# Patient Record
Sex: FEMALE | Race: OTHER | NOT HISPANIC OR LATINO | URBAN - METROPOLITAN AREA
[De-identification: names, ages, dates, MRNs, and addresses within clinical notes are randomized per-mention and may not be internally consistent; named-entity substitution may affect disease eponyms.]

---

## 2021-01-26 ENCOUNTER — INPATIENT (INPATIENT)
Facility: HOSPITAL | Age: 61
LOS: 9 days | Discharge: LEFT BEFORE DISCHARGE | End: 2021-02-05
Attending: INTERNAL MEDICINE | Admitting: INTERNAL MEDICINE
Payer: MEDICARE

## 2021-01-26 VITALS
HEART RATE: 91 BPM | RESPIRATION RATE: 16 BRPM | DIASTOLIC BLOOD PRESSURE: 104 MMHG | SYSTOLIC BLOOD PRESSURE: 232 MMHG | OXYGEN SATURATION: 100 % | TEMPERATURE: 97 F

## 2021-01-26 DIAGNOSIS — I10 ESSENTIAL (PRIMARY) HYPERTENSION: ICD-10-CM

## 2021-01-26 DIAGNOSIS — E78.5 HYPERLIPIDEMIA, UNSPECIFIED: ICD-10-CM

## 2021-01-26 DIAGNOSIS — F41.9 ANXIETY DISORDER, UNSPECIFIED: ICD-10-CM

## 2021-01-26 DIAGNOSIS — I63.9 CEREBRAL INFARCTION, UNSPECIFIED: ICD-10-CM

## 2021-01-26 DIAGNOSIS — U07.1 COVID-19: ICD-10-CM

## 2021-01-26 DIAGNOSIS — Z98.51 TUBAL LIGATION STATUS: Chronic | ICD-10-CM

## 2021-01-26 DIAGNOSIS — E11.9 TYPE 2 DIABETES MELLITUS WITHOUT COMPLICATIONS: ICD-10-CM

## 2021-01-26 DIAGNOSIS — Z98.891 HISTORY OF UTERINE SCAR FROM PREVIOUS SURGERY: Chronic | ICD-10-CM

## 2021-01-26 DIAGNOSIS — Z98.890 OTHER SPECIFIED POSTPROCEDURAL STATES: Chronic | ICD-10-CM

## 2021-01-26 DIAGNOSIS — F20.9 SCHIZOPHRENIA, UNSPECIFIED: ICD-10-CM

## 2021-01-26 DIAGNOSIS — E87.1 HYPO-OSMOLALITY AND HYPONATREMIA: ICD-10-CM

## 2021-01-26 DIAGNOSIS — Z29.9 ENCOUNTER FOR PROPHYLACTIC MEASURES, UNSPECIFIED: ICD-10-CM

## 2021-01-26 LAB
ALBUMIN SERPL ELPH-MCNC: 3.6 G/DL — SIGNIFICANT CHANGE UP (ref 3.3–5)
ALP SERPL-CCNC: 92 U/L — SIGNIFICANT CHANGE UP (ref 40–120)
ALT FLD-CCNC: 16 U/L — SIGNIFICANT CHANGE UP (ref 4–33)
ANION GAP SERPL CALC-SCNC: 15 MMOL/L — HIGH (ref 7–14)
APPEARANCE UR: CLEAR — SIGNIFICANT CHANGE UP
APTT BLD: 31.5 SEC — SIGNIFICANT CHANGE UP (ref 27–36.3)
AST SERPL-CCNC: 25 U/L — SIGNIFICANT CHANGE UP (ref 4–32)
BASE EXCESS BLDV CALC-SCNC: -0.9 MMOL/L — SIGNIFICANT CHANGE UP (ref -3–2)
BASOPHILS # BLD AUTO: 0.01 K/UL — SIGNIFICANT CHANGE UP (ref 0–0.2)
BASOPHILS NFR BLD AUTO: 0.3 % — SIGNIFICANT CHANGE UP (ref 0–2)
BILIRUB SERPL-MCNC: 0.4 MG/DL — SIGNIFICANT CHANGE UP (ref 0.2–1.2)
BILIRUB UR-MCNC: NEGATIVE — SIGNIFICANT CHANGE UP
BUN SERPL-MCNC: 18 MG/DL — SIGNIFICANT CHANGE UP (ref 7–23)
CALCIUM SERPL-MCNC: 9.5 MG/DL — SIGNIFICANT CHANGE UP (ref 8.4–10.5)
CHLORIDE SERPL-SCNC: 96 MMOL/L — LOW (ref 98–107)
CO2 SERPL-SCNC: 23 MMOL/L — SIGNIFICANT CHANGE UP (ref 22–31)
COLOR SPEC: SIGNIFICANT CHANGE UP
CREAT SERPL-MCNC: 0.72 MG/DL — SIGNIFICANT CHANGE UP (ref 0.5–1.3)
DIFF PNL FLD: NEGATIVE — SIGNIFICANT CHANGE UP
EOSINOPHIL # BLD AUTO: 0 K/UL — SIGNIFICANT CHANGE UP (ref 0–0.5)
EOSINOPHIL NFR BLD AUTO: 0 % — SIGNIFICANT CHANGE UP (ref 0–6)
GLUCOSE BLDC GLUCOMTR-MCNC: 251 MG/DL — HIGH (ref 70–99)
GLUCOSE BLDC GLUCOMTR-MCNC: 333 MG/DL — HIGH (ref 70–99)
GLUCOSE SERPL-MCNC: 336 MG/DL — HIGH (ref 70–99)
GLUCOSE UR QL: ABNORMAL
HCO3 BLDV-SCNC: 23 MMOL/L — SIGNIFICANT CHANGE UP (ref 20–27)
HCT VFR BLD CALC: 41.6 % — SIGNIFICANT CHANGE UP (ref 34.5–45)
HGB BLD-MCNC: 13.6 G/DL — SIGNIFICANT CHANGE UP (ref 11.5–15.5)
IANC: 2.58 K/UL — SIGNIFICANT CHANGE UP (ref 1.5–8.5)
IMM GRANULOCYTES NFR BLD AUTO: 0.5 % — SIGNIFICANT CHANGE UP (ref 0–1.5)
INR BLD: 0.99 RATIO — SIGNIFICANT CHANGE UP (ref 0.88–1.16)
KETONES UR-MCNC: ABNORMAL
LEUKOCYTE ESTERASE UR-ACNC: ABNORMAL
LYMPHOCYTES # BLD AUTO: 0.95 K/UL — LOW (ref 1–3.3)
LYMPHOCYTES # BLD AUTO: 24.2 % — SIGNIFICANT CHANGE UP (ref 13–44)
MCHC RBC-ENTMCNC: 29.6 PG — SIGNIFICANT CHANGE UP (ref 27–34)
MCHC RBC-ENTMCNC: 32.7 GM/DL — SIGNIFICANT CHANGE UP (ref 32–36)
MCV RBC AUTO: 90.4 FL — SIGNIFICANT CHANGE UP (ref 80–100)
MONOCYTES # BLD AUTO: 0.36 K/UL — SIGNIFICANT CHANGE UP (ref 0–0.9)
MONOCYTES NFR BLD AUTO: 9.2 % — SIGNIFICANT CHANGE UP (ref 2–14)
NEUTROPHILS # BLD AUTO: 2.58 K/UL — SIGNIFICANT CHANGE UP (ref 1.8–7.4)
NEUTROPHILS NFR BLD AUTO: 65.8 % — SIGNIFICANT CHANGE UP (ref 43–77)
NITRITE UR-MCNC: NEGATIVE — SIGNIFICANT CHANGE UP
NRBC # BLD: 0 /100 WBCS — SIGNIFICANT CHANGE UP
NRBC # FLD: 0 K/UL — SIGNIFICANT CHANGE UP
PCO2 BLDV: 41 MMHG — SIGNIFICANT CHANGE UP (ref 41–51)
PH BLDV: 7.38 — SIGNIFICANT CHANGE UP (ref 7.32–7.43)
PH UR: 6.5 — SIGNIFICANT CHANGE UP (ref 5–8)
PLATELET # BLD AUTO: 135 K/UL — LOW (ref 150–400)
PO2 BLDV: 48 MMHG — HIGH (ref 35–40)
POTASSIUM SERPL-MCNC: 4.8 MMOL/L — SIGNIFICANT CHANGE UP (ref 3.5–5.3)
POTASSIUM SERPL-SCNC: 4.8 MMOL/L — SIGNIFICANT CHANGE UP (ref 3.5–5.3)
PROT SERPL-MCNC: 8.6 G/DL — HIGH (ref 6–8.3)
PROT UR-MCNC: ABNORMAL
PROTHROM AB SERPL-ACNC: 11.4 SEC — SIGNIFICANT CHANGE UP (ref 10.6–13.6)
RBC # BLD: 4.6 M/UL — SIGNIFICANT CHANGE UP (ref 3.8–5.2)
RBC # FLD: 14.3 % — SIGNIFICANT CHANGE UP (ref 10.3–14.5)
SAO2 % BLDV: 79.5 % — SIGNIFICANT CHANGE UP (ref 60–85)
SARS-COV-2 RNA SPEC QL NAA+PROBE: DETECTED
SODIUM SERPL-SCNC: 134 MMOL/L — LOW (ref 135–145)
SP GR SPEC: 1.02 — SIGNIFICANT CHANGE UP (ref 1.01–1.02)
TROPONIN T, HIGH SENSITIVITY RESULT: 19 NG/L — SIGNIFICANT CHANGE UP
UROBILINOGEN FLD QL: SIGNIFICANT CHANGE UP
WBC # BLD: 3.92 K/UL — SIGNIFICANT CHANGE UP (ref 3.8–10.5)
WBC # FLD AUTO: 3.92 K/UL — SIGNIFICANT CHANGE UP (ref 3.8–10.5)

## 2021-01-26 PROCEDURE — 0042T: CPT

## 2021-01-26 PROCEDURE — 71045 X-RAY EXAM CHEST 1 VIEW: CPT | Mod: 26

## 2021-01-26 PROCEDURE — 70496 CT ANGIOGRAPHY HEAD: CPT | Mod: 26

## 2021-01-26 PROCEDURE — 99291 CRITICAL CARE FIRST HOUR: CPT | Mod: CS

## 2021-01-26 PROCEDURE — 70498 CT ANGIOGRAPHY NECK: CPT | Mod: 26

## 2021-01-26 PROCEDURE — 70450 CT HEAD/BRAIN W/O DYE: CPT | Mod: 26,59

## 2021-01-26 PROCEDURE — 99223 1ST HOSP IP/OBS HIGH 75: CPT

## 2021-01-26 RX ORDER — ASPIRIN/CALCIUM CARB/MAGNESIUM 324 MG
81 TABLET ORAL DAILY
Refills: 0 | Status: DISCONTINUED | OUTPATIENT
Start: 2021-01-26 | End: 2021-01-29

## 2021-01-26 RX ORDER — DEXTROSE 50 % IN WATER 50 %
12.5 SYRINGE (ML) INTRAVENOUS ONCE
Refills: 0 | Status: DISCONTINUED | OUTPATIENT
Start: 2021-01-26 | End: 2021-02-05

## 2021-01-26 RX ORDER — CLOPIDOGREL BISULFATE 75 MG/1
300 TABLET, FILM COATED ORAL ONCE
Refills: 0 | Status: COMPLETED | OUTPATIENT
Start: 2021-01-26 | End: 2021-01-26

## 2021-01-26 RX ORDER — CLONAZEPAM 1 MG
1 TABLET ORAL
Refills: 0 | Status: DISCONTINUED | OUTPATIENT
Start: 2021-01-26 | End: 2021-02-02

## 2021-01-26 RX ORDER — SODIUM CHLORIDE 9 MG/ML
1000 INJECTION, SOLUTION INTRAVENOUS
Refills: 0 | Status: DISCONTINUED | OUTPATIENT
Start: 2021-01-26 | End: 2021-02-05

## 2021-01-26 RX ORDER — GLUCAGON INJECTION, SOLUTION 0.5 MG/.1ML
1 INJECTION, SOLUTION SUBCUTANEOUS ONCE
Refills: 0 | Status: DISCONTINUED | OUTPATIENT
Start: 2021-01-26 | End: 2021-02-05

## 2021-01-26 RX ORDER — INSULIN HUMAN 100 [IU]/ML
25 INJECTION, SOLUTION SUBCUTANEOUS
Qty: 0 | Refills: 0 | DISCHARGE

## 2021-01-26 RX ORDER — RISPERIDONE 4 MG/1
3 TABLET ORAL AT BEDTIME
Refills: 0 | Status: DISCONTINUED | OUTPATIENT
Start: 2021-01-26 | End: 2021-02-05

## 2021-01-26 RX ORDER — ATORVASTATIN CALCIUM 80 MG/1
80 TABLET, FILM COATED ORAL AT BEDTIME
Refills: 0 | Status: DISCONTINUED | OUTPATIENT
Start: 2021-01-26 | End: 2021-02-05

## 2021-01-26 RX ORDER — SODIUM CHLORIDE 9 MG/ML
1000 INJECTION, SOLUTION INTRAVENOUS
Refills: 0 | Status: DISCONTINUED | OUTPATIENT
Start: 2021-01-26 | End: 2021-01-26

## 2021-01-26 RX ORDER — LABETALOL HCL 100 MG
10 TABLET ORAL ONCE
Refills: 0 | Status: COMPLETED | OUTPATIENT
Start: 2021-01-26 | End: 2021-01-26

## 2021-01-26 RX ORDER — INSULIN LISPRO 100/ML
VIAL (ML) SUBCUTANEOUS
Refills: 0 | Status: DISCONTINUED | OUTPATIENT
Start: 2021-01-26 | End: 2021-01-26

## 2021-01-26 RX ORDER — CLOPIDOGREL BISULFATE 75 MG/1
75 TABLET, FILM COATED ORAL DAILY
Refills: 0 | Status: DISCONTINUED | OUTPATIENT
Start: 2021-01-27 | End: 2021-02-05

## 2021-01-26 RX ORDER — ENOXAPARIN SODIUM 100 MG/ML
40 INJECTION SUBCUTANEOUS DAILY
Refills: 0 | Status: DISCONTINUED | OUTPATIENT
Start: 2021-01-26 | End: 2021-02-05

## 2021-01-26 RX ORDER — TRAZODONE HCL 50 MG
200 TABLET ORAL AT BEDTIME
Refills: 0 | Status: DISCONTINUED | OUTPATIENT
Start: 2021-01-26 | End: 2021-02-05

## 2021-01-26 RX ORDER — DEXTROSE 50 % IN WATER 50 %
15 SYRINGE (ML) INTRAVENOUS ONCE
Refills: 0 | Status: DISCONTINUED | OUTPATIENT
Start: 2021-01-26 | End: 2021-02-05

## 2021-01-26 RX ORDER — DEXTROSE 50 % IN WATER 50 %
25 SYRINGE (ML) INTRAVENOUS ONCE
Refills: 0 | Status: DISCONTINUED | OUTPATIENT
Start: 2021-01-26 | End: 2021-02-05

## 2021-01-26 RX ORDER — BENZTROPINE MESYLATE 1 MG
2 TABLET ORAL AT BEDTIME
Refills: 0 | Status: DISCONTINUED | OUTPATIENT
Start: 2021-01-26 | End: 2021-02-05

## 2021-01-26 RX ORDER — INSULIN LISPRO 100/ML
VIAL (ML) SUBCUTANEOUS EVERY 6 HOURS
Refills: 0 | Status: DISCONTINUED | OUTPATIENT
Start: 2021-01-26 | End: 2021-01-27

## 2021-01-26 RX ORDER — QUETIAPINE FUMARATE 200 MG/1
0 TABLET, FILM COATED ORAL
Qty: 0 | Refills: 0 | DISCHARGE

## 2021-01-26 RX ORDER — CLONAZEPAM 1 MG
0 TABLET ORAL
Qty: 0 | Refills: 0 | DISCHARGE

## 2021-01-26 RX ORDER — ZOLPIDEM TARTRATE 10 MG/1
1 TABLET ORAL
Qty: 0 | Refills: 0 | DISCHARGE

## 2021-01-26 RX ADMIN — Medication 10 MILLIGRAM(S): at 11:36

## 2021-01-26 RX ADMIN — Medication 200 MILLIGRAM(S): at 23:56

## 2021-01-26 RX ADMIN — Medication 81 MILLIGRAM(S): at 12:22

## 2021-01-26 RX ADMIN — SODIUM CHLORIDE 125 MILLILITER(S): 9 INJECTION, SOLUTION INTRAVENOUS at 14:32

## 2021-01-26 RX ADMIN — ENOXAPARIN SODIUM 40 MILLIGRAM(S): 100 INJECTION SUBCUTANEOUS at 19:20

## 2021-01-26 RX ADMIN — RISPERIDONE 3 MILLIGRAM(S): 4 TABLET ORAL at 23:56

## 2021-01-26 RX ADMIN — Medication 4: at 19:20

## 2021-01-26 RX ADMIN — Medication 1 MILLIGRAM(S): at 19:07

## 2021-01-26 RX ADMIN — CLOPIDOGREL BISULFATE 300 MILLIGRAM(S): 75 TABLET, FILM COATED ORAL at 12:22

## 2021-01-26 RX ADMIN — Medication 2 MILLIGRAM(S): at 23:57

## 2021-01-26 RX ADMIN — ATORVASTATIN CALCIUM 80 MILLIGRAM(S): 80 TABLET, FILM COATED ORAL at 23:56

## 2021-01-26 NOTE — H&P ADULT - HISTORY OF PRESENT ILLNESS
59 y/o female with a PmHx of HTN, DM type II, HLD, YONAS presents to the ED accompanied by her son s/p episode of right sided weakness that occurred this morning. Son reports that he found the patient on the floor by her bed this morning. When he tried to help her up and back into the bed, he noticed that she had significant R sided weakness and R facial drooping, which prompted him to call EMS. A similar episode occurred last night but son states that he did not think that episode was emergent because she is usually weak at baseline (2/2 medications) and he did not notice any hemiparesis or other symptoms concerning for a stroke. He was able to assist her back into bed last night, but after the episode this morning, she was unable to bear any weight on the R leg or move her R extremities. She is usually able to ambulate but has not been able to ambulate since the episode this morning due to the R sided weakness. Patient was able to talk to the son after the episode and denied acute confusion or disorientation. Patient denies loss of consciousness or trauma, states she fell onto carpet. Admits to R leg pain that was a 7/10 when she fell this morning, but denies any pain now. Patient admits to weakness, fatigue, constipation (normal for her), palpitations (2/2 anxiety), and chronic blurry vision and tinnitus in the L ear that she attributes to the medications that she is taking. Denies dizziness, lightheadedness, fever, chills, sweats, weight loss, abdominal pain, n/v, diarrhea, dysuria, hematuria, blood in the stools. Further denies SOB, chest pain, orthopnea, LE pain or swelling, numbness or tingling, syncope, depression or SI.  61 y/o female with a PmHx of HTN, DM type II, HLD, YONAS presents to the ED accompanied by her son, Ruben, s/p episode of right sided weakness that occurred this morning. Son reports that he found the patient on the floor by her bed this morning. When he tried to help her up and back into the bed, he noticed that she had significant R sided weakness and R facial drooping, which prompted him to call EMS. A similar episode occurred last night but son states that he did not think that episode was emergent because she is usually weak at baseline (2/2 medications) and he did not notice any hemiparesis or other symptoms concerning for a stroke. He was able to assist her back into bed last night, but after the episode this morning, she was unable to bear any weight on the R leg or move her R extremities. She is usually able to ambulate but has not been able to ambulate since the episode this morning due to the R sided weakness. Patient was able to talk to the son after the episode and denied acute confusion or disorientation. Patient denies loss of consciousness or trauma, states she fell onto carpet. Admits to R leg pain that was a 7/10 when she fell this morning, but denies any pain now. Patient admits to weakness, fatigue, constipation (normal for her), palpitations (2/2 anxiety), and chronic blurry vision and tinnitus in the L ear that she attributes to the medications that she is taking. Also reports one episode of urinary incontinence that occurred when she experienced the hemiparesis this morning which has never happened before. Denies fecal incontinence, dizziness, lightheadedness, fever, chills, sweats, weight loss, abdominal pain, n/v, diarrhea, dysuria, hematuria, blood in the stools. Further denies SOB, chest pain, orthopnea, LE pain or swelling, numbness or tingling, syncope, depression or SI.  61 y/o female with a PmHx of HTN, DM type II, HLD, Generalized Anxiety D/O presents to the ED accompanied by her son, Ruben, s/p episode of right sided weakness that occurred this morning. Son reports that he found his mom on the floor by her bed this morning. When he tried to help her up and back into the bed, he noticed that she had significant R sided weakness and R facial drooping, which prompted him to call EMS. A similar episode occurred last night but son states that he did not think that episode was emergent because she is usually weak at baseline (2/2 medications) and he did not notice any hemiparesis or other symptoms concerning for a stroke. He was able to assist her back into bed last night.  After the episode this morning, she was unable to bear any weight on the R leg or move her R extremities. She is usually able to ambulate but has not been able to since the episode this morning.  Patient was able to talk to the son after the episode and denied acute confusion or disorientation. Patient denies LOC or trauma, states she fell onto carpet.  Pt admits to R leg pain that was a 7/10 when she fell this morning, but denies any pain now. Patient admits to weakness, fatigue, constipation (normal for her), palpitations (2/2 anxiety), and chronic blurry vision and tinnitus in the L ear that she attributes to the medications that she is taking. Also reports one episode of urinary incontinence that occurred when she experienced the hemiparesis this morning which has never happened before. Pt denies fecal incontinence, dizziness, lightheadedness, fever, chills, sweats, weight loss, abdominal pain, n/v, diarrhea, dysuria, hematuria, blood in the stools. Further denies SOB, chest pain, orthopnea, numbness or tingling, syncope, depression or SI.    61 y/o female with a PmHx of HTN, DM type II, HLD, Generalized Anxiety D/O presents to the ED accompanied by her son, Ruben, s/p episode of right sided weakness that occurred this morning. Son reports that he found his mom on the floor by her bed this morning. When he tried to help her up and back into the bed, he noticed that she had significant R sided weakness and R facial drooping with slurred speech, which prompted him to call EMS. A similar episode occurred last night but son states that he did not think that episode was emergent because she is usually weak at baseline (2/2 medications) and he did not notice any hemiparesis or other symptoms concerning for a stroke. He was able to assist her back into bed last night.  After the episode this morning, she was unable to bear any weight on the R leg or move her R extremities. She is usually able to ambulate but has not been able to since the episode this morning.  Patient was able to talk to the son after the episode and denied acute confusion or disorientation. Patient denies LOC or trauma, states she fell onto carpet.  Pt admits to R leg pain that was a 7/10 when she fell this morning, but denies any pain now. Patient admits to weakness, fatigue, constipation (normal for her), palpitations (2/2 anxiety), and chronic blurry vision and tinnitus in the L ear that she attributes to the medications that she is taking. Also reports one episode of urinary incontinence that occurred when she experienced the hemiparesis this morning which has never happened before. Pt denies fecal incontinence, dizziness, lightheadedness, fever, chills, sweats, weight loss, abdominal pain, n/v, diarrhea, dysuria, hematuria, blood in the stools. Further denies SOB, chest pain, orthopnea, numbness or tingling, syncope, depression or SI.    61 y/o female with a PmHx of HTN, DM type II, HLD, Generalized Anxiety D/O, Covid 4 weeks ago presents to the ED accompanied by her son, Ruben, s/p episode of right sided weakness that occurred this morning. Son reports that he found his mom on the floor by her bed this morning. When he tried to help her up and back into the bed, he noticed that she had significant R sided weakness and R facial drooping with slurred speech, which prompted him to call EMS. A similar episode occurred last night but son states that he did not think that episode was emergent because she is usually weak at baseline (2/2 medications) and he did not notice any hemiparesis or other symptoms concerning for a stroke. He was able to assist her back into bed last night.  After the episode this morning, she was unable to bear any weight on the R leg or move her R extremities. She is usually able to ambulate but has not been able to since the episode this morning.  Patient was able to talk to the son after the episode and denied acute confusion or disorientation. Patient denies LOC or trauma, states she fell onto carpet.  Pt admits to R leg pain that was a 7/10 when she fell this morning, but denies any pain now. Patient admits to weakness, fatigue, constipation (normal for her), palpitations (2/2 anxiety), and chronic blurry vision and tinnitus in the L ear that she attributes to the medications that she is taking. Also reports one episode of urinary incontinence that occurred when she experienced the hemiparesis this morning which has never happened before. Pt denies fecal incontinence, dizziness, lightheadedness, fever, chills, sweats, weight loss, abdominal pain, n/v, diarrhea, dysuria, hematuria, blood in the stools. Further denies SOB, chest pain, orthopnea, numbness or tingling, syncope, depression or SI.    61 y/o female with a PmHx of HTN, DM type II, HLD, Generalized Anxiety D/O, Covid 4 weeks ago presents to the ED accompanied by her son, Ruben, s/p episode of right sided weakness that occurred this morning. Son reports that he found his mom on the floor by her bed this morning. When he tried to help her up and back into the bed, he noticed that she had significant R sided weakness and R facial drooping with dysarthria which prompted him to call EMS. A similar episode occurred last night but son states that he did not think that episode was emergent because she is usually weak at baseline (2/2 medications) and he did not notice any hemiparesis or other symptoms concerning for a stroke. He was able to assist her back into bed last night.  After the episode this morning, she was unable to bear any weight on the R leg or move her R extremities. She is usually able to ambulate but has not been able to since the episode this morning.  Patient was able to talk to the son after the episode and denied acute confusion or disorientation. Patient denies LOC or trauma, states she fell onto carpet.  Pt admits to R leg pain that was a 7/10 when she fell this morning, but denies any pain now. Patient admits to weakness, fatigue, constipation (normal for her), palpitations (2/2 anxiety), and chronic blurry vision and tinnitus in the L ear that she attributes to the medications that she is taking. Also reports one episode of urinary incontinence that occurred when she experienced the hemiparesis this morning which has never happened before. Pt denies fecal incontinence, dizziness, lightheadedness, fever, chills, sweats, weight loss, abdominal pain, n/v, diarrhea, dysuria, hematuria, blood in the stools. Further denies SOB, chest pain, orthopnea, numbness or tingling, syncope, depression or SI.    61 y/o female with a PmHx of HTN, DM type II, HLD, Anxiety/Depression, Schizophrenia, Covid 4 weeks ago presents to the ED accompanied by her son, Ruben, s/p episode of right sided weakness that occurred this morning. Son reports that he found his mom on the floor by her bed this morning. When he tried to help her up and back into the bed, he noticed that she had significant R sided weakness and R facial drooping with dysarthria which prompted him to call EMS. A similar episode occurred last night but son states that he did not think that episode was emergent because she is usually weak at baseline (2/2 medications) and he did not notice any hemiparesis or other symptoms concerning for a stroke. He was able to assist her back into bed last night.  After the episode this morning, she was unable to bear any weight on the R leg or move her R extremities. She is usually able to ambulate but has not been able to since the episode this morning.  Patient was able to talk to the son after the episode and denied acute confusion or disorientation. Patient denies LOC or trauma, states she fell onto carpet.  Pt admits to R leg pain that was a 7/10 when she fell this morning, but denies any pain now. Patient admits to weakness, fatigue, constipation (normal for her), palpitations (2/2 anxiety), and chronic blurry vision and tinnitus in the L ear that she attributes to the medications that she is taking. Also reports one episode of urinary incontinence that occurred when she experienced the hemiparesis this morning which has never happened before. Pt denies fecal incontinence, dizziness, lightheadedness, fever, chills, sweats, weight loss, abdominal pain, n/v, diarrhea, dysuria, hematuria, blood in the stools. Further denies SOB, chest pain, orthopnea, numbness or tingling, syncope, depression or SI.

## 2021-01-26 NOTE — H&P ADULT - PROBLEM SELECTOR PLAN 4
continue home lovastatin  check lipid panel Continue Statin.  check lipid panel Continue Statin.  Check lipid panel

## 2021-01-26 NOTE — ED PROVIDER NOTE - CLINICAL SUMMARY MEDICAL DECISION MAKING FREE TEXT BOX
60F, h.o htn, dm, p.w right facial drooping and right hemiparesis, last normal 16 hrs ago. no cva in the past. baseline health prior. no abdominal pain, concerning for cva, stroke code was called. no fever, non tachycardiac, unlikely meningitis or encephalitis. bp 230 systolic, will get labetolol for htn control. out of tpa window, will get ct perfusion.

## 2021-01-26 NOTE — ED ADULT NURSE NOTE - VOIDING
[FreeTextEntry1] : melvi olvera g nsr wnl\par meds renewed\par  blood work done waiting for results\par  cardiac ststus is stable\par  without difficulty

## 2021-01-26 NOTE — CONSULT NOTE ADULT - SUBJECTIVE AND OBJECTIVE BOX
DONTE PETERSON  Female  MRN-[id number]    HPI:    60 RHF w/ DM, HTN, COVID 4 weeks ago, ?psychiatric issues unspecified, presents w/ speech difficulties and right sided weakness.     Patient was in usual state of health yesterday at 1900 1/25. At 2000 1/25, she was found down by her  with right sided weakness. She tried to sleep this off. Woke up this morning with worsening symptoms. Brought into ED.     Code stroke called on arrivial.   NIHSS 15  LKN 1900 1/25  No tpa as outside window  CTH demonstrated hypodensity involving the L. PL of the IC, otherwise no early signs of ischemia (to my eye)   CTA h/n   CTP         NIHSS:  MRS:     ROS: All negative except as mentioned in HPI    PAST MEDICAL & SURGICAL HISTORY:    MEDICATIONS  (STANDING):    MEDICATIONS  (PRN):    Allergies    No Known Allergies    Intolerances    VITAL SIGNS:  T(F): 97  HR: 91  BP: 232/104  RR: 16  SpO2: 100%    Exam:   MS: Oriented x3. Non-fluent speech making subtle paraphasic errors. Comprehension intact. Cannot repeat.   CN: Decreased BTT on the right. Moderate-severe R. facial. EOMI.   Motor: Right side no movement. Left side full.   Sensory: Slightly decreased sensation to LT on the right  Coordination: No dysmetria on FNF or ataxia on HTS bilaterally     LABS:                RADIOLOGY & ADDITIONAL STUDIES:             DONTE PETERSON  Female  MRN-[id number]    HPI:    60 RHF w/ DM, HTN, COVID 4 weeks ago, ?psychiatric issues unspecified, presents w/ speech difficulties and right sided weakness.     Patient was in usual state of health yesterday at 1900 1/25. At 2000 1/25, she was found down by her  with right sided weakness. She tried to sleep this off. Woke up this morning with worsening symptoms. Brought into ED.     Code stroke called on arrivial.   NIHSS 15  LKN 1900 1/25  No tpa as outside window  CTH demonstrated hypodensity involving the L. PL of the IC, otherwise no early signs of ischemia (to my eye)   CTA head: multifocal intracranial stenosis; Severe stenosis involving the supraclinoid L. ICA, bilateral M1 moderate stenosis, severe stenosis in the mid R. M1. R. V4 severe stenosis.   CTA neck: Severe 85-90% stenosis involving the R. ICA orgin.   CTP 0 core 0 tmax     NIHSS: 15  MRS: 0    ROS: All negative except as mentioned in HPI    PAST MEDICAL & SURGICAL HISTORY:    MEDICATIONS  (STANDING):    MEDICATIONS  (PRN):    Allergies    No Known Allergies    Intolerances    VITAL SIGNS:  T(F): 97  HR: 91  BP: 232/104  RR: 16  SpO2: 100%    Exam:   MS: Oriented x3. Non-fluent speech making subtle paraphasic errors. Comprehension intact. Cannot repeat.   CN: Decreased BTT on the right. Moderate-severe R. facial. EOMI.   Motor: Right side no movement. Left side full.   Sensory: Slightly decreased sensation to LT on the right  Coordination: No dysmetria on FNF or ataxia on HTS bilaterally     LABS:      RADIOLOGY & ADDITIONAL STUDIES:

## 2021-01-26 NOTE — H&P ADULT - PROBLEM SELECTOR PLAN 1
admit to Telemetry for monitoring  CT Head shows moderate atherosclerotic narrowing in various vascular beds  CT Neck shows 85-90% narrowing of ICA lumen  Chest Xray shows clear lungs  will order MRI  dysphagia screening  Neurology c/s  O2 via nasal cannula  PT/OT c/s  EP c/s  Fall risk and seizure precautions  Aspiration precautions  ASA daily Admit to Telemetry for monitoring  CT Head shows moderate atherosclerotic narrowing in various vascular beds  CT Neck shows 85-90% narrowing of ICA lumen  Will order MRI Head  dysphagia screening  Neurology c/s  O2 via nasal cannula  PT/OT c/s  Permissive HTN up to 24-48 hours.  Fall risk and seizure precautions  Aspiration precautions  ASA + Plavix daily. Admit to Telemetry for monitoring  CT Head shows moderate atherosclerotic narrowing in various vascular beds  CT Neck shows 85-90% narrowing of ICA lumen  Will order MRI Head without contrast  Carotid dopplers to evaluate carotid stenosis.  dysphagia screening  Neurology c/s  O2 via nasal cannula  PT/OT c/s  Permissive HTN up to 24-48 hours.  Fall risk and seizure precautions  Aspiration precautions  ASA + Plavix daily. Admit to Telemetry for monitoring.  CT Head shows moderate atherosclerotic narrowing in various vascular beds.  CT Neck shows 85-90% narrowing of ICA lumen  Will order MRI Head without contrast.  Carotid dopplers to evaluate carotid stenosis.  Dysphagia screening  Neurology c/s  O2 via nasal cannula  PT/OT c/s  Permissive HTN up to 24-48 hours.  Fall risk and seizure precautions  Aspiration precautions.  ASA + Plavix daily for 3 months. Plavix load given. High-dose statin.   TTE with Bubble study. Admit to Telemetry for monitoring.  CT Head shows moderate atherosclerotic narrowing in various vascular beds.  CTA NECK: There is hemodynamically significant narrowing of the proximal right ICA immediately above the bifurcation with 85-90% narrowing of its lumen.  Will order MRI Head without contrast.  Carotid dopplers to evaluate carotid stenosis.  Dysphagia screening  Neurology c/s  O2 via nasal cannula  PT/OT c/s  Permissive HTN up to 24-48 hours.  Fall risk and seizure precautions  Aspiration precautions.  ASA + Plavix daily for 3 months. Plavix load given. High-dose statin.   TTE with Bubble study.

## 2021-01-26 NOTE — H&P ADULT - PROBLEM SELECTOR PLAN 2
monitor HgA1c  POC glucose monitoring monitor HgA1c  Monitor FS with Insulin Sliding Scale.   Continue........ monitor HgA1c  Monitor FS with Insulin Sliding Scale.   Continue Insulin. monitor HgA1c  Monitor FS with Insulin Sliding Scale.   Pt takes Humulin R 30 units before breakfast and QHS, converts to Lispro 20 units TID here but currently NPO, discussed with pharmacy, will use Sliding Scale q 6 hrs for now till she is able to eat.

## 2021-01-26 NOTE — ED ADULT NURSE NOTE - OBJECTIVE STATEMENT
pt brought by son found on her Bed rm floor, as per son pt slept in floor since yest, her  was not able to lift pt up, pt awake, alert, slur speech, right side arm/leg weakness, as per pt does not remember episode, no obvious bruising, no ecchymosis noted to body, pt denies HA, no blur vision, no diff. breathing, lungs clear, resp. equal. resp. 16-18b/min, Oxygen Sat. 07-99% RA, EKG NSR, IV to left Lat. Ac 18g, angio cath.   CT done as soon the pt arrive, Neurology at bed side, labs sent, medicated as ordered.    report to primary RN.      Celeste Hannah RN   (facilitator)

## 2021-01-26 NOTE — H&P ADULT - RS GEN PE MLT RESP DETAILS PC
normal/airway patent/breath sounds equal/good air movement/respirations non-labored/clear to auscultation bilaterally/no chest wall tenderness/no rales/no rhonchi/no wheezes

## 2021-01-26 NOTE — H&P ADULT - PROBLEM SELECTOR PLAN 3
currently 201/98, labetalol injectable given   normal EKG (NSR @ 93 bpm, normal EKG)  Troponin ordered  bedside cardiac monitoring currently 201/98, Labetalol given.   Permissive HTN currently 201/98, Labetalol given.   Permissive HTN up to 24-48 hours. Currently 201/98, Labetalol given.   Permissive HTN up to 24-48 hours.

## 2021-01-26 NOTE — ED PROVIDER NOTE - PHYSICAL EXAMINATION
General: NAD, good hygiene, well developed  HENT: Atraumatic, EOMI, no conjunctivae injection, moist mucosa.  Neck: normal ROM and trachea midline   Cardiovascular: RRR, S1&2, no M or R, radial pulses equal and b/l  Respiratory: CTABL, no wheezes or crackles, no decreased breath sounds  Abdominal: soft and non-tender non distended, neg for guarding, frias's sign, rovsing's sign, mcburney's sign, no CVA tenderness   Extremities: no edema of the legs/feet, DP/PT equal b/l  Skin: warm, well perfused  Neuro: right facial drooping, AOX3, EOMI. PERRLA, 2/5 strength in UE and LE on the right, unable to perform finger to nose on the right, Sensation intact in UE/LE b/l. + pronator drift on the right, gait deferred.   Psych: normal mood and affect General: NAD, good hygiene, well developed ill appearing  HENT: Atraumatic, EOMI, no conjunctivae injection, moist mucosa.  Neck: normal ROM and trachea midline   Cardiovascular: RRR, S1&2, no M or R, radial pulses equal and b/l  Respiratory: CTABL, no wheezes or crackles, no decreased breath sounds  Abdominal: soft and non-tender non distended, neg for guarding, frias's sign, rovsing's sign, mcburney's sign, no CVA tenderness   Extremities: no edema of the legs/feet, DP/PT equal b/l  Skin: warm, well perfused  Neuro: right facial drooping, AOX3, EOMI. PERRLA, 2/5 strength in UE and LE on the right, unable to perform finger to nose on the right, Sensation intact in UE/LE b/l. + pronator drift on the right, gait deferred.   Psych: normal mood and affect

## 2021-01-26 NOTE — H&P ADULT - PROBLEM SELECTOR PLAN 9
Pt had Covid 4 weeks ago and swab is positive.   Will keep pt in Isolation. Pt had Covid 4 weeks ago and swab is positive here. As per family, no paperwork regarding her positive covid test from 4 weeks ago.   Will keep pt in Isolation.

## 2021-01-26 NOTE — CONSULT NOTE ADULT - I HAVE PERSONALLY SEEN, EXAMINED, AND PARTICIPATED IN THE CARE OF THIS PATIENT.  I HAVE REVIEWED ALL PERTINENT CLINICAL INFORMATION, INCLUDING HISTORY, PHYSICAL EXAM, PLAN AND THE MEDICAL/PA/NP STUDENT’S NOTE AND AGREE EXCEPT AS NOTED.
Physician Discharge Summary     Patient ID:  Alicia Atwood  [id number]  30 y.o.  1981    Allergies: Other food    Admit Date: 8/31/2018    Discharge Date: 9/1/2018    * Admission Diagnoses: APPENDICITIS;Appendicitis    * Discharge Diagnoses:    Hospital Problems as of 9/1/2018  Date Reviewed: 8/31/2018          Codes Class Noted - Resolved POA    Appendicitis ICD-10-CM: K37  ICD-9-CM: 973  9/1/2018 - Present Unknown               Admission Condition: Fair    * Discharge Condition: improved    * Procedures: Procedure(s):  [address] [facility] [place] Hospital Course:   Normal hospital course for this procedure. Consults: None    Significant Diagnostic Studies: radiology: CT scan: appendicitis     * Disposition: Home    Discharge Medications:   Current Discharge Medication List      START taking these medications    Details   oxyCODONE-acetaminophen (PERCOCET) 5-325 mg per tablet Take 1-2 Tabs by mouth every six (6) hours as needed for Pain. Max Daily Amount: 8 Tabs. Qty: 20 Tab, Refills: 0    Associated Diagnoses: Acute appendicitis, unspecified acute appendicitis type             * Follow-up Care/Patient Instructions:   Activity: See surgical instructions  Diet: Resume previous diet  Wound Care: As directed    Follow-up Information     Follow up With Details Comments Ul. Mari Russell MD In 10 days For wound re-check [facility]  [address] [place] [address] 1306 [facility], [address] [place] [address]  [phone]              Signed:  Mariana Grimes MD  9/1/2018  1:08 PM Statement Selected no cyanosis

## 2021-01-26 NOTE — ED PROVIDER NOTE - NS ED ROS FT
GENERAL: No fever or chills, weight changes, nightsweats  EYES: no change in vision  HEENT: no dysplasia, odynophagia, ear pain, rhinorrhea, epistasis   CARDIAC: no chest pain, palpitation   PULMONARY: no productive cough or SOB  GI: no abdominal pain, no nausea or no vomiting, no diarrhea or constipation  : No changes in urination for pain/freq.   SKIN: no rashes, abnormal bruising or bleeding  NEURO: HPI   MSK: No joint pain

## 2021-01-26 NOTE — PATIENT PROFILE ADULT - HEALTH LITERACY
Melyssa Sharma MD - Primary         Assistant Staff:       Surgical Staff:  Circ-1: Charles Long RN  Scrub Tech-1: Florina Notice Duane  Event Time In   Incision Start 1231   Incision Close      Anesthesia: MAC   Estimated Blood Loss: MINIMAL  Specimens: * No specimens in log *   Findings: SEE DICTATION   Complications: NONE  Implants: * No implants in log *
no

## 2021-01-26 NOTE — ED PROVIDER NOTE - ATTENDING CONTRIBUTION TO CARE
I performed a face to face evaluation of this patient and performed a full history and physical examination on the patient.  I agree with the resident's history, physical examination, and plan of the patient.  60F, h.o htn, dm, p.w right facial drooping and right hemiparesis, last normal 16 hrs ago. no cva in the past. baseline health prior. no abdominal pain, concerning for cva, stroke code was called. no fever, non tachycardiac, unlikely meningitis or encephalitis. bp 230 systolic, will get labetolol for htn control. out of tpa window, will get ct perfusion.  Heart s1,s2 rrr lung cta abd soft nontender, right side hemiparesis, 2/5 strength throughtout, code stroke called and CT, CTA, perfusion studies done, ekg, labs and will need admission

## 2021-01-26 NOTE — CONSULT NOTE ADULT - ASSESSMENT
Impression: Expressive aphasia + RHH + R. hemiparesis + R. hemisensory loss consistent w/ L. MCA.    60 RHF w/ DM, HTN, COVID 4 weeks ago, ?psychiatric issues unspecified, presents w/ dysarthria and R. hemiapresis. No tpa as outside window. No MT as no LVO.     Code stroke called on arrivial.   NIHSS 15  LKN 1900   No tpa as outside window  CTH demonstrated hypodensity involving the L. Putamen and PL of the IC, otherwise no early signs of ischemia (to my eye). R. posteiror portion of the PL of the internal capsule.   CTA head: multifocal intracranial stenosis; Severe stenosis involving the supraclinoid L. ICA, bilateral M1 moderate stenosis, severe stenosis in the mid R. M1. R. V4 severe stenosis.   CTA neck: Severe 85-90% stenosis involving the R. ICA orgin.   CTP 0 core 0 tmax     Impression:  R. hemiparesis w/ dysarthria consistent w/ L. brain dysfunction, likely localizing to the subcortical or pontine structures. Mechanism likely secondary to Intracranial Atherosclerotic Disease (ICAD), probably related to branched  occlusion, vs. Small Vessel Disease (). The patient also has evidence of severe R. proximal ICA disease, with possible chronic infarct in the R. anterior choroidal distribution, which will make the carotid symptomatic, although these infarcts can also be related to ICAD, or even . Patient may benefit from early revascularization (within 2 weeks), with a possible R. carotid stent, especially since a stroke in the R. hemisphere can lead her to be quadriparetic.     Plan:   [] Keep BP permissive up to 220/110 for 48 hours followed by gradual normotension over 2-3 days   [] MRI brain without contrast   [] Carotid Dopplers to verify degree of carotid stenosis   [] TTE w/ bubble   [] Unlikely to need ILR as patient has convincing stroke mechanism  [] Load Plavix 300mg now; Start ASA 81mg + Plavix 75mg for 3 months, followed by ASA indefinitely per SHIV   [] Atorvastatin 80mg (titrate to LDL < 70)   [] A1c, LDL   [] Lovenox SQ for DVT prophylaxis   [] PT/OT; S/S evaluation   [] Will discuss case with Stroke attending and Interventionalist Dr. Guerrier. If she needs a stent, he will be performing it.

## 2021-01-26 NOTE — H&P ADULT - NEGATIVE GASTROINTESTINAL SYMPTOMS
no nausea/no vomiting/no diarrhea/no constipation/no change in bowel habits/no abdominal pain/no hematochezia/no jaundice

## 2021-01-26 NOTE — H&P ADULT - ASSESSMENT
59 y/o female with a PmHx of HTN, DM type II, HLD, YONAS presents to the ED accompanied by her son s/p episode of right sided weakness that occurred this morning. 59 y/o female with a PmHx of HTN, DM type II, HLD, Anxiety d/o presents to the ED accompanied by her son s/p episode of right sided weakness, R facial droop that occurred this morning. 61 y/o female with a PmHx of HTN, DM type II, HLD, Anxiety d/o presents to the ED accompanied by her son s/p episode of right sided weakness, R facial droop, dysarthria that occurred this morning.

## 2021-01-26 NOTE — STROKE CODE NOTE - NIH STROKE SCALE: 6B. MOTOR LEG, RIGHT, QM
Astigmatism / Hyperopia / Presbyopia OU - Discussed diagnosis in detail with patient- New Glasses RX given today- Continue to monitor- RTC 6 months ARMD with OCT MAC NIDDM - Discussed diagnosis in detail with patient- Stressed importance of good blood sugar control- Recommend no soda’s- No diabetic retinopathy seen on todays exam- Patient reports last A1C was 7 and last blood sugar was 195- Letter to Dr. Gavin Clifton 	- Continue to [address] [place] [address]- Discussed diagnosis in detail with patient- Discussed signs and symptoms of progression- Discussed UV protection- No treatment needed at this time - Continue to monitorARMD OU (Very Early) - Discussed diagnosis in detail with patient- Recommend  patient continue eye vitamins daily such as Preservision. Patient states that he has not been taking. Recommend starting. Sample given 1/31/20- Recommend that patient continue following the [address] patient to call or come into the office ASAP if any changes noted from today. Grid given with instructions on how to use 1/28/18- Continue to monitorPVD OU:-  Discussed findings of exam in detail with the patient. -  The risk of retinal detachment in patients with PVDs was discussed with the patient and the warning signs of retinal detachment were carefully reviewed with the patient. -  The patient was warned to return to the office or contact the ophthalmologist on call immediately if they experience signs of retinal detachment or changes in vision noted from today. -  Continue to monitor.  USMAN OU / Curlene Simper OU- Discussed diagnosis in detail with patient- Discussed signs and symptoms of progression- Recommend patient drinking plenty of water and starting Omega 3’s - Recommend Refresh or Systane Complete  throughout the day- Consider Restasis or plugs in the future if no improvement- Continue to monitorTrichalasis OD - Discussed diagnosis in detail with patient - Patient stable at this time - Continue to monitor; Dr's Notes: PCP - Gavin Clifton [facility]  1/28/19DFE  1/28/19[facility] 7/29/19OCT  07/23/2018[facility]  7/11/16 (4) No movement

## 2021-01-26 NOTE — H&P ADULT - ATTENDING COMMENTS
59 y/o female with PMH of HTN, DM type II, HLD, Anxiety, schizophrenia who presented to the ED for R sided weakness, facial droop, dysarthria that started this AM. Code stroke was called in the ED but patient was not a candidate for TPA. Found to have significant R ICA disease on imaging. CTH reviewed by neurology – apart from some hypodensity no early signs of ischemia. SBP ranged between 200-230, diastolic BP between  with vitals otherwise stable. On exam, patient appears mildly anxious, 4/5 strength on the L side, able to only move fingers and toes on the R. + R facial droop. Labs notable for hyperglycemia causing pseudohyponatremia, COVID +. Chest xray with no consolidation or effusion. EKG NSR with no acute ST changes, .    Plan:  - Neurology cs appreciated, MRI brain, carotid dopplers, echo w/ bubble study ordered  - Continue aspirin, Plavix, statin holding home clonidine and lisinopril for 48 hours for permissive HTN up to 220/110  - NPO pending dysphagia screen, monitor BG q6h  - Can get Seroquel PRN if needed, QTC < 500  - Istop reviewed, reference # [id number]  As per family they were all COVID + 1 month ago but no documented testing. Given uncertainty of timeline maintain isolation precaution for now, obtain outpatient records if available.     Rx Written	Rx Dispensed	Drug	Quantity	Days Supply	Prescriber Name	Payment Method	Dispenser  12/30/2020	01/05/2021	zolpidem tartrate 5 mg tablet	60	30	Denny Samaniego MD	[id number]  12/30/2020	01/05/2021	clonazepam 1 mg tablet	60	30	Denny Samaniego MD	[id number] 61 y/o female with PMH of HTN, DM type II, HLD, Anxiety, schizophrenia who presented to the ED for R sided weakness, facial droop, dysarthria that started this AM. Code stroke was called in the ED but patient was not a candidate for TPA. Found to have significant R ICA disease on imaging. CTH reviewed by neurology – apart from some hypodensity no early signs of ischemia. SBP ranged between 200-230, diastolic BP between  with vitals otherwise stable. On exam, patient appears mildly anxious, 4/5 strength on the L side, able to only move fingers and toes on the R. + R facial droop. Labs notable for hyperglycemia causing pseudohyponatremia, COVID +. Chest xray with no consolidation or effusion. EKG NSR with no acute ST changes, .    Plan:  - Neurology cs appreciated, MRI brain, carotid dopplers, echo w/ bubble study ordered. To consider possible R ICA intervention   - Continue aspirin, Plavix, statin holding home clonidine and lisinopril for 48 hours for permissive HTN up to 220/110  - NPO pending dysphagia screen, monitor BG q6h  - Can get Seroquel PRN if needed, QTC < 500  - Istop reviewed, reference # [id number]  As per family they were all COVID + 1 month ago but no documented testing. Given uncertainty of timeline maintain isolation precaution for now, obtain outpatient records if available.     Rx Written	Rx Dispensed	Drug	Quantity	Days Supply	Prescriber Name	Payment Method	Dispenser  12/30/2020	01/05/2021	zolpidem tartrate 5 mg tablet	60	30	Denny Samaniego MD	[id number]  12/30/2020	01/05/2021	clonazepam 1 mg tablet	60	30	Denny Samaniego MD	[id number]

## 2021-01-26 NOTE — ED ADULT TRIAGE NOTE - CHIEF COMPLAINT QUOTE
Covid+ 4 weeks ago.  Pt found by spouse this am with aphasia and right hemiparesis.  Last normal 10pm last night

## 2021-01-26 NOTE — H&P ADULT - NSICDXPASTMEDICALHX_GEN_ALL_CORE_FT
PAST MEDICAL HISTORY:  Anxiety     Hypercholesteremia     Hypertension     Type 2 diabetes mellitus     Uterine fibroid

## 2021-01-26 NOTE — ED PROVIDER NOTE - OBJECTIVE STATEMENT
60F, h.o anxiety disorder, HTN, DM, tubal ligation p.w right hemiparesis and right facial drooping with aphasia. last normal 16hrs ago. patient was found on the ground this am and son notice right sided weakness while helping her get on her bed. patient spend most of the time bedbound but able to ambulate. no change in meds.   clonazepam 1mg, clonidine .2, enalapril 20mg, lovastain 20, seroquel 200mg, risperdal 3mg

## 2021-01-26 NOTE — H&P ADULT - GASTROINTESTINAL DETAILS
normal/soft/nontender/no distention/no masses palpable/bowel sounds normal/no rebound tenderness/no guarding/no rigidity

## 2021-01-26 NOTE — H&P ADULT - MOTOR
Finger to nose, heel to shin normal on left without dysmetria. Unable to assess on the R 2/2 ongoing R sided weakness  LUE/LLE strengths 5/5 B/L, unable to assess RUE/RLE strengths 2/2 weakness Finger to nose, heel to shin normal on left without dysmetria. Unable to move Right side.   LUE/LLE strengths 5/5 B/L, unable to move RUE/RLE.

## 2021-01-27 LAB
ALBUMIN SERPL ELPH-MCNC: 3.1 G/DL — LOW (ref 3.3–5)
ALP SERPL-CCNC: 80 U/L — SIGNIFICANT CHANGE UP (ref 40–120)
ALT FLD-CCNC: 13 U/L — SIGNIFICANT CHANGE UP (ref 4–33)
ANION GAP SERPL CALC-SCNC: 12 MMOL/L — SIGNIFICANT CHANGE UP (ref 7–14)
AST SERPL-CCNC: 14 U/L — SIGNIFICANT CHANGE UP (ref 4–32)
BASOPHILS # BLD AUTO: 0 K/UL — SIGNIFICANT CHANGE UP (ref 0–0.2)
BASOPHILS NFR BLD AUTO: 0 % — SIGNIFICANT CHANGE UP (ref 0–2)
BILIRUB SERPL-MCNC: 0.6 MG/DL — SIGNIFICANT CHANGE UP (ref 0.2–1.2)
BUN SERPL-MCNC: 14 MG/DL — SIGNIFICANT CHANGE UP (ref 7–23)
CALCIUM SERPL-MCNC: 9 MG/DL — SIGNIFICANT CHANGE UP (ref 8.4–10.5)
CHLORIDE SERPL-SCNC: 94 MMOL/L — LOW (ref 98–107)
CHOLEST SERPL-MCNC: 280 MG/DL — HIGH
CO2 SERPL-SCNC: 26 MMOL/L — SIGNIFICANT CHANGE UP (ref 22–31)
CREAT SERPL-MCNC: 1.01 MG/DL — SIGNIFICANT CHANGE UP (ref 0.5–1.3)
EOSINOPHIL # BLD AUTO: 0.02 K/UL — SIGNIFICANT CHANGE UP (ref 0–0.5)
EOSINOPHIL NFR BLD AUTO: 0.6 % — SIGNIFICANT CHANGE UP (ref 0–6)
GLUCOSE BLDC GLUCOMTR-MCNC: 229 MG/DL — HIGH (ref 70–99)
GLUCOSE BLDC GLUCOMTR-MCNC: 230 MG/DL — HIGH (ref 70–99)
GLUCOSE BLDC GLUCOMTR-MCNC: 344 MG/DL — HIGH (ref 70–99)
GLUCOSE BLDC GLUCOMTR-MCNC: 398 MG/DL — HIGH (ref 70–99)
GLUCOSE BLDC GLUCOMTR-MCNC: 430 MG/DL — HIGH (ref 70–99)
GLUCOSE SERPL-MCNC: 223 MG/DL — HIGH (ref 70–99)
HCT VFR BLD CALC: 36.5 % — SIGNIFICANT CHANGE UP (ref 34.5–45)
HCV AB S/CO SERPL IA: 0.23 S/CO — SIGNIFICANT CHANGE UP (ref 0–0.99)
HCV AB SERPL-IMP: SIGNIFICANT CHANGE UP
HDLC SERPL-MCNC: 45 MG/DL — LOW
HGB BLD-MCNC: 11.8 G/DL — SIGNIFICANT CHANGE UP (ref 11.5–15.5)
IANC: 2.4 K/UL — SIGNIFICANT CHANGE UP (ref 1.5–8.5)
IMM GRANULOCYTES NFR BLD AUTO: 0.3 % — SIGNIFICANT CHANGE UP (ref 0–1.5)
LIPID PNL WITH DIRECT LDL SERPL: 186 MG/DL — HIGH
LYMPHOCYTES # BLD AUTO: 0.88 K/UL — LOW (ref 1–3.3)
LYMPHOCYTES # BLD AUTO: 24.7 % — SIGNIFICANT CHANGE UP (ref 13–44)
MCHC RBC-ENTMCNC: 29.3 PG — SIGNIFICANT CHANGE UP (ref 27–34)
MCHC RBC-ENTMCNC: 32.3 GM/DL — SIGNIFICANT CHANGE UP (ref 32–36)
MCV RBC AUTO: 90.6 FL — SIGNIFICANT CHANGE UP (ref 80–100)
MONOCYTES # BLD AUTO: 0.25 K/UL — SIGNIFICANT CHANGE UP (ref 0–0.9)
MONOCYTES NFR BLD AUTO: 7 % — SIGNIFICANT CHANGE UP (ref 2–14)
NEUTROPHILS # BLD AUTO: 2.4 K/UL — SIGNIFICANT CHANGE UP (ref 1.8–7.4)
NEUTROPHILS NFR BLD AUTO: 67.4 % — SIGNIFICANT CHANGE UP (ref 43–77)
NON HDL CHOLESTEROL: 235 MG/DL — HIGH
NRBC # BLD: 0 /100 WBCS — SIGNIFICANT CHANGE UP
NRBC # FLD: 0 K/UL — SIGNIFICANT CHANGE UP
PLATELET # BLD AUTO: 157 K/UL — SIGNIFICANT CHANGE UP (ref 150–400)
POTASSIUM SERPL-MCNC: 4 MMOL/L — SIGNIFICANT CHANGE UP (ref 3.5–5.3)
POTASSIUM SERPL-SCNC: 4 MMOL/L — SIGNIFICANT CHANGE UP (ref 3.5–5.3)
PROT SERPL-MCNC: 7.4 G/DL — SIGNIFICANT CHANGE UP (ref 6–8.3)
RBC # BLD: 4.03 M/UL — SIGNIFICANT CHANGE UP (ref 3.8–5.2)
RBC # FLD: 14.6 % — HIGH (ref 10.3–14.5)
SODIUM SERPL-SCNC: 132 MMOL/L — LOW (ref 135–145)
TRIGL SERPL-MCNC: 244 MG/DL — HIGH
WBC # BLD: 3.56 K/UL — LOW (ref 3.8–10.5)
WBC # FLD AUTO: 3.56 K/UL — LOW (ref 3.8–10.5)

## 2021-01-27 PROCEDURE — 99232 SBSQ HOSP IP/OBS MODERATE 35: CPT

## 2021-01-27 PROCEDURE — 93880 EXTRACRANIAL BILAT STUDY: CPT | Mod: 26

## 2021-01-27 PROCEDURE — 93970 EXTREMITY STUDY: CPT | Mod: 26

## 2021-01-27 RX ORDER — INSULIN LISPRO 100/ML
20 VIAL (ML) SUBCUTANEOUS
Refills: 0 | Status: DISCONTINUED | OUTPATIENT
Start: 2021-01-27 | End: 2021-01-29

## 2021-01-27 RX ORDER — INSULIN LISPRO 100/ML
VIAL (ML) SUBCUTANEOUS
Refills: 0 | Status: DISCONTINUED | OUTPATIENT
Start: 2021-01-27 | End: 2021-01-29

## 2021-01-27 RX ORDER — ATORVASTATIN CALCIUM 80 MG/1
80 TABLET, FILM COATED ORAL AT BEDTIME
Refills: 0 | Status: DISCONTINUED | OUTPATIENT
Start: 2021-01-27 | End: 2021-01-27

## 2021-01-27 RX ORDER — INSULIN LISPRO 100/ML
VIAL (ML) SUBCUTANEOUS AT BEDTIME
Refills: 0 | Status: DISCONTINUED | OUTPATIENT
Start: 2021-01-27 | End: 2021-01-29

## 2021-01-27 RX ADMIN — Medication 200 MILLIGRAM(S): at 22:36

## 2021-01-27 RX ADMIN — Medication 3: at 22:36

## 2021-01-27 RX ADMIN — Medication 2: at 00:08

## 2021-01-27 RX ADMIN — ENOXAPARIN SODIUM 40 MILLIGRAM(S): 100 INJECTION SUBCUTANEOUS at 12:29

## 2021-01-27 RX ADMIN — Medication 1 MILLIGRAM(S): at 17:09

## 2021-01-27 RX ADMIN — Medication 2 MILLIGRAM(S): at 22:36

## 2021-01-27 RX ADMIN — RISPERIDONE 3 MILLIGRAM(S): 4 TABLET ORAL at 22:36

## 2021-01-27 RX ADMIN — Medication 4: at 17:09

## 2021-01-27 RX ADMIN — Medication 6: at 12:28

## 2021-01-27 RX ADMIN — CLOPIDOGREL BISULFATE 75 MILLIGRAM(S): 75 TABLET, FILM COATED ORAL at 12:29

## 2021-01-27 RX ADMIN — Medication 81 MILLIGRAM(S): at 12:29

## 2021-01-27 RX ADMIN — Medication 2: at 07:06

## 2021-01-27 RX ADMIN — Medication 1 MILLIGRAM(S): at 07:07

## 2021-01-27 RX ADMIN — ATORVASTATIN CALCIUM 80 MILLIGRAM(S): 80 TABLET, FILM COATED ORAL at 22:35

## 2021-01-27 NOTE — SWALLOW BEDSIDE ASSESSMENT ADULT - SWALLOW EVAL: DIAGNOSIS
Patient presents with OroPharyngeal Stage Dysphagia characterized by adequate oral containment, slow chewing for solid with ability to break down solid, adequate bolus manipulation and transfer for solids/puree; suspect premature loss/spillage for thin liquids. There is laryngeal elevation upon palpation, suspect delayed initiation of the pharyngeal swallow. There were overt signs of impaired airway protection for Thin Liquids evident by throat clearing response post intake for Thin Liquids. Patient tolerated Nectar Thick Liquids without overt signs of impaired airway protection.

## 2021-01-27 NOTE — PROGRESS NOTE ADULT - ASSESSMENT
61 y/o female with a PmHx of HTN, DM type II, HLD, Anxiety d/o presents to the ED accompanied by her son s/p episode of right sided weakness, R facial droop, dysarthria that occurred this morning.    acute CVA with right side hemiplegia   ICA stenosis does not correlate to acute presentation       plan:  continue telemetry monitoring   neurochecks q4hrs   MRi brain   echo with bubble study   neurology evaluation

## 2021-01-27 NOTE — SWALLOW BEDSIDE ASSESSMENT ADULT - ADDITIONAL RECOMMENDATIONS
Monitor PO intake/tolerance. Monitor for any evolving mental/neurological changes given stroke code.

## 2021-01-27 NOTE — PHYSICAL THERAPY INITIAL EVALUATION ADULT - PERTINENT HX OF CURRENT PROBLEM, REHAB EVAL
patient presents with CVA. PMH includes HTN, DM type II, HLD, Anxiety/Depression, Schizophrenia, Covid 4 weeks ago

## 2021-01-27 NOTE — SWALLOW BEDSIDE ASSESSMENT ADULT - COMMENTS
H&P - 59 y/o female with a PmHx of HTN, DM type II, HLD, Anxiety d/o presents to the ED accompanied by her son s/p episode of right sided weakness, R facial droop, dysarthria that occurred this morning.    Neuro Note - 60 RHF w/ DM, HTN, COVID 4 weeks ago, ?psychiatric issues unspecified, presents w/ dysarthria and R. hemiapresis. No tpa as outside window. No MT as no LVO. Impression:  R. hemiparesis w/ dysarthria consistent w/ L. brain dysfunction, likely localizing to the subcortical or pontine structures. Mechanism likely secondary to Intracranial Atherosclerotic Disease (ICAD), probably related to branched  occlusion, vs. Small Vessel Disease (). The patient also has evidence of severe R. proximal ICA disease, with possible chronic infarct in the R. anterior choroidal distribution, which will make the carotid symptomatic, although these infarcts can also be related to ICAD, or even . Patient may benefit from early revascularization (within 2 weeks), with a possible R. carotid stent, especially since a stroke in the R. hemisphere can lead her to be quadriparetic.    Patient seen OOB in chair. Patient is alert and oriented x3. Patient is able to follow commands and express simple needs. Patient's with slow/reduced  speech output but able to make simple wants/needs known.

## 2021-01-27 NOTE — PHYSICAL THERAPY INITIAL EVALUATION ADULT - IMPAIRMENTS CONTRIBUTING IMPAIRED BED MOBILITY, REHAB EVAL
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   Would like for [facility] to continue to intermittently check her blood pressure and continue to monitor trend- send BPs in for review in 1 week    Repeat BMP to reassess potassium   Continue on current dose of 12 5 mg for now (refills provided today) and if home BPs trend up or if BMP is abnormal, will adjust medications     Will transition to adult nephrology and plan for follow up in 4 months  HPI: Ysabel Story is a 23 y  o female who presents for follow up of   Chief Complaint   Patient presents with    Follow-up     Laurence has been doing well overall with no complaints  States that she feels overall well with her medication regimen  No missed doses of her hydrochlorothiazide  Intermittently checking her blood pressures at this time  Blood pressures on average have been 120s to 130s/ 70 to 80s  No complaints of dizziness  Had one migraine several days ago  No recent fevers or illnesses  Obtain a new job and is planning on starting school again this upcoming month with 18 credits this semester  Trying to stay well hydrated with HCTZ and denies any issues at this time  Recently had IUD placed for contraception control per [facility]  Review of Systems  Constitutional:   Negative for fevers, fatigue   HEENT: negative for rhinorrhea, congestion or sore throat  Respiratory: negative for cough or shortness of breath? ?  Cardiovascular: negative for chest pain, facial or lower extremity edema  Gastrointestinal: negative for abdominal pain, nausea, vomiting, diarrhea or constipation  Genitourinary: negative for dysuria, hematuria, urgency, frequency or foamy urine  Endocrine: negative for changes in weight  Musculoskeletal: negative for joint pain or swelling, back pain  Neurologic: negative for dizziness  See HPI  Hematologic: negative for bruising or bleeding  Integumentary: negative for rashes  Psychiatric/Behavioral: no behavioral changes    The remainder of review of systems as noted per HPI  ?           Past Medical History:   Diagnosis Date    Asthma     prn - not used recently    Contraception     Elevated glycated hemoglobin     History of asthma     History of UTI     3 times/yr - last 4/2018    Hypertension     chronic    Injury of foot, right     Injury of knee, right     Injury, ankle     Kidney stone     Late menses     Migraine     1-2 times/month    Obesity     Pain in finger of right hand     Pain in right wrist     Pseudohypoaldosteronism, type 2     Severe headache     Sprain and strain of deltoid (ligament) of ankle      Past Surgical History:   Procedure Laterality Date    TOOTH EXTRACTION      WISDOM TOOTH EXTRACTION        Family History   Problem Relation Age of Onset    Hypertension Mother     Nephrolithiasis Mother     No Known Problems Father     Hypertension Maternal Grandfather     Heart disease Maternal Grandfather     Hypertension Paternal Grandmother     Hypertension Paternal Grandfather     Heart disease Paternal Grandfather     Diabetes Maternal Uncle     Breast cancer Neg Hx         paternal cousin      Social History     Socioeconomic History    Marital status: Single     Spouse name: Not on file    Number of children: Not on file    Years of education: Not on file    Highest education level: Not on file   Occupational History    Not on file   Social Needs    Financial resource strain: Not on file   Langston-Delaney insecurity:     Worry: Not on file     Inability: Not on file    Transportation needs:     Medical: Not on file     Non-medical: Not on file   Tobacco Use    Smoking status: Never Smoker    Smokeless tobacco: Never Used   Substance and Sexual Activity    Alcohol use: No    Drug use: No    Sexual activity: Not Currently     Partners: Male   Lifestyle    Physical activity:     Days per week: Not on file     Minutes per session: Not on file    Stress: Not on file   Relationships    Social connections:     Talks on phone: Not on file     Gets together: Not on file     Attends Yarsani service: Not on file     Active member of club or organization: Not on file     Attends meetings of clubs or organizations: Not on file     Relationship status: Not on file    Intimate partner violence:     Fear of current or ex partner: Not on file     Emotionally abused: Not on file     Physically abused: Not on file     Forced sexual activity: Not on file   Other Topics Concern    Not on file   Social History Narrative    Not on file       Allergies   Allergen Reactions    Nitrofurantoin Hives     Reaction Date: 02Oct2017;     Pollen Extract Allergic Rhinitis        Current Outpatient Medications:     albuterol (PROVENTIL HFA,VENTOLIN HFA) 90 mcg/act inhaler, INHALE 1-2 PUFFS BY MOUTH 4 TIMES A DAY, Disp: , Rfl: 12    Blood Pressure Monitoring (BLOOD PRESSURE KIT) POLLY, 1 kit by Does not apply route as needed (for blood pressure checks), Disp: 1 Device, Rfl: 0    hydrochlorothiazide (HYDRODIURIL) 12 5 mg tablet, Take 1 tablet (12 5 mg total) by mouth daily, Disp: 90 tablet, Rfl: 3    levonorgestrel (KYLEENA) 19 5 MG intrauterine device, 1 Intra Uterine Device by Intrauterine route once, Disp: , Rfl:     ondansetron (ZOFRAN-ODT) 8 mg disintegrating tablet, as needed, Disp: , Rfl: 1    aspirin (ECOTRIN LOW STRENGTH) 81 mg EC tablet, Take 81 mg by mouth 2 (two) times a day  , Disp: , Rfl:     docusate sodium (COLACE) 100 mg capsule, Take 1 capsule (100 mg total) by mouth 2 (two) times a day (Patient not taking: Reported on 2/27/2019), Disp: 60 capsule, Rfl: 2    Ferrous Fumarate-Vitamin C (CRISTIAN-SEQUELS PO), Take 1 caplet by mouth daily, Disp: , Rfl:     hydrocortisone 1 % cream, Apply 1 application topically as needed for rash (after each bowel movement for skin irritation ) (Patient not taking: Reported on 2/27/2019), Disp: 30 g, Rfl: 0    ibuprofen (MOTRIN) 600 mg tablet, Take 1 tablet (600 mg total) by mouth every 6 (six) hours as needed for mild pain (Patient not taking: Reported on 7/15/2019), Disp: 30 tablet, Rfl: 1    Prenatal MV-Min-Fe Fum-FA-DHA (PRENATAL+DHA PO), Take 1 tablet by mouth daily, Disp: , Rfl:      Objective   Vitals:    07/30/19 0923   BP: 114/68   Pulse: 72     Height:5' 3" (1 6 m)  Weight:109 kg (241 lb)  BMI: Body mass index is 42 69 kg/m²      Physical Exam:  General: Obese  Awake, alert and in no acute distress  HEENT:  +Glasses  Normocephalic, atraumatic, pupils equally round and reactive to light, extraocular movement intact, conjunctiva clear with no discharge  Ears normally set with tympanic membranes visualized  Tympanic membranes without erythema or effusion and canals clear  Nares patent with no discharge  Mucous membranes moist and oropharynx is clear with no erythema or exudate present  Normal dentition  Chest: Normal without deformity  Neck: supple, symmetric with no masses, no cervical lymphadenopathy  Lungs: clear to auscultation bilaterally with no wheezes, rales or rhonchi  Cardiovascular:   Normal S1 and S2  No murmurs, rubs or gallops  Regular rate and rhythm  Abdomen:  Soft, nontender, and nondistended  Normoactive bowel sounds  No hepatosplenomegaly present  Genitourinary:  Deferred  Back:  Straight without deformity  Skin: warm and well perfused  No rashes present  Extremities:  No cyanosis, clubbing or edema    Pulses 2+ bilaterally  Musculoskeletal:   Full range of motion all four extremities  No joint swelling or tenderness noted  Neurologic: grossly normal neurologic exam with no deficits noted    Psychiatric: normal mood and affect     Lab Results:     Lab Results   Component Value Date    GLUCOSE 87 11/01/2015    CALCIUM 8 7 05/28/2019     11/01/2015    K 5 4 (H) 05/28/2019    CO2 24 05/28/2019     (H) 05/28/2019    BUN 11 05/28/2019    CREATININE 0 67 05/28/2019     Lab Results   Component Value Date    CALCIUM 8 7 05/28/2019    PHOS 3 9 10/21/2014         Imaging: none  Other Studies: none    All laboratory results and imaging was reviewed by me and summarized above    decreased strength

## 2021-01-27 NOTE — PROGRESS NOTE ADULT - SUBJECTIVE AND OBJECTIVE BOX
PROGRESS NOTE:     Patient is a 60y old  Female who presents with a chief complaint of R sided hemiparesis and facial droop (2021 15:16)    SUBJECTIVE / OVERNIGHT EVENTS:  reports right side weakness     ADDITIONAL REVIEW OF SYSTEMS:  denies cough fever or chills     MEDICATIONS  (STANDING):  aspirin enteric coated 81 milliGRAM(s) Oral daily  atorvastatin 80 milliGRAM(s) Oral at bedtime  benztropine 2 milliGRAM(s) Oral at bedtime  clonazePAM  Tablet 1 milliGRAM(s) Oral two times a day  clopidogrel Tablet 75 milliGRAM(s) Oral daily  dextrose 40% Gel 15 Gram(s) Oral once  dextrose 5%. 1000 milliLiter(s) (50 mL/Hr) IV Continuous <Continuous>  dextrose 5%. 1000 milliLiter(s) (100 mL/Hr) IV Continuous <Continuous>  dextrose 5%. 1000 milliLiter(s) (100 mL/Hr) IV Continuous <Continuous>  dextrose 50% Injectable 25 Gram(s) IV Push once  dextrose 50% Injectable 12.5 Gram(s) IV Push once  dextrose 50% Injectable 25 Gram(s) IV Push once  enoxaparin Injectable 40 milliGRAM(s) SubCutaneous daily  glucagon  Injectable 1 milliGRAM(s) IntraMuscular once  glucagon  Injectable 1 milliGRAM(s) IntraMuscular once  insulin lispro (ADMELOG) corrective regimen sliding scale   SubCutaneous every 6 hours  risperiDONE   Tablet 3 milliGRAM(s) Oral at bedtime  traZODone 200 milliGRAM(s) Oral at bedtime    MEDICATIONS  (PRN):      CAPILLARY BLOOD GLUCOSE      POCT Blood Glucose.: 344 mg/dL (2021 17:01)  POCT Blood Glucose.: 430 mg/dL (2021 11:51)  POCT Blood Glucose.: 230 mg/dL (2021 06:29)  POCT Blood Glucose.: 229 mg/dL (2021 23:48)  POCT Blood Glucose.: 251 mg/dL (2021 21:28)  POCT Blood Glucose.: 333 mg/dL (2021 19:16)    I&O's Summary    2021 07:01  -  2021 07:00  --------------------------------------------------------  IN: 0 mL / OUT: 1500 mL / NET: -1500 mL    PHYSICAL EXAM:  Vital Signs Last 24 Hrs  T(C): 37.1 (2021 17:18), Max: 37.1 (2021 07:04)  T(F): 98.7 (2021 17:18), Max: 98.7 (2021 07:04)  HR: 100 (2021 17:18) (91 - 100)  BP: 204/80 (2021 17:18) (152/66 - 207/91)  BP(mean): 110 (2021 17:18) (89 - 110)  RR: 18 (2021 17:18) (18 - 18)  SpO2: 96% (2021 17:18) (95% - 97%)    CONSTITUTIONAL: NAD, well-developed  mild dysarthria, right side facial droop, right hemiplegia 1/5 strength in upper and lower extremity   weak right hand grasp  left side intact strength 5/5    LABS:                        11.8   3.56  )-----------( 157      ( 2021 07:24 )             36.5     01-27    132<L>  |  94<L>  |  14  ----------------------------<  223<H>  4.0   |  26  |  1.01    Ca    9.0      2021 07:24    TPro  7.4  /  Alb  3.1<L>  /  TBili  0.6  /  DBili  x   /  AST  14  /  ALT  13  /  AlkPhos  80      PT/INR - ( 2021 13:29 )   PT: 11.4 sec;   INR: 0.99 ratio         PTT - ( 2021 13:29 )  PTT:31.5 sec      Urinalysis Basic - ( 2021 13:27 )    Color: Light Yellow / Appearance: Clear / S.025 / pH: x  Gluc: x / Ketone: Trace  / Bili: Negative / Urobili: <2 mg/dL   Blood: x / Protein: Trace / Nitrite: Negative   Leuk Esterase: Moderate / RBC: 0 /HPF / WBC 23 /HPF   Sq Epi: x / Non Sq Epi: 2 /HPF / Bacteria: Few      RADIOLOGY & ADDITIONAL TESTS:  Results Reviewed: y  Imaging Personally Reviewed:y  Electrocardiogram Personally Reviewed:y    COORDINATION OF CARE:  Care Discussed with Consultants/Other Providers [Y/N]:y  Prior or Outpatient Records Reviewed [Y/N]:y

## 2021-01-28 LAB
A1C WITH ESTIMATED AVERAGE GLUCOSE RESULT: 11.6 % — HIGH (ref 4–5.6)
ALBUMIN SERPL ELPH-MCNC: 3.1 G/DL — LOW (ref 3.3–5)
ALP SERPL-CCNC: 86 U/L — SIGNIFICANT CHANGE UP (ref 40–120)
ALT FLD-CCNC: 7 U/L — SIGNIFICANT CHANGE UP (ref 4–33)
ANION GAP SERPL CALC-SCNC: 11 MMOL/L — SIGNIFICANT CHANGE UP (ref 7–14)
AST SERPL-CCNC: 11 U/L — SIGNIFICANT CHANGE UP (ref 4–32)
BILIRUB SERPL-MCNC: 0.6 MG/DL — SIGNIFICANT CHANGE UP (ref 0.2–1.2)
BUN SERPL-MCNC: 17 MG/DL — SIGNIFICANT CHANGE UP (ref 7–23)
CALCIUM SERPL-MCNC: 8.9 MG/DL — SIGNIFICANT CHANGE UP (ref 8.4–10.5)
CHLORIDE SERPL-SCNC: 94 MMOL/L — LOW (ref 98–107)
CO2 SERPL-SCNC: 26 MMOL/L — SIGNIFICANT CHANGE UP (ref 22–31)
CREAT SERPL-MCNC: 1.04 MG/DL — SIGNIFICANT CHANGE UP (ref 0.5–1.3)
CRP SERPL-MCNC: 28.4 MG/L — HIGH
D DIMER BLD IA.RAPID-MCNC: 1180 NG/ML DDU — HIGH
ESTIMATED AVERAGE GLUCOSE: 286 MG/DL — HIGH (ref 68–114)
FERRITIN SERPL-MCNC: 445 NG/ML — HIGH (ref 15–150)
GLUCOSE BLDC GLUCOMTR-MCNC: 189 MG/DL — HIGH (ref 70–99)
GLUCOSE BLDC GLUCOMTR-MCNC: 223 MG/DL — HIGH (ref 70–99)
GLUCOSE SERPL-MCNC: 310 MG/DL — HIGH (ref 70–99)
HCT VFR BLD CALC: 35.3 % — SIGNIFICANT CHANGE UP (ref 34.5–45)
HGB BLD-MCNC: 11.3 G/DL — LOW (ref 11.5–15.5)
LDH SERPL L TO P-CCNC: 245 U/L — HIGH (ref 135–225)
MAGNESIUM SERPL-MCNC: 1.8 MG/DL — SIGNIFICANT CHANGE UP (ref 1.6–2.6)
MCHC RBC-ENTMCNC: 28.8 PG — SIGNIFICANT CHANGE UP (ref 27–34)
MCHC RBC-ENTMCNC: 32 GM/DL — SIGNIFICANT CHANGE UP (ref 32–36)
MCV RBC AUTO: 90.1 FL — SIGNIFICANT CHANGE UP (ref 80–100)
NRBC # BLD: 0 /100 WBCS — SIGNIFICANT CHANGE UP
NRBC # FLD: 0 K/UL — SIGNIFICANT CHANGE UP
PHOSPHATE SERPL-MCNC: 3.4 MG/DL — SIGNIFICANT CHANGE UP (ref 2.5–4.5)
PLATELET # BLD AUTO: 141 K/UL — LOW (ref 150–400)
POTASSIUM SERPL-MCNC: 3.8 MMOL/L — SIGNIFICANT CHANGE UP (ref 3.5–5.3)
POTASSIUM SERPL-SCNC: 3.8 MMOL/L — SIGNIFICANT CHANGE UP (ref 3.5–5.3)
PROCALCITONIN SERPL-MCNC: 0.06 NG/ML — SIGNIFICANT CHANGE UP (ref 0.02–0.1)
PROT SERPL-MCNC: 7.3 G/DL — SIGNIFICANT CHANGE UP (ref 6–8.3)
RBC # BLD: 3.92 M/UL — SIGNIFICANT CHANGE UP (ref 3.8–5.2)
RBC # FLD: 14.5 % — SIGNIFICANT CHANGE UP (ref 10.3–14.5)
SODIUM SERPL-SCNC: 131 MMOL/L — LOW (ref 135–145)
WBC # BLD: 3.68 K/UL — LOW (ref 3.8–10.5)
WBC # FLD AUTO: 3.68 K/UL — LOW (ref 3.8–10.5)

## 2021-01-28 PROCEDURE — 70551 MRI BRAIN STEM W/O DYE: CPT | Mod: 26

## 2021-01-28 PROCEDURE — 99232 SBSQ HOSP IP/OBS MODERATE 35: CPT

## 2021-01-28 RX ADMIN — Medication 20 UNIT(S): at 08:00

## 2021-01-28 RX ADMIN — Medication 3: at 08:01

## 2021-01-28 RX ADMIN — Medication 81 MILLIGRAM(S): at 12:49

## 2021-01-28 RX ADMIN — Medication 20 UNIT(S): at 17:33

## 2021-01-28 RX ADMIN — ENOXAPARIN SODIUM 40 MILLIGRAM(S): 100 INJECTION SUBCUTANEOUS at 12:49

## 2021-01-28 RX ADMIN — Medication 200 MILLIGRAM(S): at 22:09

## 2021-01-28 RX ADMIN — Medication 1 MILLIGRAM(S): at 06:41

## 2021-01-28 RX ADMIN — Medication 1: at 17:33

## 2021-01-28 RX ADMIN — Medication 20 UNIT(S): at 12:47

## 2021-01-28 RX ADMIN — ATORVASTATIN CALCIUM 80 MILLIGRAM(S): 80 TABLET, FILM COATED ORAL at 22:09

## 2021-01-28 RX ADMIN — Medication 2: at 12:48

## 2021-01-28 RX ADMIN — Medication 1 MILLIGRAM(S): at 17:37

## 2021-01-28 RX ADMIN — RISPERIDONE 3 MILLIGRAM(S): 4 TABLET ORAL at 22:09

## 2021-01-28 RX ADMIN — CLOPIDOGREL BISULFATE 75 MILLIGRAM(S): 75 TABLET, FILM COATED ORAL at 12:49

## 2021-01-28 RX ADMIN — Medication 2 MILLIGRAM(S): at 22:09

## 2021-01-28 NOTE — PROGRESS NOTE ADULT - SUBJECTIVE AND OBJECTIVE BOX
PROGRESS NOTE:     Patient is a 60y old  Female who presents with a chief complaint of R sided hemiparesis and facial droop (27 Jan 2021 18:04)    SUBJECTIVE / OVERNIGHT EVENTS:  reports odynophagia and discomfort with food, tolerating po intake  , right side weakness     ADDITIONAL REVIEW OF SYSTEMS:  MEDICATIONS  (STANDING):  aspirin enteric coated 81 milliGRAM(s) Oral daily  atorvastatin 80 milliGRAM(s) Oral at bedtime  benztropine 2 milliGRAM(s) Oral at bedtime  clonazePAM  Tablet 1 milliGRAM(s) Oral two times a day  clopidogrel Tablet 75 milliGRAM(s) Oral daily  dextrose 40% Gel 15 Gram(s) Oral once  dextrose 5%. 1000 milliLiter(s) (50 mL/Hr) IV Continuous <Continuous>  dextrose 5%. 1000 milliLiter(s) (100 mL/Hr) IV Continuous <Continuous>  dextrose 5%. 1000 milliLiter(s) (100 mL/Hr) IV Continuous <Continuous>  dextrose 50% Injectable 25 Gram(s) IV Push once  dextrose 50% Injectable 12.5 Gram(s) IV Push once  dextrose 50% Injectable 25 Gram(s) IV Push once  enoxaparin Injectable 40 milliGRAM(s) SubCutaneous daily  glucagon  Injectable 1 milliGRAM(s) IntraMuscular once  glucagon  Injectable 1 milliGRAM(s) IntraMuscular once  insulin lispro (ADMELOG) corrective regimen sliding scale   SubCutaneous three times a day before meals  insulin lispro (ADMELOG) corrective regimen sliding scale   SubCutaneous at bedtime  insulin lispro Injectable (ADMELOG) 20 Unit(s) SubCutaneous three times a day before meals  risperiDONE   Tablet 3 milliGRAM(s) Oral at bedtime  traZODone 200 milliGRAM(s) Oral at bedtime    MEDICATIONS  (PRN):    CAPILLARY BLOOD GLUCOSE      POCT Blood Glucose.: 223 mg/dL (28 Jan 2021 11:52)  POCT Blood Glucose.: 398 mg/dL (27 Jan 2021 21:56)  POCT Blood Glucose.: 344 mg/dL (27 Jan 2021 17:01)    I&O's Summary    27 Jan 2021 07:01  -  28 Jan 2021 07:00  --------------------------------------------------------  IN: 0 mL / OUT: 450 mL / NET: -450 mL        PHYSICAL EXAM:  Vital Signs Last 24 Hrs  T(C): 36.6 (28 Jan 2021 10:21), Max: 37.2 (27 Jan 2021 22:34)  T(F): 97.8 (28 Jan 2021 10:21), Max: 98.9 (27 Jan 2021 22:34)  HR: 102 (28 Jan 2021 10:21) (96 - 102)  BP: 147/66 (28 Jan 2021 10:21) (147/66 - 204/80)  BP(mean): 110 (27 Jan 2021 17:18) (110 - 110)  RR: 17 (28 Jan 2021 10:21) (16 - 18)  SpO2: 97% (28 Jan 2021 10:21) (96% - 97%)    awake, alert oriented , mild dysarthria , facial droop right side  right side hemiplegia     LABS:                        11.3   3.68  )-----------( 141      ( 28 Jan 2021 08:38 )             35.3     01-28    131<L>  |  94<L>  |  17  ----------------------------<  310<H>  3.8   |  26  |  1.04    Ca    8.9      28 Jan 2021 08:38  Phos  3.4     01-28  Mg     1.8     01-28    TPro  7.3  /  Alb  3.1<L>  /  TBili  0.6  /  DBili  x   /  AST  11  /  ALT  7   /  AlkPhos  86  01-28      RADIOLOGY & ADDITIONAL TESTS:  Results Reviewed: y  Imaging Personally Reviewed:y  Electrocardiogram Personally Reviewed:y    COORDINATION OF CARE:  Care Discussed with Consultants/Other Providers [Y/N]:y  Prior or Outpatient Records Reviewed [Y/N]:elisabeth

## 2021-01-28 NOTE — PROGRESS NOTE ADULT - ASSESSMENT
59 y/o female with a PmHx of HTN, DM type II, HLD, Anxiety d/o presents to the ED accompanied by her son s/p episode of right sided weakness, R facial droop, dysarthria that occurred this morning.    acute CVA with right side hemiplegia   ICA stenosis does not correlate to acute presentation       plan:  continue telemetry monitoring   neurochecks q4hrs   MRi brain   echo with bubble study    61 y/o female with a PmHx of HTN, DM type II, HLD, Anxiety d/o presents to the ED accompanied by her son s/p episode of right sided weakness, R facial droop, dysarthria that occurred this morning.    acute CVA with right side hemiplegia   ICA stenosis does not correlate to acute presentation   acute urinary retention , monitor Urine output, straight  cath as needed depending on bladder scan , insert hanna if fails management       plan:  continue telemetry monitoring   neurochecks q4hrs   MRi brain   echo with bubble study

## 2021-01-29 DIAGNOSIS — E78.5 HYPERLIPIDEMIA, UNSPECIFIED: ICD-10-CM

## 2021-01-29 DIAGNOSIS — E11.65 TYPE 2 DIABETES MELLITUS WITH HYPERGLYCEMIA: ICD-10-CM

## 2021-01-29 DIAGNOSIS — I10 ESSENTIAL (PRIMARY) HYPERTENSION: ICD-10-CM

## 2021-01-29 LAB
ANION GAP SERPL CALC-SCNC: 14 MMOL/L — SIGNIFICANT CHANGE UP (ref 7–14)
BUN SERPL-MCNC: 22 MG/DL — SIGNIFICANT CHANGE UP (ref 7–23)
CALCIUM SERPL-MCNC: 8.9 MG/DL — SIGNIFICANT CHANGE UP (ref 8.4–10.5)
CHLORIDE SERPL-SCNC: 95 MMOL/L — LOW (ref 98–107)
CO2 SERPL-SCNC: 23 MMOL/L — SIGNIFICANT CHANGE UP (ref 22–31)
CREAT SERPL-MCNC: 1.03 MG/DL — SIGNIFICANT CHANGE UP (ref 0.5–1.3)
GLUCOSE BLDC GLUCOMTR-MCNC: 143 MG/DL — HIGH (ref 70–99)
GLUCOSE BLDC GLUCOMTR-MCNC: 205 MG/DL — HIGH (ref 70–99)
GLUCOSE BLDC GLUCOMTR-MCNC: 350 MG/DL — HIGH (ref 70–99)
GLUCOSE BLDC GLUCOMTR-MCNC: 383 MG/DL — HIGH (ref 70–99)
GLUCOSE BLDC GLUCOMTR-MCNC: 397 MG/DL — HIGH (ref 70–99)
GLUCOSE BLDC GLUCOMTR-MCNC: 405 MG/DL — HIGH (ref 70–99)
GLUCOSE BLDC GLUCOMTR-MCNC: 409 MG/DL — HIGH (ref 70–99)
GLUCOSE BLDC GLUCOMTR-MCNC: 416 MG/DL — HIGH (ref 70–99)
GLUCOSE BLDC GLUCOMTR-MCNC: 464 MG/DL — CRITICAL HIGH (ref 70–99)
GLUCOSE SERPL-MCNC: 332 MG/DL — HIGH (ref 70–99)
HCT VFR BLD CALC: 37 % — SIGNIFICANT CHANGE UP (ref 34.5–45)
HGB BLD-MCNC: 11.9 G/DL — SIGNIFICANT CHANGE UP (ref 11.5–15.5)
MAGNESIUM SERPL-MCNC: 1.9 MG/DL — SIGNIFICANT CHANGE UP (ref 1.6–2.6)
MCHC RBC-ENTMCNC: 29.7 PG — SIGNIFICANT CHANGE UP (ref 27–34)
MCHC RBC-ENTMCNC: 32.2 GM/DL — SIGNIFICANT CHANGE UP (ref 32–36)
MCV RBC AUTO: 92.3 FL — SIGNIFICANT CHANGE UP (ref 80–100)
NRBC # BLD: 0 /100 WBCS — SIGNIFICANT CHANGE UP
NRBC # FLD: 0 K/UL — SIGNIFICANT CHANGE UP
PHOSPHATE SERPL-MCNC: 4 MG/DL — SIGNIFICANT CHANGE UP (ref 2.5–4.5)
PLATELET # BLD AUTO: 164 K/UL — SIGNIFICANT CHANGE UP (ref 150–400)
POTASSIUM SERPL-MCNC: 4 MMOL/L — SIGNIFICANT CHANGE UP (ref 3.5–5.3)
POTASSIUM SERPL-SCNC: 4 MMOL/L — SIGNIFICANT CHANGE UP (ref 3.5–5.3)
RBC # BLD: 4.01 M/UL — SIGNIFICANT CHANGE UP (ref 3.8–5.2)
RBC # FLD: 14.4 % — SIGNIFICANT CHANGE UP (ref 10.3–14.5)
SODIUM SERPL-SCNC: 132 MMOL/L — LOW (ref 135–145)
WBC # BLD: 3.92 K/UL — SIGNIFICANT CHANGE UP (ref 3.8–10.5)
WBC # FLD AUTO: 3.92 K/UL — SIGNIFICANT CHANGE UP (ref 3.8–10.5)

## 2021-01-29 PROCEDURE — 99232 SBSQ HOSP IP/OBS MODERATE 35: CPT

## 2021-01-29 PROCEDURE — 99223 1ST HOSP IP/OBS HIGH 75: CPT | Mod: GC

## 2021-01-29 RX ORDER — INSULIN GLARGINE 100 [IU]/ML
15 INJECTION, SOLUTION SUBCUTANEOUS EVERY MORNING
Refills: 0 | Status: DISCONTINUED | OUTPATIENT
Start: 2021-01-29 | End: 2021-01-29

## 2021-01-29 RX ORDER — INSULIN LISPRO 100/ML
12 VIAL (ML) SUBCUTANEOUS
Refills: 0 | Status: DISCONTINUED | OUTPATIENT
Start: 2021-01-29 | End: 2021-01-31

## 2021-01-29 RX ORDER — ASPIRIN/CALCIUM CARB/MAGNESIUM 324 MG
81 TABLET ORAL DAILY
Refills: 0 | Status: DISCONTINUED | OUTPATIENT
Start: 2021-01-30 | End: 2021-02-05

## 2021-01-29 RX ORDER — INSULIN GLARGINE 100 [IU]/ML
35 INJECTION, SOLUTION SUBCUTANEOUS ONCE
Refills: 0 | Status: COMPLETED | OUTPATIENT
Start: 2021-01-29 | End: 2021-01-29

## 2021-01-29 RX ORDER — INSULIN GLARGINE 100 [IU]/ML
35 INJECTION, SOLUTION SUBCUTANEOUS
Refills: 0 | Status: DISCONTINUED | OUTPATIENT
Start: 2021-01-30 | End: 2021-01-31

## 2021-01-29 RX ORDER — METFORMIN HYDROCHLORIDE 850 MG/1
500 TABLET ORAL
Refills: 0 | Status: DISCONTINUED | OUTPATIENT
Start: 2021-01-29 | End: 2021-01-31

## 2021-01-29 RX ORDER — INSULIN LISPRO 100/ML
VIAL (ML) SUBCUTANEOUS AT BEDTIME
Refills: 0 | Status: DISCONTINUED | OUTPATIENT
Start: 2021-01-29 | End: 2021-02-02

## 2021-01-29 RX ORDER — INSULIN LISPRO 100/ML
VIAL (ML) SUBCUTANEOUS
Refills: 0 | Status: DISCONTINUED | OUTPATIENT
Start: 2021-01-29 | End: 2021-02-02

## 2021-01-29 RX ORDER — PANTOPRAZOLE SODIUM 20 MG/1
40 TABLET, DELAYED RELEASE ORAL
Refills: 0 | Status: DISCONTINUED | OUTPATIENT
Start: 2021-01-29 | End: 2021-02-05

## 2021-01-29 RX ADMIN — Medication 2 MILLIGRAM(S): at 20:20

## 2021-01-29 RX ADMIN — RISPERIDONE 3 MILLIGRAM(S): 4 TABLET ORAL at 20:20

## 2021-01-29 RX ADMIN — Medication 1 MILLIGRAM(S): at 17:25

## 2021-01-29 RX ADMIN — Medication 12 UNIT(S): at 16:47

## 2021-01-29 RX ADMIN — CLOPIDOGREL BISULFATE 75 MILLIGRAM(S): 75 TABLET, FILM COATED ORAL at 10:09

## 2021-01-29 RX ADMIN — Medication 81 MILLIGRAM(S): at 10:00

## 2021-01-29 RX ADMIN — Medication 20 UNIT(S): at 12:20

## 2021-01-29 RX ADMIN — Medication 1 MILLIGRAM(S): at 06:30

## 2021-01-29 RX ADMIN — ENOXAPARIN SODIUM 40 MILLIGRAM(S): 100 INJECTION SUBCUTANEOUS at 10:00

## 2021-01-29 RX ADMIN — ATORVASTATIN CALCIUM 80 MILLIGRAM(S): 80 TABLET, FILM COATED ORAL at 20:20

## 2021-01-29 RX ADMIN — Medication 20 UNIT(S): at 08:12

## 2021-01-29 RX ADMIN — Medication 8: at 16:47

## 2021-01-29 RX ADMIN — INSULIN GLARGINE 35 UNIT(S): 100 INJECTION, SOLUTION SUBCUTANEOUS at 15:19

## 2021-01-29 RX ADMIN — METFORMIN HYDROCHLORIDE 500 MILLIGRAM(S): 850 TABLET ORAL at 18:10

## 2021-01-29 RX ADMIN — Medication 5: at 08:11

## 2021-01-29 RX ADMIN — Medication 20 MILLIGRAM(S): at 18:11

## 2021-01-29 RX ADMIN — Medication 200 MILLIGRAM(S): at 20:20

## 2021-01-29 RX ADMIN — Medication 6: at 12:20

## 2021-01-29 NOTE — PROGRESS NOTE ADULT - SUBJECTIVE AND OBJECTIVE BOX
PROGRESS NOTE:     Patient is a 60y old  Female who presents with a chief complaint of R sided hemiparesis and facial droop (29 Jan 2021 13:44)      SUBJECTIVE / OVERNIGHT EVENTS:    ADDITIONAL REVIEW OF SYSTEMS:    MEDICATIONS  (STANDING):  aspirin enteric coated 81 milliGRAM(s) Oral daily  atorvastatin 80 milliGRAM(s) Oral at bedtime  benztropine 2 milliGRAM(s) Oral at bedtime  clonazePAM  Tablet 1 milliGRAM(s) Oral two times a day  clopidogrel Tablet 75 milliGRAM(s) Oral daily  dextrose 40% Gel 15 Gram(s) Oral once  dextrose 5%. 1000 milliLiter(s) (50 mL/Hr) IV Continuous <Continuous>  dextrose 5%. 1000 milliLiter(s) (100 mL/Hr) IV Continuous <Continuous>  dextrose 5%. 1000 milliLiter(s) (100 mL/Hr) IV Continuous <Continuous>  dextrose 50% Injectable 25 Gram(s) IV Push once  dextrose 50% Injectable 12.5 Gram(s) IV Push once  dextrose 50% Injectable 25 Gram(s) IV Push once  enoxaparin Injectable 40 milliGRAM(s) SubCutaneous daily  glucagon  Injectable 1 milliGRAM(s) IntraMuscular once  glucagon  Injectable 1 milliGRAM(s) IntraMuscular once  insulin lispro (ADMELOG) corrective regimen sliding scale   SubCutaneous three times a day before meals  insulin lispro (ADMELOG) corrective regimen sliding scale   SubCutaneous at bedtime  insulin lispro Injectable (ADMELOG) 12 Unit(s) SubCutaneous three times a day before meals  pantoprazole    Tablet 40 milliGRAM(s) Oral before breakfast  risperiDONE   Tablet 3 milliGRAM(s) Oral at bedtime  traZODone 200 milliGRAM(s) Oral at bedtime    MEDICATIONS  (PRN):      CAPILLARY BLOOD GLUCOSE      POCT Blood Glucose.: 383 mg/dL (29 Jan 2021 15:11)  POCT Blood Glucose.: 409 mg/dL (29 Jan 2021 15:08)  POCT Blood Glucose.: 464 mg/dL (29 Jan 2021 12:17)  POCT Blood Glucose.: 416 mg/dL (29 Jan 2021 12:00)  POCT Blood Glucose.: 397 mg/dL (29 Jan 2021 07:08)  POCT Blood Glucose.: 405 mg/dL (29 Jan 2021 07:07)  POCT Blood Glucose.: 205 mg/dL (29 Jan 2021 00:25)  POCT Blood Glucose.: 189 mg/dL (28 Jan 2021 17:11)    I&O's Summary      PHYSICAL EXAM:  Vital Signs Last 24 Hrs  T(C): 36.3 (29 Jan 2021 15:26), Max: 36.9 (28 Jan 2021 22:05)  T(F): 97.4 (29 Jan 2021 15:26), Max: 98.5 (29 Jan 2021 06:18)  HR: 98 (29 Jan 2021 15:26) (96 - 100)  BP: 166/78 (29 Jan 2021 15:26) (166/78 - 192/84)  BP(mean): --  RR: 18 (29 Jan 2021 15:26) (15 - 18)  SpO2: 96% (29 Jan 2021 15:26) (95% - 97%)    up in chair, no acute distress  awake alert orientedx3  right hemiplegia , mild right facial droop and dysarthria       LABS:                        11.9   3.92  )-----------( 164      ( 29 Jan 2021 07:35 )             37.0     01-29    132<L>  |  95<L>  |  22  ----------------------------<  332<H>  4.0   |  23  |  1.03    Ca    8.9      29 Jan 2021 06:58  Phos  4.0     01-29  Mg     1.9     01-29    TPro  7.3  /  Alb  3.1<L>  /  TBili  0.6  /  DBili  x   /  AST  11  /  ALT  7   /  AlkPhos  86  01-28        RADIOLOGY & ADDITIONAL TESTS:  Results Reviewed: y  Imaging Personally Reviewed:y  Electrocardiogram Personally Reviewed:y    COORDINATION OF CARE:  Care Discussed with Consultants/Other Providers [Y/N]:y  Prior or Outpatient Records Reviewed [Y/N]:elisabeth

## 2021-01-29 NOTE — PROGRESS NOTE ADULT - ASSESSMENT
61 y/o female with a PmHx of HTN, DM type II, HLD, Anxiety d/o and unspecified somatic complaint disorder presents to the ED accompanied by her son s/p episode of right sided weakness, R facial droop, dysarthria that occurred this morning.    acute CVA with right side hemiplegia   ICA stenosis does not correlate to acute presentation   acute urinary retention , monitor Urine output, straight  cath as needed depending on bladder scan , insert hanna if fails management   odynophagia and dysphagia post stroke   Dm2 uncontrolled     plan:  start protonix PPI,   barium swallow  to evaluate odynophagia, discussed with her outpatient GI doctor, no hx of web or strictures. she has numerous somatic complaints in the past  endocrine eval, start lantus, metformin.monitor blood sugar   restart enalapril 20mg bid   continue telemetry monitoring   neurochecks q4hrs   MRI  brain  shows acute left basal ganglia and corona radiata infarcts consistent with the patine presentation, there is  old right lacunar infarct   echo with bubble study pending   OT eval, PT recommended for STR   outpatient vascular surgery evaluation and follow up for carotid disease

## 2021-01-29 NOTE — PROGRESS NOTE ADULT - SUBJECTIVE AND OBJECTIVE BOX
DONTE PETERSON  Female  MRN-[id number]    Interval:    -No acute events overnight.   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     VITAL SIGNS:  T(F): 98.5  HR: 100  BP: 171/86  RR: 15  SpO2: 95%    Exam:   MS: Oriented x3. Non-fluent speech making subtle paraphasic errors. Comprehension intact. Cannot repeat.   CN: Decreased BTT on the right. Moderate-severe R. facial. EOMI.   Motor: Right side no movement. Left side full.   Sensory: Slightly decreased sensation to LT on the right  Coordination: No dysmetria on FNF or ataxia on HTS bilaterally     LABS:                          11.9   3.92  )-----------( 164      ( 29 Jan 2021 07:35 )             37.0     01-29    132<L>  |  95<L>  |  22  ----------------------------<  332<H>  4.0   |  23  |  1.03    Ca    8.9      29 Jan 2021 06:58  Phos  4.0     01-29  Mg     1.9     01-29    TPro  7.3  /  Alb  3.1<L>  /  TBili  0.6  /  DBili  x   /  AST  11  /  ALT  7   /  AlkPhos  86  01-28        MEDICATIONS:   aspirin enteric coated 81 milliGRAM(s) Oral daily  atorvastatin 80 milliGRAM(s) Oral at bedtime  benztropine 2 milliGRAM(s) Oral at bedtime  clonazePAM  Tablet 1 milliGRAM(s) Oral two times a day  clopidogrel Tablet 75 milliGRAM(s) Oral daily  dextrose 40% Gel 15 Gram(s) Oral once  dextrose 5%. 1000 milliLiter(s) IV Continuous <Continuous>  dextrose 5%. 1000 milliLiter(s) IV Continuous <Continuous>  dextrose 5%. 1000 milliLiter(s) IV Continuous <Continuous>  dextrose 50% Injectable 25 Gram(s) IV Push once  dextrose 50% Injectable 12.5 Gram(s) IV Push once  dextrose 50% Injectable 25 Gram(s) IV Push once  enoxaparin Injectable 40 milliGRAM(s) SubCutaneous daily  glucagon  Injectable 1 milliGRAM(s) IntraMuscular once  glucagon  Injectable 1 milliGRAM(s) IntraMuscular once  insulin glargine Injectable (LANTUS) 15 Unit(s) SubCutaneous every morning  insulin lispro (ADMELOG) corrective regimen sliding scale   SubCutaneous three times a day before meals  insulin lispro (ADMELOG) corrective regimen sliding scale   SubCutaneous at bedtime  insulin lispro Injectable (ADMELOG) 20 Unit(s) SubCutaneous three times a day before meals  risperiDONE   Tablet 3 milliGRAM(s) Oral at bedtime  traZODone 200 milliGRAM(s) Oral at bedtime          RADIOLOGY & ADDITIONAL STUDIES:             DONTE PETERSON  Female  [id number]    Interval: patient seen and examined in COVID unit on airborne and contact isolation    -No acute events overnight.   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     VITAL SIGNS:  T(F): 98.5  HR: 100  BP: 171/86  RR: 15  SpO2: 95%    Exam:   MS: Oriented x3. Non-fluent speech making subtle paraphasic errors. Comprehension intact. Cannot repeat.   CN: Decreased BTT on the right. Moderate-severe R. facial. EOMI.   Motor: Right side no movement. Left side full.   Sensory: Slightly decreased sensation to LT on the right  Coordination: No dysmetria on FNF or ataxia on HTS bilaterally     LABS:                          11.9   3.92  )-----------( 164      ( 29 Jan 2021 07:35 )             37.0     01-29    132<L>  |  95<L>  |  22  ----------------------------<  332<H>  4.0   |  23  |  1.03    Ca    8.9      29 Jan 2021 06:58  Phos  4.0     01-29  Mg     1.9     01-29    TPro  7.3  /  Alb  3.1<L>  /  TBili  0.6  /  DBili  x   /  AST  11  /  ALT  7   /  AlkPhos  86  01-28        MEDICATIONS:   aspirin enteric coated 81 milliGRAM(s) Oral daily  atorvastatin 80 milliGRAM(s) Oral at bedtime  benztropine 2 milliGRAM(s) Oral at bedtime  clonazePAM  Tablet 1 milliGRAM(s) Oral two times a day  clopidogrel Tablet 75 milliGRAM(s) Oral daily  dextrose 40% Gel 15 Gram(s) Oral once  dextrose 5%. 1000 milliLiter(s) IV Continuous <Continuous>  dextrose 5%. 1000 milliLiter(s) IV Continuous <Continuous>  dextrose 5%. 1000 milliLiter(s) IV Continuous <Continuous>  dextrose 50% Injectable 25 Gram(s) IV Push once  dextrose 50% Injectable 12.5 Gram(s) IV Push once  dextrose 50% Injectable 25 Gram(s) IV Push once  enoxaparin Injectable 40 milliGRAM(s) SubCutaneous daily  glucagon  Injectable 1 milliGRAM(s) IntraMuscular once  glucagon  Injectable 1 milliGRAM(s) IntraMuscular once  insulin glargine Injectable (LANTUS) 15 Unit(s) SubCutaneous every morning  insulin lispro (ADMELOG) corrective regimen sliding scale   SubCutaneous three times a day before meals  insulin lispro (ADMELOG) corrective regimen sliding scale   SubCutaneous at bedtime  insulin lispro Injectable (ADMELOG) 20 Unit(s) SubCutaneous three times a day before meals  risperiDONE   Tablet 3 milliGRAM(s) Oral at bedtime  traZODone 200 milliGRAM(s) Oral at bedtime          RADIOLOGY & ADDITIONAL STUDIES:

## 2021-01-29 NOTE — CONSULT NOTE ADULT - ATTENDING COMMENTS
seen and examined in COVID unit.  code stroke called in ED> 61 yo RH  F comes with dysarthria and R HP, outside of tpa window. recent covid still +. CTH with old infarcts and significant atrophy for age. R ICA 90% stenosis. CTP neg,  CTH with L BG. o/e no gaze but right flaccid 0/5.  Tchol 280, .  Etiology unclear possible large artery athero needs further workup.  MRI brain w/o, CD, DAPT and lipitor 80mg daily. will need to consider diagnostic angio in future. possible covid coagulopathy. TTE
Uncontrolled DM2 with COVID, not on dexamethasone but with hyperglycemia to 400s.  At home on Humulin R, administered by .  Inpatient optimize to basal and bolus insulin plan.  Adjust home regimen as noted above.   Will follow.  Endocrine team consulted for uncontrolled diabetes. Patient is high risk with high level decision making due to uncontrolled diabetes which places patient at high risk for cardiovascular and cerebrovascular events. Patient with lability of glucose requiring close monitoring and insulin adjustments.    Katharina Padron MD  Division of Endocrinology  Pager: [phone]    If after 6PM or before 9AM, or on weekends/holidays, please call endocrine answering service for assistance ([phone]).  For nonurgent matters email [email or web address] for assistance.

## 2021-01-29 NOTE — CONSULT NOTE ADULT - ASSESSMENT
Uncontrolled DM2  A1c 11.4%  FS premeal and qhs   Fs goal 100-180  Carb consistent diet  Recommend give Lantus 35units now. Then dose future doses of Lantus 35units for 3pm (please order using user schedule)  Recommend start admelog 12units TID ac, hold if NPO   Recommend mod correctional scale premeal and qhs   Nutrition consult    On discharge patient will require basal/bolus insulin, dose to be determined   [facility] in [facility]   [address] [facility] [address]  [place] [id number] [phone]    HTN  goal 130/80   Currently above goal   Primary team to restart home medication, when patient stable     HDL   goal LDL <70    Patient started on Atorvastatin 80mg daily during admission given recent CVA        Camilo Rojas MD  Endocrine Fellow   Pager  59 y/o female with history of HTN, DM type II, HLD, Anxiety/Depression, Schizophrenia, Covid 4 weeks ago presented with right sided weakness and R facial drooping with dysarthria.   Patient admitted for management of L. anterior choroidal territory infarct.  Endocrine consult requested for management of uncontrolled DM2. During admission BG trended up t0 400s. Currently on Admelog 20units with mod correctional scale. No basal insulin.         Uncontrolled DM2  A1c 11.6%  FS premeal and qhs   Fs goal 100-180  Carb consistent diet  Recommend give Lantus 35units now. Then dose future doses of Lantus 35units for 3pm (please order using user schedule)  Recommend start admelog 12units TID ac, hold if NPO   Recommend mod correctional scale premeal and qhs     On discharge patient will require basal/bolus insulin, dose to be determined   [facility] in [facility]   [address] [place] [address] [address]  [place] [address]  [phone]    HTN  goal 130/80   Currently above goal   Primary team to restart home medication, when patient stable     HDL   goal LDL <70    Patient started on Atorvastatin 80mg daily during admission given recent CVA        Camilo Rojas MD  Endocrine Fellow   Pager  61 y/o female with history of HTN, DM type II, HLD, Anxiety/Depression, Schizophrenia, Covid 4 weeks ago presented with right sided weakness and R facial drooping with dysarthria.   Patient admitted for management of L. anterior choroidal territory infarct.  Endocrine consult requested for management of uncontrolled DM2. During admission BG trended up t0 400s. Currently on Admelog 20units with mod correctional scale. No basal insulin.       Uncontrolled DM2  A1c 11.6%  FS premeal and qhs   Fs goal 100-180  Carb consistent diet  Recommend give Lantus 35units now. Then dose future doses of Lantus 35units for 3pm (please order using user schedule)  Recommend start admelog 12units TID ac, hold if NPO   Recommend mod correctional scale premeal and qhs   Nutrition consult     On discharge, ideally patient should be on basal/bolus insulin. However given patient history of mental illness will try to simply home medication regimen.   Recommend basal insulin+ GLP1 agonist+ metformin. Please stop Humulin R  Recommend send basal insulin (ie lantus, basaglar or tuojeo...).  script to pharmacy and see which on is covered.  Patient will need BD pen needles  Patient will NEED BEDSIDE RN INSULIN  PEN TEACHING, HOWEVER  HAS TO BE TAUGHT AS WELL AS HE ADMINISTERS INSULIN AT HOME. Patient was previously on vial and syringe.   Recommend sending script for once weekly GLP1 agonist ( trulicty 0.75mg SQ weekly or ozempic 0.25mg SQ weekly) to pharmacy to determine which is covered.   Would also restart metformin 500mg BIDac and increase to 1000mg BID ac after one week if patient not having GI side effects  Patient can [facility] up in [facility]   [address] [facility] [address]  [facility] [id number] [phone]    HTN  goal 130/80   Currently above goal   Primary team to restart home medication, when patient stable     HDL   goal LDL <70    Patient started on Atorvastatin 80mg daily during admission given recent CVA        Camilo Rojas MD  Endocrine Fellow   Pager  59 y/o female with history of HTN, DM type 2, HLD, Anxiety/Depression, Schizophrenia, Covid 4 weeks ago presented with right sided weakness and R facial drooping with dysarthria.   Patient admitted for management of L. anterior choroidal territory infarct.  Endocrine consult requested for management of uncontrolled DM2. During admission BG trended up t0 400s. Currently on Admelog 20units with mod correctional scale. No basal insulin.       Uncontrolled DM2  A1c 11.6%  FS premeal and qhs   Fs goal 100-180  Carb consistent diet  Recommend give Lantus 35units now. Then dose future doses of Lantus 35units for 3pm (please order using user schedule)  Recommend start admelog 12units TID ac, hold if NPO   Recommend mod correctional scale premeal and qhs   Nutrition consult     On discharge, ideally patient should be on basal/bolus insulin. However given patient history of mental illness will try to simply home medication regimen.   Recommend basal insulin+ GLP1 agonist (once weekly)+ metformin. Please stop Humulin R  Recommend send basal insulin (ie lantus, basaglar or tuojeo...).  script to pharmacy and see which on is covered.  Patient will need BD pen needles  Patient will NEED BEDSIDE RN INSULIN  PEN TEACHING, HOWEVER  HAS TO BE TAUGHT AS WELL AS HE ADMINISTERS INSULIN AT HOME. Patient was previously on vial and syringe.   Recommend sending script for once weekly GLP1 agonist ( trulicty 0.75mg SQ weekly or ozempic 0.25mg SQ weekly) to pharmacy to determine which is covered.   Would also restart metformin 500mg BIDac and increase to 1000mg BID ac after one week if patient not having GI side effects  Patient can follow up in [facility]   [address] [facility] [address]  [facility] [id number] [phone]    HTN  goal 130/80   Currently above goal   Primary team to restart home medication, when patient stable     HDL   goal LDL <70    Patient started on Atorvastatin 80mg daily during admission given recent CVA        Camilo Rojas MD  Endocrine Fellow   Pager

## 2021-01-29 NOTE — PROGRESS NOTE ADULT - ASSESSMENT
60 RHF w/ DM, HTN, COVID 4 weeks ago, ?psychiatric issues unspecified, presents w/ dysarthria and R. hemiapresis. No tpa as outside window. No MT as no LVO.     CTH demonstrated hypodensity involving the L. [place] and PL of the IC, otherwise no early signs of ischemia (to my eye). R. posteiror portion of the PL of the internal capsule.   CTA head: multifocal intracranial stenosis; Severe stenosis involving the supraclinoid L. ICA, bilateral M1 moderate stenosis, severe stenosis in the mid R. M1. R. V4 severe stenosis.   CTA neck: Severe 85-90% stenosis involving the R. ICA orgin.   MR brain demonstrates L. anterior choroidal infarct.   Carotid Dopplers R. ICA moderate stenosis 50-79%; L. 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 R/o competing cardioembolic cause. The moderate carotid stenosis is symptomatic given previous infarcts.     Plan:   [] TTE w/ bubble   [] Unlikely to need ILR as patient has convincing stroke mechanism  [x] Load Plavix 300mg now; Start ASA 81mg + Plavix 75mg for 3 months, followed by ASA indefinitely per [facility]   [x] Atorvastatin 80mg (titrate to LDL < 70)   [x] A1c 11.6,    [] Lovenox SQ for DVT prophylaxis   [] F/u with Dr. Parrish Ragsdale at [phone]  [] F/u with Dr. Wilmer Guerrier at [phone] within 2 week for possible right carotid stent

## 2021-01-29 NOTE — CONSULT NOTE ADULT - SUBJECTIVE AND OBJECTIVE BOX
HPI: 59 y/o female with history of HTN, DM type II, HLD, Anxiety/Depression, Schizophrenia, Covid 4 weeks ago presented with right sided weakness and R facial drooping with dysarthria.   Patient admitted for management of L. anterior choroidal territory infarct.   Endocrine consult requested for management of uncontrolled DM2. During admission BG trended up t0 400s. Currently on Admelog 20units with mod correctional scale. No basal insulin.     Endocrine History:     PAST MEDICAL & SURGICAL HISTORY:  Uterine fibroid  Hypercholesteremia  Type 2 diabetes mellitus  Hypertension  Anxiety  H/O  section  H/O hemorrhoidectomy  H/O tubal ligation    FAMILY HISTORY:  FH: type 2 diabetes    Social History:    Outpatient Medications:  benztropine 2 mg oral tablet: 1 tab(s) orally once a day (at bedtime) (2021 18:18)  cloNIDine 0.2 mg oral tablet: 1 tab(s) orally 2 times a day (2021 17:00)  enalapril 20 mg oral tablet: 1 tab(s) orally 2 times a day (2021 17:38)  HumuLIN R 100 units/mL injectable solution: 30-40 unit(s) injectable 2 times a day before breakfast and QHS. (2021 18:19)  KlonoPIN 1 mg oral tablet: 1 tab(s) orally 2 times a day (2021 17:38)  lovastatin 20 mg oral tablet: 1 tab(s) orally once a day (2021 15:32)  RisperDAL 3 mg oral tablet: orally once a day (2021 15:32)  risperiDONE 3 mg oral tablet: 1 tab(s) orally once a day (at bedtime) (2021 18:17)  SEROquel 200 mg oral tablet: 1 tab(s) orally once a day (at bedtime) as needed.  (2021 18:17)  traZODone 100 mg oral tablet: 2 tab(s) orally once a day (at bedtime) (2021 18:16)  zolpidem 5 mg oral tablet: 2 tab(s) orally once a day (at bedtime) (2021 18:16)      MEDICATIONS  (STANDING):  aspirin enteric coated 81 milliGRAM(s) Oral daily  atorvastatin 80 milliGRAM(s) Oral at bedtime  benztropine 2 milliGRAM(s) Oral at bedtime  clonazePAM  Tablet 1 milliGRAM(s) Oral two times a day  clopidogrel Tablet 75 milliGRAM(s) Oral daily  dextrose 40% Gel 15 Gram(s) Oral once  dextrose 5%. 1000 milliLiter(s) (50 mL/Hr) IV Continuous <Continuous>  dextrose 5%. 1000 milliLiter(s) (100 mL/Hr) IV Continuous <Continuous>  dextrose 5%. 1000 milliLiter(s) (100 mL/Hr) IV Continuous <Continuous>  dextrose 50% Injectable 25 Gram(s) IV Push once  dextrose 50% Injectable 12.5 Gram(s) IV Push once  dextrose 50% Injectable 25 Gram(s) IV Push once  enoxaparin Injectable 40 milliGRAM(s) SubCutaneous daily  glucagon  Injectable 1 milliGRAM(s) IntraMuscular once  glucagon  Injectable 1 milliGRAM(s) IntraMuscular once  insulin glargine Injectable (LANTUS) 15 Unit(s) SubCutaneous every morning  insulin lispro (ADMELOG) corrective regimen sliding scale   SubCutaneous three times a day before meals  insulin lispro (ADMELOG) corrective regimen sliding scale   SubCutaneous at bedtime  insulin lispro Injectable (ADMELOG) 20 Unit(s) SubCutaneous three times a day before meals  pantoprazole    Tablet 40 milliGRAM(s) Oral before breakfast  risperiDONE   Tablet 3 milliGRAM(s) Oral at bedtime  traZODone 200 milliGRAM(s) Oral at bedtime      Allergies  erythromycin (Unknown)    Review of Systems:  Constitutional: No fever  Eyes: No blurry vision  Neuro: No tremors  HEENT: No pain  Cardiovascular: No chest pain, palpitations  Respiratory: No SOB, no cough  GI: No nausea, vomiting, abdominal pain  : No dysuria  Skin: no rash  Psych: no depression  Endocrine: no polyuria, polydipsia  Hem/lymph: no swelling  Osteoporosis: no fractures    ALL OTHER SYSTEMS REVIEWED AND NEGATIVE      PHYSICAL EXAM:  VITALS: T(C): 36.9 (21 @ 06:18)  T(F): 98.5 (21 @ 06:18), Max: 98.5 (21 @ 06:18)  HR: 100 (21 @ 06:18) (96 - 100)  BP: 171/86 (21 @ 06:18) (171/86 - 192/84)  RR:  (15 - 16)  SpO2:  (95% - 97%)  Wt(kg): 77kg   GENERAL: NAD, well-groomed, well-developed  EYES: No proptosis, no lid lag, anicteric  HEENT:  Atraumatic, Normocephalic, moist mucous membranes  THYROID: Normal size, no palpable nodules  RESPIRATORY: Clear to auscultation bilaterally; No rales, rhonchi, wheezing  CARDIOVASCULAR: Regular rate and rhythm; No murmurs; no peripheral edema  GI: Soft, nontender, non distended, normal bowel sounds  SKIN: Dry, intact, No rashes or lesions  MUSCULOSKELETAL: Full range of motion, normal strength  NEURO: sensation intact, extraocular movements intact, no tremor  PSYCH: Alert and oriented x 3, reactive affect     POCT Blood Glucose.: 464 mg/dL (21 @ 12:17)  POCT Blood Glucose.: 416 mg/dL (21 @ 12:00)  POCT Blood Glucose.: 397 mg/dL (21 @ 07:08)  POCT Blood Glucose.: 405 mg/dL (21 @ 07:07)  POCT Blood Glucose.: 205 mg/dL (21 @ 00:25)  POCT Blood Glucose.: 189 mg/dL (21 @ 17:11)  POCT Blood Glucose.: 223 mg/dL (21 @ 11:52)  POCT Blood Glucose.: 398 mg/dL (21 @ 21:56)  POCT Blood Glucose.: 344 mg/dL (21 @ 17:01)  POCT Blood Glucose.: 430 mg/dL (21 @ 11:51)  POCT Blood Glucose.: 230 mg/dL (21 @ 06:29)  POCT Blood Glucose.: 229 mg/dL (21 @ 23:48)  POCT Blood Glucose.: 251 mg/dL (21 @ 21:28)  POCT Blood Glucose.: 333 mg/dL (21 @ 19:16)                            11.9   3.92  )-----------( 164      ( 2021 07:35 )             37.0           132<L>  |  95<L>  |  22  ----------------------------<  332<H>  4.0   |  23  |  1.03    EGFR if : 68  EGFR if non : 59<L>    Ca    8.9        Mg     1.9       Phos  4.0         TPro  7.3  /  Alb  3.1<L>  /  TBili  0.6  /  DBili  x   /  AST  11  /  ALT  7   /  AlkPhos  86   Chol 280<H> LDL -- HDL 45<L> Trig 244<H>                   HPI: 61 y/o female with history of HTN, DM type II, HLD, Anxiety/Depression, Schizophrenia, Covid 4 weeks ago presented with right sided weakness and R facial drooping with dysarthria.   Patient admitted for management of L. anterior choroidal territory infarct.   Endocrine consult requested for management of uncontrolled DM2. During admission BG trended up t0 400s. Currently on Admelog 20units with mod correctional scale. No basal insulin.     Endocrine History:   Patient is a poor history   Discussed with son Ruben, Diagnosed with 10-15years, PCP Berto Varun  Was on Metformin in the past however did not control her BG levels.   Reports  administers insulin. Reports patient takes Humulin- R 25units when BG ~200, 35units when BG ~300,and 40units when BG ~400. Patient only takes insulin before breakfast and dinner.    Son reports patient checks her own FS. however believes premeal FS , before dinner 400.  No retinopathy, No neuropathy, no CAD.  Son reports patient eats fast food for each meal.     PAST MEDICAL & SURGICAL HISTORY:  Uterine fibroid  Hypercholesteremia  Type 2 diabetes mellitus  Hypertension  Anxiety  H/O  section  H/O hemorrhoidectomy  H/O tubal ligation    FAMILY HISTORY:  FH: type 2 diabetes in mother and father ans siblings     Social History:  No history of smoking or alcohol use     Outpatient Medications:  benztropine 2 mg oral tablet: 1 tab(s) orally once a day (at bedtime) (2021 18:18)  cloNIDine 0.2 mg oral tablet: 1 tab(s) orally 2 times a day (2021 17:00)  enalapril 20 mg oral tablet: 1 tab(s) orally 2 times a day (2021 17:38)  HumuLIN R 100 units/mL injectable solution: 30-40 unit(s) injectable 2 times a day before breakfast and QHS. (2021 18:19)  KlonoPIN 1 mg oral tablet: 1 tab(s) orally 2 times a day (2021 17:38)  lovastatin 20 mg oral tablet: 1 tab(s) orally once a day (2021 15:32)  RisperDAL 3 mg oral tablet: orally once a day (2021 15:32)  risperiDONE 3 mg oral tablet: 1 tab(s) orally once a day (at bedtime) (2021 18:17)  SEROquel 200 mg oral tablet: 1 tab(s) orally once a day (at bedtime) as needed.  (2021 18:17)  traZODone 100 mg oral tablet: 2 tab(s) orally once a day (at bedtime) (2021 18:16)  zolpidem 5 mg oral tablet: 2 tab(s) orally once a day (at bedtime) (2021 18:16)      MEDICATIONS  (STANDING):  aspirin enteric coated 81 milliGRAM(s) Oral daily  atorvastatin 80 milliGRAM(s) Oral at bedtime  benztropine 2 milliGRAM(s) Oral at bedtime  clonazePAM  Tablet 1 milliGRAM(s) Oral two times a day  clopidogrel Tablet 75 milliGRAM(s) Oral daily  dextrose 40% Gel 15 Gram(s) Oral once  dextrose 5%. 1000 milliLiter(s) (50 mL/Hr) IV Continuous <Continuous>  dextrose 5%. 1000 milliLiter(s) (100 mL/Hr) IV Continuous <Continuous>  dextrose 5%. 1000 milliLiter(s) (100 mL/Hr) IV Continuous <Continuous>  dextrose 50% Injectable 25 Gram(s) IV Push once  dextrose 50% Injectable 12.5 Gram(s) IV Push once  dextrose 50% Injectable 25 Gram(s) IV Push once  enoxaparin Injectable 40 milliGRAM(s) SubCutaneous daily  glucagon  Injectable 1 milliGRAM(s) IntraMuscular once  glucagon  Injectable 1 milliGRAM(s) IntraMuscular once  insulin glargine Injectable (LANTUS) 15 Unit(s) SubCutaneous every morning  insulin lispro (ADMELOG) corrective regimen sliding scale   SubCutaneous three times a day before meals  insulin lispro (ADMELOG) corrective regimen sliding scale   SubCutaneous at bedtime  insulin lispro Injectable (ADMELOG) 20 Unit(s) SubCutaneous three times a day before meals  pantoprazole    Tablet 40 milliGRAM(s) Oral before breakfast  risperiDONE   Tablet 3 milliGRAM(s) Oral at bedtime  traZODone 200 milliGRAM(s) Oral at bedtime      Allergies  erythromycin (Unknown)    Review of Systems:  Constitutional: No fever  Eyes: No blurry vision  Neuro: No tremors  HEENT: No pain  Cardiovascular: No chest pain, palpitations  Respiratory: No SOB, no cough  GI: No nausea, vomiting, abdominal pain  : No dysuria  Skin: no rash  Psych: no depression  Endocrine: no polyuria, polydipsia  Hem/lymph: no swelling  Osteoporosis: no fractures  Neuro: slurred speech, right UE and LE weakness     ALL OTHER SYSTEMS REVIEWED AND NEGATIVE      PHYSICAL EXAM:  VITALS: T(C): 36.9 (21 @ 06:18)  T(F): 98.5 (21 @ 06:18), Max: 98.5 (21 @ 06:18)  HR: 100 (21 @ 06:18) (96 - 100)  BP: 171/86 (21 @ 06:18) (171/86 - 192/84)  RR:  (15 - 16)  SpO2:  (95% - 97%)  Wt(kg): 77kg   GENERAL: NAD, well-groomed, well-developed  EYES: No proptosis, anicteric  HEENT:  Atraumatic, Normocephalic, moist mucous membranes  THYROID: Normal size, no palpable nodules, nontender   RESPIRATORY: Clear to auscultation bilaterally; No rales, rhonchi, wheezing  CARDIOVASCULAR: Regular rate and rhythm; No murmurs; no peripheral edema  GI: Soft, nontender, non distended, normal bowel sounds  SKIN: Dry, intact, No rashes or lesions  MUSCULOSKELETAL: decrease ROM and strength in RUE and RLE   NEURO: right facial droop and slurred speech   PSYCH: Alert and oriented x 3, reactive affect     POCT Blood Glucose.: 464 mg/dL (21 @ 12:17)  POCT Blood Glucose.: 416 mg/dL (21 @ 12:00)  POCT Blood Glucose.: 397 mg/dL (21 @ 07:08)  POCT Blood Glucose.: 405 mg/dL (21 @ 07:07)  POCT Blood Glucose.: 205 mg/dL (21 @ 00:25)  POCT Blood Glucose.: 189 mg/dL (21 @ 17:11)  POCT Blood Glucose.: 223 mg/dL (21 @ 11:52)  POCT Blood Glucose.: 398 mg/dL (21 @ 21:56)  POCT Blood Glucose.: 344 mg/dL (21 @ 17:01)  POCT Blood Glucose.: 430 mg/dL (21 @ 11:51)  POCT Blood Glucose.: 230 mg/dL (21 @ 06:29)  POCT Blood Glucose.: 229 mg/dL (21 @ 23:48)  POCT Blood Glucose.: 251 mg/dL (21 @ 21:28)  POCT Blood Glucose.: 333 mg/dL (21 @ 19:16)                            11.9   3.92  )-----------( 164      ( 2021 07:35 )             37.0           132<L>  |  95<L>  |  22  ----------------------------<  332<H>  4.0   |  23  |  1.03    EGFR if : 68  EGFR if non : 59<L>    Ca    8.9        Mg     1.9       Phos  4.0         TPro  7.3  /  Alb  3.1<L>  /  TBili  0.6  /  DBili  x   /  AST  11  /  ALT  7   /  AlkPhos  86   Chol 280<H> LDL -- HDL 45<L> Trig 244<H>                   HPI: 59 y/o female with history of HTN, DM type II, HLD, Anxiety/Depression, Schizophrenia, Covid 4 weeks ago presented with right sided weakness and R facial drooping with dysarthria.   Patient admitted for management of L. anterior choroidal territory infarct.   Endocrine consult requested for management of uncontrolled DM2. During admission BG trended up t0 400s. Currently on Admelog 20units with mod correctional scale. No basal insulin.     Endocrine History:   Patient is a poor historian.   Discussed with son Ruben, diagnosed with 10-15years, PCP Berto Varun  Was on Metformin in the past however did not control her BG levels.   Patient reports  administers insulin, patient does not know  how much insulin  administers.  Son reports patient takes Humulin- R 25units when BG ~200, 35units when BG ~300,and 40units when BG ~400. Patient only takes insulin before breakfast and dinner.  Confirmed with [facility] in [place], patient prescribed Humulin R 25units TID.     Son reports patient checks her own FS, however believes premeal FS , before dinner 400. Son does not report any hypoglycemic events  No retinopathy, No neuropathy, no CAD.  Son reports patient eats fast food for each meal.     PAST MEDICAL & SURGICAL HISTORY:  Uterine fibroid  Hypercholesteremia  Type 2 diabetes mellitus  Hypertension  Anxiety  H/O  section  H/O hemorrhoidectomy  H/O tubal ligation    FAMILY HISTORY:  FH: type 2 diabetes in mother and father ans siblings     Social History:  No history of smoking or alcohol use     Outpatient Medications:  benztropine 2 mg oral tablet: 1 tab(s) orally once a day (at bedtime) (2021 18:18)  cloNIDine 0.2 mg oral tablet: 1 tab(s) orally 2 times a day (2021 17:00)  enalapril 20 mg oral tablet: 1 tab(s) orally 2 times a day (2021 17:38)  HumuLIN R 100 units/mL injectable solution: 30-40 unit(s) injectable 2 times a day before breakfast and QHS. (2021 18:19)  KlonoPIN 1 mg oral tablet: 1 tab(s) orally 2 times a day (2021 17:38)  lovastatin 20 mg oral tablet: 1 tab(s) orally once a day (2021 15:32)  RisperDAL 3 mg oral tablet: orally once a day (2021 15:32)  risperiDONE 3 mg oral tablet: 1 tab(s) orally once a day (at bedtime) (2021 18:17)  SEROquel 200 mg oral tablet: 1 tab(s) orally once a day (at bedtime) as needed.  (2021 18:17)  traZODone 100 mg oral tablet: 2 tab(s) orally once a day (at bedtime) (2021 18:16)  zolpidem 5 mg oral tablet: 2 tab(s) orally once a day (at bedtime) (2021 18:16)      MEDICATIONS  (STANDING):  aspirin enteric coated 81 milliGRAM(s) Oral daily  atorvastatin 80 milliGRAM(s) Oral at bedtime  benztropine 2 milliGRAM(s) Oral at bedtime  clonazePAM  Tablet 1 milliGRAM(s) Oral two times a day  clopidogrel Tablet 75 milliGRAM(s) Oral daily  dextrose 40% Gel 15 Gram(s) Oral once  dextrose 5%. 1000 milliLiter(s) (50 mL/Hr) IV Continuous <Continuous>  dextrose 5%. 1000 milliLiter(s) (100 mL/Hr) IV Continuous <Continuous>  dextrose 5%. 1000 milliLiter(s) (100 mL/Hr) IV Continuous <Continuous>  dextrose 50% Injectable 25 Gram(s) IV Push once  dextrose 50% Injectable 12.5 Gram(s) IV Push once  dextrose 50% Injectable 25 Gram(s) IV Push once  enoxaparin Injectable 40 milliGRAM(s) SubCutaneous daily  glucagon  Injectable 1 milliGRAM(s) IntraMuscular once  glucagon  Injectable 1 milliGRAM(s) IntraMuscular once  insulin glargine Injectable (LANTUS) 15 Unit(s) SubCutaneous every morning  insulin lispro (ADMELOG) corrective regimen sliding scale   SubCutaneous three times a day before meals  insulin lispro (ADMELOG) corrective regimen sliding scale   SubCutaneous at bedtime  insulin lispro Injectable (ADMELOG) 20 Unit(s) SubCutaneous three times a day before meals  pantoprazole    Tablet 40 milliGRAM(s) Oral before breakfast  risperiDONE   Tablet 3 milliGRAM(s) Oral at bedtime  traZODone 200 milliGRAM(s) Oral at bedtime      Allergies  erythromycin (Unknown)    Review of Systems:  Constitutional: No fever  Eyes: No blurry vision  Neuro: No tremors  HEENT: No pain  Cardiovascular: No chest pain, palpitations  Respiratory: No SOB, no cough  GI: No nausea, vomiting, abdominal pain  : No dysuria  Skin: no rash  Psych: no depression  Endocrine: no polyuria, polydipsia  Hem/lymph: no swelling  Osteoporosis: no fractures  Neuro: slurred speech, right UE and LE weakness     ALL OTHER SYSTEMS REVIEWED AND NEGATIVE      PHYSICAL EXAM:  VITALS: T(C): 36.9 (21 @ 06:18)  T(F): 98.5 (21 @ 06:18), Max: 98.5 (21 @ 06:18)  HR: 100 (21 @ 06:18) (96 - 100)  BP: 171/86 (21 @ 06:18) (171/86 - 192/84)  RR:  (15 - 16)  SpO2:  (95% - 97%)  Wt(kg): 77kg   GENERAL: NAD, well-groomed, well-developed  EYES: No proptosis, anicteric  HEENT:  Atraumatic, Normocephalic, moist mucous membranes  THYROID: Normal size, no palpable nodules, nontender   RESPIRATORY: Clear to auscultation bilaterally; No rales, rhonchi, wheezing  CARDIOVASCULAR: Regular rate and rhythm; No murmurs; no peripheral edema  GI: Soft, nontender, non distended, normal bowel sounds  SKIN: Dry, intact, No rashes or lesions  MUSCULOSKELETAL: decrease ROM and strength in RUE and RLE   NEURO: right facial droop and slurred speech   PSYCH: Alert and oriented x 3, reactive affect     POCT Blood Glucose.: 464 mg/dL (21 @ 12:17)  POCT Blood Glucose.: 416 mg/dL (21 @ 12:00)  POCT Blood Glucose.: 397 mg/dL (21 @ 07:08)  POCT Blood Glucose.: 405 mg/dL (21 @ 07:07)  POCT Blood Glucose.: 205 mg/dL (21 @ 00:25)  POCT Blood Glucose.: 189 mg/dL (21 @ 17:11)  POCT Blood Glucose.: 223 mg/dL (21 @ 11:52)  POCT Blood Glucose.: 398 mg/dL (21 @ 21:56)  POCT Blood Glucose.: 344 mg/dL (21 @ 17:01)  POCT Blood Glucose.: 430 mg/dL (21 @ 11:51)  POCT Blood Glucose.: 230 mg/dL (21 @ 06:29)  POCT Blood Glucose.: 229 mg/dL (21 @ 23:48)  POCT Blood Glucose.: 251 mg/dL (21 @ 21:28)  POCT Blood Glucose.: 333 mg/dL (21 @ 19:16)                            11.9   3.92  )-----------( 164      ( 2021 07:35 )             37.0           132<L>  |  95<L>  |  22  ----------------------------<  332<H>  4.0   |  23  |  1.03    EGFR if : 68  EGFR if non : 59<L>    Ca    8.9        Mg     1.9       Phos  4.0         TPro  7.3  /  Alb  3.1<L>  /  TBili  0.6  /  DBili  x   /  AST  11  /  ALT  7   /  AlkPhos  86   Chol 280<H> LDL -- HDL 45<L> Trig 244<H>

## 2021-01-30 LAB
ANION GAP SERPL CALC-SCNC: 12 MMOL/L — SIGNIFICANT CHANGE UP (ref 7–14)
BUN SERPL-MCNC: 20 MG/DL — SIGNIFICANT CHANGE UP (ref 7–23)
CALCIUM SERPL-MCNC: 8.9 MG/DL — SIGNIFICANT CHANGE UP (ref 8.4–10.5)
CHLORIDE SERPL-SCNC: 98 MMOL/L — SIGNIFICANT CHANGE UP (ref 98–107)
CO2 SERPL-SCNC: 24 MMOL/L — SIGNIFICANT CHANGE UP (ref 22–31)
CREAT SERPL-MCNC: 0.92 MG/DL — SIGNIFICANT CHANGE UP (ref 0.5–1.3)
GLUCOSE BLDC GLUCOMTR-MCNC: 100 MG/DL — HIGH (ref 70–99)
GLUCOSE BLDC GLUCOMTR-MCNC: 147 MG/DL — HIGH (ref 70–99)
GLUCOSE BLDC GLUCOMTR-MCNC: 79 MG/DL — SIGNIFICANT CHANGE UP (ref 70–99)
GLUCOSE BLDC GLUCOMTR-MCNC: 95 MG/DL — SIGNIFICANT CHANGE UP (ref 70–99)
GLUCOSE SERPL-MCNC: 139 MG/DL — HIGH (ref 70–99)
HCT VFR BLD CALC: 34.1 % — LOW (ref 34.5–45)
HGB BLD-MCNC: 11.4 G/DL — LOW (ref 11.5–15.5)
MAGNESIUM SERPL-MCNC: 1.7 MG/DL — SIGNIFICANT CHANGE UP (ref 1.6–2.6)
MCHC RBC-ENTMCNC: 29.7 PG — SIGNIFICANT CHANGE UP (ref 27–34)
MCHC RBC-ENTMCNC: 33.4 GM/DL — SIGNIFICANT CHANGE UP (ref 32–36)
MCV RBC AUTO: 88.8 FL — SIGNIFICANT CHANGE UP (ref 80–100)
NRBC # BLD: 0 /100 WBCS — SIGNIFICANT CHANGE UP
NRBC # FLD: 0 K/UL — SIGNIFICANT CHANGE UP
PHOSPHATE SERPL-MCNC: 3.8 MG/DL — SIGNIFICANT CHANGE UP (ref 2.5–4.5)
PLATELET # BLD AUTO: 153 K/UL — SIGNIFICANT CHANGE UP (ref 150–400)
POTASSIUM SERPL-MCNC: 3.6 MMOL/L — SIGNIFICANT CHANGE UP (ref 3.5–5.3)
POTASSIUM SERPL-SCNC: 3.6 MMOL/L — SIGNIFICANT CHANGE UP (ref 3.5–5.3)
RBC # BLD: 3.84 M/UL — SIGNIFICANT CHANGE UP (ref 3.8–5.2)
RBC # FLD: 14.6 % — HIGH (ref 10.3–14.5)
SODIUM SERPL-SCNC: 134 MMOL/L — LOW (ref 135–145)
WBC # BLD: 4.03 K/UL — SIGNIFICANT CHANGE UP (ref 3.8–10.5)
WBC # FLD AUTO: 4.03 K/UL — SIGNIFICANT CHANGE UP (ref 3.8–10.5)

## 2021-01-30 PROCEDURE — 99232 SBSQ HOSP IP/OBS MODERATE 35: CPT

## 2021-01-30 RX ORDER — AMLODIPINE BESYLATE 2.5 MG/1
5 TABLET ORAL DAILY
Refills: 0 | Status: DISCONTINUED | OUTPATIENT
Start: 2021-01-30 | End: 2021-02-01

## 2021-01-30 RX ADMIN — Medication 12 UNIT(S): at 08:20

## 2021-01-30 RX ADMIN — Medication 200 MILLIGRAM(S): at 21:20

## 2021-01-30 RX ADMIN — Medication 1 MILLIGRAM(S): at 17:24

## 2021-01-30 RX ADMIN — AMLODIPINE BESYLATE 5 MILLIGRAM(S): 2.5 TABLET ORAL at 17:24

## 2021-01-30 RX ADMIN — Medication 81 MILLIGRAM(S): at 11:30

## 2021-01-30 RX ADMIN — Medication 1 MILLIGRAM(S): at 04:52

## 2021-01-30 RX ADMIN — CLOPIDOGREL BISULFATE 75 MILLIGRAM(S): 75 TABLET, FILM COATED ORAL at 11:30

## 2021-01-30 RX ADMIN — Medication 12 UNIT(S): at 12:01

## 2021-01-30 RX ADMIN — INSULIN GLARGINE 35 UNIT(S): 100 INJECTION, SOLUTION SUBCUTANEOUS at 14:25

## 2021-01-30 RX ADMIN — Medication 20 MILLIGRAM(S): at 17:24

## 2021-01-30 RX ADMIN — Medication 2 MILLIGRAM(S): at 21:20

## 2021-01-30 RX ADMIN — PANTOPRAZOLE SODIUM 40 MILLIGRAM(S): 20 TABLET, DELAYED RELEASE ORAL at 04:52

## 2021-01-30 RX ADMIN — ENOXAPARIN SODIUM 40 MILLIGRAM(S): 100 INJECTION SUBCUTANEOUS at 11:29

## 2021-01-30 RX ADMIN — METFORMIN HYDROCHLORIDE 500 MILLIGRAM(S): 850 TABLET ORAL at 04:52

## 2021-01-30 RX ADMIN — Medication 20 MILLIGRAM(S): at 04:52

## 2021-01-30 RX ADMIN — ATORVASTATIN CALCIUM 80 MILLIGRAM(S): 80 TABLET, FILM COATED ORAL at 21:20

## 2021-01-30 RX ADMIN — METFORMIN HYDROCHLORIDE 500 MILLIGRAM(S): 850 TABLET ORAL at 17:24

## 2021-01-30 RX ADMIN — RISPERIDONE 3 MILLIGRAM(S): 4 TABLET ORAL at 21:20

## 2021-01-30 NOTE — DIETITIAN INITIAL EVALUATION ADULT. - ADD RECOMMEND
4) Nutrition education deferred at this time, RDN remains available PRN. 5) Monitor PO intake, tolerance to diet/supplement, weight trends, nutrition related lab values, BMs/GI distress, hydration status, skin integrity.

## 2021-01-30 NOTE — OCCUPATIONAL THERAPY INITIAL EVALUATION ADULT - VISUAL ASSESSMENT: VISUAL NEGLECT
No pertinent past medical history not observed <<----- Click to add NO pertinent Past Medical History

## 2021-01-30 NOTE — DIETITIAN INITIAL EVALUATION ADULT. - ORAL NUTRITION SUPPLEMENTS
2) Recommend add Ensure Max 1 PO daily (provides 150 kcal, 30 gm protein per 11oz serving), will provide thickeners to prepare to appropriate consistency.

## 2021-01-30 NOTE — DIETITIAN INITIAL EVALUATION ADULT. - PERTINENT MEDS FT
atorvastatin, LANTUS 35 Unit(s), ADMELOG corrective regimen sliding scale, ADMELOG 12 Unit(s) three times a day before meals, metFORMIN, pantoprazole Tablet

## 2021-01-30 NOTE — DIETITIAN INITIAL EVALUATION ADULT. - ETIOLOGY
motor/mechanical causes endocrine related organ dysfunction, limited prior exposure to nutrition educationh

## 2021-01-30 NOTE — PROGRESS NOTE ADULT - ASSESSMENT
59 y/o female with a PmHx of HTN, DM type II, HLD, Anxiety d/o and unspecified somatic complaint disorder presents to the ED accompanied by her son s/p episode of right sided weakness, R facial droop, dysarthria that occurred this morning.    acute CVA with right side hemiplegia   ICA stenosis does not correlate to acute presentation   acute urinary retention , monitor Urine output, straight  cath as needed depending on bladder scan , insert hanna if fails management   odynophagia and dysphagia post stroke   Dm2 uncontrolled     plan:  start protonix PPI,   barium swallow  to evaluate odynophagia, discussed with her outpatient GI doctor, no hx of web or strictures. she has numerous somatic complaints in the past  endocrine eval, start lantus, metformin.monitor blood sugar   restart enalapril 20mg bid   continue telemetry monitoring   neurochecks q4hrs   MRI  brain  shows acute left basal ganglia and corona radiata infarcts consistent with the patine presentation, there is  old right lacunar infarct   echo with bubble study pending   OT eval, PT recommended for STR   outpatient vascular surgery evaluation and follow up for carotid disease

## 2021-01-30 NOTE — DIETITIAN INITIAL EVALUATION ADULT. - ORAL INTAKE PTA/DIET HISTORY
Unable to conduct a face-to-face interview due to limited contact restrictions related to pt's medical condition/isolation precautions. Attempted to contact pt/pt family, unable to reach at this time. Comprehensive chart review completed.    Pt with history of T2DM, on basal insulin and oral medications PTA, HbA1c 11.6% indicating poor control. Per chart, son reports pt eats fast food at every meal. Of note, pt with history of mental illness, lives with family at home who assist with care.

## 2021-01-30 NOTE — DIETITIAN INITIAL EVALUATION ADULT. - OTHER INFO
Endocrinology following and adjusting insulin regimen as appropriate. Noted pt with hyperglycemia, BGs managed with basal/bolus/corrective sliding scale insulin, metformin BID and therapeutic diet. Pt currently on room air. Noted with with odynophagia and dysphagia following stroke; s/p bedside swallow assessment (1/27) recommending soft solids with nectar consistency liquids, pending barium swallow. Dosing weight 170 lbs (1/26), no history available via chart. Pt with no current GI distress (nausea/vomiting/constipation/diarrhea) per flowsheets, last BM 1/29. Pt with NKFA, no noted micronutrient supplementation PTA per H&P.

## 2021-01-30 NOTE — DIETITIAN INITIAL EVALUATION ADULT. - PERTINENT LABORATORY DATA
(1/30) Na 134<L>, BUN 20, Cr 0.92, <H>, K+ 3.6, Phos 3.8, Mg 1.7. (1/28) HbA1c 11.6%<>H. POCT: (1/30) 100-147, (1/29) 143-464.

## 2021-01-30 NOTE — OCCUPATIONAL THERAPY INITIAL EVALUATION ADULT - PERTINENT HX OF CURRENT PROBLEM, REHAB EVAL
60 year old female with history of HTN, DM type II, HLD, Anxiety d/o, Schizophrenia, and Covid 4 weeks ago presents to the ED s/p episode of right sided weakness, right facial droop, and dysarthria. MRI brain demonstrating acute infarct of left basal ganglia/corona radiata region.

## 2021-01-30 NOTE — OCCUPATIONAL THERAPY INITIAL EVALUATION ADULT - PRECAUTIONS/LIMITATIONS, REHAB EVAL
[place]/CONTACT- COVID-19+/aspiration precautions/fall precautions/isolation precautions/seizure precautions

## 2021-01-30 NOTE — OCCUPATIONAL THERAPY INITIAL EVALUATION ADULT - DIAGNOSIS, OT EVAL
s/p right sided weakness, s/p dysarthria, s/p acute CVA of left basal ganglia/corona radiata; decreased functional mobility, decreased ADL performance, decreased functional use of right UE, decreased sitting balance

## 2021-01-30 NOTE — DIETITIAN INITIAL EVALUATION ADULT. - SIGNS/SYMPTOMS
HbA1c 11.6%, limited adherence to therapeutic diet s/p bedside swallow assessment recommending texture/consistency modified diet

## 2021-01-30 NOTE — DIETITIAN INITIAL EVALUATION ADULT. - CONTINUE CURRENT NUTRITION CARE PLAN
1) Recommend continue current diet to optimize glycemic control; texture/consistency per speech recommendations/medical discretion.

## 2021-01-30 NOTE — DIETITIAN INITIAL EVALUATION ADULT. - RD TO REMAIN AVAILABLE
yes [Consultation] : a consultation visit [Spouse] : spouse [FreeTextEntry1] : LLE venous stasis ulcer since mid December

## 2021-01-30 NOTE — DIETITIAN INITIAL EVALUATION ADULT. - REASON FOR ADMISSION
Per chart, pt is 60 year old female PMHx of HTN, DM2, HLD, Anxiety/Depression, Schizophrenia, COVID swab still positive, presenting with right sided weakness and R facial drooping with dysarthria, admitted for management of L anterior choroidal territory infarct.

## 2021-01-30 NOTE — OCCUPATIONAL THERAPY INITIAL EVALUATION ADULT - GENERAL OBSERVATIONS, REHAB EVAL
Patient received semisupine in bed in NAD. +Prima fit. +tele. Per RN Cj, patient okay to participate in OT evaluation.

## 2021-01-30 NOTE — PROGRESS NOTE ADULT - SUBJECTIVE AND OBJECTIVE BOX
Medicine Progress Note    Patient is a 60y old  Female who presents with a chief complaint of R sided hemiparesis and facial droop (29 Jan 2021 16:13)      SUBJECTIVE / OVERNIGHT EVENTS: Patient with elevated BP, will start amlodipine. Satting well on RA.     ADDITIONAL REVIEW OF SYSTEMS:    MEDICATIONS  (STANDING):  amLODIPine   Tablet 5 milliGRAM(s) Oral daily  aspirin  chewable 81 milliGRAM(s) Oral daily  atorvastatin 80 milliGRAM(s) Oral at bedtime  benztropine 2 milliGRAM(s) Oral at bedtime  clonazePAM  Tablet 1 milliGRAM(s) Oral two times a day  clopidogrel Tablet 75 milliGRAM(s) Oral daily  dextrose 40% Gel 15 Gram(s) Oral once  dextrose 5%. 1000 milliLiter(s) (50 mL/Hr) IV Continuous <Continuous>  dextrose 5%. 1000 milliLiter(s) (100 mL/Hr) IV Continuous <Continuous>  dextrose 5%. 1000 milliLiter(s) (100 mL/Hr) IV Continuous <Continuous>  dextrose 50% Injectable 25 Gram(s) IV Push once  dextrose 50% Injectable 12.5 Gram(s) IV Push once  dextrose 50% Injectable 25 Gram(s) IV Push once  enalapril 20 milliGRAM(s) Oral two times a day  enoxaparin Injectable 40 milliGRAM(s) SubCutaneous daily  glucagon  Injectable 1 milliGRAM(s) IntraMuscular once  glucagon  Injectable 1 milliGRAM(s) IntraMuscular once  insulin glargine Injectable (LANTUS) 35 Unit(s) SubCutaneous <User Schedule>  insulin lispro (ADMELOG) corrective regimen sliding scale   SubCutaneous three times a day before meals  insulin lispro (ADMELOG) corrective regimen sliding scale   SubCutaneous at bedtime  insulin lispro Injectable (ADMELOG) 12 Unit(s) SubCutaneous three times a day before meals  metFORMIN 500 milliGRAM(s) Oral two times a day  pantoprazole    Tablet 40 milliGRAM(s) Oral before breakfast  risperiDONE   Tablet 3 milliGRAM(s) Oral at bedtime  traZODone 200 milliGRAM(s) Oral at bedtime    MEDICATIONS  (PRN):    CAPILLARY BLOOD GLUCOSE      POCT Blood Glucose.: 147 mg/dL (30 Jan 2021 07:41)  POCT Blood Glucose.: 143 mg/dL (29 Jan 2021 21:27)  POCT Blood Glucose.: 350 mg/dL (29 Jan 2021 16:45)  POCT Blood Glucose.: 383 mg/dL (29 Jan 2021 15:11)  POCT Blood Glucose.: 409 mg/dL (29 Jan 2021 15:08)  POCT Blood Glucose.: 464 mg/dL (29 Jan 2021 12:17)  POCT Blood Glucose.: 416 mg/dL (29 Jan 2021 12:00)    I&O's Summary      PHYSICAL EXAM:  Vital Signs Last 24 Hrs  T(C): 36.6 (30 Jan 2021 04:51), Max: 36.6 (29 Jan 2021 20:15)  T(F): 97.8 (30 Jan 2021 04:51), Max: 97.9 (29 Jan 2021 20:15)  HR: 91 (30 Jan 2021 04:51) (91 - 98)  BP: 152/73 (30 Jan 2021 04:51) (152/73 - 166/78)  BP(mean): --  RR: 18 (30 Jan 2021 04:51) (18 - 18)  SpO2: 94% (30 Jan 2021 04:51) (94% - 97%)  CONSTITUTIONAL: NAD, well-developed, well-groomed  ENMT: Moist oral mucosa, no pharyngeal injection or exudates; normal dentition  RESPIRATORY: Normal respiratory effort; lungs are clear to auscultation bilaterally  CARDIOVASCULAR: Regular rate and rhythm, normal S1 and S2, no murmur/rub/gallop; No lower extremity edema; Peripheral pulses are 2+ bilaterally  ABDOMEN: Nontender to palpation, normoactive bowel sounds, no rebound/guarding; No hepatosplenomegaly  PSYCH: A+O to person, place, and time; affect appropriate  NEUROLOGY: CN 2-12 are intact and symmetric; no gross sensory deficits   SKIN: No rashes; no palpable lesions    LABS:                        11.4   4.03  )-----------( 153      ( 30 Jan 2021 08:13 )             34.1     01-30    134<L>  |  98  |  20  ----------------------------<  139<H>  3.6   |  24  |  0.92    Ca    8.9      30 Jan 2021 08:13  Phos  3.8     01-30  Mg     1.7     01-30                COVID-19 PCR: Detected (26 Jan 2021 12:59)      RADIOLOGY & ADDITIONAL TESTS:  Imaging from Last 24 Hours:    Electrocardiogram/QTc Interval:    COORDINATION OF CARE:  Care Discussed with Consultants/Other Providers:   Medicine Progress Note    Patient is a 60y old  Female who presents with a chief complaint of R sided hemiparesis and facial droop (29 Jan 2021 16:13)      SUBJECTIVE / OVERNIGHT EVENTS: Patient with elevated BP, will start amlodipine. Satting well on RA.     ADDITIONAL REVIEW OF SYSTEMS:    MEDICATIONS  (STANDING):  amLODIPine   Tablet 5 milliGRAM(s) Oral daily  aspirin  chewable 81 milliGRAM(s) Oral daily  atorvastatin 80 milliGRAM(s) Oral at bedtime  benztropine 2 milliGRAM(s) Oral at bedtime  clonazePAM  Tablet 1 milliGRAM(s) Oral two times a day  clopidogrel Tablet 75 milliGRAM(s) Oral daily  dextrose 40% Gel 15 Gram(s) Oral once  dextrose 5%. 1000 milliLiter(s) (50 mL/Hr) IV Continuous <Continuous>  dextrose 5%. 1000 milliLiter(s) (100 mL/Hr) IV Continuous <Continuous>  dextrose 5%. 1000 milliLiter(s) (100 mL/Hr) IV Continuous <Continuous>  dextrose 50% Injectable 25 Gram(s) IV Push once  dextrose 50% Injectable 12.5 Gram(s) IV Push once  dextrose 50% Injectable 25 Gram(s) IV Push once  enalapril 20 milliGRAM(s) Oral two times a day  enoxaparin Injectable 40 milliGRAM(s) SubCutaneous daily  glucagon  Injectable 1 milliGRAM(s) IntraMuscular once  glucagon  Injectable 1 milliGRAM(s) IntraMuscular once  insulin glargine Injectable (LANTUS) 35 Unit(s) SubCutaneous <User Schedule>  insulin lispro (ADMELOG) corrective regimen sliding scale   SubCutaneous three times a day before meals  insulin lispro (ADMELOG) corrective regimen sliding scale   SubCutaneous at bedtime  insulin lispro Injectable (ADMELOG) 12 Unit(s) SubCutaneous three times a day before meals  metFORMIN 500 milliGRAM(s) Oral two times a day  pantoprazole    Tablet 40 milliGRAM(s) Oral before breakfast  risperiDONE   Tablet 3 milliGRAM(s) Oral at bedtime  traZODone 200 milliGRAM(s) Oral at bedtime    MEDICATIONS  (PRN):    CAPILLARY BLOOD GLUCOSE      POCT Blood Glucose.: 147 mg/dL (30 Jan 2021 07:41)  POCT Blood Glucose.: 143 mg/dL (29 Jan 2021 21:27)  POCT Blood Glucose.: 350 mg/dL (29 Jan 2021 16:45)  POCT Blood Glucose.: 383 mg/dL (29 Jan 2021 15:11)  POCT Blood Glucose.: 409 mg/dL (29 Jan 2021 15:08)  POCT Blood Glucose.: 464 mg/dL (29 Jan 2021 12:17)  POCT Blood Glucose.: 416 mg/dL (29 Jan 2021 12:00)    I&O's Summary      PHYSICAL EXAM:  Vital Signs Last 24 Hrs  T(C): 36.6 (30 Jan 2021 04:51), Max: 36.6 (29 Jan 2021 20:15)  T(F): 97.8 (30 Jan 2021 04:51), Max: 97.9 (29 Jan 2021 20:15)  HR: 91 (30 Jan 2021 04:51) (91 - 98)  BP: 152/73 (30 Jan 2021 04:51) (152/73 - 166/78)  BP(mean): --  RR: 18 (30 Jan 2021 04:51) (18 - 18)  SpO2: 94% (30 Jan 2021 04:51) (94% - 97%)  CONSTITUTIONAL: Middle age woman NAD, well-developed, well-groomed  ENMT: Moist oral mucosa, no pharyngeal injection or exudates; normal dentition  RESPIRATORY: Breathing comfortably on RA, Normal respiratory effort; lungs are clear to auscultation bilaterally  CARDIOVASCULAR: Regular rate and rhythm, normal S1 and S2, no murmur/rub/gallop; No lower extremity edema; Peripheral pulses are 2+ bilaterally  ABDOMEN: Nontender to palpation, normoactive bowel sounds, no rebound/guarding; No hepatosplenomegaly  PSYCH: A+O to person, place, and time; affect appropriate  NEUROLOGY: right hemiplegia , mild right facial droop and dysarthria   SKIN: No rashes; no palpable lesions    LABS:                        11.4   4.03  )-----------( 153      ( 30 Jan 2021 08:13 )             34.1     01-30    134<L>  |  98  |  20  ----------------------------<  139<H>  3.6   |  24  |  0.92    Ca    8.9      30 Jan 2021 08:13  Phos  3.8     01-30  Mg     1.7     01-30                COVID-19 PCR: Detected (26 Jan 2021 12:59)      RADIOLOGY & ADDITIONAL TESTS:  Imaging from Last 24 Hours:    Electrocardiogram/QTc Interval:    COORDINATION OF CARE:  Care Discussed with Consultants/Other Providers:

## 2021-01-31 LAB
ANION GAP SERPL CALC-SCNC: 12 MMOL/L — SIGNIFICANT CHANGE UP (ref 7–14)
BUN SERPL-MCNC: 19 MG/DL — SIGNIFICANT CHANGE UP (ref 7–23)
CALCIUM SERPL-MCNC: 8.8 MG/DL — SIGNIFICANT CHANGE UP (ref 8.4–10.5)
CHLORIDE SERPL-SCNC: 100 MMOL/L — SIGNIFICANT CHANGE UP (ref 98–107)
CO2 SERPL-SCNC: 24 MMOL/L — SIGNIFICANT CHANGE UP (ref 22–31)
CREAT SERPL-MCNC: 1.01 MG/DL — SIGNIFICANT CHANGE UP (ref 0.5–1.3)
CRP SERPL-MCNC: 21 MG/L — HIGH
D DIMER BLD IA.RAPID-MCNC: 912 NG/ML DDU — HIGH
FERRITIN SERPL-MCNC: 337 NG/ML — HIGH (ref 15–150)
GLUCOSE BLDC GLUCOMTR-MCNC: 116 MG/DL — HIGH (ref 70–99)
GLUCOSE BLDC GLUCOMTR-MCNC: 129 MG/DL — HIGH (ref 70–99)
GLUCOSE BLDC GLUCOMTR-MCNC: 168 MG/DL — HIGH (ref 70–99)
GLUCOSE BLDC GLUCOMTR-MCNC: 290 MG/DL — HIGH (ref 70–99)
GLUCOSE SERPL-MCNC: 111 MG/DL — HIGH (ref 70–99)
HCT VFR BLD CALC: 34.9 % — SIGNIFICANT CHANGE UP (ref 34.5–45)
HGB BLD-MCNC: 11.2 G/DL — LOW (ref 11.5–15.5)
LDH SERPL L TO P-CCNC: 243 U/L — HIGH (ref 135–225)
MAGNESIUM SERPL-MCNC: 1.8 MG/DL — SIGNIFICANT CHANGE UP (ref 1.6–2.6)
MCHC RBC-ENTMCNC: 29.4 PG — SIGNIFICANT CHANGE UP (ref 27–34)
MCHC RBC-ENTMCNC: 32.1 GM/DL — SIGNIFICANT CHANGE UP (ref 32–36)
MCV RBC AUTO: 91.6 FL — SIGNIFICANT CHANGE UP (ref 80–100)
NRBC # BLD: 0 /100 WBCS — SIGNIFICANT CHANGE UP
NRBC # FLD: 0 K/UL — SIGNIFICANT CHANGE UP
PHOSPHATE SERPL-MCNC: 3.8 MG/DL — SIGNIFICANT CHANGE UP (ref 2.5–4.5)
PLATELET # BLD AUTO: 173 K/UL — SIGNIFICANT CHANGE UP (ref 150–400)
POTASSIUM SERPL-MCNC: 3.8 MMOL/L — SIGNIFICANT CHANGE UP (ref 3.5–5.3)
POTASSIUM SERPL-SCNC: 3.8 MMOL/L — SIGNIFICANT CHANGE UP (ref 3.5–5.3)
PROCALCITONIN SERPL-MCNC: 0.05 NG/ML — SIGNIFICANT CHANGE UP (ref 0.02–0.1)
RBC # BLD: 3.81 M/UL — SIGNIFICANT CHANGE UP (ref 3.8–5.2)
RBC # FLD: 14.4 % — SIGNIFICANT CHANGE UP (ref 10.3–14.5)
SODIUM SERPL-SCNC: 136 MMOL/L — SIGNIFICANT CHANGE UP (ref 135–145)
WBC # BLD: 4.26 K/UL — SIGNIFICANT CHANGE UP (ref 3.8–10.5)
WBC # FLD AUTO: 4.26 K/UL — SIGNIFICANT CHANGE UP (ref 3.8–10.5)

## 2021-01-31 PROCEDURE — 99232 SBSQ HOSP IP/OBS MODERATE 35: CPT

## 2021-01-31 PROCEDURE — 99222 1ST HOSP IP/OBS MODERATE 55: CPT

## 2021-01-31 RX ORDER — INSULIN GLARGINE 100 [IU]/ML
33 INJECTION, SOLUTION SUBCUTANEOUS
Refills: 0 | Status: DISCONTINUED | OUTPATIENT
Start: 2021-01-31 | End: 2021-02-01

## 2021-01-31 RX ORDER — INSULIN LISPRO 100/ML
11 VIAL (ML) SUBCUTANEOUS
Refills: 0 | Status: DISCONTINUED | OUTPATIENT
Start: 2021-01-31 | End: 2021-02-01

## 2021-01-31 RX ADMIN — METFORMIN HYDROCHLORIDE 500 MILLIGRAM(S): 850 TABLET ORAL at 06:14

## 2021-01-31 RX ADMIN — ATORVASTATIN CALCIUM 80 MILLIGRAM(S): 80 TABLET, FILM COATED ORAL at 20:52

## 2021-01-31 RX ADMIN — INSULIN GLARGINE 33 UNIT(S): 100 INJECTION, SOLUTION SUBCUTANEOUS at 16:59

## 2021-01-31 RX ADMIN — Medication 20 MILLIGRAM(S): at 17:20

## 2021-01-31 RX ADMIN — Medication 11 UNIT(S): at 12:14

## 2021-01-31 RX ADMIN — Medication 200 MILLIGRAM(S): at 20:52

## 2021-01-31 RX ADMIN — Medication 2 MILLIGRAM(S): at 20:52

## 2021-01-31 RX ADMIN — Medication 20 MILLIGRAM(S): at 06:14

## 2021-01-31 RX ADMIN — Medication 2: at 17:18

## 2021-01-31 RX ADMIN — ENOXAPARIN SODIUM 40 MILLIGRAM(S): 100 INJECTION SUBCUTANEOUS at 12:07

## 2021-01-31 RX ADMIN — RISPERIDONE 3 MILLIGRAM(S): 4 TABLET ORAL at 20:52

## 2021-01-31 RX ADMIN — PANTOPRAZOLE SODIUM 40 MILLIGRAM(S): 20 TABLET, DELAYED RELEASE ORAL at 06:14

## 2021-01-31 RX ADMIN — AMLODIPINE BESYLATE 5 MILLIGRAM(S): 2.5 TABLET ORAL at 06:14

## 2021-01-31 RX ADMIN — Medication 6: at 12:13

## 2021-01-31 RX ADMIN — Medication 1 MILLIGRAM(S): at 06:14

## 2021-01-31 RX ADMIN — Medication 81 MILLIGRAM(S): at 12:06

## 2021-01-31 RX ADMIN — Medication 11 UNIT(S): at 17:18

## 2021-01-31 RX ADMIN — Medication 1 MILLIGRAM(S): at 17:17

## 2021-01-31 RX ADMIN — CLOPIDOGREL BISULFATE 75 MILLIGRAM(S): 75 TABLET, FILM COATED ORAL at 12:06

## 2021-01-31 NOTE — CONSULT NOTE ADULT - SUBJECTIVE AND OBJECTIVE BOX
Patient is a 60y old  Female who presents with a chief complaint of R sided hemiparesis and facial droop (2021 10:13)      HPI:  61 y/o female with a PmHx of HTN, DM type II, HLD, Anxiety/Depression, Schizophrenia, Covid 4 weeks ago presents to the ED accompanied by her son, Ruben, s/p episode of right sided weakness that occurred this morning. Son reports that he found his mom on the floor by her bed this morning. When he tried to help her up and back into the bed, he noticed that she had significant R sided weakness and R facial drooping with dysarthria which prompted him to call EMS. A similar episode occurred last night but son states that he did not think that episode was emergent because she is usually weak at baseline (2/2 medications) and he did not notice any hemiparesis or other symptoms concerning for a stroke. He was able to assist her back into bed last night.  After the episode this morning, she was unable to bear any weight on the R leg or move her R extremities. She is usually able to ambulate but has not been able to since the episode this morning.  Patient was able to talk to the son after the episode and denied acute confusion or disorientation. Patient denies LOC or trauma, states she fell onto carpet.  Pt admits to R leg pain that was a 7/10 when she fell this morning, but denies any pain now. Patient admits to weakness, fatigue, constipation (normal for her), palpitations (2/2 anxiety), and chronic blurry vision and tinnitus in the L ear that she attributes to the medications that she is taking. Also reports one episode of urinary incontinence that occurred when she experienced the hemiparesis this morning which has never happened before. Pt denies fecal incontinence, dizziness, lightheadedness, fever, chills, sweats, weight loss, abdominal pain, n/v, diarrhea, dysuria, hematuria, blood in the stools. Further denies SOB, chest pain, orthopnea, numbness or tingling, syncope, depression or SI.    (2021 15:16)  Patient was found to have acute L basal ganglia/corona radiata cva on MRI.  Patient states she lives with her  at home in house with 2 stairs to enter, 10 inside.  She worked with PT today. States she is feeling better overall, denies pain or shortness of breath.  States cough improving.    REVIEW OF SYSTEMS  Constitutional - No fever, No weight loss, No fatigue  HEENT - No eye pain, No visual disturbances, No difficulty hearing, No tinnitus, No vertigo, No neck pain  Respiratory - + cough, No wheezing, No shortness of breath  Cardiovascular - No chest pain, No palpitations  Gastrointestinal - No abdominal pain, No nausea, No vomiting, No diarrhea, No constipation  Genitourinary - No dysuria, No frequency, No hematuria, No incontinence  Neurological - No headaches, No memory loss, + loss of strength, No numbness, No tremors  Skin - No itching, No rashes, No lesions   Endocrine - No temperature intolerance  Musculoskeletal - No joint pain, No joint swelling, No muscle pain  Psychiatric - No depression, No anxiety    PAST MEDICAL & SURGICAL HISTORY  Uterine fibroid    Hypercholesteremia    Type 2 diabetes mellitus    Hypertension    Diabetes    Anxiety    H/O  section    H/O hemorrhoidectomy    H/O tubal ligation        SOCIAL HISTORY  Smoking - Denied  EtOH - Denied   Drugs - Denied    FUNCTIONAL HISTORY  Lives with  in private house  Independent for ambulation, states she required assistance for some adls at baseline    CURRENT FUNCTIONAL STATUS  Pt received semi-nicole in bed, + via tele monitor, room air, NAD. Pt performs bed mobility with moderate assistance x1, sit to stand transfers x2 with minimum assistance x1 and stand pivot transfer with maximum assistance x1 secondary to right LE knee buckling. Continue to follow with PT services to address functional deficits.Pt left seated in bedside chair, all lines and tubes intact, NAD, +call bell. RN aware of session.       FAMILY HISTORY   FH: type 2 diabetes    No pertinent family history in first degree relatives        RECENT LABS/IMAGING  < from: MR Head No Cont (21 @ 22:03) >  EXAM:  MR BRAIN        PROCEDURE DATE:  2021         INTERPRETATION:  Clinical indications: Code stroke.    MRI of the brain was performed using sagittal T1, axial T1 T2 T2 FLAIR diffusion and susceptibility weighted sequence.    This exam is compared prior noncontrast head CT performed on 2001    Extensive parenchymal volume loss and chronic microvascular ischemic changes are identified.    Old lacunar infarct involving the posterior right lenticular nucleus is seen.    Abnormal T2 prolongation restricted diffusion is seen involving the left basal ganglia/corona radiata region. This is compatible with an acute infarct. No hemorrhagic transformation is seen. No significant shift or herniation is again    The large vessels demonstrate normal flow voids    The visualized paranasal sinuses mastoid and middle ear regions appear clear.    IMPRESSION: Acute infarcts as described above.          < end of copied text >    CBC Full  -  ( 2021 06:34 )  WBC Count : 4.26 K/uL  RBC Count : 3.81 M/uL  Hemoglobin : 11.2 g/dL  Hematocrit : 34.9 %  Platelet Count - Automated : 173 K/uL  Mean Cell Volume : 91.6 fL  Mean Cell Hemoglobin : 29.4 pg  Mean Cell Hemoglobin Concentration : 32.1 gm/dL  Auto Neutrophil # : x  Auto Lymphocyte # : x  Auto Monocyte # : x  Auto Eosinophil # : x  Auto Basophil # : x  Auto Neutrophil % : x  Auto Lymphocyte % : x  Auto Monocyte % : x  Auto Eosinophil % : x  Auto Basophil % : x        136  |  100  |  19  ----------------------------<  111<H>  3.8   |  24  |  1.01    Ca    8.8      2021 06:34  Phos  3.8       Mg     1.8               VITALS  T(C): 36.5 (21 @ 12:04), Max: 37.3 (21 @ 01:27)  HR: 95 (21 @ 17:21) (85 - 95)  BP: 189/89 (21 @ 17:21) (134/68 - 189/89)  RR: 18 (21 @ 17:21) (17 - 18)  SpO2: 97% (21 @ 17:21) (96% - 97%)  Wt(kg): --    ALLERGIES  erythromycin (Unknown)      MEDICATIONS   amLODIPine   Tablet 5 milliGRAM(s) Oral daily  aspirin  chewable 81 milliGRAM(s) Oral daily  atorvastatin 80 milliGRAM(s) Oral at bedtime  benztropine 2 milliGRAM(s) Oral at bedtime  clonazePAM  Tablet 1 milliGRAM(s) Oral two times a day  clopidogrel Tablet 75 milliGRAM(s) Oral daily  dextrose 40% Gel 15 Gram(s) Oral once  dextrose 5%. 1000 milliLiter(s) IV Continuous <Continuous>  dextrose 5%. 1000 milliLiter(s) IV Continuous <Continuous>  dextrose 5%. 1000 milliLiter(s) IV Continuous <Continuous>  dextrose 50% Injectable 25 Gram(s) IV Push once  dextrose 50% Injectable 12.5 Gram(s) IV Push once  dextrose 50% Injectable 25 Gram(s) IV Push once  enalapril 20 milliGRAM(s) Oral two times a day  enoxaparin Injectable 40 milliGRAM(s) SubCutaneous daily  glucagon  Injectable 1 milliGRAM(s) IntraMuscular once  glucagon  Injectable 1 milliGRAM(s) IntraMuscular once  insulin glargine Injectable (LANTUS) 33 Unit(s) SubCutaneous <User Schedule>  insulin lispro (ADMELOG) corrective regimen sliding scale   SubCutaneous three times a day before meals  insulin lispro (ADMELOG) corrective regimen sliding scale   SubCutaneous at bedtime  insulin lispro Injectable (ADMELOG) 11 Unit(s) SubCutaneous three times a day before meals  pantoprazole    Tablet 40 milliGRAM(s) Oral before breakfast  risperiDONE   Tablet 3 milliGRAM(s) Oral at bedtime  traZODone 200 milliGRAM(s) Oral at bedtime      ----------------------------------------------------------------------------------------  PHYSICAL EXAM  Constitutional - NAD, Comfortable  HEENT - NCAT, EOMI  Neck - Supple, No limited ROM  Chest - CTA bilaterally, No wheeze, No rhonchi, No crackles  Cardiovascular - RRR, S1S2, No murmurs  Abdomen - BS+, Soft, NTND  Extremities - No C/C/E, No calf tenderness   Neurologic Exam -                    Cognitive - Awake, Alert, AAO to self, place, date, year, situation     Communication - mild dysarthria     Cranial Nerves - trace R facial weakness, tongue appears midline     Motor -                      LEFT    UE - ShAB 5/5, EF 5/5, EE 5/5, WE 5/5,  5/5                    RIGHT UE - 2/5                    LEFT    LE - HF 5/5, KE 5/5, DF 5/5, PF 5/5                    RIGHT LE - HF 1/5     Sensory - Intact to LT      Coordination - FTN intact on left     OculoVestibular - No saccades, No nystagmus, VOR         Balance - WNL Static  Psychiatric - Mood stable, Affect WNL  ----------------------------------------------------------------------------------------  ASSESSMENT/PLAN 61 y/o female with a PmHx of HTN, DM type II, HLD, Anxiety d/o and unspecified somatic complaint disorder presents to the ED accompanied by her son s/p episode of right sided weakness and dysarthria  on isolation for covid per family (4 weeks ago).  on isolation precautions  echo pending  schizophrenia: risperdal  anxiety: klonopin  htn: norvasc, enalapril  cva: asa, statin, plavix  DVT PPX - lovenox  Diet: soft, consistent carb, nectar thick liquid  continue bedside PT/OT/SLP  turn and position q 2 to prevent skin breakdown  Rehab - likely acute rehab when medically cleared.    Recommend ACUTE inpatient rehabilitation for the functional deficits consisting of 3 hours of therapy/day & 24 hour RN/daily PMR physician for comorbid medical management. Will continue to follow for ongoing rehab needs and recommendations.

## 2021-01-31 NOTE — PROGRESS NOTE ADULT - SUBJECTIVE AND OBJECTIVE BOX
Medicine Progress Note    Patient is a 60y old  Female who presents with a chief complaint of Per chart, pt is 60 year old female PMHx of HTN, DM2, HLD, Anxiety/Depression, Schizophrenia, COVID swab still positive, presenting with right sided weakness and R facial drooping with dysarthria, admitted for management of L anterior choroidal territory infarct. (30 Jan 2021 12:54)      SUBJECTIVE / OVERNIGHT EVENTS: Patient is 96-97 on RA.     ADDITIONAL REVIEW OF SYSTEMS:    MEDICATIONS  (STANDING):  amLODIPine   Tablet 5 milliGRAM(s) Oral daily  aspirin  chewable 81 milliGRAM(s) Oral daily  atorvastatin 80 milliGRAM(s) Oral at bedtime  benztropine 2 milliGRAM(s) Oral at bedtime  clonazePAM  Tablet 1 milliGRAM(s) Oral two times a day  clopidogrel Tablet 75 milliGRAM(s) Oral daily  dextrose 40% Gel 15 Gram(s) Oral once  dextrose 5%. 1000 milliLiter(s) (50 mL/Hr) IV Continuous <Continuous>  dextrose 5%. 1000 milliLiter(s) (100 mL/Hr) IV Continuous <Continuous>  dextrose 5%. 1000 milliLiter(s) (100 mL/Hr) IV Continuous <Continuous>  dextrose 50% Injectable 25 Gram(s) IV Push once  dextrose 50% Injectable 12.5 Gram(s) IV Push once  dextrose 50% Injectable 25 Gram(s) IV Push once  enalapril 20 milliGRAM(s) Oral two times a day  enoxaparin Injectable 40 milliGRAM(s) SubCutaneous daily  glucagon  Injectable 1 milliGRAM(s) IntraMuscular once  glucagon  Injectable 1 milliGRAM(s) IntraMuscular once  insulin glargine Injectable (LANTUS) 35 Unit(s) SubCutaneous <User Schedule>  insulin lispro (ADMELOG) corrective regimen sliding scale   SubCutaneous three times a day before meals  insulin lispro (ADMELOG) corrective regimen sliding scale   SubCutaneous at bedtime  insulin lispro Injectable (ADMELOG) 12 Unit(s) SubCutaneous three times a day before meals  metFORMIN 500 milliGRAM(s) Oral two times a day  pantoprazole    Tablet 40 milliGRAM(s) Oral before breakfast  risperiDONE   Tablet 3 milliGRAM(s) Oral at bedtime  traZODone 200 milliGRAM(s) Oral at bedtime    MEDICATIONS  (PRN):    CAPILLARY BLOOD GLUCOSE  108 (30 Jan 2021 14:22)      POCT Blood Glucose.: 129 mg/dL (31 Jan 2021 07:21)  POCT Blood Glucose.: 95 mg/dL (30 Jan 2021 21:15)  POCT Blood Glucose.: 79 mg/dL (30 Jan 2021 16:36)  POCT Blood Glucose.: 100 mg/dL (30 Jan 2021 11:47)    I&O's Summary    30 Jan 2021 07:01  -  31 Jan 2021 07:00  --------------------------------------------------------  IN: 400 mL / OUT: 900 mL / NET: -500 mL        PHYSICAL EXAM:  Vital Signs Last 24 Hrs  T(C): 36.5 (31 Jan 2021 06:10), Max: 37.3 (31 Jan 2021 01:27)  T(F): 97.7 (31 Jan 2021 06:10), Max: 99.1 (31 Jan 2021 01:27)  HR: 85 (31 Jan 2021 06:10) (85 - 95)  BP: 134/68 (31 Jan 2021 06:10) (134/68 - 178/74)  BP(mean): --  RR: 17 (31 Jan 2021 06:10) (16 - 18)  SpO2: 97% (31 Jan 2021 06:10) (95% - 97%)  CONSTITUTIONAL: NAD, well-developed, well-groomed  ENMT: Moist oral mucosa, no pharyngeal injection or exudates; normal dentition  RESPIRATORY: Normal respiratory effort; lungs are clear to auscultation bilaterally  CARDIOVASCULAR: Regular rate and rhythm, normal S1 and S2, no murmur/rub/gallop; No lower extremity edema; Peripheral pulses are 2+ bilaterally  ABDOMEN: Nontender to palpation, normoactive bowel sounds, no rebound/guarding; No hepatosplenomegaly  PSYCH: A+O to person, place, and time; affect appropriate  NEUROLOGY: CN 2-12 are intact and symmetric; no gross sensory deficits   SKIN: No rashes; no palpable lesions    LABS:                        11.2   4.26  )-----------( 173      ( 31 Jan 2021 06:34 )             34.9     01-31    136  |  100  |  19  ----------------------------<  111<H>  3.8   |  24  |  1.01    Ca    8.8      31 Jan 2021 06:34  Phos  3.8     01-31  Mg     1.8     01-31                COVID-19 PCR: Detected (26 Jan 2021 12:59)      RADIOLOGY & ADDITIONAL TESTS:  Imaging from Last 24 Hours:    Electrocardiogram/QTc Interval:    COORDINATION OF CARE:  Care Discussed with Consultants/Other Providers:   Medicine Progress Note    Patient is a 60y old  Female who presents with a chief complaint of Per chart, pt is 60 year old female PMHx of HTN, DM2, HLD, Anxiety/Depression, Schizophrenia, COVID swab still positive, presenting with right sided weakness and R facial drooping with dysarthria, admitted for management of L anterior choroidal territory infarct. (30 Jan 2021 12:54)      SUBJECTIVE / OVERNIGHT EVENTS: Patient is 96-97 on RA. Patient reports neck pain. No other issues. No fever, chills, or cough.     ADDITIONAL REVIEW OF SYSTEMS:    MEDICATIONS  (STANDING):  amLODIPine   Tablet 5 milliGRAM(s) Oral daily  aspirin  chewable 81 milliGRAM(s) Oral daily  atorvastatin 80 milliGRAM(s) Oral at bedtime  benztropine 2 milliGRAM(s) Oral at bedtime  clonazePAM  Tablet 1 milliGRAM(s) Oral two times a day  clopidogrel Tablet 75 milliGRAM(s) Oral daily  dextrose 40% Gel 15 Gram(s) Oral once  dextrose 5%. 1000 milliLiter(s) (50 mL/Hr) IV Continuous <Continuous>  dextrose 5%. 1000 milliLiter(s) (100 mL/Hr) IV Continuous <Continuous>  dextrose 5%. 1000 milliLiter(s) (100 mL/Hr) IV Continuous <Continuous>  dextrose 50% Injectable 25 Gram(s) IV Push once  dextrose 50% Injectable 12.5 Gram(s) IV Push once  dextrose 50% Injectable 25 Gram(s) IV Push once  enalapril 20 milliGRAM(s) Oral two times a day  enoxaparin Injectable 40 milliGRAM(s) SubCutaneous daily  glucagon  Injectable 1 milliGRAM(s) IntraMuscular once  glucagon  Injectable 1 milliGRAM(s) IntraMuscular once  insulin glargine Injectable (LANTUS) 35 Unit(s) SubCutaneous <User Schedule>  insulin lispro (ADMELOG) corrective regimen sliding scale   SubCutaneous three times a day before meals  insulin lispro (ADMELOG) corrective regimen sliding scale   SubCutaneous at bedtime  insulin lispro Injectable (ADMELOG) 12 Unit(s) SubCutaneous three times a day before meals  metFORMIN 500 milliGRAM(s) Oral two times a day  pantoprazole    Tablet 40 milliGRAM(s) Oral before breakfast  risperiDONE   Tablet 3 milliGRAM(s) Oral at bedtime  traZODone 200 milliGRAM(s) Oral at bedtime    MEDICATIONS  (PRN):    CAPILLARY BLOOD GLUCOSE  108 (30 Jan 2021 14:22)      POCT Blood Glucose.: 129 mg/dL (31 Jan 2021 07:21)  POCT Blood Glucose.: 95 mg/dL (30 Jan 2021 21:15)  POCT Blood Glucose.: 79 mg/dL (30 Jan 2021 16:36)  POCT Blood Glucose.: 100 mg/dL (30 Jan 2021 11:47)    I&O's Summary    30 Jan 2021 07:01  -  31 Jan 2021 07:00  --------------------------------------------------------  IN: 400 mL / OUT: 900 mL / NET: -500 mL        PHYSICAL EXAM:  Vital Signs Last 24 Hrs  T(C): 36.5 (31 Jan 2021 06:10), Max: 37.3 (31 Jan 2021 01:27)  T(F): 97.7 (31 Jan 2021 06:10), Max: 99.1 (31 Jan 2021 01:27)  HR: 85 (31 Jan 2021 06:10) (85 - 95)  BP: 134/68 (31 Jan 2021 06:10) (134/68 - 178/74)  BP(mean): --  RR: 17 (31 Jan 2021 06:10) (16 - 18)  SpO2: 97% (31 Jan 2021 06:10) (95% - 97%)  CONSTITUTIONAL: Middle age woman NAD, well-developed, well-groomed  ENMT: Moist oral mucosa, no pharyngeal injection or exudates; normal dentition  RESPIRATORY: Breathing comfortably on RA, Normal respiratory effort; lungs are clear to auscultation bilaterally  CARDIOVASCULAR: Regular rate and rhythm, normal S1 and S2, no murmur/rub/gallop; No lower extremity edema; Peripheral pulses are 2+ bilaterally  ABDOMEN: Nontender to palpation, normoactive bowel sounds, no rebound/guarding; No hepatosplenomegaly  PSYCH: A+O to person, place, and time; affect appropriate  NEUROLOGY: right hemiplegia , mild right facial droop and dysarthria   SKIN: No rashes; no palpable lesions  LABS:                        11.2   4.26  )-----------( 173      ( 31 Jan 2021 06:34 )             34.9     01-31    136  |  100  |  19  ----------------------------<  111<H>  3.8   |  24  |  1.01    Ca    8.8      31 Jan 2021 06:34  Phos  3.8     01-31  Mg     1.8     01-31                COVID-19 PCR: Detected (26 Jan 2021 12:59)      RADIOLOGY & ADDITIONAL TESTS:  Imaging from Last 24 Hours:    Electrocardiogram/QTc Interval:    COORDINATION OF CARE:  Care Discussed with Consultants/Other Providers:

## 2021-01-31 NOTE — CHART NOTE - NSCHARTNOTEFT_GEN_A_CORE
Chart reviewed and noted that patient is receiving basal bolus insulin and metformin inpatient.  Discontinued metformin for inpatient (plan is to start at time of discharge).  BG tightly controlled.   Will mildly reduce Admelog to 11/11/11 and Lantus to 33 units daily.  Continue moderate correction scales.  For full discharge plan see endocrine consult from 1/29/21.  Will follow.  Discussed with Dr. Bernal.    Katharina Padron MD  Division of Endocrinology  Pager: [phone]    If after 6PM or before 9AM, or on weekends/holidays, please call endocrine answering service for assistance ([phone]).  For nonurgent matters email [email or web address] for assistance.

## 2021-02-01 LAB
ANION GAP SERPL CALC-SCNC: 13 MMOL/L — SIGNIFICANT CHANGE UP (ref 7–14)
BUN SERPL-MCNC: 18 MG/DL — SIGNIFICANT CHANGE UP (ref 7–23)
CALCIUM SERPL-MCNC: 9.1 MG/DL — SIGNIFICANT CHANGE UP (ref 8.4–10.5)
CHLORIDE SERPL-SCNC: 99 MMOL/L — SIGNIFICANT CHANGE UP (ref 98–107)
CO2 SERPL-SCNC: 24 MMOL/L — SIGNIFICANT CHANGE UP (ref 22–31)
CREAT SERPL-MCNC: 0.72 MG/DL — SIGNIFICANT CHANGE UP (ref 0.5–1.3)
GLUCOSE BLDC GLUCOMTR-MCNC: 101 MG/DL — HIGH (ref 70–99)
GLUCOSE BLDC GLUCOMTR-MCNC: 123 MG/DL — HIGH (ref 70–99)
GLUCOSE BLDC GLUCOMTR-MCNC: 161 MG/DL — HIGH (ref 70–99)
GLUCOSE BLDC GLUCOMTR-MCNC: 186 MG/DL — HIGH (ref 70–99)
GLUCOSE BLDC GLUCOMTR-MCNC: 210 MG/DL — HIGH (ref 70–99)
GLUCOSE BLDC GLUCOMTR-MCNC: 216 MG/DL — HIGH (ref 70–99)
GLUCOSE BLDC GLUCOMTR-MCNC: 68 MG/DL — LOW (ref 70–99)
GLUCOSE BLDC GLUCOMTR-MCNC: 69 MG/DL — LOW (ref 70–99)
GLUCOSE SERPL-MCNC: 98 MG/DL — SIGNIFICANT CHANGE UP (ref 70–99)
HCT VFR BLD CALC: 34.9 % — SIGNIFICANT CHANGE UP (ref 34.5–45)
HGB BLD-MCNC: 11.4 G/DL — LOW (ref 11.5–15.5)
MAGNESIUM SERPL-MCNC: 1.9 MG/DL — SIGNIFICANT CHANGE UP (ref 1.6–2.6)
MCHC RBC-ENTMCNC: 29.5 PG — SIGNIFICANT CHANGE UP (ref 27–34)
MCHC RBC-ENTMCNC: 32.7 GM/DL — SIGNIFICANT CHANGE UP (ref 32–36)
MCV RBC AUTO: 90.4 FL — SIGNIFICANT CHANGE UP (ref 80–100)
NRBC # BLD: 0 /100 WBCS — SIGNIFICANT CHANGE UP
NRBC # FLD: 0 K/UL — SIGNIFICANT CHANGE UP
PHOSPHATE SERPL-MCNC: 3.1 MG/DL — SIGNIFICANT CHANGE UP (ref 2.5–4.5)
PLATELET # BLD AUTO: 184 K/UL — SIGNIFICANT CHANGE UP (ref 150–400)
POTASSIUM SERPL-MCNC: 3.4 MMOL/L — LOW (ref 3.5–5.3)
POTASSIUM SERPL-SCNC: 3.4 MMOL/L — LOW (ref 3.5–5.3)
RBC # BLD: 3.86 M/UL — SIGNIFICANT CHANGE UP (ref 3.8–5.2)
RBC # FLD: 14.2 % — SIGNIFICANT CHANGE UP (ref 10.3–14.5)
SODIUM SERPL-SCNC: 136 MMOL/L — SIGNIFICANT CHANGE UP (ref 135–145)
WBC # BLD: 4.07 K/UL — SIGNIFICANT CHANGE UP (ref 3.8–10.5)
WBC # FLD AUTO: 4.07 K/UL — SIGNIFICANT CHANGE UP (ref 3.8–10.5)

## 2021-02-01 PROCEDURE — 99232 SBSQ HOSP IP/OBS MODERATE 35: CPT

## 2021-02-01 RX ORDER — INSULIN GLARGINE 100 [IU]/ML
30 INJECTION, SOLUTION SUBCUTANEOUS
Refills: 0 | Status: DISCONTINUED | OUTPATIENT
Start: 2021-02-01 | End: 2021-02-04

## 2021-02-01 RX ORDER — INSULIN LISPRO 100/ML
10 VIAL (ML) SUBCUTANEOUS
Refills: 0 | Status: DISCONTINUED | OUTPATIENT
Start: 2021-02-01 | End: 2021-02-04

## 2021-02-01 RX ORDER — POTASSIUM CHLORIDE 20 MEQ
20 PACKET (EA) ORAL ONCE
Refills: 0 | Status: COMPLETED | OUTPATIENT
Start: 2021-02-01 | End: 2021-02-01

## 2021-02-01 RX ORDER — DEXTROSE 50 % IN WATER 50 %
15 SYRINGE (ML) INTRAVENOUS ONCE
Refills: 0 | Status: COMPLETED | OUTPATIENT
Start: 2021-02-01 | End: 2021-02-01

## 2021-02-01 RX ORDER — AMLODIPINE BESYLATE 2.5 MG/1
5 TABLET ORAL ONCE
Refills: 0 | Status: COMPLETED | OUTPATIENT
Start: 2021-02-01 | End: 2021-02-05

## 2021-02-01 RX ORDER — INSULIN LISPRO 100/ML
12 VIAL (ML) SUBCUTANEOUS
Refills: 0 | Status: DISCONTINUED | OUTPATIENT
Start: 2021-02-01 | End: 2021-02-05

## 2021-02-01 RX ORDER — INSULIN LISPRO 100/ML
11 VIAL (ML) SUBCUTANEOUS
Refills: 0 | Status: DISCONTINUED | OUTPATIENT
Start: 2021-02-01 | End: 2021-02-02

## 2021-02-01 RX ORDER — AMLODIPINE BESYLATE 2.5 MG/1
10 TABLET ORAL DAILY
Refills: 0 | Status: DISCONTINUED | OUTPATIENT
Start: 2021-02-02 | End: 2021-02-05

## 2021-02-01 RX ORDER — INSULIN LISPRO 100/ML
11 VIAL (ML) SUBCUTANEOUS
Refills: 0 | Status: DISCONTINUED | OUTPATIENT
Start: 2021-02-01 | End: 2021-02-01

## 2021-02-01 RX ADMIN — Medication 4: at 13:24

## 2021-02-01 RX ADMIN — Medication 15 GRAM(S): at 18:15

## 2021-02-01 RX ADMIN — Medication 20 MILLIGRAM(S): at 04:15

## 2021-02-01 RX ADMIN — Medication 2 MILLIGRAM(S): at 22:43

## 2021-02-01 RX ADMIN — Medication 2: at 09:34

## 2021-02-01 RX ADMIN — Medication 11 UNIT(S): at 09:34

## 2021-02-01 RX ADMIN — Medication 1 MILLIGRAM(S): at 18:57

## 2021-02-01 RX ADMIN — Medication 20 MILLIGRAM(S): at 18:57

## 2021-02-01 RX ADMIN — ATORVASTATIN CALCIUM 80 MILLIGRAM(S): 80 TABLET, FILM COATED ORAL at 22:43

## 2021-02-01 RX ADMIN — Medication 81 MILLIGRAM(S): at 11:45

## 2021-02-01 RX ADMIN — CLOPIDOGREL BISULFATE 75 MILLIGRAM(S): 75 TABLET, FILM COATED ORAL at 11:45

## 2021-02-01 RX ADMIN — Medication 200 MILLIGRAM(S): at 22:43

## 2021-02-01 RX ADMIN — ENOXAPARIN SODIUM 40 MILLIGRAM(S): 100 INJECTION SUBCUTANEOUS at 11:45

## 2021-02-01 RX ADMIN — AMLODIPINE BESYLATE 5 MILLIGRAM(S): 2.5 TABLET ORAL at 04:15

## 2021-02-01 RX ADMIN — Medication 11 UNIT(S): at 13:24

## 2021-02-01 RX ADMIN — Medication 20 MILLIEQUIVALENT(S): at 11:45

## 2021-02-01 RX ADMIN — PANTOPRAZOLE SODIUM 40 MILLIGRAM(S): 20 TABLET, DELAYED RELEASE ORAL at 04:15

## 2021-02-01 RX ADMIN — Medication 1 MILLIGRAM(S): at 04:15

## 2021-02-01 RX ADMIN — RISPERIDONE 3 MILLIGRAM(S): 4 TABLET ORAL at 22:43

## 2021-02-01 NOTE — PROGRESS NOTE ADULT - ASSESSMENT
59 y/o female with a PmHx of HTN, DM type II, HLD, Anxiety d/o and unspecified somatic complaint disorder presents to the ED accompanied by her son s/p episode of right sided weakness, R facial droop, dysarthria that occurred this morning.    acute CVA with right side hemiplegia   ICA stenosis does not correlate to acute presentation   acute urinary retention , monitor Urine output, straight  cath as needed depending on bladder scan , insert hanna if fails management   odynophagia and dysphagia post stroke   Dm2 uncontrolled   uncontrolled htn , on  enalapril 20mg bid, norvasc 5mg     MRI  brain  shows acute left basal ganglia and corona radiata infarcts consistent with the patine presentation, there is  old right lacunar infarct   discussed her care with her son and updated him.   outpatient Gi follow up, outpatient vascular surgery eval in 2-3 weeks, outpatient neurology in 4 weeks    plan:  started protonix PPI,  outpatient barium swallow  to evaluate odynophagia, discussed with her outpatient GI doctor, no hx of web or strictures. she has numerous somatic complaints in the past  insuline per endocrine team  on  lantus, metformin.monitor blood sugar   continue telemetry monitoring   neurochecks q4hrs   echo with bubble study pending  discharge planning after   OT eval, PT recommended for acute rehab  outpatient vascular surgery evaluation and follow up for carotid disease

## 2021-02-01 NOTE — PROGRESS NOTE ADULT - ASSESSMENT
60 RHF w/ DM, HTN, COVID 4 weeks ago, ?psychiatric issues unspecified, presents w/ dysarthria and R. hemiapresis. No tpa as outside window. No MT as no LVO.     CTH demonstrated hypodensity involving the L. Putamen and PL of the IC, otherwise no early signs of ischemia (to my eye). R. posteiror portion of the PL of the internal capsule.   CTA head: multifocal intracranial stenosis; Severe stenosis involving the supraclinoid L. ICA, bilateral M1 moderate stenosis, severe stenosis in the mid R. M1. R. V4 severe stenosis.   CTA neck: Severe 85-90% stenosis involving the R. ICA orgin.   MR brain demonstrates L. anterior choroidal infarct.   Carotid Dopplers R. ICA moderate stenosis 50-79%; L. ICA mild 15-49% stenosis  A1c 11.6%, LDL 186mg/dL    Impression:  R. hemiparesis secondary to L. anterior choroidal territory infarct. Mechanism ICAD. R/o competing cardioembolic cause. The moderate carotid stenosis is symptomatic given previous infarcts.     Plan:   -TTE w/ bubble   - c/w ASA 81mg + Plavix 75mg for 3 months, followed by ASA indefinitely per HSIV   - Atorvastatin 80mg (titrate to LDL < 70)   - telemetry  - PT/OT/SS/SLP, OOBC  - avoid hypotension  - check FS, glucose control <180  - GI/DVT ppx  - Counseling on diet, exercise, and medication adherence was done  - Counseling on smoking cessation and alcohol consumption offered when appropriate.  - Pain assessed and judicious use of narcotics when appropriate was discussed.    - Stroke education given when appropriate.  - Importance of fall prevention discussed.   - Differential diagnosis and plan of care discussed with patient and/or family and primary team  - Thank you for allowing me to participate in the care of this patient. Call with questions.   -F/u with Dr. Parrish Ragsdale at [phone]  -F/u with Dr. Wilmer Guerrier at [phone] within 2 week for possible right carotid stent

## 2021-02-01 NOTE — PROGRESS NOTE ADULT - SUBJECTIVE AND OBJECTIVE BOX
PROGRESS NOTE:     Patient is a 60y old  Female who presents with a chief complaint of R sided hemiparesis and facial droop (01 Feb 2021 12:12)      SUBJECTIVE / OVERNIGHT EVENTS:  complains of odynophagia still   right arm weakness     ADDITIONAL REVIEW OF SYSTEMS:  no fever or cough  MEDICATIONS  (STANDING):  amLODIPine   Tablet 5 milliGRAM(s) Oral daily  amLODIPine   Tablet 5 milliGRAM(s) Oral once  aspirin  chewable 81 milliGRAM(s) Oral daily  atorvastatin 80 milliGRAM(s) Oral at bedtime  benztropine 2 milliGRAM(s) Oral at bedtime  clonazePAM  Tablet 1 milliGRAM(s) Oral two times a day  clopidogrel Tablet 75 milliGRAM(s) Oral daily  dextrose 40% Gel 15 Gram(s) Oral once  dextrose 5%. 1000 milliLiter(s) (50 mL/Hr) IV Continuous <Continuous>  dextrose 5%. 1000 milliLiter(s) (100 mL/Hr) IV Continuous <Continuous>  dextrose 5%. 1000 milliLiter(s) (100 mL/Hr) IV Continuous <Continuous>  dextrose 50% Injectable 25 Gram(s) IV Push once  dextrose 50% Injectable 12.5 Gram(s) IV Push once  dextrose 50% Injectable 25 Gram(s) IV Push once  enalapril 20 milliGRAM(s) Oral two times a day  enoxaparin Injectable 40 milliGRAM(s) SubCutaneous daily  glucagon  Injectable 1 milliGRAM(s) IntraMuscular once  glucagon  Injectable 1 milliGRAM(s) IntraMuscular once  insulin glargine Injectable (LANTUS) 30 Unit(s) SubCutaneous <User Schedule>  insulin lispro (ADMELOG) corrective regimen sliding scale   SubCutaneous three times a day before meals  insulin lispro (ADMELOG) corrective regimen sliding scale   SubCutaneous at bedtime  insulin lispro Injectable (ADMELOG) 12 Unit(s) SubCutaneous before breakfast  insulin lispro Injectable (ADMELOG) 11 Unit(s) SubCutaneous before dinner  insulin lispro Injectable (ADMELOG) 10 Unit(s) SubCutaneous before lunch  pantoprazole    Tablet 40 milliGRAM(s) Oral before breakfast  risperiDONE   Tablet 3 milliGRAM(s) Oral at bedtime  traZODone 200 milliGRAM(s) Oral at bedtime    MEDICATIONS  (PRN):      CAPILLARY BLOOD GLUCOSE      POCT Blood Glucose.: 123 mg/dL (01 Feb 2021 19:10)  POCT Blood Glucose.: 101 mg/dL (01 Feb 2021 18:34)  POCT Blood Glucose.: 69 mg/dL (01 Feb 2021 17:52)  POCT Blood Glucose.: 68 mg/dL (01 Feb 2021 17:37)  POCT Blood Glucose.: 216 mg/dL (01 Feb 2021 12:50)  POCT Blood Glucose.: 161 mg/dL (01 Feb 2021 09:18)  POCT Blood Glucose.: 186 mg/dL (01 Feb 2021 09:06)  POCT Blood Glucose.: 116 mg/dL (31 Jan 2021 21:03)    I&O's Summary      PHYSICAL EXAM:  Vital Signs Last 24 Hrs  T(C): 36.6 (01 Feb 2021 18:16), Max: 36.6 (01 Feb 2021 10:10)  T(F): 97.9 (01 Feb 2021 18:16), Max: 97.9 (01 Feb 2021 10:10)  HR: 82 (01 Feb 2021 18:16) (82 - 85)  BP: 181/84 (01 Feb 2021 18:16) (156/80 - 181/84)  BP(mean): --  RR: 18 (01 Feb 2021 18:16) (16 - 18)  SpO2: 98% (01 Feb 2021 18:16) (98% - 100%)    awake, alert   mild to moderate dysarthria   right hemiplegia     LABS:                        11.4   4.07  )-----------( 184      ( 01 Feb 2021 07:10 )             34.9     02-01    136  |  99  |  18  ----------------------------<  98  3.4<L>   |  24  |  0.72    Ca    9.1      01 Feb 2021 07:10  Phos  3.1     02-01  Mg     1.9     02-01    RADIOLOGY & ADDITIONAL TESTS:  Results Reviewed: y  Imaging Personally Reviewed:y  Electrocardiogram Personally Reviewed:y    COORDINATION OF CARE:  Care Discussed with Consultants/Other Providers [Y/N]:y  Prior or Outpatient Records Reviewed [Y/N]:elisabeth

## 2021-02-01 NOTE — CHART NOTE - NSCHARTNOTEFT_GEN_A_CORE
59 y/o female with history of HTN, DM type 2, HLD, Anxiety/Depression, Schizophrenia, Covid 4 weeks ago presented with right sided weakness and R facial drooping with dysarthria.   Patient admitted for management of L. anterior choroidal territory infarct.  Endocrine consult requested for management of uncontrolled DM2.    Recommend decrease Lantus to 30units qhs as AM serum glucose 98  Recommend increase Admelog to 12units before breakfast. Continue Admelog 11units before lunch and dinner   Recommend continue mod correctional scale premal and qhs  FS goal 100-180    Discussed with nursing (Ingrid) and ACP (Kimberlee)    Camilo Rojas MD  Endocrine Fellow   Pls call  if there are any further questions

## 2021-02-01 NOTE — PROGRESS NOTE ADULT - SUBJECTIVE AND OBJECTIVE BOX
Neurology Progress Note    S: Patient seen in covid unit on airborne and contact isolation. No new events overnight.      Medication:  amLODIPine   Tablet 5 milliGRAM(s) Oral daily  aspirin  chewable 81 milliGRAM(s) Oral daily  atorvastatin 80 milliGRAM(s) Oral at bedtime  benztropine 2 milliGRAM(s) Oral at bedtime  clonazePAM  Tablet 1 milliGRAM(s) Oral two times a day  clopidogrel Tablet 75 milliGRAM(s) Oral daily  dextrose 40% Gel 15 Gram(s) Oral once  dextrose 5%. 1000 milliLiter(s) IV Continuous <Continuous>  dextrose 5%. 1000 milliLiter(s) IV Continuous <Continuous>  dextrose 5%. 1000 milliLiter(s) IV Continuous <Continuous>  dextrose 50% Injectable 25 Gram(s) IV Push once  dextrose 50% Injectable 12.5 Gram(s) IV Push once  dextrose 50% Injectable 25 Gram(s) IV Push once  enalapril 20 milliGRAM(s) Oral two times a day  enoxaparin Injectable 40 milliGRAM(s) SubCutaneous daily  glucagon  Injectable 1 milliGRAM(s) IntraMuscular once  glucagon  Injectable 1 milliGRAM(s) IntraMuscular once  insulin glargine Injectable (LANTUS) 33 Unit(s) SubCutaneous <User Schedule>  insulin lispro (ADMELOG) corrective regimen sliding scale   SubCutaneous three times a day before meals  insulin lispro (ADMELOG) corrective regimen sliding scale   SubCutaneous at bedtime  insulin lispro Injectable (ADMELOG) 11 Unit(s) SubCutaneous three times a day before meals  pantoprazole    Tablet 40 milliGRAM(s) Oral before breakfast  risperiDONE   Tablet 3 milliGRAM(s) Oral at bedtime  traZODone 200 milliGRAM(s) Oral at bedtime      Vitals:  Vital Signs Last 24 Hrs  T(C): 36.2 (01 Feb 2021 04:12), Max: 36.2 (01 Feb 2021 04:12)  T(F): 97.1 (01 Feb 2021 04:12), Max: 97.1 (01 Feb 2021 04:12)  HR: 83 (01 Feb 2021 04:12) (83 - 95)  BP: 164/80 (01 Feb 2021 04:12) (164/80 - 189/89)  BP(mean): --  RR: 18 (01 Feb 2021 04:12) (18 - 18)  SpO2: 100% (01 Feb 2021 04:12) (97% - 100%)    General Exam:   General Appearance: Appropriately dressed and in no acute distress       Head: Normocephalic, atraumatic and no dysmorphic features  Ear, Nose, and Throat: Moist mucous membranes  CVS: S1S2+  Resp: No SOB, no wheeze or rhonchi  Abd: soft NTND  Extremities: No edema, no cyanosis  Skin: No bruises, no rashes     Neurological Exam:  MS: Oriented x3. Non-fluent speech making subtle paraphasic errors. Comprehension intact. Cannot repeat.   CN: Decreased BTT on the right. Moderate-severe R. facial. EOMI.   Motor: Right side no movement. Left side full.   Sensory: Slightly decreased sensation to LT on the right  Coordination: No dysmetria on FNF or ataxia on HTS bilaterally   Gait: deferred     I personally reviewed the below data/images/labs:      CBC Full  -  ( 01 Feb 2021 07:10 )  WBC Count : 4.07 K/uL  RBC Count : 3.86 M/uL  Hemoglobin : 11.4 g/dL  Hematocrit : 34.9 %  Platelet Count - Automated : 184 K/uL  Mean Cell Volume : 90.4 fL  Mean Cell Hemoglobin : 29.5 pg  Mean Cell Hemoglobin Concentration : 32.7 gm/dL  Auto Neutrophil # : x  Auto Lymphocyte # : x  Auto Monocyte # : x  Auto Eosinophil # : x  Auto Basophil # : x  Auto Neutrophil % : x  Auto Lymphocyte % : x  Auto Monocyte % : x  Auto Eosinophil % : x  Auto Basophil % : x    02-01    136  |  99  |  18  ----------------------------<  98  3.4<L>   |  24  |  0.72    Ca    9.1      01 Feb 2021 07:10  Phos  3.1     02-01  Mg     1.9     02-01          < from: CT Angio Neck w/ IV Cont (01.26.21 @ 11:08) >    EXAM:  CT PERFUSION W MAPS IC      EXAM:  CT ANGIO NECK (W)AW IC      EXAM:  CT ANGIO BRAIN (W)AW IC        PROCEDURE DATE:  Jan 26 2021         INTERPRETATION:  CT ANGIOGRAPHY OF HEAD AND NECK AND CT PERFUSION WITH INTRAVENOUS CONTRAST.    CLINICAL INDICATION: Code stroke.    TECHNIQUE: Volumetric acquisition was performed from the thoracic inlet to the vertex.    A total of 70 cc of Omnipaque 350 was injected intravenously without complications.  Dose optimization techniques were utilized including kVp/mA modulation along with iterative reconstructions.  MIP image analysis was performed on a separate workstation.  CT brain perfusion was performed after intravenous injection 50 mL of intravenous contrast Omnipaque 350. Dose optimization techniques were utilized including kVp/mA modulation along with iterative reconstructions. 3D image analysis was performed on a dedicated workstation by a neuroradiologist.    DOSE REPORT: Radiation Dose Estimate (DLP) Dose 2370 mGy-cm.    COMPARISON: No prior studies have been submitted for comparison.    FINDINGS:    CT angiography:    NECK:  The aortic arch is conventional in anatomy. Origin of supraaortic arteries are patent. The common carotid arteries are normal in course and caliber. There are atherosclerotic plaques at common carotid bifurcations and carotid bulbs with significant narrowing in the right common carotid bifurcation.    There is severe atherosclerotic narrowing of the proximal portion of the right internal carotid artery immediately above the bifurcation. There is 85-90 % stenosis using NASCET criteria (normal right ICA caliber is 1.2 mm).    There is mild atherosclerotic narrowing of the proximal left internal carotid artery. There is 15-20 % stenosis using NASCET criteria (normal left ICA caliber is 3.8 mm).    Bilateral vertebral arteries are normal in course, caliber and contour, without evidence of dissection or occlusion.    Degenerative changes of the bony cervical spine are noted.    BRAIN:    There is moderate atherosclerotic narrowing of the petrocavernous ICA is bilaterally. There is severe atherosclerotic narrowing of the supraclinoid left ICA. There is multifocal moderate atherosclerotic narrowing of the M1 segments bilaterally with severe narrowingof the midportion of the M1 segment of the right MCA. The ACOM is present. The right posterior communicating artery is present and patent. The left posterior communicating artery is not well seen, probably atretic or hypoplastic. There is multifocal moderate narrowing of the V4 segment of the right vertebral artery. The left intradural vertebral artery is normal in caliber.  The visualized cerebellar arteries, basilar and bilateral posterior cerebral arteries are patent without evidence of stenoses or aneurysm.    Other findings: None.    CT Perfusion:    The regional cerebral blood flow (CBF), cerebral blood volume (CBV), and time to maximum (Tmax) for the VAHE, MCA, and PCA territories are within normal limits. There is no evidence of asymmetric or delayed territorial perfusion.      IMPRESSION:    CTA brain: There is moderate atherosclerotic narrowing in various vascular beds as described above. There is severe atherosclerotic narrowing in the supraclinoid left ICA and midportion of theM1 segment of the right MCA.    CTA NECK: There is hemodynamically significant narrowing of the proximal right ICA immediately above the bifurcation with 85-90% narrowing of its lumen.    CT perfusion: There is no evidence of asymmetric or delayed territorial perfusion.    NASCET CAROTID STENOSIS CRITERIA (distal normal appearing ICA as denominator for measurement): 0%-none, 1-49%-mild, 50-70%-moderate, 70-89%-severe, 90-99%-critical.      The threshold values for estimation of hypoperfused and infarction volume are based on previously utilized methodology in large clinical trial including [facility] [employer] ClinicalTrials.gov number, WAU36436806, www.nejm.org/doi/full/10.1056/MEXAvl5696811    Notification to clinician of alert:    Provider   Dr. Boni Nye  was notified about  the impression 1118  on  1/26/2021   via telephone by Neuroradiologist DOMINGUEZ Russo.  Readback confirmation was obtained.              KEHINDE RUSSO M.D., ATTENDING RADIOLOGIST  This document has been electronicallysigned. Jan 26 2021 11:29AM    < end of copied text >  < from: MR Head No Cont (01.28.21 @ 22:03) >    EXAM:  MR BRAIN        PROCEDURE DATE:  Jan 28 2021         INTERPRETATION:  Clinical indications: Code stroke.    MRI of the brain was performed using sagittal T1, axial T1 T2 T2 FLAIR diffusion and susceptibility weighted sequence.    This exam is compared prior noncontrast head CT performed on June 26, 2001    Extensive parenchymal volume loss and chronic microvascular ischemic changes are identified.    Old lacunar infarct involving the posterior right lenticular nucleus is seen.    Abnormal T2 prolongation restricted diffusion is seen involving the left basal ganglia/corona radiata region. This is compatible with an acute infarct. No hemorrhagic transformation is seen. No significant shift or herniation is again    The large vessels demonstrate normal flow voids    The visualized paranasal sinuses mastoid and middle ear regions appear clear.    IMPRESSION: Acute infarcts as described above.                LEO PICKARD M.D., ATTENDING RADIOLOGIST  This document has been electronically signed. Jan 29 2021  8:31AM    < end of copied text >

## 2021-02-02 LAB
ANION GAP SERPL CALC-SCNC: 10 MMOL/L — SIGNIFICANT CHANGE UP (ref 7–14)
BUN SERPL-MCNC: 15 MG/DL — SIGNIFICANT CHANGE UP (ref 7–23)
CALCIUM SERPL-MCNC: 8.8 MG/DL — SIGNIFICANT CHANGE UP (ref 8.4–10.5)
CHLORIDE SERPL-SCNC: 98 MMOL/L — SIGNIFICANT CHANGE UP (ref 98–107)
CO2 SERPL-SCNC: 26 MMOL/L — SIGNIFICANT CHANGE UP (ref 22–31)
CREAT SERPL-MCNC: 0.9 MG/DL — SIGNIFICANT CHANGE UP (ref 0.5–1.3)
GLUCOSE BLDC GLUCOMTR-MCNC: 197 MG/DL — HIGH (ref 70–99)
GLUCOSE BLDC GLUCOMTR-MCNC: 228 MG/DL — HIGH (ref 70–99)
GLUCOSE BLDC GLUCOMTR-MCNC: 231 MG/DL — HIGH (ref 70–99)
GLUCOSE BLDC GLUCOMTR-MCNC: 292 MG/DL — HIGH (ref 70–99)
GLUCOSE SERPL-MCNC: 237 MG/DL — HIGH (ref 70–99)
HCT VFR BLD CALC: 34.7 % — SIGNIFICANT CHANGE UP (ref 34.5–45)
HGB BLD-MCNC: 11.5 G/DL — SIGNIFICANT CHANGE UP (ref 11.5–15.5)
MAGNESIUM SERPL-MCNC: 1.8 MG/DL — SIGNIFICANT CHANGE UP (ref 1.6–2.6)
MCHC RBC-ENTMCNC: 29.7 PG — SIGNIFICANT CHANGE UP (ref 27–34)
MCHC RBC-ENTMCNC: 33.1 GM/DL — SIGNIFICANT CHANGE UP (ref 32–36)
MCV RBC AUTO: 89.7 FL — SIGNIFICANT CHANGE UP (ref 80–100)
NRBC # BLD: 0 /100 WBCS — SIGNIFICANT CHANGE UP
NRBC # FLD: 0 K/UL — SIGNIFICANT CHANGE UP
PHOSPHATE SERPL-MCNC: 2.6 MG/DL — SIGNIFICANT CHANGE UP (ref 2.5–4.5)
PLATELET # BLD AUTO: 207 K/UL — SIGNIFICANT CHANGE UP (ref 150–400)
POTASSIUM SERPL-MCNC: 3.9 MMOL/L — SIGNIFICANT CHANGE UP (ref 3.5–5.3)
POTASSIUM SERPL-SCNC: 3.9 MMOL/L — SIGNIFICANT CHANGE UP (ref 3.5–5.3)
RBC # BLD: 3.87 M/UL — SIGNIFICANT CHANGE UP (ref 3.8–5.2)
RBC # FLD: 14 % — SIGNIFICANT CHANGE UP (ref 10.3–14.5)
SODIUM SERPL-SCNC: 134 MMOL/L — LOW (ref 135–145)
WBC # BLD: 4.44 K/UL — SIGNIFICANT CHANGE UP (ref 3.8–10.5)
WBC # FLD AUTO: 4.44 K/UL — SIGNIFICANT CHANGE UP (ref 3.8–10.5)

## 2021-02-02 PROCEDURE — 99232 SBSQ HOSP IP/OBS MODERATE 35: CPT

## 2021-02-02 PROCEDURE — 99232 SBSQ HOSP IP/OBS MODERATE 35: CPT | Mod: GC

## 2021-02-02 RX ORDER — HYDRALAZINE HCL 50 MG
10 TABLET ORAL ONCE
Refills: 0 | Status: DISCONTINUED | OUTPATIENT
Start: 2021-02-02 | End: 2021-02-02

## 2021-02-02 RX ORDER — INSULIN LISPRO 100/ML
VIAL (ML) SUBCUTANEOUS AT BEDTIME
Refills: 0 | Status: DISCONTINUED | OUTPATIENT
Start: 2021-02-02 | End: 2021-02-05

## 2021-02-02 RX ORDER — INSULIN LISPRO 100/ML
10 VIAL (ML) SUBCUTANEOUS
Refills: 0 | Status: DISCONTINUED | OUTPATIENT
Start: 2021-02-02 | End: 2021-02-05

## 2021-02-02 RX ORDER — INSULIN LISPRO 100/ML
VIAL (ML) SUBCUTANEOUS
Refills: 0 | Status: DISCONTINUED | OUTPATIENT
Start: 2021-02-02 | End: 2021-02-05

## 2021-02-02 RX ADMIN — Medication 10 UNIT(S): at 13:03

## 2021-02-02 RX ADMIN — Medication 10 UNIT(S): at 19:12

## 2021-02-02 RX ADMIN — Medication 20 MILLIGRAM(S): at 05:01

## 2021-02-02 RX ADMIN — Medication 12 UNIT(S): at 09:33

## 2021-02-02 RX ADMIN — CLOPIDOGREL BISULFATE 75 MILLIGRAM(S): 75 TABLET, FILM COATED ORAL at 13:45

## 2021-02-02 RX ADMIN — ATORVASTATIN CALCIUM 80 MILLIGRAM(S): 80 TABLET, FILM COATED ORAL at 21:48

## 2021-02-02 RX ADMIN — PANTOPRAZOLE SODIUM 40 MILLIGRAM(S): 20 TABLET, DELAYED RELEASE ORAL at 05:00

## 2021-02-02 RX ADMIN — Medication 0.1 MILLIGRAM(S): at 05:01

## 2021-02-02 RX ADMIN — Medication 0.2 MILLIGRAM(S): at 19:11

## 2021-02-02 RX ADMIN — ENOXAPARIN SODIUM 40 MILLIGRAM(S): 100 INJECTION SUBCUTANEOUS at 13:45

## 2021-02-02 RX ADMIN — Medication 2: at 13:13

## 2021-02-02 RX ADMIN — Medication 1 MILLIGRAM(S): at 20:18

## 2021-02-02 RX ADMIN — Medication 1 MILLIGRAM(S): at 05:01

## 2021-02-02 RX ADMIN — Medication 81 MILLIGRAM(S): at 13:45

## 2021-02-02 RX ADMIN — INSULIN GLARGINE 30 UNIT(S): 100 INJECTION, SOLUTION SUBCUTANEOUS at 19:11

## 2021-02-02 RX ADMIN — Medication 2 MILLIGRAM(S): at 21:48

## 2021-02-02 RX ADMIN — Medication 200 MILLIGRAM(S): at 21:48

## 2021-02-02 RX ADMIN — Medication 20 MILLIGRAM(S): at 20:18

## 2021-02-02 RX ADMIN — RISPERIDONE 3 MILLIGRAM(S): 4 TABLET ORAL at 21:48

## 2021-02-02 RX ADMIN — Medication 4: at 09:33

## 2021-02-02 RX ADMIN — AMLODIPINE BESYLATE 10 MILLIGRAM(S): 2.5 TABLET ORAL at 05:01

## 2021-02-02 RX ADMIN — Medication 3: at 19:11

## 2021-02-02 NOTE — PROGRESS NOTE ADULT - ASSESSMENT
61 y/o female with history of HTN, DM type 2, HLD, Anxiety/Depression, Schizophrenia, Covid 4 weeks ago presented with right sided weakness and R facial drooping with dysarthria.   Patient admitted for management of L. anterior choroidal territory infarct.  Endocrine consult requested for management of uncontrolled DM2.    Uncontrolled DM2  A1c 11.6%  FS premeal and qhs   Fs goal 100-180  Carb consistent diet  Recommend continue Lantus 30 q3pm   Recommend continue Admelog 12units before breakfast and decrease to Admelog 10units before lunch and dinner, hold if NPO   Recommend switch to low correctional scale premeal and qhs     On discharge, ideally patient should be on basal/bolus insulin. However given patient history of mental illness will try to simplify home medication regimen.   Recommend basal insulin+ GLP1 agonist (once weekly)+ metformin. Please stop Humulin R  Recommend send basal insulin (ie lantus, basaglar or tuojeo...).  script to pharmacy and see which on is covered.  Patient will need BD pen needles  Patient will NEED BEDSIDE RN INSULIN  PEN TEACHING, HOWEVER  HAS TO BE TAUGHT AS WELL AS HE ADMINISTERS INSULIN AT HOME. Patient was previously on vial and syringe.   Recommend sending script for once weekly GLP1 agonist ( trulicty 0.75mg SQ weekly or ozempic 0.25mg SQ weekly) to pharmacy to determine which is covered.   Would also restart metformin 500mg BIDac and increase to 1000mg BID ac after one week if patient not having GI side effects  [facility] up in [facility]   [address] [facility] [address]  [place] [address]  [phone]    HTN  goal 130/80   Remains above goal   Patient restarted on home regimen     HDL   goal LDL <70    Continue Atorvastatin 80mg daily         Camilo Rojas MD  Endocrine Fellow   Pager  61 y/o female with history of HTN, DM type 2, HLD, Anxiety/Depression, Schizophrenia, Covid 4 weeks ago presented with right sided weakness and R facial drooping with dysarthria.   Patient admitted for management of L. anterior choroidal territory infarct.  Endocrine consult requested for management of uncontrolled DM2.    Uncontrolled DM2  A1c 11.6%  FS premeal and qhs   Fs goal 100-180  Carb consistent diet  Recommend continue Lantus 30 q3pm   Recommend continue Admelog 12units before breakfast and decrease to Admelog 10units before lunch and dinner, hold if NPO   Recommend switch to low correctional scale premeal and qhs     Patient to be discharged to rehab, recommend continue basal/bolus insulin. dose to be determined   On discharge home, ideally patient should be on basal/bolus insulin. However given patient history of mental illness will try to simplify home medication regimen.   Recommend basal insulin+ GLP1 agonist (once weekly)+ metformin. Please stop Humulin R  Recommend send basal insulin (ie lantus, basaglar or tuojeo...).  script to pharmacy and see which on is covered.  Patient will need BD pen needles  Patient will NEED BEDSIDE RN INSULIN  PEN TEACHING, HOWEVER  HAS TO BE TAUGHT AS WELL AS HE ADMINISTERS INSULIN AT HOME. Patient was previously on vial and syringe.   Recommend sending script for once weekly GLP1 agonist ( trulicty 0.75mg SQ weekly or ozempic 0.25mg SQ weekly) to pharmacy to determine which is covered.   Would also restart metformin 500mg BIDac and increase to 1000mg BID ac after one week if patient not having GI side effects  Patient can follow up in [facility]   [address] [place] [address] [address]  [facility] [id number] [phone]    HTN  goal 130/80   Remains above goal   Patient restarted on home regimen     HDL   goal LDL <70    Continue Atorvastatin 80mg daily         Camilo Rojas MD  Endocrine Fellow   Pager  59 y/o female with history of HTN, DM type 2, HLD, Anxiety/Depression, Schizophrenia, Covid 4 weeks ago presented with right sided weakness and R facial drooping with dysarthria.   Patient admitted for management of L. anterior choroidal territory infarct.  Endocrine consult requested for management of uncontrolled DM2.    Uncontrolled DM2  A1c 11.6%  FS premeal and qhs   Fs goal 100-180  Carb consistent diet  Recommend continue Lantus 30 units q3pm   Recommend continue Admelog 12units before breakfast and decrease to Admelog 10units before lunch and dinner, hold if NPO   Recommend switch to low correctional scale premeal and qhs     Patient to be discharged to rehab, recommend continue basal/bolus insulin. dose to be determined   On discharge home, ideally patient should be on basal/bolus insulin. However given patient history of mental illness will try to simplify home medication regimen.   Recommend basal insulin+ GLP1 agonist (once weekly)+ metformin. Please stop Humulin R  Recommend send basal insulin (ie lantus, basaglar or tuojeo...).  script to pharmacy and see which on is covered.  Patient will need BD pen needles  Patient will NEED BEDSIDE RN INSULIN  PEN TEACHING, HOWEVER  HAS TO BE TAUGHT AS WELL AS HE ADMINISTERS INSULIN AT HOME. Patient was previously on vial and syringe.   Recommend sending script for once weekly GLP1 agonist ( trulicty 0.75mg SQ weekly or ozempic 0.25mg SQ weekly) to pharmacy to determine which is covered.   Would also restart metformin 500mg BIDac and increase to 1000mg BID ac after one week if patient not having GI side effects  Patient can follow up in [facility]   [address] [place] [address] [address]  [facility] [id number] [phone]    HTN  goal 130/80   Remains above goal   Patient restarted on home regimen     HDL   goal LDL <70    Continue Atorvastatin 80mg daily         Camilo Rojas MD  Endocrine Fellow   Pager

## 2021-02-02 NOTE — PROGRESS NOTE ADULT - SUBJECTIVE AND OBJECTIVE BOX
PROGRESS NOTE:     Patient is a 60y old  Female who presents with a chief complaint of R sided hemiparesis and facial droop (02 Feb 2021 18:17)      SUBJECTIVE / OVERNIGHT EVENTS:    ADDITIONAL REVIEW OF SYSTEMS:    MEDICATIONS  (STANDING):  amLODIPine   Tablet 5 milliGRAM(s) Oral once  amLODIPine   Tablet 10 milliGRAM(s) Oral daily  aspirin  chewable 81 milliGRAM(s) Oral daily  atorvastatin 80 milliGRAM(s) Oral at bedtime  benztropine 2 milliGRAM(s) Oral at bedtime  clonazePAM  Tablet 1 milliGRAM(s) Oral two times a day  cloNIDine 0.2 milliGRAM(s) Oral two times a day  clopidogrel Tablet 75 milliGRAM(s) Oral daily  dextrose 40% Gel 15 Gram(s) Oral once  dextrose 5%. 1000 milliLiter(s) (50 mL/Hr) IV Continuous <Continuous>  dextrose 5%. 1000 milliLiter(s) (100 mL/Hr) IV Continuous <Continuous>  dextrose 5%. 1000 milliLiter(s) (100 mL/Hr) IV Continuous <Continuous>  dextrose 50% Injectable 25 Gram(s) IV Push once  dextrose 50% Injectable 12.5 Gram(s) IV Push once  dextrose 50% Injectable 25 Gram(s) IV Push once  enalapril 20 milliGRAM(s) Oral two times a day  enoxaparin Injectable 40 milliGRAM(s) SubCutaneous daily  glucagon  Injectable 1 milliGRAM(s) IntraMuscular once  glucagon  Injectable 1 milliGRAM(s) IntraMuscular once  insulin glargine Injectable (LANTUS) 30 Unit(s) SubCutaneous <User Schedule>  insulin lispro (ADMELOG) corrective regimen sliding scale   SubCutaneous three times a day before meals  insulin lispro (ADMELOG) corrective regimen sliding scale   SubCutaneous at bedtime  insulin lispro Injectable (ADMELOG) 10 Unit(s) SubCutaneous before dinner  insulin lispro Injectable (ADMELOG) 10 Unit(s) SubCutaneous before lunch  insulin lispro Injectable (ADMELOG) 12 Unit(s) SubCutaneous before breakfast  pantoprazole    Tablet 40 milliGRAM(s) Oral before breakfast  risperiDONE   Tablet 3 milliGRAM(s) Oral at bedtime  traZODone 200 milliGRAM(s) Oral at bedtime    MEDICATIONS  (PRN):      CAPILLARY BLOOD GLUCOSE      POCT Blood Glucose.: 292 mg/dL (02 Feb 2021 18:49)  POCT Blood Glucose.: 197 mg/dL (02 Feb 2021 12:36)  POCT Blood Glucose.: 231 mg/dL (02 Feb 2021 09:20)  POCT Blood Glucose.: 210 mg/dL (01 Feb 2021 21:28)    I&O's Summary      PHYSICAL EXAM:  Vital Signs Last 24 Hrs  T(C): 36.6 (02 Feb 2021 14:45), Max: 37.1 (02 Feb 2021 04:52)  T(F): 97.8 (02 Feb 2021 14:45), Max: 98.8 (02 Feb 2021 04:52)  HR: 83 (02 Feb 2021 14:45) (81 - 93)  BP: 158/82 (02 Feb 2021 14:45) (158/82 - 203/81)  BP(mean): --  RR: 18 (02 Feb 2021 14:45) (18 - 18)  SpO2: 97% (02 Feb 2021 14:45) (96% - 98%)    CONSTITUTIONAL: NAD, well-developed  RESPIRATORY: Normal respiratory effort; lungs are clear to auscultation bilaterally  CARDIOVASCULAR: Regular rate and rhythm, normal S1 and S2, no murmur/rub/gallop; No lower extremity edema; Peripheral pulses are 2+ bilaterally  ABDOMEN: Nontender to palpation, normoactive bowel sounds, no rebound/guarding; No hepatosplenomegaly  MUSCLOSKELETAL: no clubbing or cyanosis of digits; no joint swelling or tenderness to palpation  PSYCH: A+O to person, place, and time; affect appropriate    LABS:                        11.5   4.44  )-----------( 207      ( 02 Feb 2021 07:07 )             34.7     02-02    134<L>  |  98  |  15  ----------------------------<  237<H>  3.9   |  26  |  0.90    Ca    8.8      02 Feb 2021 07:07  Phos  2.6     02-02  Mg     1.8     02-02        RADIOLOGY & ADDITIONAL TESTS:  Results Reviewed: y  Imaging Personally Reviewed:y  Electrocardiogram Personally Reviewed:y    COORDINATION OF CARE:  Care Discussed with Consultants/Other Providers [Y/N]:y  Prior or Outpatient Records Reviewed [Y/N]:y

## 2021-02-02 NOTE — PROGRESS NOTE ADULT - SUBJECTIVE AND OBJECTIVE BOX
Neurology Progress Note    S: Patient seen in covid unit on airborne and contact isolation. No new events overnight. in chair on RA      Medication:  MEDICATIONS  (STANDING):  amLODIPine   Tablet 5 milliGRAM(s) Oral once  amLODIPine   Tablet 10 milliGRAM(s) Oral daily  aspirin  chewable 81 milliGRAM(s) Oral daily  atorvastatin 80 milliGRAM(s) Oral at bedtime  benztropine 2 milliGRAM(s) Oral at bedtime  clonazePAM  Tablet 1 milliGRAM(s) Oral two times a day  cloNIDine 0.2 milliGRAM(s) Oral two times a day  clopidogrel Tablet 75 milliGRAM(s) Oral daily  dextrose 40% Gel 15 Gram(s) Oral once  dextrose 5%. 1000 milliLiter(s) (50 mL/Hr) IV Continuous <Continuous>  dextrose 5%. 1000 milliLiter(s) (100 mL/Hr) IV Continuous <Continuous>  dextrose 5%. 1000 milliLiter(s) (100 mL/Hr) IV Continuous <Continuous>  dextrose 50% Injectable 25 Gram(s) IV Push once  dextrose 50% Injectable 12.5 Gram(s) IV Push once  dextrose 50% Injectable 25 Gram(s) IV Push once  enalapril 20 milliGRAM(s) Oral two times a day  enoxaparin Injectable 40 milliGRAM(s) SubCutaneous daily  glucagon  Injectable 1 milliGRAM(s) IntraMuscular once  glucagon  Injectable 1 milliGRAM(s) IntraMuscular once  insulin glargine Injectable (LANTUS) 30 Unit(s) SubCutaneous <User Schedule>  insulin lispro (ADMELOG) corrective regimen sliding scale   SubCutaneous three times a day before meals  insulin lispro (ADMELOG) corrective regimen sliding scale   SubCutaneous at bedtime  insulin lispro Injectable (ADMELOG) 10 Unit(s) SubCutaneous before dinner  insulin lispro Injectable (ADMELOG) 10 Unit(s) SubCutaneous before lunch  insulin lispro Injectable (ADMELOG) 12 Unit(s) SubCutaneous before breakfast  pantoprazole    Tablet 40 milliGRAM(s) Oral before breakfast  risperiDONE   Tablet 3 milliGRAM(s) Oral at bedtime  traZODone 200 milliGRAM(s) Oral at bedtime    MEDICATIONS  (PRN):      Vitals:  Vital Signs Last 24 Hrs  T(C): 36.6 (02 Feb 2021 14:45), Max: 37.1 (02 Feb 2021 04:52)  T(F): 97.8 (02 Feb 2021 14:45), Max: 98.8 (02 Feb 2021 04:52)  HR: 83 (02 Feb 2021 14:45) (81 - 93)  BP: 158/82 (02 Feb 2021 14:45) (158/82 - 203/81)  BP(mean): --  RR: 18 (02 Feb 2021 14:45) (18 - 18)  SpO2: 97% (02 Feb 2021 14:45) (96% - 98%)    General Exam:   General Appearance: Appropriately dressed and in no acute distress       Head: Normocephalic, atraumatic and no dysmorphic features  Ear, Nose, and Throat: Moist mucous membranes  CVS: S1S2+  Resp: No SOB, no wheeze or rhonchi  Abd: soft NTND  Extremities: No edema, no cyanosis  Skin: No bruises, no rashes     Neurological Exam:  MS: Oriented x3. Non-fluent speech making subtle paraphasic errors. Comprehension intact. Cannot repeat.   CN: Decreased BTT on the right. Moderate-severe R. facial. EOMI.   Motor: Right side no movement. Left side full.   Sensory: Slightly decreased sensation to LT on the right  Coordination: No dysmetria on FNF or ataxia on HTS bilaterally   Gait: deferred     I personally reviewed the below data/images/labs:      CBC Full  -  ( 02 Feb 2021 07:07 )  WBC Count : 4.44 K/uL  RBC Count : 3.87 M/uL  Hemoglobin : 11.5 g/dL  Hematocrit : 34.7 %  Platelet Count - Automated : 207 K/uL  Mean Cell Volume : 89.7 fL  Mean Cell Hemoglobin : 29.7 pg  Mean Cell Hemoglobin Concentration : 33.1 gm/dL  Auto Neutrophil # : x  Auto Lymphocyte # : x  Auto Monocyte # : x  Auto Eosinophil # : x  Auto Basophil # : x  Auto Neutrophil % : x  Auto Lymphocyte % : x  Auto Monocyte % : x  Auto Eosinophil % : x  Auto Basophil % : x    02-02    134<L>  |  98  |  15  ----------------------------<  237<H>  3.9   |  26  |  0.90    Ca    8.8      02 Feb 2021 07:07  Phos  2.6     02-02  Mg     1.8     02-02            < from: CT Angio Neck w/ IV Cont (01.26.21 @ 11:08) >    EXAM:  CT PERFUSION W MAPS IC      EXAM:  CT ANGIO NECK (W)AW IC      EXAM:  CT ANGIO BRAIN (W)AW IC        PROCEDURE DATE:  Jan 26 2021         INTERPRETATION:  CT ANGIOGRAPHY OF HEAD AND NECK AND CT PERFUSION WITH INTRAVENOUS CONTRAST.    CLINICAL INDICATION: Code stroke.    TECHNIQUE: Volumetric acquisition was performed from the thoracic inlet to the vertex.    A total of 70 cc of Omnipaque 350 was injected intravenously without complications.  Dose optimization techniques were utilized including kVp/mA modulation along with iterative reconstructions.  MIP image analysis was performed on a separate workstation.  CT brain perfusion was performed after intravenous injection 50 mL of intravenous contrast Omnipaque 350. Dose optimization techniques were utilized including kVp/mA modulation along with iterative reconstructions. 3D image analysis was performed on a dedicated workstation by a neuroradiologist.    DOSE REPORT: Radiation Dose Estimate (DLP) Dose 2370 mGy-cm.    COMPARISON: No prior studies have been submitted for comparison.    FINDINGS:    CT angiography:    NECK:  The aortic arch is conventional in anatomy. Origin of supraaortic arteries are patent. The common carotid arteries are normal in course and caliber. There are atherosclerotic plaques at common carotid bifurcations and carotid bulbs with significant narrowing in the right common carotid bifurcation.    There is severe atherosclerotic narrowing of the proximal portion of the right internal carotid artery immediately above the bifurcation. There is 85-90 % stenosis using NASCET criteria (normal right ICA caliber is 1.2 mm).    There is mild atherosclerotic narrowing of the proximal left internal carotid artery. There is 15-20 % stenosis using NASCET criteria (normal left ICA caliber is 3.8 mm).    Bilateral vertebral arteries are normal in course, caliber and contour, without evidence of dissection or occlusion.    Degenerative changes of the bony cervical spine are noted.    BRAIN:    There is moderate atherosclerotic narrowing of the petrocavernous ICA is bilaterally. There is severe atherosclerotic narrowing of the supraclinoid left ICA. There is multifocal moderate atherosclerotic narrowing of the M1 segments bilaterally with severe narrowingof the midportion of the M1 segment of the right MCA. The ACOM is present. The right posterior communicating artery is present and patent. The left posterior communicating artery is not well seen, probably atretic or hypoplastic. There is multifocal moderate narrowing of the V4 segment of the right vertebral artery. The left intradural vertebral artery is normal in caliber.  The visualized cerebellar arteries, basilar and bilateral posterior cerebral arteries are patent without evidence of stenoses or aneurysm.    Other findings: None.    CT Perfusion:    The regional cerebral blood flow (CBF), cerebral blood volume (CBV), and time to maximum (Tmax) for the VAHE, MCA, and PCA territories are within normal limits. There is no evidence of asymmetric or delayed territorial perfusion.      IMPRESSION:    CTA brain: There is moderate atherosclerotic narrowing in various vascular beds as described above. There is severe atherosclerotic narrowing in the supraclinoid left ICA and midportion of theM1 segment of the right MCA.    CTA NECK: There is hemodynamically significant narrowing of the proximal right ICA immediately above the bifurcation with 85-90% narrowing of its lumen.    CT perfusion: There is no evidence of asymmetric or delayed territorial perfusion.    NASCET CAROTID STENOSIS CRITERIA (distal normal appearing ICA as denominator for measurement): 0%-none, 1-49%-mild, 50-70%-moderate, 70-89%-severe, 90-99%-critical.      The threshold values for estimation of hypoperfused and infarction volume are based on previously utilized methodology in large clinical trial including [facility] [employer] ClinicalTrials.gov number, [id number], www.nejm.org/doi/full/10.1056/HEUNas5224109    Notification to clinician of alert:    Provider   Dr. Boni Nye  was notified about  the impression 1118  on  1/26/2021   via telephone by Neuroradiologist DOMINGUEZ Russo.  Readback confirmation was obtained.              KEHINDE RUSSO M.D., ATTENDING RADIOLOGIST  This document has been electronicallysigned. Jan 26 2021 11:29AM    < end of copied text >  < from: MR Head No Cont (01.28.21 @ 22:03) >    EXAM:  MR BRAIN        PROCEDURE DATE:  Jan 28 2021         INTERPRETATION:  Clinical indications: Code stroke.    MRI of the brain was performed using sagittal T1, axial T1 T2 T2 FLAIR diffusion and susceptibility weighted sequence.    This exam is compared prior noncontrast head CT performed on June 26, 2001    Extensive parenchymal volume loss and chronic microvascular ischemic changes are identified.    Old lacunar infarct involving the posterior right lenticular nucleus is seen.    Abnormal T2 prolongation restricted diffusion is seen involving the left basal ganglia/corona radiata region. This is compatible with an acute infarct. No hemorrhagic transformation is seen. No significant shift or herniation is again    The large vessels demonstrate normal flow voids    The visualized paranasal sinuses mastoid and middle ear regions appear clear.    IMPRESSION: Acute infarcts as described above.                LEO PICKARD M.D., ATTENDING RADIOLOGIST  This document has been electronically signed. Jan 29 2021  8:31AM    < end of copied text >

## 2021-02-02 NOTE — PROGRESS NOTE ADULT - ASSESSMENT
59 y/o female with a PmHx of HTN, DM type II, HLD, Anxiety d/o and unspecified somatic complaint disorder presents to the ED accompanied by her son s/p episode of right sided weakness, R facial droop, dysarthria that occurred this morning.    acute CVA with right side hemiplegia   right ICA stenosis does not correlate to acute presentation   odynophagia and dysphagia post stroke   Dm2 uncontrolled   uncontrolled htn , on  enalapril 20mg bid, norvasc 10mg , clonidine .2mg bid     MRI  brain  shows acute left basal ganglia and corona radiata infarcts consistent with the patine presentation, there is  old right lacunar infarct   discussed her care with her son and updated him.   outpatient Gi follow up, outpatient vascular surgery eval in 2-3 weeks, outpatient neurology in 4 weeks  follow up with Dr. Parrish Ragsdale at [phone]  follow up with  Dr. Wilmer Guerrier at [phone] within 2 week for evaluation  possible right carotid stent   outpatient Gi follow up for dysphagia and odynophagia if no improvement with PPI in 2-4 weeks    plan:  monitor BP, discharge planning to acute rehab after echocardiogram   insulin per endocrine team  on  lantus,monitor blood sugar   continue telemetry monitoring   neurochecks q4hrs   echo with bubble study pending  discharge planning after   OT eval, PT recommended for acute rehab

## 2021-02-02 NOTE — PROGRESS NOTE ADULT - ASSESSMENT
60 RHF w/ DM, HTN, COVID 4 weeks ago, ?psychiatric issues unspecified, presents w/ dysarthria and R. hemiapresis. No tpa as outside window. No MT as no LVO.     CTH demonstrated hypodensity involving the L. [place] and PL of the IC, otherwise no early signs of ischemia (to my eye). R. posteiror portion of the PL of the internal capsule.   CTA head: multifocal intracranial stenosis; Severe stenosis involving the supraclinoid L. ICA, bilateral M1 moderate stenosis, severe stenosis in the mid R. M1. R. V4 severe stenosis.   CTA neck: Severe 85-90% stenosis involving the R. ICA orgin.   MR brain demonstrates L. anterior choroidal infarct.   Carotid Dopplers R. ICA moderate stenosis 50-79%; L. ICA mild 15-49% stenosis  A1c 11.6%, LDL 186mg/dL    Impression:  R. hemiparesis secondary to L. anterior choroidal territory infarct. Mechanism ICAD. R/o competing cardioembolic cause. The moderate carotid stenosis is symptomatic given previous infarcts.     Plan:   - TTE w/ bubble   - c/w ASA 81mg + Plavix 75mg for 3 months, followed by ASA indefinitely per [facility]   - Atorvastatin 80mg (titrate to LDL < 70)   - telemetry  - PT/OT/SS/SLP, OOBC  - avoid hypotension  - check FS, glucose control <180  - GI/DVT ppx  - Counseling on diet, exercise, and medication adherence was done  - Counseling on smoking cessation and alcohol consumption offered when appropriate.  - Pain assessed and judicious use of narcotics when appropriate was discussed.    - Stroke education given when appropriate.  - Importance of fall prevention discussed.   - Differential diagnosis and plan of care discussed with patient and/or family and primary team  - Thank you for allowing me to participate in the care of this patient. Call with questions.   -F/u with Dr. Parrish Ragsdale at [phone]  -F/u with Dr. Wilmer Guerrier at [phone] within 2 week for possible right carotid stent     Parrish Ragsdale MD  Vascular Neurology  Office: [phone]

## 2021-02-02 NOTE — PROGRESS NOTE ADULT - SUBJECTIVE AND OBJECTIVE BOX
Patient is a 60y old  Female who presents with a chief complaint of R sided hemiparesis and facial droop (2021 20:19)      HPI:  61 y/o female with a PmHx of HTN, DM type II, HLD, Anxiety/Depression, Schizophrenia, Covid 4 weeks ago presents to the ED accompanied by her son, Ruben, s/p episode of right sided weakness that occurred this morning. Son reports that he found his mom on the floor by her bed this morning. When he tried to help her up and back into the bed, he noticed that she had significant R sided weakness and R facial drooping with dysarthria which prompted him to call EMS. A similar episode occurred last night but son states that he did not think that episode was emergent because she is usually weak at baseline (2/2 medications) and he did not notice any hemiparesis or other symptoms concerning for a stroke. He was able to assist her back into bed last night.  After the episode this morning, she was unable to bear any weight on the R leg or move her R extremities. She is usually able to ambulate but has not been able to since the episode this morning.  Patient was able to talk to the son after the episode and denied acute confusion or disorientation. Patient denies LOC or trauma, states she fell onto carpet.  Pt admits to R leg pain that was a 7/10 when she fell this morning, but denies any pain now. Patient admits to weakness, fatigue, constipation (normal for her), palpitations (2/2 anxiety), and chronic blurry vision and tinnitus in the L ear that she attributes to the medications that she is taking. Also reports one episode of urinary incontinence that occurred when she experienced the hemiparesis this morning which has never happened before. Pt denies fecal incontinence, dizziness, lightheadedness, fever, chills, sweats, weight loss, abdominal pain, n/v, diarrhea, dysuria, hematuria, blood in the stools. Further denies SOB, chest pain, orthopnea, numbness or tingling, syncope, depression or SI.    (2021 15:16)      REVIEW OF SYSTEMS  Constitutional - No fever, No weight loss, No fatigue  HEENT - No eye pain, No visual disturbances, No difficulty hearing, No tinnitus, No vertigo, No neck pain  Respiratory - + cough, No wheezing, No shortness of breath  Cardiovascular - No chest pain, No palpitations  Gastrointestinal - No abdominal pain, No nausea, No vomiting, No diarrhea, No constipation  Genitourinary - No dysuria, No frequency, No hematuria, No incontinence  Neurological - No headaches, No memory loss, + loss of strength, No numbness, No tremors  Skin - No itching, No rashes, No lesions   Endocrine - No temperature intolerance  Musculoskeletal - No joint pain, No joint swelling, No muscle pain  Psychiatric - No depression, No anxiety    PAST MEDICAL & SURGICAL HISTORY  Uterine fibroid    Hypercholesteremia    Type 2 diabetes mellitus    Hypertension    Diabetes    Anxiety    H/O  section    H/O hemorrhoidectomy    H/O tubal ligation           CURRENT FUNCTIONAL STATUS  Patient performed supine to sit and rested; sit to stand and ambulated 3' (upgraded to right Lower Extremity FWB, as per last physical therapy note) sidestepping with rolling walker & Mod A x 1. Patient performed right upper extremity and lower extremity active-assistive ROM 1x10 while sitting up at the edge of the bed.    FAMILY HISTORY   FH: type 2 diabetes      RECENT LABS/IMAGING  CBC Full  -  ( 2021 07:07 )  WBC Count : 4.44 K/uL  RBC Count : 3.87 M/uL  Hemoglobin : 11.5 g/dL  Hematocrit : 34.7 %  Platelet Count - Automated : 207 K/uL  Mean Cell Volume : 89.7 fL  Mean Cell Hemoglobin : 29.7 pg  Mean Cell Hemoglobin Concentration : 33.1 gm/dL  Auto Neutrophil # : x  Auto Lymphocyte # : x  Auto Monocyte # : x  Auto Eosinophil # : x  Auto Basophil # : x  Auto Neutrophil % : x  Auto Lymphocyte % : x  Auto Monocyte % : x  Auto Eosinophil % : x  Auto Basophil % : x        134<L>  |  98  |  15  ----------------------------<  237<H>  3.9   |  26  |  0.90    Ca    8.8      2021 07:07  Phos  2.6       Mg     1.8               VITALS  T(C): 36.8 (21 @ 10:20), Max: 37.1 (21 @ 04:52)  HR: 90 (21 @ 10:20) (81 - 93)  BP: 162/69 (21 @ 10:20) (162/69 - 203/81)  RR: 18 (21 @ 10:20) (18 - 18)  SpO2: 97% (21 @ 10:20) (96% - 98%)  Wt(kg): --    ALLERGIES  erythromycin (Unknown)      MEDICATIONS   amLODIPine   Tablet 5 milliGRAM(s) Oral once  amLODIPine   Tablet 10 milliGRAM(s) Oral daily  aspirin  chewable 81 milliGRAM(s) Oral daily  atorvastatin 80 milliGRAM(s) Oral at bedtime  benztropine 2 milliGRAM(s) Oral at bedtime  clonazePAM  Tablet 1 milliGRAM(s) Oral two times a day  cloNIDine 0.2 milliGRAM(s) Oral two times a day  clopidogrel Tablet 75 milliGRAM(s) Oral daily  dextrose 40% Gel 15 Gram(s) Oral once  dextrose 5%. 1000 milliLiter(s) IV Continuous <Continuous>  dextrose 5%. 1000 milliLiter(s) IV Continuous <Continuous>  dextrose 5%. 1000 milliLiter(s) IV Continuous <Continuous>  dextrose 50% Injectable 25 Gram(s) IV Push once  dextrose 50% Injectable 12.5 Gram(s) IV Push once  dextrose 50% Injectable 25 Gram(s) IV Push once  enalapril 20 milliGRAM(s) Oral two times a day  enoxaparin Injectable 40 milliGRAM(s) SubCutaneous daily  glucagon  Injectable 1 milliGRAM(s) IntraMuscular once  glucagon  Injectable 1 milliGRAM(s) IntraMuscular once  insulin glargine Injectable (LANTUS) 30 Unit(s) SubCutaneous <User Schedule>  insulin lispro (ADMELOG) corrective regimen sliding scale   SubCutaneous three times a day before meals  insulin lispro (ADMELOG) corrective regimen sliding scale   SubCutaneous at bedtime  insulin lispro Injectable (ADMELOG) 10 Unit(s) SubCutaneous before dinner  insulin lispro Injectable (ADMELOG) 10 Unit(s) SubCutaneous before lunch  insulin lispro Injectable (ADMELOG) 12 Unit(s) SubCutaneous before breakfast  pantoprazole    Tablet 40 milliGRAM(s) Oral before breakfast  risperiDONE   Tablet 3 milliGRAM(s) Oral at bedtime  traZODone 200 milliGRAM(s) Oral at bedtime      ----------------------------------------------------------------------------------------   PHYSICAL EXAM  Constitutional - NAD, Comfortable  HEENT - NCAT, EOMI  Neck - Supple, No limited ROM  Chest - no respiratory distress  Cardiovascular - no edema  Abdomen - BS+, Soft, NTND  Extremities - No C/C/E, No calf tenderness   Neurologic Exam -                    Cognitive - Awake, Alert, AAO to self, place, date, year, situation     Communication - mild dysarthria     Cranial Nerves - trace R facial weakness, tongue appears midline     Motor -                      LEFT    UE - ShAB 5/5, EF 5/5, EE 5/5, WE 5/5,  5/5                    RIGHT UE - 2/5                    LEFT    LE - HF 5/5, KE 5/5, DF 5/5, PF 5/5                    RIGHT LE - HF 1/5     Sensory - Intact to LT      Coordination - FTN intact on left     OculoVestibular - No saccades, No nystagmus, VOR         Balance - WNL Static  Psychiatric - Mood stable, Affect WNL  ----------------------------------------------------------------------------------------  ASSESSMENT/PLAN 61 y/o female with a PmHx of HTN, DM type II, HLD, Anxiety d/o and unspecified somatic complaint disorder presents to the ED accompanied by her son s/p episode of right sided weakness and dysarthria  on isolation for covid per family (4 weeks ago).  on isolation precautions  echo pending  schizophrenia: risperdal  anxiety: klonopin  htn: norvasc, enalapril  cva: asa, statin, plavix  DVT PPX - lovenox  Diet: soft, consistent carb, nectar thick liquid  continue bedside PT/OT/SLP  turn and position q 2 to prevent skin breakdown  Rehab - likely acute rehab when medically cleared.    Recommend ACUTE inpatient rehabilitation for the functional deficits consisting of 3 hours of therapy/day & 24 hour RN/daily PMR physician for comorbid medical management. Will continue to follow for ongoing rehab needs and recommendations.      Patient is a 60y old  Female who presents with a chief complaint of R sided hemiparesis and facial droop (2021 20:19)      HPI:  59 y/o female with a PmHx of HTN, DM type II, HLD, Anxiety/Depression, Schizophrenia, Covid 4 weeks ago presents to the ED accompanied by her son, Ruben, s/p episode of right sided weakness that occurred this morning. Son reports that he found his mom on the floor by her bed this morning. When he tried to help her up and back into the bed, he noticed that she had significant R sided weakness and R facial drooping with dysarthria which prompted him to call EMS. A similar episode occurred last night but son states that he did not think that episode was emergent because she is usually weak at baseline (2/2 medications) and he did not notice any hemiparesis or other symptoms concerning for a stroke. He was able to assist her back into bed last night.  After the episode this morning, she was unable to bear any weight on the R leg or move her R extremities. She is usually able to ambulate but has not been able to since the episode this morning.  Patient was able to talk to the son after the episode and denied acute confusion or disorientation. Patient denies LOC or trauma, states she fell onto carpet.  Pt admits to R leg pain that was a 7/10 when she fell this morning, but denies any pain now. Patient admits to weakness, fatigue, constipation (normal for her), palpitations (2/2 anxiety), and chronic blurry vision and tinnitus in the L ear that she attributes to the medications that she is taking. Also reports one episode of urinary incontinence that occurred when she experienced the hemiparesis this morning which has never happened before. Pt denies fecal incontinence, dizziness, lightheadedness, fever, chills, sweats, weight loss, abdominal pain, n/v, diarrhea, dysuria, hematuria, blood in the stools. Further denies SOB, chest pain, orthopnea, numbness or tingling, syncope, depression or SI.    (2021 15:16)      REVIEW OF SYSTEMS  Constitutional - No fever, No weight loss, No fatigue  HEENT - No eye pain, No visual disturbances, No difficulty hearing, No tinnitus, No vertigo, No neck pain  Respiratory - + cough, No wheezing, No shortness of breath  Cardiovascular - No chest pain, No palpitations  Gastrointestinal - No abdominal pain, No nausea, No vomiting, No diarrhea, No constipation  Genitourinary - No dysuria, No frequency, No hematuria, No incontinence  Neurological - No headaches, No memory loss, + loss of strength, No numbness, No tremors  Skin - No itching, No rashes, No lesions   Endocrine - No temperature intolerance  Musculoskeletal - No joint pain, No joint swelling, No muscle pain  Psychiatric - No depression, No anxiety    PAST MEDICAL & SURGICAL HISTORY  Uterine fibroid    Hypercholesteremia    Type 2 diabetes mellitus    Hypertension    Diabetes    Anxiety    H/O  section    H/O hemorrhoidectomy    H/O tubal ligation           CURRENT FUNCTIONAL STATUS  Patient performed supine to sit and rested; sit to stand and ambulated 3' (upgraded to right Lower Extremity FWB, as per last physical therapy note) sidestepping with rolling walker & Mod A x 1. Patient performed right upper extremity and lower extremity active-assistive ROM 1x10 while sitting up at the edge of the bed.    FAMILY HISTORY   FH: type 2 diabetes      RECENT LABS/IMAGING  CBC Full  -  ( 2021 07:07 )  WBC Count : 4.44 K/uL  RBC Count : 3.87 M/uL  Hemoglobin : 11.5 g/dL  Hematocrit : 34.7 %  Platelet Count - Automated : 207 K/uL  Mean Cell Volume : 89.7 fL  Mean Cell Hemoglobin : 29.7 pg  Mean Cell Hemoglobin Concentration : 33.1 gm/dL  Auto Neutrophil # : x  Auto Lymphocyte # : x  Auto Monocyte # : x  Auto Eosinophil # : x  Auto Basophil # : x  Auto Neutrophil % : x  Auto Lymphocyte % : x  Auto Monocyte % : x  Auto Eosinophil % : x  Auto Basophil % : x        134<L>  |  98  |  15  ----------------------------<  237<H>  3.9   |  26  |  0.90    Ca    8.8      2021 07:07  Phos  2.6       Mg     1.8               VITALS  T(C): 36.8 (21 @ 10:20), Max: 37.1 (21 @ 04:52)  HR: 90 (21 @ 10:20) (81 - 93)  BP: 162/69 (21 @ 10:20) (162/69 - 203/81)  RR: 18 (21 @ 10:20) (18 - 18)  SpO2: 97% (21 @ 10:20) (96% - 98%)  Wt(kg): --    ALLERGIES  erythromycin (Unknown)      MEDICATIONS   amLODIPine   Tablet 5 milliGRAM(s) Oral once  amLODIPine   Tablet 10 milliGRAM(s) Oral daily  aspirin  chewable 81 milliGRAM(s) Oral daily  atorvastatin 80 milliGRAM(s) Oral at bedtime  benztropine 2 milliGRAM(s) Oral at bedtime  clonazePAM  Tablet 1 milliGRAM(s) Oral two times a day  cloNIDine 0.2 milliGRAM(s) Oral two times a day  clopidogrel Tablet 75 milliGRAM(s) Oral daily  dextrose 40% Gel 15 Gram(s) Oral once  dextrose 5%. 1000 milliLiter(s) IV Continuous <Continuous>  dextrose 5%. 1000 milliLiter(s) IV Continuous <Continuous>  dextrose 5%. 1000 milliLiter(s) IV Continuous <Continuous>  dextrose 50% Injectable 25 Gram(s) IV Push once  dextrose 50% Injectable 12.5 Gram(s) IV Push once  dextrose 50% Injectable 25 Gram(s) IV Push once  enalapril 20 milliGRAM(s) Oral two times a day  enoxaparin Injectable 40 milliGRAM(s) SubCutaneous daily  glucagon  Injectable 1 milliGRAM(s) IntraMuscular once  glucagon  Injectable 1 milliGRAM(s) IntraMuscular once  insulin glargine Injectable (LANTUS) 30 Unit(s) SubCutaneous <User Schedule>  insulin lispro (ADMELOG) corrective regimen sliding scale   SubCutaneous three times a day before meals  insulin lispro (ADMELOG) corrective regimen sliding scale   SubCutaneous at bedtime  insulin lispro Injectable (ADMELOG) 10 Unit(s) SubCutaneous before dinner  insulin lispro Injectable (ADMELOG) 10 Unit(s) SubCutaneous before lunch  insulin lispro Injectable (ADMELOG) 12 Unit(s) SubCutaneous before breakfast  pantoprazole    Tablet 40 milliGRAM(s) Oral before breakfast  risperiDONE   Tablet 3 milliGRAM(s) Oral at bedtime  traZODone 200 milliGRAM(s) Oral at bedtime      ----------------------------------------------------------------------------------------   PHYSICAL EXAM  Constitutional - NAD, Comfortable  HEENT - NCAT, EOMI  Neck - Supple, No limited ROM  Chest - no respiratory distress  Cardiovascular - no edema  Abdomen - BS+, Soft, NTND  Extremities - No C/C/E, No calf tenderness   Neurologic Exam -                    Cognitive - Awake, Alert, AAO to self, place, date, year, situation     Communication - mild dysarthria     Cranial Nerves - trace R facial weakness, tongue appears midline     Motor -                      LEFT    UE - ShAB 5/5, EF 5/5, EE 5/5, WE 5/5,  5/5                    RIGHT UE - 2/5                    LEFT    LE - HF 5/5, KE 5/5, DF 5/5, PF 5/5                    RIGHT LE -  1/5     Sensory - Intact to LT       Balance - WNL Static  Psychiatric - Mood stable, Affect WNL  ----------------------------------------------------------------------------------------  ASSESSMENT/PLAN 59 y/o female with a PmHx of HTN, DM type II, HLD, Anxiety d/o and unspecified somatic complaint disorder presents to the ED accompanied by her son s/p episode of right sided weakness and dysarthria  on isolation for covid per family (4 weeks ago).  on isolation precautions  schizophrenia: risperdal  anxiety: klonopin  htn: norvasc, enalapril  cva: asa, statin, plavix  DVT PPX - lovenox  Diet: soft, consistent carb, nectar thick liquid  continue bedside PT/OT/SLP  turn and position q 2 to prevent skin breakdown  Rehab - when medically cleared.    Recommend ACUTE inpatient rehabilitation for the functional deficits consisting of 3 hours of therapy/day & 24 hour RN/daily PMR physician for comorbid medical management. Will continue to follow for ongoing rehab needs and recommendations.

## 2021-02-02 NOTE — PROGRESS NOTE ADULT - SUBJECTIVE AND OBJECTIVE BOX
Chief Complaint: uncontrolled DM2    History: Patient seen and examined at bedside. Reports feeling sad as she would like to go home.   Patient reports appetite in not great. She would prefer to have lasagna. About 40% of lunch eaten at  bedside.  Per nursing, patient appetite is good, ate all of breakfast.  Of note yesterday 2/1/21 dinner FS, 68.     MEDICATIONS  (STANDING):  amLODIPine   Tablet 5 milliGRAM(s) Oral once  amLODIPine   Tablet 10 milliGRAM(s) Oral daily  aspirin  chewable 81 milliGRAM(s) Oral daily  atorvastatin 80 milliGRAM(s) Oral at bedtime  benztropine 2 milliGRAM(s) Oral at bedtime  clonazePAM  Tablet 1 milliGRAM(s) Oral two times a day  cloNIDine 0.2 milliGRAM(s) Oral two times a day  clopidogrel Tablet 75 milliGRAM(s) Oral daily  dextrose 40% Gel 15 Gram(s) Oral once  dextrose 5%. 1000 milliLiter(s) (50 mL/Hr) IV Continuous <Continuous>  dextrose 5%. 1000 milliLiter(s) (100 mL/Hr) IV Continuous <Continuous>  dextrose 5%. 1000 milliLiter(s) (100 mL/Hr) IV Continuous <Continuous>  dextrose 50% Injectable 25 Gram(s) IV Push once  dextrose 50% Injectable 12.5 Gram(s) IV Push once  dextrose 50% Injectable 25 Gram(s) IV Push once  enalapril 20 milliGRAM(s) Oral two times a day  enoxaparin Injectable 40 milliGRAM(s) SubCutaneous daily  glucagon  Injectable 1 milliGRAM(s) IntraMuscular once  glucagon  Injectable 1 milliGRAM(s) IntraMuscular once  insulin glargine Injectable (LANTUS) 30 Unit(s) SubCutaneous <User Schedule>  insulin lispro (ADMELOG) corrective regimen sliding scale   SubCutaneous three times a day before meals  insulin lispro (ADMELOG) corrective regimen sliding scale   SubCutaneous at bedtime  insulin lispro Injectable (ADMELOG) 10 Unit(s) SubCutaneous before dinner  insulin lispro Injectable (ADMELOG) 10 Unit(s) SubCutaneous before lunch  insulin lispro Injectable (ADMELOG) 12 Unit(s) SubCutaneous before breakfast  pantoprazole    Tablet 40 milliGRAM(s) Oral before breakfast  risperiDONE   Tablet 3 milliGRAM(s) Oral at bedtime  traZODone 200 milliGRAM(s) Oral at bedtime    PHYSICAL EXAM:  VITALS: T(C): 36.8 (02-02-21 @ 10:20)  T(F): 98.2 (02-02-21 @ 10:20), Max: 98.8 (02-02-21 @ 04:52)  HR: 90 (02-02-21 @ 10:20) (81 - 93)  BP: 162/69 (02-02-21 @ 10:20) (162/69 - 203/81)  RR:  (18 - 18)  SpO2:  (96% - 98%)  Wt(kg): 77kg   GENERAL: NAD, well-groomed, well-developed  RESPIRATORY: Clear to auscultation bilaterally; No rales, rhonchi, wheezing, or rubs  CARDIOVASCULAR: Regular rate and rhythm; No murmurs; no peripheral edema  GI: Soft, nontender, non distended, normal bowel sounds  SKIN: Dry, intact, No rashes or lesions  MUSCULOSKELETAL: Decreased strength in RUE and RLE   NEURO:  slow speech and right facial droop   PSYCH: Alert and oriented x 3,reactive affect     POCT Blood Glucose.: 197 mg/dL (02-02-21 @ 12:36)  POCT Blood Glucose.: 231 mg/dL (02-02-21 @ 09:20)  POCT Blood Glucose.: 210 mg/dL (02-01-21 @ 21:28)  POCT Blood Glucose.: 123 mg/dL (02-01-21 @ 19:10)  POCT Blood Glucose.: 101 mg/dL (02-01-21 @ 18:34)  POCT Blood Glucose.: 69 mg/dL (02-01-21 @ 17:52)  POCT Blood Glucose.: 68 mg/dL (02-01-21 @ 17:37)  POCT Blood Glucose.: 216 mg/dL (02-01-21 @ 12:50)  POCT Blood Glucose.: 161 mg/dL (02-01-21 @ 09:18)  POCT Blood Glucose.: 186 mg/dL (02-01-21 @ 09:06)  POCT Blood Glucose.: 116 mg/dL (01-31-21 @ 21:03)  POCT Blood Glucose.: 168 mg/dL (01-31-21 @ 16:44)  POCT Blood Glucose.: 290 mg/dL (01-31-21 @ 12:07)  POCT Blood Glucose.: 129 mg/dL (01-31-21 @ 07:21)  POCT Blood Glucose.: 95 mg/dL (01-30-21 @ 21:15)  POCT Blood Glucose.: 79 mg/dL (01-30-21 @ 16:36)      02-02    134<L>  |  98  |  15  ----------------------------<  237<H>  3.9   |  26  |  0.90    EGFR if : 80  EGFR if non : 69    Ca    8.8      02-02  Mg     1.8     02-02  Phos  2.6     02-02

## 2021-02-03 LAB
ANION GAP SERPL CALC-SCNC: 10 MMOL/L — SIGNIFICANT CHANGE UP (ref 7–14)
BUN SERPL-MCNC: 22 MG/DL — SIGNIFICANT CHANGE UP (ref 7–23)
CALCIUM SERPL-MCNC: 9.1 MG/DL — SIGNIFICANT CHANGE UP (ref 8.4–10.5)
CHLORIDE SERPL-SCNC: 99 MMOL/L — SIGNIFICANT CHANGE UP (ref 98–107)
CO2 SERPL-SCNC: 25 MMOL/L — SIGNIFICANT CHANGE UP (ref 22–31)
CREAT SERPL-MCNC: 1.17 MG/DL — SIGNIFICANT CHANGE UP (ref 0.5–1.3)
CRP SERPL-MCNC: 5.6 MG/L — HIGH
D DIMER BLD IA.RAPID-MCNC: 502 NG/ML DDU — HIGH
FERRITIN SERPL-MCNC: 418 NG/ML — HIGH (ref 15–150)
GLUCOSE BLDC GLUCOMTR-MCNC: 112 MG/DL — HIGH (ref 70–99)
GLUCOSE BLDC GLUCOMTR-MCNC: 139 MG/DL — HIGH (ref 70–99)
GLUCOSE BLDC GLUCOMTR-MCNC: 151 MG/DL — HIGH (ref 70–99)
GLUCOSE BLDC GLUCOMTR-MCNC: 220 MG/DL — HIGH (ref 70–99)
GLUCOSE BLDC GLUCOMTR-MCNC: 222 MG/DL — HIGH (ref 70–99)
GLUCOSE BLDC GLUCOMTR-MCNC: 65 MG/DL — LOW (ref 70–99)
GLUCOSE BLDC GLUCOMTR-MCNC: 67 MG/DL — LOW (ref 70–99)
GLUCOSE BLDC GLUCOMTR-MCNC: 69 MG/DL — LOW (ref 70–99)
GLUCOSE BLDC GLUCOMTR-MCNC: 89 MG/DL — SIGNIFICANT CHANGE UP (ref 70–99)
GLUCOSE SERPL-MCNC: 136 MG/DL — HIGH (ref 70–99)
HCT VFR BLD CALC: 33.5 % — LOW (ref 34.5–45)
HGB BLD-MCNC: 10.9 G/DL — LOW (ref 11.5–15.5)
LDH SERPL L TO P-CCNC: 232 U/L — HIGH (ref 135–225)
MCHC RBC-ENTMCNC: 29.4 PG — SIGNIFICANT CHANGE UP (ref 27–34)
MCHC RBC-ENTMCNC: 32.5 GM/DL — SIGNIFICANT CHANGE UP (ref 32–36)
MCV RBC AUTO: 90.3 FL — SIGNIFICANT CHANGE UP (ref 80–100)
NRBC # BLD: 0 /100 WBCS — SIGNIFICANT CHANGE UP
NRBC # FLD: 0 K/UL — SIGNIFICANT CHANGE UP
PHOSPHATE SERPL-MCNC: 3.7 MG/DL — SIGNIFICANT CHANGE UP (ref 2.5–4.5)
PLATELET # BLD AUTO: 202 K/UL — SIGNIFICANT CHANGE UP (ref 150–400)
POTASSIUM SERPL-MCNC: 3.8 MMOL/L — SIGNIFICANT CHANGE UP (ref 3.5–5.3)
POTASSIUM SERPL-SCNC: 3.8 MMOL/L — SIGNIFICANT CHANGE UP (ref 3.5–5.3)
PROCALCITONIN SERPL-MCNC: 0.05 NG/ML — SIGNIFICANT CHANGE UP (ref 0.02–0.1)
RBC # BLD: 3.71 M/UL — LOW (ref 3.8–5.2)
RBC # FLD: 14.3 % — SIGNIFICANT CHANGE UP (ref 10.3–14.5)
SODIUM SERPL-SCNC: 134 MMOL/L — LOW (ref 135–145)
WBC # BLD: 4.4 K/UL — SIGNIFICANT CHANGE UP (ref 3.8–10.5)
WBC # FLD AUTO: 4.4 K/UL — SIGNIFICANT CHANGE UP (ref 3.8–10.5)

## 2021-02-03 PROCEDURE — 99232 SBSQ HOSP IP/OBS MODERATE 35: CPT

## 2021-02-03 RX ORDER — CLONAZEPAM 1 MG
1 TABLET ORAL
Refills: 0 | Status: DISCONTINUED | OUTPATIENT
Start: 2021-02-03 | End: 2021-02-05

## 2021-02-03 RX ORDER — DEXTROSE 50 % IN WATER 50 %
25 SYRINGE (ML) INTRAVENOUS ONCE
Refills: 0 | Status: COMPLETED | OUTPATIENT
Start: 2021-02-03 | End: 2021-02-03

## 2021-02-03 RX ORDER — DEXTROSE 50 % IN WATER 50 %
15 SYRINGE (ML) INTRAVENOUS ONCE
Refills: 0 | Status: COMPLETED | OUTPATIENT
Start: 2021-02-03 | End: 2021-02-03

## 2021-02-03 RX ADMIN — Medication 1: at 13:12

## 2021-02-03 RX ADMIN — Medication 0.2 MILLIGRAM(S): at 04:34

## 2021-02-03 RX ADMIN — Medication 25 GRAM(S): at 19:00

## 2021-02-03 RX ADMIN — Medication 10 UNIT(S): at 13:13

## 2021-02-03 RX ADMIN — Medication 0.2 MILLIGRAM(S): at 21:30

## 2021-02-03 RX ADMIN — CLOPIDOGREL BISULFATE 75 MILLIGRAM(S): 75 TABLET, FILM COATED ORAL at 14:59

## 2021-02-03 RX ADMIN — Medication 20 MILLIGRAM(S): at 04:34

## 2021-02-03 RX ADMIN — Medication 20 MILLIGRAM(S): at 19:24

## 2021-02-03 RX ADMIN — Medication 0.2 MILLIGRAM(S): at 14:59

## 2021-02-03 RX ADMIN — Medication 200 MILLIGRAM(S): at 21:30

## 2021-02-03 RX ADMIN — AMLODIPINE BESYLATE 10 MILLIGRAM(S): 2.5 TABLET ORAL at 04:34

## 2021-02-03 RX ADMIN — Medication 2 MILLIGRAM(S): at 21:30

## 2021-02-03 RX ADMIN — Medication 1 MILLIGRAM(S): at 19:24

## 2021-02-03 RX ADMIN — RISPERIDONE 3 MILLIGRAM(S): 4 TABLET ORAL at 21:30

## 2021-02-03 RX ADMIN — Medication 15 GRAM(S): at 19:00

## 2021-02-03 RX ADMIN — ENOXAPARIN SODIUM 40 MILLIGRAM(S): 100 INJECTION SUBCUTANEOUS at 15:00

## 2021-02-03 RX ADMIN — Medication 81 MILLIGRAM(S): at 14:59

## 2021-02-03 RX ADMIN — INSULIN GLARGINE 30 UNIT(S): 100 INJECTION, SOLUTION SUBCUTANEOUS at 15:43

## 2021-02-03 RX ADMIN — ATORVASTATIN CALCIUM 80 MILLIGRAM(S): 80 TABLET, FILM COATED ORAL at 21:30

## 2021-02-03 RX ADMIN — PANTOPRAZOLE SODIUM 40 MILLIGRAM(S): 20 TABLET, DELAYED RELEASE ORAL at 09:43

## 2021-02-03 RX ADMIN — Medication 12 UNIT(S): at 09:43

## 2021-02-03 NOTE — PROGRESS NOTE ADULT - SUBJECTIVE AND OBJECTIVE BOX
PROGRESS NOTE:     Patient is a 60y old  Female who presents with a chief complaint of R sided hemiparesis and facial droop (03 Feb 2021 11:55)      SUBJECTIVE / OVERNIGHT EVENTS:  reports no dysphagia or odynophagia   right side weakness still     ADDITIONAL REVIEW OF SYSTEMS: no fever    MEDICATIONS  (STANDING):  amLODIPine   Tablet 5 milliGRAM(s) Oral once  amLODIPine   Tablet 10 milliGRAM(s) Oral daily  aspirin  chewable 81 milliGRAM(s) Oral daily  atorvastatin 80 milliGRAM(s) Oral at bedtime  benztropine 2 milliGRAM(s) Oral at bedtime  clonazePAM  Tablet 1 milliGRAM(s) Oral two times a day  cloNIDine 0.2 milliGRAM(s) Oral three times a day  clopidogrel Tablet 75 milliGRAM(s) Oral daily  dextrose 40% Gel 15 Gram(s) Oral once  dextrose 5%. 1000 milliLiter(s) (100 mL/Hr) IV Continuous <Continuous>  dextrose 5%. 1000 milliLiter(s) (100 mL/Hr) IV Continuous <Continuous>  dextrose 5%. 1000 milliLiter(s) (50 mL/Hr) IV Continuous <Continuous>  dextrose 50% Injectable 25 Gram(s) IV Push once  dextrose 50% Injectable 12.5 Gram(s) IV Push once  dextrose 50% Injectable 25 Gram(s) IV Push once  enalapril 20 milliGRAM(s) Oral two times a day  enoxaparin Injectable 40 milliGRAM(s) SubCutaneous daily  glucagon  Injectable 1 milliGRAM(s) IntraMuscular once  glucagon  Injectable 1 milliGRAM(s) IntraMuscular once  insulin glargine Injectable (LANTUS) 30 Unit(s) SubCutaneous <User Schedule>  insulin lispro (ADMELOG) corrective regimen sliding scale   SubCutaneous three times a day before meals  insulin lispro (ADMELOG) corrective regimen sliding scale   SubCutaneous at bedtime  insulin lispro Injectable (ADMELOG) 12 Unit(s) SubCutaneous before breakfast  insulin lispro Injectable (ADMELOG) 10 Unit(s) SubCutaneous before lunch  insulin lispro Injectable (ADMELOG) 10 Unit(s) SubCutaneous before dinner  pantoprazole    Tablet 40 milliGRAM(s) Oral before breakfast  risperiDONE   Tablet 3 milliGRAM(s) Oral at bedtime  traZODone 200 milliGRAM(s) Oral at bedtime    MEDICATIONS  (PRN):      CAPILLARY BLOOD GLUCOSE      POCT Blood Glucose.: 112 mg/dL (03 Feb 2021 15:12)  POCT Blood Glucose.: 151 mg/dL (03 Feb 2021 12:38)  POCT Blood Glucose.: 139 mg/dL (03 Feb 2021 09:09)  POCT Blood Glucose.: 228 mg/dL (02 Feb 2021 21:28)  POCT Blood Glucose.: 292 mg/dL (02 Feb 2021 18:49)    I&O's Summary    02 Feb 2021 07:01  -  03 Feb 2021 07:00  --------------------------------------------------------  IN: 0 mL / OUT: 600 mL / NET: -600 mL        PHYSICAL EXAM:  Vital Signs Last 24 Hrs  T(C): 36.8 (03 Feb 2021 14:21), Max: 36.8 (03 Feb 2021 14:21)  T(F): 98.2 (03 Feb 2021 14:21), Max: 98.2 (03 Feb 2021 14:21)  HR: 87 (03 Feb 2021 14:21) (79 - 87)  BP: 161/79 (03 Feb 2021 14:21) (138/70 - 168/72)  BP(mean): --  RR: 15 (03 Feb 2021 14:21) (15 - 19)  SpO2: 99% (03 Feb 2021 14:21) (95% - 99%)    awake ,alert  right side mild facial , mild dysarthria,  right hemiplegia upper and lower extremity     LABS:                        10.9   4.40  )-----------( 202      ( 03 Feb 2021 07:24 )             33.5     02-03    134<L>  |  99  |  22  ----------------------------<  136<H>  3.8   |  25  |  1.17    Ca    9.1      03 Feb 2021 07:24  Phos  3.7     02-03  Mg     1.8     02-02      RADIOLOGY & ADDITIONAL TESTS:  Results Reviewed: y  Imaging Personally Reviewed:y  Electrocardiogram Personally Reviewed:y    COORDINATION OF CARE:  Care Discussed with Consultants/Other Providers [Y/N]:y  Prior or Outpatient Records Reviewed [Y/N]:y

## 2021-02-03 NOTE — PROGRESS NOTE ADULT - SUBJECTIVE AND OBJECTIVE BOX
Neurology Progress Note    S: Patient seen in covid unit on airborne and contact isolation. No new events overnight. in chair on RA sitting in chair      Medication:  MEDICATIONS  (STANDING):  amLODIPine   Tablet 5 milliGRAM(s) Oral once  amLODIPine   Tablet 10 milliGRAM(s) Oral daily  aspirin  chewable 81 milliGRAM(s) Oral daily  atorvastatin 80 milliGRAM(s) Oral at bedtime  benztropine 2 milliGRAM(s) Oral at bedtime  cloNIDine 0.2 milliGRAM(s) Oral three times a day  clopidogrel Tablet 75 milliGRAM(s) Oral daily  dextrose 40% Gel 15 Gram(s) Oral once  dextrose 5%. 1000 milliLiter(s) (50 mL/Hr) IV Continuous <Continuous>  dextrose 5%. 1000 milliLiter(s) (100 mL/Hr) IV Continuous <Continuous>  dextrose 5%. 1000 milliLiter(s) (100 mL/Hr) IV Continuous <Continuous>  dextrose 50% Injectable 25 Gram(s) IV Push once  dextrose 50% Injectable 12.5 Gram(s) IV Push once  dextrose 50% Injectable 25 Gram(s) IV Push once  enalapril 20 milliGRAM(s) Oral two times a day  enoxaparin Injectable 40 milliGRAM(s) SubCutaneous daily  glucagon  Injectable 1 milliGRAM(s) IntraMuscular once  glucagon  Injectable 1 milliGRAM(s) IntraMuscular once  insulin glargine Injectable (LANTUS) 30 Unit(s) SubCutaneous <User Schedule>  insulin lispro (ADMELOG) corrective regimen sliding scale   SubCutaneous three times a day before meals  insulin lispro (ADMELOG) corrective regimen sliding scale   SubCutaneous at bedtime  insulin lispro Injectable (ADMELOG) 10 Unit(s) SubCutaneous before dinner  insulin lispro Injectable (ADMELOG) 10 Unit(s) SubCutaneous before lunch  insulin lispro Injectable (ADMELOG) 12 Unit(s) SubCutaneous before breakfast  pantoprazole    Tablet 40 milliGRAM(s) Oral before breakfast  risperiDONE   Tablet 3 milliGRAM(s) Oral at bedtime  traZODone 200 milliGRAM(s) Oral at bedtime    MEDICATIONS  (PRN):        Vitals:  ICU Vital Signs Last 24 Hrs  T(C): 36.6 (03 Feb 2021 04:24), Max: 36.6 (02 Feb 2021 14:45)  T(F): 97.9 (03 Feb 2021 04:24), Max: 97.9 (03 Feb 2021 04:24)  HR: 79 (03 Feb 2021 04:24) (79 - 85)  BP: 139/70 (03 Feb 2021 04:24) (138/70 - 168/72)  BP(mean): --  ABP: --  ABP(mean): --  RR: 18 (03 Feb 2021 04:24) (18 - 19)  SpO2: 98% (03 Feb 2021 04:24) (95% - 98%)      General Exam:   General Appearance: Appropriately dressed and in no acute distress       Head: Normocephalic, atraumatic and no dysmorphic features  Ear, Nose, and Throat: Moist mucous membranes  CVS: S1S2+  Resp: No SOB, no wheeze or rhonchi  Abd: soft NTND  Extremities: No edema, no cyanosis  Skin: No bruises, no rashes     Neurological Exam:  MS: Oriented x3. Non-fluent speech making subtle paraphasic errors. Comprehension intact. Cannot repeat.   CN: Decreased BTT on the right. Moderate-severe R. facial. EOMI.   Motor: Right side no movement. Left side full.   Sensory: Slightly decreased sensation to LT on the right  Coordination: No dysmetria on FNF or ataxia on HTS bilaterally   Gait: deferred     I personally reviewed the below data/images/labs:      CBC Full  -  ( 03 Feb 2021 07:24 )  WBC Count : 4.40 K/uL  RBC Count : 3.71 M/uL  Hemoglobin : 10.9 g/dL  Hematocrit : 33.5 %  Platelet Count - Automated : 202 K/uL  Mean Cell Volume : 90.3 fL  Mean Cell Hemoglobin : 29.4 pg  Mean Cell Hemoglobin Concentration : 32.5 gm/dL  Auto Neutrophil # : x  Auto Lymphocyte # : x  Auto Monocyte # : x  Auto Eosinophil # : x  Auto Basophil # : x  Auto Neutrophil % : x  Auto Lymphocyte % : x  Auto Monocyte % : x  Auto Eosinophil % : x  Auto Basophil % : x    02-03    134<L>  |  99  |  22  ----------------------------<  136<H>  3.8   |  25  |  1.17    Ca    9.1      03 Feb 2021 07:24  Phos  3.7     02-03  Mg     1.8     02-02              < from: CT Angio Neck w/ IV Cont (01.26.21 @ 11:08) >    EXAM:  CT PERFUSION W MAPS IC      EXAM:  CT ANGIO NECK (W)AW IC      EXAM:  CT ANGIO BRAIN (W)AW IC        PROCEDURE DATE:  Jan 26 2021         INTERPRETATION:  CT ANGIOGRAPHY OF HEAD AND NECK AND CT PERFUSION WITH INTRAVENOUS CONTRAST.    CLINICAL INDICATION: Code stroke.    TECHNIQUE: Volumetric acquisition was performed from the thoracic inlet to the vertex.    A total of 70 cc of Omnipaque 350 was injected intravenously without complications.  Dose optimization techniques were utilized including kVp/mA modulation along with iterative reconstructions.  MIP image analysis was performed on a separate workstation.  CT brain perfusion was performed after intravenous injection 50 mL of intravenous contrast Omnipaque 350. Dose optimization techniques were utilized including kVp/mA modulation along with iterative reconstructions. 3D image analysis was performed on a dedicated workstation by a neuroradiologist.    DOSE REPORT: Radiation Dose Estimate (DLP) Dose 2370 mGy-cm.    COMPARISON: No prior studies have been submitted for comparison.    FINDINGS:    CT angiography:    NECK:  The aortic arch is conventional in anatomy. Origin of supraaortic arteries are patent. The common carotid arteries are normal in course and caliber. There are atherosclerotic plaques at common carotid bifurcations and carotid bulbs with significant narrowing in the right common carotid bifurcation.    There is severe atherosclerotic narrowing of the proximal portion of the right internal carotid artery immediately above the bifurcation. There is 85-90 % stenosis using NASCET criteria (normal right ICA caliber is 1.2 mm).    There is mild atherosclerotic narrowing of the proximal left internal carotid artery. There is 15-20 % stenosis using NASCET criteria (normal left ICA caliber is 3.8 mm).    Bilateral vertebral arteries are normal in course, caliber and contour, without evidence of dissection or occlusion.    Degenerative changes of the bony cervical spine are noted.    BRAIN:    There is moderate atherosclerotic narrowing of the petrocavernous ICA is bilaterally. There is severe atherosclerotic narrowing of the supraclinoid left ICA. There is multifocal moderate atherosclerotic narrowing of the M1 segments bilaterally with severe narrowingof the midportion of the M1 segment of the right MCA. The ACOM is present. The right posterior communicating artery is present and patent. The left posterior communicating artery is not well seen, probably atretic or hypoplastic. There is multifocal moderate narrowing of the V4 segment of the right vertebral artery. The left intradural vertebral artery is normal in caliber.  The visualized cerebellar arteries, basilar and bilateral posterior cerebral arteries are patent without evidence of stenoses or aneurysm.    Other findings: None.    CT Perfusion:    The regional cerebral blood flow (CBF), cerebral blood volume (CBV), and time to maximum (Tmax) for the VAHE, MCA, and PCA territories are within normal limits. There is no evidence of asymmetric or delayed territorial perfusion.      IMPRESSION:    CTA brain: There is moderate atherosclerotic narrowing in various vascular beds as described above. There is severe atherosclerotic narrowing in the supraclinoid left ICA and midportion of theM1 segment of the right MCA.    CTA NECK: There is hemodynamically significant narrowing of the proximal right ICA immediately above the bifurcation with 85-90% narrowing of its lumen.    CT perfusion: There is no evidence of asymmetric or delayed territorial perfusion.    NASCET CAROTID STENOSIS CRITERIA (distal normal appearing ICA as denominator for measurement): 0%-none, 1-49%-mild, 50-70%-moderate, 70-89%-severe, 90-99%-critical.      The threshold values for estimation of hypoperfused and infarction volume are based on previously utilized methodology in large clinical trial including [facility] [employer] ClinicalTrials.gov number, NIO54583971, www.nejm.org/doi/full/10.1056/OLCVnz7722404    Notification to clinician of alert:    Provider   Dr. Boni Nye  was notified about  the impression 1118  on  1/26/2021   via telephone by Neuroradiologist DOMINGUEZ Russo.  Readback confirmation was obtained.              KEHINDE RUSSO M.D., ATTENDING RADIOLOGIST  This document has been electronicallysigned. Jan 26 2021 11:29AM    < end of copied text >  < from: MR Head No Cont (01.28.21 @ 22:03) >    EXAM:  MR BRAIN        PROCEDURE DATE:  Jan 28 2021         INTERPRETATION:  Clinical indications: Code stroke.    MRI of the brain was performed using sagittal T1, axial T1 T2 T2 FLAIR diffusion and susceptibility weighted sequence.    This exam is compared prior noncontrast head CT performed on June 26, 2001    Extensive parenchymal volume loss and chronic microvascular ischemic changes are identified.    Old lacunar infarct involving the posterior right lenticular nucleus is seen.    Abnormal T2 prolongation restricted diffusion is seen involving the left basal ganglia/corona radiata region. This is compatible with an acute infarct. No hemorrhagic transformation is seen. No significant shift or herniation is again    The large vessels demonstrate normal flow voids    The visualized paranasal sinuses mastoid and middle ear regions appear clear.    IMPRESSION: Acute infarcts as described above.                LEO PICKARD M.D., ATTENDING RADIOLOGIST  This document has been electronically signed. Jan 29 2021  8:31AM    < end of copied text >

## 2021-02-03 NOTE — PROGRESS NOTE ADULT - ASSESSMENT
60 RHF w/ DM, HTN, COVID 4 weeks ago, ?psychiatric issues unspecified, presents w/ dysarthria and R. hemiapresis. No tpa as outside window. No MT as no LVO.     CTH demonstrated hypodensity involving the L. [place] and PL of the IC, otherwise no early signs of ischemia (to my eye). R. posteiror portion of the PL of the internal capsule.   CTA head: multifocal intracranial stenosis; Severe stenosis involving the supraclinoid L. ICA, bilateral M1 moderate stenosis, severe stenosis in the mid R. M1. R. V4 severe stenosis.   CTA neck: Severe 85-90% stenosis involving the R. ICA orgin.   MR brain demonstrates L. anterior choroidal infarct.   Carotid Dopplers R. ICA moderate stenosis 50-79%; L. ICA mild 15-49% stenosis  A1c 11.6%, LDL 186mg/dL    Impression:  R. hemiparesis secondary to L. anterior choroidal territory infarct. Mechanism ICAD. R/o competing cardioembolic cause. The moderate carotid stenosis is symptomatic given previous infarcts.     Plan:   - TTE w/ bubble   - would send APLS labs including beta 2 glycoprotein, lupus anticoagulant and cardiolipin Ab  - c/w ASA 81mg + Plavix 75mg for 3 months, followed by ASA indefinitely per [facility]   - Atorvastatin 80mg (titrate to LDL < 70)   - telemetry  - PT/OT/SS/SLP, OOBC  - avoid hypotension  - check FS, glucose control <180  - GI/DVT ppx  - Counseling on diet, exercise, and medication adherence was done  - Counseling on smoking cessation and alcohol consumption offered when appropriate.  - Pain assessed and judicious use of narcotics when appropriate was discussed.    - Stroke education given when appropriate.  - Importance of fall prevention discussed.   - Differential diagnosis and plan of care discussed with patient and/or family and primary team  - Thank you for allowing me to participate in the care of this patient. Call with questions.   -F/u with Dr. Parrish Ragsdale at [phone]  -F/u with Dr. Wilmer Guerrier at [phone] within 2 week for possible right carotid stent     Parrish Ragsdale MD  Vascular Neurology  Office: [phone]

## 2021-02-03 NOTE — PROGRESS NOTE ADULT - ASSESSMENT
61 y/o female with a PmHx of HTN, DM type II, HLD, Anxiety d/o and unspecified somatic complaint disorder presents to the ED accompanied by her son s/p episode of right sided weakness, R facial droop, dysarthria that occurred this morning.    acute CVA with right side hemiplegia   right ICA stenosis does not correlate to acute presentation   odynophagia  - resolved  dysphagia post stroke - on dysphagia diet    Dm2 uncontrolled   uncontrolled htn , on  enalapril 20mg bid, norvasc 10mg , clonidine 0.2mg TID     MRI  brain  shows acute left basal ganglia and corona radiata infarcts consistent with the patine presentation, there is  old right lacunar infarct   discussed her care with her son and updated him.   outpatient Gi follow up, outpatient vascular surgery eval in 2-3 weeks, outpatient neurology in 4 weeks  follow up with Dr. Parrish Ragsdale at [phone]  follow up with  Dr. Wilmer Guerrier at [phone] within 2 week for evaluation  possible right carotid stent   outpatient Gi follow up for dysphagia and odynophagia if no improvement with PPI in 2-4 weeks    plan:  monitor BP, increased clonidine to TID dose for optimal bp control, discharge planning to acute rehab after echocardiogram   insulin per endocrine team  on  lantus,monitor blood sugar   continue telemetry monitoring   neurochecks q4hrs   echo with bubble study pending  discharge planning after  . i contacted echo lab asked them to perform study   OT eval, PT recommended for acute rehab

## 2021-02-04 ENCOUNTER — TRANSCRIPTION ENCOUNTER (OUTPATIENT)
Age: 61
End: 2021-02-04

## 2021-02-04 LAB
ANION GAP SERPL CALC-SCNC: 11 MMOL/L — SIGNIFICANT CHANGE UP (ref 7–14)
BUN SERPL-MCNC: 24 MG/DL — HIGH (ref 7–23)
CALCIUM SERPL-MCNC: 8.8 MG/DL — SIGNIFICANT CHANGE UP (ref 8.4–10.5)
CHLORIDE SERPL-SCNC: 97 MMOL/L — LOW (ref 98–107)
CO2 SERPL-SCNC: 25 MMOL/L — SIGNIFICANT CHANGE UP (ref 22–31)
CREAT SERPL-MCNC: 1.01 MG/DL — SIGNIFICANT CHANGE UP (ref 0.5–1.3)
GLUCOSE BLDC GLUCOMTR-MCNC: 110 MG/DL — HIGH (ref 70–99)
GLUCOSE BLDC GLUCOMTR-MCNC: 204 MG/DL — HIGH (ref 70–99)
GLUCOSE BLDC GLUCOMTR-MCNC: 239 MG/DL — HIGH (ref 70–99)
GLUCOSE BLDC GLUCOMTR-MCNC: 374 MG/DL — HIGH (ref 70–99)
GLUCOSE BLDC GLUCOMTR-MCNC: 58 MG/DL — LOW (ref 70–99)
GLUCOSE BLDC GLUCOMTR-MCNC: 74 MG/DL — SIGNIFICANT CHANGE UP (ref 70–99)
GLUCOSE BLDC GLUCOMTR-MCNC: 89 MG/DL — SIGNIFICANT CHANGE UP (ref 70–99)
GLUCOSE SERPL-MCNC: 245 MG/DL — HIGH (ref 70–99)
HCT VFR BLD CALC: 34.5 % — SIGNIFICANT CHANGE UP (ref 34.5–45)
HGB BLD-MCNC: 11 G/DL — LOW (ref 11.5–15.5)
LUPUS ANTICOAGULANT PROFILE RESULT: SIGNIFICANT CHANGE UP
MCHC RBC-ENTMCNC: 29 PG — SIGNIFICANT CHANGE UP (ref 27–34)
MCHC RBC-ENTMCNC: 31.9 GM/DL — LOW (ref 32–36)
MCV RBC AUTO: 91 FL — SIGNIFICANT CHANGE UP (ref 80–100)
NRBC # BLD: 0 /100 WBCS — SIGNIFICANT CHANGE UP
NRBC # FLD: 0 K/UL — SIGNIFICANT CHANGE UP
PLATELET # BLD AUTO: 226 K/UL — SIGNIFICANT CHANGE UP (ref 150–400)
POTASSIUM SERPL-MCNC: 4.3 MMOL/L — SIGNIFICANT CHANGE UP (ref 3.5–5.3)
POTASSIUM SERPL-SCNC: 4.3 MMOL/L — SIGNIFICANT CHANGE UP (ref 3.5–5.3)
RBC # BLD: 3.79 M/UL — LOW (ref 3.8–5.2)
RBC # FLD: 14.1 % — SIGNIFICANT CHANGE UP (ref 10.3–14.5)
SARS-COV-2 RNA SPEC QL NAA+PROBE: SIGNIFICANT CHANGE UP
SODIUM SERPL-SCNC: 133 MMOL/L — LOW (ref 135–145)
WBC # BLD: 4.52 K/UL — SIGNIFICANT CHANGE UP (ref 3.8–10.5)
WBC # FLD AUTO: 4.52 K/UL — SIGNIFICANT CHANGE UP (ref 3.8–10.5)

## 2021-02-04 PROCEDURE — 99232 SBSQ HOSP IP/OBS MODERATE 35: CPT

## 2021-02-04 PROCEDURE — 93306 TTE W/DOPPLER COMPLETE: CPT | Mod: 26

## 2021-02-04 RX ORDER — INSULIN GLARGINE 100 [IU]/ML
30 INJECTION, SOLUTION SUBCUTANEOUS
Refills: 0 | Status: DISCONTINUED | OUTPATIENT
Start: 2021-02-04 | End: 2021-02-05

## 2021-02-04 RX ORDER — INSULIN LISPRO 100/ML
8 VIAL (ML) SUBCUTANEOUS
Refills: 0 | Status: DISCONTINUED | OUTPATIENT
Start: 2021-02-04 | End: 2021-02-05

## 2021-02-04 RX ORDER — INSULIN LISPRO 100/ML
6 VIAL (ML) SUBCUTANEOUS ONCE
Refills: 0 | Status: COMPLETED | OUTPATIENT
Start: 2021-02-04 | End: 2021-02-04

## 2021-02-04 RX ADMIN — Medication 5: at 09:40

## 2021-02-04 RX ADMIN — Medication 0.2 MILLIGRAM(S): at 13:36

## 2021-02-04 RX ADMIN — ENOXAPARIN SODIUM 40 MILLIGRAM(S): 100 INJECTION SUBCUTANEOUS at 13:11

## 2021-02-04 RX ADMIN — Medication 1 MILLIGRAM(S): at 18:47

## 2021-02-04 RX ADMIN — Medication 200 MILLIGRAM(S): at 21:43

## 2021-02-04 RX ADMIN — Medication 12 UNIT(S): at 09:40

## 2021-02-04 RX ADMIN — CLOPIDOGREL BISULFATE 75 MILLIGRAM(S): 75 TABLET, FILM COATED ORAL at 13:11

## 2021-02-04 RX ADMIN — INSULIN GLARGINE 30 UNIT(S): 100 INJECTION, SOLUTION SUBCUTANEOUS at 18:46

## 2021-02-04 RX ADMIN — RISPERIDONE 3 MILLIGRAM(S): 4 TABLET ORAL at 21:43

## 2021-02-04 RX ADMIN — PANTOPRAZOLE SODIUM 40 MILLIGRAM(S): 20 TABLET, DELAYED RELEASE ORAL at 05:42

## 2021-02-04 RX ADMIN — Medication 0.2 MILLIGRAM(S): at 21:43

## 2021-02-04 RX ADMIN — AMLODIPINE BESYLATE 10 MILLIGRAM(S): 2.5 TABLET ORAL at 09:39

## 2021-02-04 RX ADMIN — Medication 81 MILLIGRAM(S): at 13:11

## 2021-02-04 RX ADMIN — Medication 0.2 MILLIGRAM(S): at 05:42

## 2021-02-04 RX ADMIN — Medication 2: at 13:10

## 2021-02-04 RX ADMIN — Medication 1 MILLIGRAM(S): at 05:42

## 2021-02-04 RX ADMIN — Medication 20 MILLIGRAM(S): at 05:42

## 2021-02-04 RX ADMIN — Medication 2 MILLIGRAM(S): at 21:43

## 2021-02-04 RX ADMIN — Medication 20 MILLIGRAM(S): at 18:47

## 2021-02-04 RX ADMIN — ATORVASTATIN CALCIUM 80 MILLIGRAM(S): 80 TABLET, FILM COATED ORAL at 21:43

## 2021-02-04 RX ADMIN — Medication 8 UNIT(S): at 13:10

## 2021-02-04 NOTE — CHART NOTE - NSCHARTNOTEFT_GEN_A_CORE
patient with hypoglycemic episode in the evening . Fingerstick noted to be 58. Per RN patient is asymptomatic. Hypoglycemic protocol initiated. Repeat fingerstick is 89, 110. Will continue to monitor. patient with hypoglycemic episode in the evening . Fingerstick noted to be 58. Per RN patient is asymptomatic. Hypoglycemic protocol initiated. Repeat fingerstick is 110. Will continue to monitor.

## 2021-02-04 NOTE — PROGRESS NOTE ADULT - PROBLEM SELECTOR PLAN 3
- Continue with enalapril 20mg bid  may restart clonidine if needed
- Continue with enalapril 20mg bid  may restart clonidine if needed
- Continue with enalapril 20mg bid  - Poorly controlled, added amlodipine 5mg
- Continue with enalapril 20mg bid  may restart clonidine if needed
Permissive HTN up to 24-48 hours.  restarted enalapril 20mg bid
- Continue with enalapril 20mg bid  - Poorly controlled, added amlodipine 5mg
- Continue with enalapril 20mg bid  may restart clonidine if needed
Permissive HTN up to 24-48 hours.
Permissive HTN up to 24-48 hours.  may restart enalapril lower dose if needed for blood pressure control

## 2021-02-04 NOTE — PROGRESS NOTE ADULT - PROBLEM SELECTOR PLAN 1
CT Head shows moderate atherosclerotic narrowing in various vascular beds.  CTA NECK: There is hemodynamically significant narrowing of the proximal right ICA immediately above the bifurcation with 85-90% narrowing of its lumen.  MRI Head without contrast  the ICA stenosis does not correlate with her presenting symptoms.  Dysphagia screen passed  Neurology evaluated patient   Fall risk and seizure precautions  Aspiration precautions.  ASA + Plavix daily for 3 months. Plavix load given. High-dose statin.   TTE with Bubble study.
CT Head shows moderate atherosclerotic narrowing in various vascular beds.  CTA NECK: There is hemodynamically significant narrowing of the proximal right ICA immediately above the bifurcation with 85-90% narrowing of its lumen.  MRI Head without contrast  the ICA stenosis does not correlate with her presenting symptoms.  Carotid dopplers to evaluate carotid stenosis.  Dysphagia screening  Neurology evaluation   PT/OT c/s  Permissive HTN up to 24-48 hours.  Fall risk and seizure precautions  Aspiration precautions.  ASA + Plavix daily for 3 months. Plavix load given. High-dose statin.   TTE with Bubble study.
CT Head shows moderate atherosclerotic narrowing in various vascular beds.  CTA NECK: There is hemodynamically significant narrowing of the proximal right ICA immediately above the bifurcation with 85-90% narrowing of its lumen.  MRI Head without contrast  the ICA stenosis does not correlate with her presenting symptoms.  Carotid dopplers to evaluate carotid stenosis.  Dysphagia screening  Neurology evaluation   PT/OT c/s  Permissive HTN up to 24-48 hours.  Fall risk and seizure precautions  Aspiration precautions.  ASA + Plavix daily for 3 months. Plavix load given. High-dose statin.   TTE with Bubble study.
CT Head shows moderate atherosclerotic narrowing in various vascular beds.  CTA NECK: There is hemodynamically significant narrowing of the proximal right ICA immediately above the bifurcation with 85-90% narrowing of its lumen.  MRI Head without contrast  the ICA stenosis does not correlate with her presenting symptoms.  Dysphagia screen passed  Neurology evaluated patient   Fall risk and seizure precautions  Aspiration precautions.  ASA + Plavix daily for 3 months. Plavix load given. High-dose statin.

## 2021-02-04 NOTE — DISCHARGE NOTE PROVIDER - NSDCCPCAREPLAN_GEN_ALL_CORE_FT
PRINCIPAL DISCHARGE DIAGNOSIS  Diagnosis: Stroke  Assessment and Plan of Treatment: You had a stroke and it was confirmed by MRI.  MRI of the brain was performed using sagittal T1, axial T1 T2 T2 FLAIR diffusion and susceptibility weighted sequence.  This exam is compared prior noncontrast head CT performed on June 26, 2001  Extensive parenchymal volume loss and chronic microvascular ischemic changes are identified.  Old lacunar infarct involving the posterior right lenticular nucleus is seen.  Abnormal T2 prolongation restricted diffusion is seen involving the left basal ganglia/corona radiata region. This is compatible with an acute infarct. No hemorrhagic transformation is seen. No significant shift or herniation is again  The large vessels demonstrate normal flow voids  The visualized paranasal sinuses mastoid and middle ear regions appear clear.  Follow up with Neurology within 2 weeks of discharge for further medical management.   It is recommended to go to acute rehab setting.  Family chooses to pick patient up to leave against medical advise.  Risks discussed with son Damian including death.  Attending Dr Chicas made aware.      SECONDARY DISCHARGE DIAGNOSES  Diagnosis: COVID-19  Assessment and Plan of Treatment: You have been diagnosed with the COVID-19 virus during your hospital stay. You must self quarantine to complete a 14 day time period.  Monitor for fevers, shortness of breath and cough primarily.  Monitor your temperature daily to not any changes and increases.    It has been determined that you no longer need hospitalization and can recover while remaining in self-quarantine at home. You should follow the prevention steps below until a healthcare provider or local or state health department says you can return to your normal activities.  1. You should restrict activities outside your home, except for getting medical care.  2. Do not go to work, school, or public areas.  3. Avoid using public transportation, ride-sharing, or taxis.  4. Separate yourself from other people and animals in your home.  5. Call ahead before visiting your doctor.  6. Wear a facemask.  7. Cover your coughs and sneezes.  8. Clean your hands often.  9. Avoid sharing personal household items.  10. Clean all “high-touch” surfaces everyday.  11. Monitor your symptoms.  If you have a medical emergency and need to call 911, notify the dispatch personnel that you have COVID-19 If possible, put on a facemask before emergency medical services arrive.  12. Stopping home isolation.  Patients with confirmed COVID-19 should remain under home isolation precautions for 14 days since the positive COVID-19 test and until the risk of secondary transmission to others is thought to be low. The decision to discontinue home isolation precautions should be made on a case-by-case basis, in consultation with healthcare providers and state and local health departments. Your [facility] Department of Health can be reached at [phone] for further information about COVID-19.      Diagnosis: Hypertension, unspecified type  Assessment and Plan of Treatment: Continue current blood pressure medication regimen as directed. Monitor for any visual changes, headaches or dizziness.  Monitor blood pressure regularly.  Follow up with your PCP for further management for high blood pressure, please call to make appointment within 1 week of discharge    Diagnosis: Hyperlipidemia, unspecified hyperlipidemia type  Assessment and Plan of Treatment: Continue Lipitor as prescribed.    Diagnosis: Type 2 diabetes mellitus with hyperglycemia, with long-term current use of insulin  Assessment and Plan of Treatment: Continue consistent carbohydrate diet.  Monitor blood glucose levels throughout the day before meals and at bedtime.  Record blood sugars and bring to outpatient providers appointment in order to be reviewed by your doctor for management modifications.  Be aware of diabetes management symptoms including feeling cool and clammy may be related to low glucose levels.  Feeling hot and dry may indicate high glucose levels.  If  you feel these symptoms, check your blood sugar.  Make regular podiatry appointments in order to have feet checked for wounds and toe nails cut by a doctor to prevent infections.     PRINCIPAL DISCHARGE DIAGNOSIS  Diagnosis: Stroke  Assessment and Plan of Treatment: You had a stroke and it was confirmed by MRI.  MRI of the brain was performed using sagittal T1, axial T1 T2 T2 FLAIR diffusion and susceptibility weighted sequence.  This exam is compared prior noncontrast head CT performed on June 26, 2001  Extensive parenchymal volume loss and chronic microvascular ischemic changes are identified.  Old lacunar infarct involving the posterior right lenticular nucleus is seen.  Abnormal T2 prolongation restricted diffusion is seen involving the left basal ganglia/corona radiata region. This is compatible with an acute infarct. No hemorrhagic transformation is seen. No significant shift or herniation is again  The large vessels demonstrate normal flow voids  The visualized paranasal sinuses mastoid and middle ear regions appear clear.  Follow up with Neurology within 2 weeks of discharge for further medical management.   It is recommended to go to acute rehab setting.  Family chooses to pick patient up to leave against medical advise.  Risks discussed with son Damian including death.  Attending Dr Chicas made aware.      SECONDARY DISCHARGE DIAGNOSES  Diagnosis: Dysphagia due to old stroke  Assessment and Plan of Treatment:     Diagnosis: Type 2 diabetes mellitus with hyperglycemia, with long-term current use of insulin  Assessment and Plan of Treatment: Continue consistent carbohydrate diet.  Monitor blood glucose levels throughout the day before meals and at bedtime.  Record blood sugars and bring to outpatient providers appointment in order to be reviewed by your doctor for management modifications.  Be aware of diabetes management symptoms including feeling cool and clammy may be related to low glucose levels.  Feeling hot and dry may indicate high glucose levels.  If  you feel these symptoms, check your blood sugar.  Make regular podiatry appointments in order to have feet checked for wounds and toe nails cut by a doctor to prevent infections.    Diagnosis: Hyperlipidemia, unspecified hyperlipidemia type  Assessment and Plan of Treatment: Continue Lipitor as prescribed.    Diagnosis: Hypertension, unspecified type  Assessment and Plan of Treatment: Continue current blood pressure medication regimen as directed. Monitor for any visual changes, headaches or dizziness.  Monitor blood pressure regularly.  Follow up with your PCP for further management for high blood pressure, please call to make appointment within 1 week of discharge    Diagnosis: COVID-19  Assessment and Plan of Treatment: You have been diagnosed with the COVID-19 virus during your hospital stay. You must self quarantine to complete a 14 day time period.  Monitor for fevers, shortness of breath and cough primarily.  Monitor your temperature daily to not any changes and increases.    It has been determined that you no longer need hospitalization and can recover while remaining in self-quarantine at home. You should follow the prevention steps below until a healthcare provider or local or state health department says you can return to your normal activities.  1. You should restrict activities outside your home, except for getting medical care.  2. Do not go to work, school, or public areas.  3. Avoid using public transportation, ride-sharing, or taxis.  4. Separate yourself from other people and animals in your home.  5. Call ahead before visiting your doctor.  6. Wear a facemask.  7. Cover your coughs and sneezes.  8. Clean your hands often.  9. Avoid sharing personal household items.  10. Clean all “high-touch” surfaces everyday.  11. Monitor your symptoms.  If you have a medical emergency and need to call 911, notify the dispatch personnel that you have COVID-19 If possible, put on a facemask before emergency medical services arrive.  12. Stopping home isolation.  Patients with confirmed COVID-19 should remain under home isolation precautions for 14 days since the positive COVID-19 test and until the risk of secondary transmission to others is thought to be low. The decision to discontinue home isolation precautions should be made on a case-by-case basis, in consultation with healthcare providers and state and local health departments. Your [facility] Department of Health can be reached at [phone] for further information about COVID-19.

## 2021-02-04 NOTE — DISCHARGE NOTE PROVIDER - NSDCMRMEDTOKEN_GEN_ALL_CORE_FT
benztropine 2 mg oral tablet: 1 tab(s) orally once a day (at bedtime)  cloNIDine 0.2 mg oral tablet: 1 tab(s) orally 2 times a day  enalapril 20 mg oral tablet: 1 tab(s) orally 2 times a day  HumuLIN R 100 units/mL injectable solution: 30-40 unit(s) injectable 2 times a day before breakfast and QHS.  KlonoPIN 1 mg oral tablet: 1 tab(s) orally 2 times a day  lovastatin 20 mg oral tablet: 1 tab(s) orally once a day  RisperDAL 3 mg oral tablet: orally once a day  risperiDONE 3 mg oral tablet: 1 tab(s) orally once a day (at bedtime)  SEROquel 200 mg oral tablet: 1 tab(s) orally once a day (at bedtime) as needed.   traZODone 100 mg oral tablet: 2 tab(s) orally once a day (at bedtime)  zolpidem 5 mg oral tablet: 2 tab(s) orally once a day (at bedtime)   amLODIPine 10 mg oral tablet: 1 tab(s) orally once a day  aspirin 81 mg oral tablet, chewable: 1 tab(s) orally once a day  benztropine 2 mg oral tablet: 1 tab(s) orally once a day (at bedtime)  cloNIDine 0.2 mg oral tablet: 1 tab(s) orally 2 times a day  clopidogrel 75 mg oral tablet: 1 tab(s) orally once a day  enalapril 20 mg oral tablet: 1 tab(s) orally 2 times a day  HumuLIN R 100 units/mL injectable solution: 30-40 unit(s) injectable 2 times a day before breakfast and QHS.  KlonoPIN 1 mg oral tablet: 1 tab(s) orally 2 times a day  lovastatin 20 mg oral tablet: 1 tab(s) orally once a day  pantoprazole 40 mg oral delayed release tablet: 1 tab(s) orally once a day (before a meal)  RisperDAL 3 mg oral tablet: orally once a day  risperiDONE 3 mg oral tablet: 1 tab(s) orally once a day (at bedtime)  traZODone 100 mg oral tablet: 2 tab(s) orally once a day (at bedtime)   amLODIPine 10 mg oral tablet: 1 tab(s) orally once a day  aspirin 81 mg oral tablet, chewable: 1 tab(s) orally once a day  benztropine 2 mg oral tablet: 1 tab(s) orally once a day (at bedtime)  cloNIDine 0.2 mg oral tablet: 1 tab(s) orally 2 times a day  clopidogrel 75 mg oral tablet: 1 tab(s) orally once a day  enalapril 20 mg oral tablet: 1 tab(s) orally 2 times a day  HumuLIN R 100 units/mL injectable solution: 30-40 unit(s) injectable 2 times a day before breakfast and QHS.  KlonoPIN 1 mg oral tablet: 1 tab(s) orally 2 times a day  lovastatin 20 mg oral tablet: 1 tab(s) orally once a day  pantoprazole 40 mg oral delayed release tablet: 1 tab(s) orally once a day (before a meal)  RisperDAL 3 mg oral tablet: orally once a day  risperiDONE 3 mg oral tablet: 1 tab(s) orally once a day (at bedtime)  traZODone 100 mg oral tablet: 2 tab(s) orally once a day (at bedtime)  Xarelto 20 mg oral tablet: 1 tab(s) orally once a day

## 2021-02-04 NOTE — PROGRESS NOTE ADULT - PROBLEM SELECTOR PLAN 6
Continue Klonopin.

## 2021-02-04 NOTE — PROGRESS NOTE ADULT - PROBLEM SELECTOR PLAN 7
VTE score 4  Lovenox 40mg daily.

## 2021-02-04 NOTE — PROGRESS NOTE ADULT - PROBLEM SELECTOR PLAN 4
Continue Statin lipitor 80mg
Continue Statin.  Check lipid panel
Continue Statin lipitor 80mg

## 2021-02-04 NOTE — PROVIDER CONTACT NOTE (OTHER) - RECOMMENDATIONS
straight cath?
ACP said goal of SBP is to be below 220. Continue to monitor pt BP.
Stray Cath Pt and document results.
Notify MD ,

## 2021-02-04 NOTE — PROGRESS NOTE ADULT - PROBLEM SELECTOR PLAN 2
monitor HgA1c  Monitor FS with Insulin Sliding Scale.   Pt takes Humulin R 30 units before breakfast and QHS, converts to Lispro 20 units TID here but currently NPO, discussed with pharmacy, will use Sliding Scale q 6 hrs for now till she is able to eat.  resume insulin, she is on dysphagia diet passed swallow eval
she is on dysphagia diet passed swallow eval , resumed insulin  Monitor FS with Insulin Sliding Scale.   plan to add trulicity upon discharge.   endocrine evaluation for uncontrolled hyperglycemia and Dm2  started on lantus 35 units  , sliding scale insulin   decreased per endocrine to 30 units lantus , and meal time lunch and dinner decreased    - dc'ed metformin while in house
she is on dysphagia diet passed swallow eval , resumed insulin  Monitor FS with Insulin Sliding Scale.   plan to add trulicity upon discharge.   endocrine monitoring  for uncontrolled hyperglycemia and Dm2
she is on dysphagia diet passed swallow eval , resumed insulin  Monitor FS with Insulin Sliding Scale.   plan to add trulicity upon discharge.   endocrine evaluation for uncontrolled hyperglycemia and Dm2  started on lantus 35 units , lispro 12 units TID , sliding scale insulin   - dc'ed metformin while in house
she is on dysphagia diet passed swallow eval , resumed insulin  Monitor FS with Insulin Sliding Scale.   plan to add trulicity upon discharge.   endocrine monitoring  for uncontrolled hyperglycemia and Dm2
she is on dysphagia diet passed swallow eval , resumed insulin  Monitor FS with Insulin Sliding Scale.   plan to add trulicity upon discharge.   endocrine evaluation for uncontrolled hyperglycemia and Dm2  started on lantus 35 units , lispro 12 units TID , sliding scale insulin   added metformin 500mg bid
she is on dysphagia diet passed swallow eval , resumed insulin  Monitor FS with Insulin Sliding Scale.   plan to add trulicity upon discharge.   endocrine monitoring  for uncontrolled hyperglycemia and Dm2
monitor HgA1c  Monitor FS with Insulin Sliding Scale.   Pt takes Humulin R 30 units before breakfast and QHS, converts to Lispro 20 units TID here but currently NPO, discussed with pharmacy, will use Sliding Scale q 6 hrs for now till she is able to eat.  resume insulin, she is on dysphagia diet passed swallow eval
she is on dysphagia diet passed swallow eval , resumed insulin  Monitor FS with Insulin Sliding Scale.   plan to add trulicity upon discharge.   endocrine evaluation for uncontrolled hyperglycemia and Dm2  started on lantus 35 units , lispro 12 units TID , sliding scale insulin   added metformin 500mg bid

## 2021-02-04 NOTE — PROVIDER CONTACT NOTE (OTHER) - ASSESSMENT
450 mL retained at this time.
Patients has been straining to void and tried multiple times throughout the night. Patient abdomen has 2 lumps/nodules on palpation. Patient was bladder scanned and an >999ml amount was found.
patient stable  Notified MD , administered juice and patient ate entire dinner, dr needed to adjust insulins , repeat done after patient finished eating, repeat was 89, [facility] [id number] was notified of repeat value , will administer more juice and repeat in 15minutes
Patients blood pressure is 207/91. Patient shows no s/s of acute distress. Pt is asymptomatic
all other vital signs stable. no adverse signs/symptoms noted.
Patient is A&Ox4. comfortable. no new deficits.

## 2021-02-04 NOTE — PROGRESS NOTE ADULT - PROBLEM SELECTOR PROBLEM 8
wheezing ,cough,sob
Schizophrenia

## 2021-02-04 NOTE — PROVIDER CONTACT NOTE (OTHER) - SITUATION
patient blood pressure of 190/92 during 2200 med pass.
At 0127, Patients blood pressure was 178/74 HR 93 RR 18 spO2 97 RA temp 99.1. Vitals rechecked at 0139 and BP was 158/77, HR 88, RR 17 spO2 96 RA temp 98.9
Patient is a 60 year old female with an admitting diagnosis of cerebral infarction.
patient has BG 58
patient retaining urine. bladder scanned twice this shift. provider made aware of bladder scanning/straight cath frequency yesterday (1/27).
Patient is a 60 year old female with an admitting diagnosis of cerebral infarction.

## 2021-02-04 NOTE — PROGRESS NOTE ADULT - ASSESSMENT
61 y/o female with history of HTN, DM type 2, HLD, Anxiety/Depression, Schizophrenia, Covid 4 weeks ago presented with right sided weakness and R facial drooping with dysarthria.   Patient admitted for management of L. anterior choroidal territory infarct.  Endocrine consult requested for management of uncontrolled DM2.    1) Uncontrolled DM2  A1c 11.6%  FS premeal and qhs   Fs goal 100-180, both hypo and hyperglycemia noted past 24 hours  Carb consistent diet  Recommend adjust timing of Lantus 30 units to q 5pm  Recommend adjust Admelog to lower dose prelunch to prevent hypoglycemia predinner: 12/8/10  continue with low correctional scale premeal and qhs     Patient to be discharged to rehab, recommend continue basal/bolus insulin, dose to be finalized before dc.  On discharge home, ideally patient should be on basal/bolus insulin. However given patient history of mental illness will try to simplify home medication regimen.   Recommend basal insulin+ GLP1 agonist (once weekly)+ metformin. Please stop Humulin R  Recommend send basal insulin (ie lantus, basaglar or tuojeo...).  script to pharmacy and see which on is covered.  Patient will need BD pen needles  Patient will NEED BEDSIDE RN INSULIN  PEN TEACHING, HOWEVER  HAS TO BE TAUGHT AS WELL AS HE ADMINISTERS INSULIN AT HOME. Patient was previously on vial and syringe.   Recommend sending script for once weekly GLP1 agonist ( trulicty 0.75mg SQ weekly or ozempic 0.25mg SQ weekly) to pharmacy to determine which is covered.   Would also restart metformin 500mg BIDac and increase to 1000mg BID ac after one week if patient not having GI side effects  [facility]   [address]  [place] [id number] [phone]    HTN  goal 130/80   Remains above goal   Patient restarted on home regimen     HDL   goal LDL <70    Continue Atorvastatin 80mg daily

## 2021-02-04 NOTE — PROGRESS NOTE ADULT - SUBJECTIVE AND OBJECTIVE BOX
Chief Complaint: DM2 with COVID, not on dexamethasone    History: hypoglycemia noted yesterday predinner  patient unable to recall if she had symptoms  ate some of her lunch today    MEDICATIONS  (STANDING):  amLODIPine   Tablet 5 milliGRAM(s) Oral once  amLODIPine   Tablet 10 milliGRAM(s) Oral daily  aspirin  chewable 81 milliGRAM(s) Oral daily  atorvastatin 80 milliGRAM(s) Oral at bedtime  benztropine 2 milliGRAM(s) Oral at bedtime  clonazePAM  Tablet 1 milliGRAM(s) Oral two times a day  cloNIDine 0.2 milliGRAM(s) Oral three times a day  clopidogrel Tablet 75 milliGRAM(s) Oral daily  dextrose 40% Gel 15 Gram(s) Oral once  dextrose 5%. 1000 milliLiter(s) (50 mL/Hr) IV Continuous <Continuous>  dextrose 5%. 1000 milliLiter(s) (100 mL/Hr) IV Continuous <Continuous>  dextrose 5%. 1000 milliLiter(s) (100 mL/Hr) IV Continuous <Continuous>  dextrose 50% Injectable 25 Gram(s) IV Push once  dextrose 50% Injectable 12.5 Gram(s) IV Push once  dextrose 50% Injectable 25 Gram(s) IV Push once  enalapril 20 milliGRAM(s) Oral two times a day  enoxaparin Injectable 40 milliGRAM(s) SubCutaneous daily  glucagon  Injectable 1 milliGRAM(s) IntraMuscular once  glucagon  Injectable 1 milliGRAM(s) IntraMuscular once  insulin glargine Injectable (LANTUS) 30 Unit(s) SubCutaneous <User Schedule>  insulin lispro (ADMELOG) corrective regimen sliding scale   SubCutaneous three times a day before meals  insulin lispro (ADMELOG) corrective regimen sliding scale   SubCutaneous at bedtime  insulin lispro Injectable (ADMELOG) 8 Unit(s) SubCutaneous before lunch  insulin lispro Injectable (ADMELOG) 10 Unit(s) SubCutaneous before dinner  insulin lispro Injectable (ADMELOG) 12 Unit(s) SubCutaneous before breakfast  pantoprazole    Tablet 40 milliGRAM(s) Oral before breakfast  risperiDONE   Tablet 3 milliGRAM(s) Oral at bedtime  traZODone 200 milliGRAM(s) Oral at bedtime    MEDICATIONS  (PRN):      Allergies    erythromycin (Unknown)    Intolerances      Review of Systems:  ALL OTHER SYSTEMS REVIEWED AND NEGATIVE      PHYSICAL EXAM:  VITALS: T(C): 36.8 (02-04-21 @ 13:36)  T(F): 98.2 (02-04-21 @ 13:36), Max: 98.9 (02-03-21 @ 21:13)  HR: 73 (02-04-21 @ 13:36) (73 - 78)  BP: 153/73 (02-04-21 @ 13:36) (116/69 - 153/73)  RR:  (18 - 18)  SpO2:  (98% - 99%)  Wt(kg): --  GENERAL: NAD, well-groomed, well-developed  EYES: No proptosis, no lid lag, anicteric  HEENT:  Atraumatic, Normocephalic, moist mucous membranes  RESPIRATORY: nonlabored respirations, not on oxygen  PSYCH: Alert and awake, pleasant    CAPILLARY BLOOD GLUCOSE      POCT Blood Glucose.: 239 mg/dL (04 Feb 2021 12:42)  POCT Blood Glucose.: 374 mg/dL (04 Feb 2021 09:18)  POCT Blood Glucose.: 222 mg/dL (03 Feb 2021 21:34)  POCT Blood Glucose.: 220 mg/dL (03 Feb 2021 19:36)  POCT Blood Glucose.: 89 mg/dL (03 Feb 2021 18:53)  POCT Blood Glucose.: 69 mg/dL (03 Feb 2021 18:16)  POCT Blood Glucose.: 67 mg/dL (03 Feb 2021 17:56)  POCT Blood Glucose.: 65 mg/dL (03 Feb 2021 17:41)      02-04    133<L>  |  97<L>  |  24<H>  ----------------------------<  245<H>  4.3   |  25  |  1.01    EGFR if : 70  EGFR if non : 60    Ca    8.8      02-04  Mg     1.8     02-02  Phos  3.7     02-03        A1C with Estimated Average Glucose Result: 11.6 % (01-28-21 @ 04:36)      Thyroid Function Tests:

## 2021-02-04 NOTE — PROGRESS NOTE ADULT - PROBLEM SELECTOR PROBLEM 4
Hyperlipemia

## 2021-02-04 NOTE — PROVIDER CONTACT NOTE (OTHER) - BACKGROUND
Patient has a h/o COVID +, type 2 DM, and hypertension.
covid+; recent CVA with right sided weakness.
s/p stroke, covid +
Patient admitted for CVA.
covid+; recent CVA with right sided weakness.
Patient has a h/o type 2 DM, hypertension, and anxiety.

## 2021-02-04 NOTE — PROVIDER CONTACT NOTE (OTHER) - ACTION/TREATMENT ORDERED:
providers changing shift at this time. hold off on straight cath and wait for provider to come to the bedside and assess the patient.
Notified MD , administered juice and patient ate entire dinner, dr needed to adjust insulins , repeat done after patient finished eating, repeat was 89, [facility] [id number] was notified of repeat value
Patient was stray cath, and waiting on further orders from notfied MD.
MD made aware. no new orders. will continue to monitor.
Waiting on further orders from notified provider.
provider okay with blood pressure to maintain perfusion.

## 2021-02-04 NOTE — DISCHARGE NOTE PROVIDER - CARE PROVIDER_API CALL
Parrish Ragsdale)  Neurology; Vascular Neurology  [address], [address]  [place] [address]  Phone: [phone]  Fax: [phone]  Follow Up Time:     Berto Bond)  Internal Medicine; Nephrology  [address] [place] [address]  Phone: [phone]  Fax: [phone]  Follow Up Time:

## 2021-02-04 NOTE — PROGRESS NOTE ADULT - PROBLEM SELECTOR PLAN 9
Pt had Covid 4 weeks ago and swab is positive here. As per family, no paperwork regarding her positive covid test from 4 weeks ago.   Will keep pt in Isolation.

## 2021-02-04 NOTE — PROGRESS NOTE ADULT - PROBLEM SELECTOR PLAN 5
pseudohyponatremia  134 due to hyperglycemia
monitor sodium, pseudo hyponatremia due to hyperglycemia   restarted on insulin with meals
pseudohyponatremia  134 due to hyperglycemia
monitor sodium, pseudo hyponatremia 132  due to hyperglycemia  300-400
monitor sodium, pseudo hyponatremia 132  due to hyperglycemia  300-400
pseudohyponatremia  134 due to hyperglycemia
monitor sodium, pseudo hyponatremia 132  due to hyperglycemia  300-400
monitor sodium
monitor sodium, pseudo hyponatremia 132  due to hyperglycemia  300-400

## 2021-02-04 NOTE — DISCHARGE NOTE PROVIDER - NSDCFUADDAPPT_GEN_ALL_CORE_FT
outpatient vascular surgery follow up  outpatient neurology follow up   outpatient cardiology follow up   outpatient hematology evaluation

## 2021-02-04 NOTE — PROVIDER CONTACT NOTE (OTHER) - REASON
increased blood pressure
Blood pressure
patient retaining urine
Patients blood pressure is 207/91.
Patients has been straining to void and tried multiple times throughout the night. Patient abdomen has 2 lumps/nodules on palpation.
hypoglycemic BG 58

## 2021-02-04 NOTE — PROGRESS NOTE ADULT - SUBJECTIVE AND OBJECTIVE BOX
PROGRESS NOTE:     Patient is a 60y old  Female who presents with a chief complaint of R sided hemiparesis and facial droop (04 Feb 2021 15:46)  SUBJECTIVE / OVERNIGHT EVENTS:  denies odynophagia  right side weakness still     ADDITIONAL REVIEW OF SYSTEMS:  no headache     MEDICATIONS  (STANDING):  amLODIPine   Tablet 5 milliGRAM(s) Oral once  amLODIPine   Tablet 10 milliGRAM(s) Oral daily  aspirin  chewable 81 milliGRAM(s) Oral daily  atorvastatin 80 milliGRAM(s) Oral at bedtime  benztropine 2 milliGRAM(s) Oral at bedtime  clonazePAM  Tablet 1 milliGRAM(s) Oral two times a day  cloNIDine 0.2 milliGRAM(s) Oral three times a day  clopidogrel Tablet 75 milliGRAM(s) Oral daily  dextrose 40% Gel 15 Gram(s) Oral once  dextrose 5%. 1000 milliLiter(s) (50 mL/Hr) IV Continuous <Continuous>  dextrose 5%. 1000 milliLiter(s) (100 mL/Hr) IV Continuous <Continuous>  dextrose 5%. 1000 milliLiter(s) (100 mL/Hr) IV Continuous <Continuous>  dextrose 50% Injectable 25 Gram(s) IV Push once  dextrose 50% Injectable 12.5 Gram(s) IV Push once  dextrose 50% Injectable 25 Gram(s) IV Push once  enalapril 20 milliGRAM(s) Oral two times a day  enoxaparin Injectable 40 milliGRAM(s) SubCutaneous daily  glucagon  Injectable 1 milliGRAM(s) IntraMuscular once  glucagon  Injectable 1 milliGRAM(s) IntraMuscular once  insulin glargine Injectable (LANTUS) 30 Unit(s) SubCutaneous <User Schedule>  insulin lispro (ADMELOG) corrective regimen sliding scale   SubCutaneous three times a day before meals  insulin lispro (ADMELOG) corrective regimen sliding scale   SubCutaneous at bedtime  insulin lispro Injectable (ADMELOG) 8 Unit(s) SubCutaneous before lunch  insulin lispro Injectable (ADMELOG) 10 Unit(s) SubCutaneous before dinner  insulin lispro Injectable (ADMELOG) 12 Unit(s) SubCutaneous before breakfast  insulin lispro Injectable (ADMELOG). 6 Unit(s) SubCutaneous once  pantoprazole    Tablet 40 milliGRAM(s) Oral before breakfast  risperiDONE   Tablet 3 milliGRAM(s) Oral at bedtime  traZODone 200 milliGRAM(s) Oral at bedtime    MEDICATIONS  (PRN):      CAPILLARY BLOOD GLUCOSE      POCT Blood Glucose.: 74 mg/dL (04 Feb 2021 17:04)  POCT Blood Glucose.: 239 mg/dL (04 Feb 2021 12:42)  POCT Blood Glucose.: 374 mg/dL (04 Feb 2021 09:18)  POCT Blood Glucose.: 222 mg/dL (03 Feb 2021 21:34)  POCT Blood Glucose.: 220 mg/dL (03 Feb 2021 19:36)  POCT Blood Glucose.: 89 mg/dL (03 Feb 2021 18:53)    I&O's Summary    03 Feb 2021 07:01  -  04 Feb 2021 07:00  --------------------------------------------------------  IN: 90 mL / OUT: 400 mL / NET: -310 mL        PHYSICAL EXAM:  Vital Signs Last 24 Hrs  T(C): 36.8 (04 Feb 2021 13:36), Max: 37.2 (03 Feb 2021 21:13)  T(F): 98.2 (04 Feb 2021 13:36), Max: 98.9 (03 Feb 2021 21:13)  HR: 73 (04 Feb 2021 13:36) (73 - 78)  BP: 153/73 (04 Feb 2021 13:36) (116/69 - 153/73)  BP(mean): --  RR: 18 (04 Feb 2021 13:36) (18 - 18)  SpO2: 98% (04 Feb 2021 13:36) (98% - 99%)    CONSTITUTIONAL: NAD, well-developed  right mild facial droop, right hemiplegia   abdomen soft nontender       LABS:                        11.0   4.52  )-----------( 226      ( 04 Feb 2021 07:09 )             34.5     02-04    133<L>  |  97<L>  |  24<H>  ----------------------------<  245<H>  4.3   |  25  |  1.01    Ca    8.8      04 Feb 2021 07:09  Phos  3.7     02-03      PT/INR - ( 04 Feb 2021 07:09 )   PT: 11.4 sec;   INR: 0.99 ratio         PTT - ( 04 Feb 2021 07:09 )  PTT:35.8 sec    RADIOLOGY & ADDITIONAL TESTS:  Results Reviewed: y  Imaging Personally Reviewed:y  Electrocardiogram Personally Reviewed:y    COORDINATION OF CARE:  Care Discussed with Consultants/Other Providers [Y/N]:y  Prior or Outpatient Records Reviewed [Y/N]:y

## 2021-02-04 NOTE — PROVIDER CONTACT NOTE (OTHER) - NAME OF MD/NP/PA/DO NOTIFIED:
AKBAR KABA MD
Sofya CUNHA
Sofya CUNHA
Yasmani Roche
[facility] [address] [address]
ACP

## 2021-02-04 NOTE — PROGRESS NOTE ADULT - ASSESSMENT
61 y/o female with a PmHx of HTN, DM type II, HLD, Anxiety d/o and unspecified somatic complaint disorder presents to the ED accompanied by her son s/p episode of right sided weakness, R facial droop, dysarthria that occurred this morning.    acute CVA with right side hemiplegia   right ICA stenosis does not correlate to acute presentation   odynophagia  - resolved  dysphagia post stroke - on dysphagia diet    Dm2 uncontrolled   uncontrolled htn , on  enalapril 20mg bid, norvasc 10mg , clonidine 0.2mg TID     echocardiogram transthoracic  reduced EF , INFERIOLATERAL wall HYPOKINESIS , eccentric hypertrophy of left vetricles      MRI  brain  shows acute left basal ganglia and corona radiata infarcts consistent with the patine presentation, there is  old right lacunar infarct   discussed her care with her son and updated him.   outpatient Gi follow up, outpatient vascular surgery eval in 2-3 weeks, outpatient neurology in 4 weeks      plan:  insulin per endocrine team  on  lantus,monitor blood sugar   continue telemetry monitoring   neurochecks q4hrs   OT eval, PT recommended for acute rehab , patient is medical stable for discharge  pending accepting facility   follow up outpatient cardiology evaluation for cardiomyopathy.  follow up with Dr. Parrish Ragsdale at [phone]  follow up with  Dr. Wilmer Guerrier at [phone] within 2 week for evaluation  possible right carotid stent   outpatient Gi follow up for dysphagia and odynophagia if no improvement with PPI in 2-4 weeks

## 2021-02-04 NOTE — DISCHARGE NOTE PROVIDER - HOSPITAL COURSE
59 y/o female with a PmHx of HTN, DM type II, HLD, Anxiety d/o and unspecified somatic complaint disorder presents to the ED 1/26 accompanied by her son s/p episode of right sided weakness, R facial droop, dysarthria that morning.  Patient was admitted for acute CVA with right side hemiplegia. Noted to have right ICA stenosis does not correlate to acute presentation. MRI  brain  shows acute left basal ganglia and corona radiata infarcts consistent with the patine presentation, there is  old right lacunar infarct. 61 y/o female with a PmHx of HTN, DM type II, HLD, Anxiety d/o and unspecified somatic complaint disorder presents to the ED 1/26 accompanied by her son s/p episode of right sided weakness, R facial droop, dysarthria that morning.  Patient was admitted for acute CVA with right side hemiplegia. Noted to have right ICA stenosis does not correlate to acute presentation. MRI  brain  shows acute left basal ganglia and corona radiata infarcts consistent with the patine presentation, there is  old right lacunar infarct.   Acute rehab recommended for patient.    Leaving against medical advise.  All explained to son risks of leaving against medical adivse, including death.  Son Irineo states he understands and mann responsibility.  Attending amade aware. 61 y/o female with a PmHx of HTN, DM type II, HLD, Anxiety d/o and unspecified somatic complaint disorder presents to the ED 1/26 accompanied by her son s/p episode of right sided weakness, R facial droop, dysarthria that morning.  Patient was admitted for acute CVA with right side hemiplegia. Noted to have right ICA stenosis does not correlate to acute presentation. MRI  brain  shows acute left basal ganglia and corona radiata infarcts consistent with the patient current  presentation, there is  old right lacunar infarct.   Acute rehab recommended for patient.    she is corona virus positive, acute rehab requested a second swab, the result is pending. the patients family had concerns about the patient declining in rehab due to her psychiatric illness.   they are aware of her right side hemiplegia , she is dependant on ADLS, and she needs 24 hours care.   I spoke with her son ruben and explained to him in detail her condition. advised against going home  today as no services have been coordinated.   he stated he would co ordinate with the primary care physician. and they would prefer mrs Tomlinson receive   home PT, and they will have family and private pay in addition to VNA care for her . her family assumed her care and responsibility.   they are Leaving against medical advise.  All explained to son risks of leaving against medical adivse, including death.  Son Ruben Salgado and  Irineo states he understands and takes responsibility.      61 y/o female with a PmHx of HTN, DM type II, HLD, Anxiety d/o and unspecified somatic complaint disorder presents to the ED accompanied by her son s/p episode of right sided weakness, R facial droop, dysarthria that occurred in the morning of admission.    acute CVA with right side hemiplegia ,right ICA stenosis does not correlate to acute presentation   odynophagia  - resolved  with PPI , dysphagia post stroke - on dysphagia diet    Dm2 uncontrolled   uncontrolled htn , on  enalapril 20mg bid, norvasc 10mg , clonidine 0.2mg TID     echocardiogram transthoracic  reduced EF , INFEROLATERAL wall HYPOKINESIS , eccentric hypertrophy of left ventricle     MRI  brain  shows acute left basal ganglia and corona radiata infarcts consistent with the patine presentation, there is  old right lacunar infarct   discussed her care with her son and updated him.   patient needs wheelchair and bedside commode at home       i discussed the care with ruben , indication for anticoagulation , and possible transient positive beta 2 glycoprotein due to COVID infection, vs underlying APLS.   she has  beta 2 glycoprotein (positive), lupus anticoagulant (neg) and cardiolipin Ab  - c/w ASA 81mg and lipitor 80mg    - given + beta 2 glycoprotiein  , neurology recommended  1mg/kg lovenox x 1 month. can stop plavix, but continue asa when on lovenox. for possible APLS.   needed confirmation and repeat studies within 12 weeks.   -  outpatient hematology evaluation would need repeat APL workup to confirm and assess indication for anticoagulation     outpatient vascular surgery evaluation --F/u with Dr. Wilmer Guerrier at [phone] within 2 week for possible right carotid stent   outpatient neruology evaluation -F/u with Dr. Parrish Ragsdale at [phone]  outpatient cardiology evaluation   continue outpatient psychiatric care   outpatient Gi follow up for gerd     total time spent discharging patient greater than 30 mins

## 2021-02-04 NOTE — PROVIDER CONTACT NOTE (OTHER) - DATE AND TIME:
04-Feb-2021 18:30
26-Jan-2021 22:14
27-Jan-2021 04:45
31-Jan-2021 01:50
28-Jan-2021 07:00
28-Jan-2021 00:00

## 2021-02-04 NOTE — PROGRESS NOTE ADULT - PROBLEM SELECTOR PLAN 8
Continue Risperdal.

## 2021-02-05 VITALS
OXYGEN SATURATION: 100 % | HEART RATE: 68 BPM | TEMPERATURE: 98 F | RESPIRATION RATE: 18 BRPM | DIASTOLIC BLOOD PRESSURE: 66 MMHG | SYSTOLIC BLOOD PRESSURE: 144 MMHG

## 2021-02-05 LAB
ANION GAP SERPL CALC-SCNC: 12 MMOL/L — SIGNIFICANT CHANGE UP (ref 7–14)
B2 GLYCOPROT1 AB SER QL: POSITIVE
B2 GLYCOPROT1 IGA SER QL: <5 SAU — SIGNIFICANT CHANGE UP
B2 GLYCOPROT1 IGG SER-ACNC: <5 SGU — SIGNIFICANT CHANGE UP
B2 GLYCOPROT1 IGM SER-ACNC: 12 SMU — SIGNIFICANT CHANGE UP
BUN SERPL-MCNC: 28 MG/DL — HIGH (ref 7–23)
CALCIUM SERPL-MCNC: 8.6 MG/DL — SIGNIFICANT CHANGE UP (ref 8.4–10.5)
CHLORIDE SERPL-SCNC: 96 MMOL/L — LOW (ref 98–107)
CO2 SERPL-SCNC: 24 MMOL/L — SIGNIFICANT CHANGE UP (ref 22–31)
CREAT SERPL-MCNC: 1.2 MG/DL — SIGNIFICANT CHANGE UP (ref 0.5–1.3)
GLUCOSE BLDC GLUCOMTR-MCNC: 120 MG/DL — HIGH (ref 70–99)
GLUCOSE BLDC GLUCOMTR-MCNC: 225 MG/DL — HIGH (ref 70–99)
GLUCOSE BLDC GLUCOMTR-MCNC: 283 MG/DL — HIGH (ref 70–99)
GLUCOSE SERPL-MCNC: 267 MG/DL — HIGH (ref 70–99)
HCT VFR BLD CALC: 33.3 % — LOW (ref 34.5–45)
HGB BLD-MCNC: 10.7 G/DL — LOW (ref 11.5–15.5)
MAGNESIUM SERPL-MCNC: 1.9 MG/DL — SIGNIFICANT CHANGE UP (ref 1.6–2.6)
MCHC RBC-ENTMCNC: 29.4 PG — SIGNIFICANT CHANGE UP (ref 27–34)
MCHC RBC-ENTMCNC: 32.1 GM/DL — SIGNIFICANT CHANGE UP (ref 32–36)
MCV RBC AUTO: 91.5 FL — SIGNIFICANT CHANGE UP (ref 80–100)
NRBC # BLD: 0 /100 WBCS — SIGNIFICANT CHANGE UP
NRBC # FLD: 0 K/UL — SIGNIFICANT CHANGE UP
PHOSPHATE SERPL-MCNC: 3.3 MG/DL — SIGNIFICANT CHANGE UP (ref 2.5–4.5)
PLATELET # BLD AUTO: 219 K/UL — SIGNIFICANT CHANGE UP (ref 150–400)
POTASSIUM SERPL-MCNC: 4.4 MMOL/L — SIGNIFICANT CHANGE UP (ref 3.5–5.3)
POTASSIUM SERPL-SCNC: 4.4 MMOL/L — SIGNIFICANT CHANGE UP (ref 3.5–5.3)
RBC # BLD: 3.64 M/UL — LOW (ref 3.8–5.2)
RBC # FLD: 14.3 % — SIGNIFICANT CHANGE UP (ref 10.3–14.5)
SARS-COV-2 RNA SPEC QL NAA+PROBE: SIGNIFICANT CHANGE UP
SODIUM SERPL-SCNC: 132 MMOL/L — LOW (ref 135–145)
WBC # BLD: 4.34 K/UL — SIGNIFICANT CHANGE UP (ref 3.8–10.5)
WBC # FLD AUTO: 4.34 K/UL — SIGNIFICANT CHANGE UP (ref 3.8–10.5)

## 2021-02-05 PROCEDURE — 99232 SBSQ HOSP IP/OBS MODERATE 35: CPT

## 2021-02-05 PROCEDURE — 99239 HOSP IP/OBS DSCHRG MGMT >30: CPT

## 2021-02-05 RX ORDER — ZOLPIDEM TARTRATE 10 MG/1
2 TABLET ORAL
Qty: 0 | Refills: 0 | DISCHARGE

## 2021-02-05 RX ORDER — AMLODIPINE BESYLATE 2.5 MG/1
1 TABLET ORAL
Qty: 0 | Refills: 0 | DISCHARGE
Start: 2021-02-05

## 2021-02-05 RX ORDER — INSULIN GLARGINE 100 [IU]/ML
32 INJECTION, SOLUTION SUBCUTANEOUS AT BEDTIME
Refills: 0 | Status: DISCONTINUED | OUTPATIENT
Start: 2021-02-05 | End: 2021-02-05

## 2021-02-05 RX ORDER — CLOPIDOGREL BISULFATE 75 MG/1
1 TABLET, FILM COATED ORAL
Qty: 30 | Refills: 0
Start: 2021-02-05 | End: 2021-03-06

## 2021-02-05 RX ORDER — RIVAROXABAN 15 MG-20MG
1 KIT ORAL
Qty: 30 | Refills: 0
Start: 2021-02-05 | End: 2021-03-06

## 2021-02-05 RX ORDER — INSULIN LISPRO 100/ML
5 VIAL (ML) SUBCUTANEOUS
Refills: 0 | Status: DISCONTINUED | OUTPATIENT
Start: 2021-02-05 | End: 2021-02-05

## 2021-02-05 RX ORDER — QUETIAPINE FUMARATE 200 MG/1
1 TABLET, FILM COATED ORAL
Qty: 0 | Refills: 0 | DISCHARGE

## 2021-02-05 RX ORDER — AMLODIPINE BESYLATE 2.5 MG/1
1 TABLET ORAL
Qty: 30 | Refills: 0
Start: 2021-02-05 | End: 2021-03-06

## 2021-02-05 RX ORDER — PANTOPRAZOLE SODIUM 20 MG/1
1 TABLET, DELAYED RELEASE ORAL
Qty: 30 | Refills: 0
Start: 2021-02-05 | End: 2021-03-06

## 2021-02-05 RX ORDER — LOVASTATIN 20 MG
1 TABLET ORAL
Qty: 0 | Refills: 0 | DISCHARGE

## 2021-02-05 RX ORDER — ATORVASTATIN CALCIUM 80 MG/1
1 TABLET, FILM COATED ORAL
Qty: 30 | Refills: 0
Start: 2021-02-05

## 2021-02-05 RX ORDER — ASPIRIN/CALCIUM CARB/MAGNESIUM 324 MG
1 TABLET ORAL
Qty: 30 | Refills: 0
Start: 2021-02-05 | End: 2021-03-06

## 2021-02-05 RX ORDER — LOVASTATIN 20 MG
1 TABLET ORAL
Qty: 30 | Refills: 0
Start: 2021-02-05 | End: 2021-03-06

## 2021-02-05 RX ORDER — ASPIRIN/CALCIUM CARB/MAGNESIUM 324 MG
1 TABLET ORAL
Qty: 0 | Refills: 0 | DISCHARGE
Start: 2021-02-05

## 2021-02-05 RX ADMIN — ENOXAPARIN SODIUM 40 MILLIGRAM(S): 100 INJECTION SUBCUTANEOUS at 11:30

## 2021-02-05 RX ADMIN — AMLODIPINE BESYLATE 5 MILLIGRAM(S): 2.5 TABLET ORAL at 05:47

## 2021-02-05 RX ADMIN — Medication 1 MILLIGRAM(S): at 17:39

## 2021-02-05 RX ADMIN — Medication 0.2 MILLIGRAM(S): at 12:58

## 2021-02-05 RX ADMIN — Medication 1 MILLIGRAM(S): at 05:47

## 2021-02-05 RX ADMIN — Medication 12 UNIT(S): at 09:17

## 2021-02-05 RX ADMIN — PANTOPRAZOLE SODIUM 40 MILLIGRAM(S): 20 TABLET, DELAYED RELEASE ORAL at 05:47

## 2021-02-05 RX ADMIN — Medication 81 MILLIGRAM(S): at 11:31

## 2021-02-05 RX ADMIN — CLOPIDOGREL BISULFATE 75 MILLIGRAM(S): 75 TABLET, FILM COATED ORAL at 11:30

## 2021-02-05 RX ADMIN — Medication 10 UNIT(S): at 17:31

## 2021-02-05 RX ADMIN — Medication 5 UNIT(S): at 12:58

## 2021-02-05 RX ADMIN — Medication 2: at 12:57

## 2021-02-05 RX ADMIN — Medication 20 MILLIGRAM(S): at 05:47

## 2021-02-05 RX ADMIN — Medication 3: at 09:17

## 2021-02-05 RX ADMIN — Medication 0.2 MILLIGRAM(S): at 05:47

## 2021-02-05 RX ADMIN — Medication 20 MILLIGRAM(S): at 17:39

## 2021-02-05 NOTE — PROGRESS NOTE ADULT - OTHER
[facility] 4th floor [place]

## 2021-02-05 NOTE — PROGRESS NOTE ADULT - PROBLEM SELECTOR PROBLEM 2
Hyperlipidemia, unspecified hyperlipidemia type
Type 2 diabetes mellitus

## 2021-02-05 NOTE — PROGRESS NOTE ADULT - ATTENDING COMMENTS
Katharina Padron MD  Division of Endocrinology  Pager: [phone]    If after 6PM or before 9AM, or on weekends/holidays, please call endocrine answering service for assistance ([phone]).  For nonurgent matters email [email or web address] for assistance.
Katharina Padron MD  Division of Endocrinology  Pager: [phone]    If after 6PM or before 9AM, or on weekends/holidays, please call endocrine answering service for assistance ([phone]).  For nonurgent matters email [email or web address] for assistance.
seen in covid unit    Parrish Ragsdale MD  Vascular Neurology  Office: [phone]
Agree with insulin adjustments as outlined above. Will follow.    Katharina Padron MD  Division of Endocrinology  Pager: [phone]    If after 6PM or before 9AM, or on weekends/holidays, please call endocrine answering service for assistance ([phone]).  For nonurgent matters email [email or web address] for assistance.

## 2021-02-05 NOTE — PROGRESS NOTE ADULT - PROVIDER SPECIALTY LIST ADULT
Hospitalist
Neurology
Rehab Medicine
Hospitalist
Neurology
Neurology
Endocrinology
Rehab Medicine
Endocrinology
Endocrinology
Hospitalist

## 2021-02-05 NOTE — PROGRESS NOTE ADULT - ASSESSMENT
59 y/o female with history of HTN, DM type 2, HLD, Anxiety/Depression, Schizophrenia, Covid 4 weeks ago presented with right sided weakness and R facial drooping with dysarthria.   Patient admitted for management of L. anterior choroidal territory infarct.  Endocrine consult requested for management of uncontrolled DM2.    1) Uncontrolled DM2  A1c 11.6%  FS premeal and qhs   Fs goal 100-180, both hypo and hyperglycemia noted past 24 hours  Carb consistent diet  Recommend adjust timing and dose of Lantus to 32 units qhs  Recommend adjust Admelog to lower dose prelunch to prevent hypoglycemia predinner: 12/5/10  continue with low correctional scale premeal and qhs     Patient to be discharged to rehab, recommend continue basal/bolus insulin, dose to be finalized before dc.  On discharge home, ideally patient should be on basal/bolus insulin. However given patient history of mental illness will try to simplify home medication regimen.   Recommend basal insulin+ GLP1 agonist (once weekly)+ metformin. Please stop Humulin R  Recommend send basal insulin (ie lantus, basaglar or tuojeo...).  script to pharmacy and see which on is covered.  Patient will need BD pen needles  Patient will NEED BEDSIDE RN INSULIN  PEN TEACHING, HOWEVER  HAS TO BE TAUGHT AS WELL AS HE ADMINISTERS INSULIN AT HOME. Patient was previously on vial and syringe.   Recommend sending script for once weekly GLP1 agonist ( trulicty 0.75mg SQ weekly or ozempic 0.25mg SQ weekly) to pharmacy to determine which is covered.   Would also restart metformin 500mg BIDac and increase to 1000mg BID ac after one week if patient not having GI side effects  Patient can follow up in [facility]   [address] [facility] [address]  [place] [id number] [phone]    HTN  goal 130/80   Remains above goal   Patient restarted on home regimen     HDL   goal LDL <70    Continue Atorvastatin 80mg daily

## 2021-02-05 NOTE — PROGRESS NOTE ADULT - SUBJECTIVE AND OBJECTIVE BOX
Chief Complaint: DM2    History: She does not recall if she ate breakfast today  denies complaints  asking about leaving the hospital  hypoglycemia event noted yesterday predinner    MEDICATIONS  (STANDING):  amLODIPine   Tablet 10 milliGRAM(s) Oral daily  aspirin  chewable 81 milliGRAM(s) Oral daily  atorvastatin 80 milliGRAM(s) Oral at bedtime  benztropine 2 milliGRAM(s) Oral at bedtime  clonazePAM  Tablet 1 milliGRAM(s) Oral two times a day  cloNIDine 0.2 milliGRAM(s) Oral three times a day  clopidogrel Tablet 75 milliGRAM(s) Oral daily  dextrose 40% Gel 15 Gram(s) Oral once  dextrose 5%. 1000 milliLiter(s) (50 mL/Hr) IV Continuous <Continuous>  dextrose 5%. 1000 milliLiter(s) (100 mL/Hr) IV Continuous <Continuous>  dextrose 5%. 1000 milliLiter(s) (100 mL/Hr) IV Continuous <Continuous>  dextrose 50% Injectable 25 Gram(s) IV Push once  dextrose 50% Injectable 12.5 Gram(s) IV Push once  dextrose 50% Injectable 25 Gram(s) IV Push once  enalapril 20 milliGRAM(s) Oral two times a day  enoxaparin Injectable 40 milliGRAM(s) SubCutaneous daily  glucagon  Injectable 1 milliGRAM(s) IntraMuscular once  glucagon  Injectable 1 milliGRAM(s) IntraMuscular once  insulin glargine Injectable (LANTUS) 32 Unit(s) SubCutaneous at bedtime  insulin lispro (ADMELOG) corrective regimen sliding scale   SubCutaneous three times a day before meals  insulin lispro (ADMELOG) corrective regimen sliding scale   SubCutaneous at bedtime  insulin lispro Injectable (ADMELOG) 10 Unit(s) SubCutaneous before dinner  insulin lispro Injectable (ADMELOG) 5 Unit(s) SubCutaneous before lunch  insulin lispro Injectable (ADMELOG) 12 Unit(s) SubCutaneous before breakfast  pantoprazole    Tablet 40 milliGRAM(s) Oral before breakfast  risperiDONE   Tablet 3 milliGRAM(s) Oral at bedtime  traZODone 200 milliGRAM(s) Oral at bedtime    MEDICATIONS  (PRN):      Allergies    erythromycin (Unknown)    Intolerances      Review of Systems:    ALL OTHER SYSTEMS REVIEWED AND NEGATIVE      PHYSICAL EXAM:  VITALS: T(C): 36.9 (02-05-21 @ 12:55)  T(F): 98.4 (02-05-21 @ 12:55), Max: 98.8 (02-05-21 @ 05:44)  HR: 68 (02-05-21 @ 12:55) (67 - 77)  BP: 127/57 (02-05-21 @ 12:55) (127/57 - 144/64)  RR:  (18 - 18)  SpO2:  (96% - 100%)  Wt(kg): --  GENERAL: NAD, well-groomed, well-developed  EYES: No proptosis, no lid lag, anicteric  HEENT:  Atraumatic, Normocephalic, moist mucous membranes  RESPIRATORY: nonlabored respirations  PSYCH: Alert and oriented x 3, normal affect, normal mood    CAPILLARY BLOOD GLUCOSE      POCT Blood Glucose.: 225 mg/dL (05 Feb 2021 12:44)  POCT Blood Glucose.: 283 mg/dL (05 Feb 2021 08:57)  POCT Blood Glucose.: 204 mg/dL (04 Feb 2021 22:32)  POCT Blood Glucose.: 110 mg/dL (04 Feb 2021 19:58)  POCT Blood Glucose.: 89 mg/dL (04 Feb 2021 19:29)  POCT Blood Glucose.: 58 mg/dL (04 Feb 2021 18:20)  POCT Blood Glucose.: 74 mg/dL (04 Feb 2021 17:04)      02-05    132<L>  |  96<L>  |  28<H>  ----------------------------<  267<H>  4.4   |  24  |  1.20    EGFR if : 56<L>  EGFR if non : 49<L>    Ca    8.6      02-05  Mg     1.9     02-05  Phos  3.3     02-05        A1C with Estimated Average Glucose Result: 11.6 % (01-28-21 @ 04:36)      Thyroid Function Tests:

## 2021-02-05 NOTE — PROGRESS NOTE ADULT - PROBLEM SELECTOR PROBLEM 1
Type 2 diabetes mellitus with hyperglycemia, with long-term current use of insulin
Cerebrovascular accident
Type 2 diabetes mellitus with hyperglycemia, with long-term current use of insulin
Cerebrovascular accident
Type 2 diabetes mellitus with hyperglycemia, with long-term current use of insulin
Cerebrovascular accident

## 2021-02-05 NOTE — PROGRESS NOTE ADULT - SUBJECTIVE AND OBJECTIVE BOX
Patient is a 60y old  Female who presents with a chief complaint of R sided hemiparesis and facial droop (2021 18:25)    Interval history:  Reports last bm yesterday.  States she wants to go home, however understands she needs to go to rehab first.  Had episode of hypoglycemia overnight but was asymptomatic.      REVIEW OF SYSTEMS  Constitutional - No fever, No weight loss, No fatigue  HEENT - No eye pain, No visual disturbances, No difficulty hearing, No tinnitus, No vertigo, No neck pain  Respiratory - No cough, No wheezing, No shortness of breath  Cardiovascular - No chest pain, No palpitations  Gastrointestinal - No abdominal pain, No nausea, No vomiting, No diarrhea, No constipation  Genitourinary - No dysuria, No frequency, No hematuria, No incontinence  Neurological - No headaches, No memory loss, + loss of strength, No numbness, No tremors  Skin - No itching, No rashes, No lesions   Endocrine - No temperature intolerance  Musculoskeletal - No joint pain, No joint swelling, No muscle pain  Psychiatric - No depression, No anxiety    PAST MEDICAL & SURGICAL HISTORY  Uterine fibroid    Hypercholesteremia    Type 2 diabetes mellitus    Hypertension    Diabetes    Anxiety    H/O  section    H/O hemorrhoidectomy    H/O tubal ligation      CURRENT FUNCTIONAL STATUS  2/4  Pt performed bed mobility with 1 person max/mod assist, RUE/RLE weakness .Pt sat at bedside, sitting balance fair- ,performed seated balancing ex's and therapeutic exercises to extremities as tolerated in all planes while seated at bedside     FAMILY HISTORY   FH: type 2 diabetes  No pertinent family history in first degree relatives        RECENT LABS/IMAGING  < from: MR Head No Cont (21 @ 22:03) >    EXAM:  MR BRAIN        PROCEDURE DATE:  2021         INTERPRETATION:  Clinical indications: Code stroke.    MRI of the brain was performed using sagittal T1, axial T1 T2 T2 FLAIR diffusion and susceptibility weighted sequence.    This exam is compared prior noncontrast head CT performed on 2001    Extensive parenchymal volume loss and chronic microvascular ischemic changes are identified.    Old lacunar infarct involving the posterior right lenticular nucleus is seen.    Abnormal T2 prolongation restricted diffusion is seen involving the left basal ganglia/corona radiata region. This is compatible with an acute infarct. No hemorrhagic transformation is seen. No significant shift or herniation is again    The large vessels demonstrate normal flow voids    The visualized paranasal sinuses mastoid and middle ear regions appear clear.    IMPRESSION: Acute infarcts as described above.        < end of copied text >    CBC Full  -  ( 2021 07:17 )  WBC Count : 4.34 K/uL  RBC Count : 3.64 M/uL  Hemoglobin : 10.7 g/dL  Hematocrit : 33.3 %  Platelet Count - Automated : 219 K/uL  Mean Cell Volume : 91.5 fL  Mean Cell Hemoglobin : 29.4 pg  Mean Cell Hemoglobin Concentration : 32.1 gm/dL  Auto Neutrophil # : x  Auto Lymphocyte # : x  Auto Monocyte # : x  Auto Eosinophil # : x  Auto Basophil # : x  Auto Neutrophil % : x  Auto Lymphocyte % : x  Auto Monocyte % : x  Auto Eosinophil % : x  Auto Basophil % : x        132<L>  |  96<L>  |  28<H>  ----------------------------<  267<H>  4.4   |  24  |  1.20    Ca    8.6      2021 07:17  Phos  3.3       Mg     1.9           VITALS  T(C): 36.9 (21 @ 12:55), Max: 37.1 (21 @ 05:44)  HR: 68 (21 @ 12:55) (67 - 77)  BP: 127/57 (21 @ 12:55) (127/57 - 144/64)  RR: 18 (21 @ 12:55) (18 - 18)  SpO2: 100% (21 @ 12:55) (96% - 100%)  Wt(kg): --    ALLERGIES  erythromycin (Unknown)      MEDICATIONS   amLODIPine   Tablet 10 milliGRAM(s) Oral daily  aspirin  chewable 81 milliGRAM(s) Oral daily  atorvastatin 80 milliGRAM(s) Oral at bedtime  benztropine 2 milliGRAM(s) Oral at bedtime  clonazePAM  Tablet 1 milliGRAM(s) Oral two times a day  cloNIDine 0.2 milliGRAM(s) Oral three times a day  clopidogrel Tablet 75 milliGRAM(s) Oral daily  dextrose 40% Gel 15 Gram(s) Oral once  dextrose 5%. 1000 milliLiter(s) IV Continuous <Continuous>  dextrose 5%. 1000 milliLiter(s) IV Continuous <Continuous>  dextrose 5%. 1000 milliLiter(s) IV Continuous <Continuous>  dextrose 50% Injectable 25 Gram(s) IV Push once  dextrose 50% Injectable 12.5 Gram(s) IV Push once  dextrose 50% Injectable 25 Gram(s) IV Push once  enalapril 20 milliGRAM(s) Oral two times a day  enoxaparin Injectable 40 milliGRAM(s) SubCutaneous daily  glucagon  Injectable 1 milliGRAM(s) IntraMuscular once  glucagon  Injectable 1 milliGRAM(s) IntraMuscular once  insulin glargine Injectable (LANTUS) 32 Unit(s) SubCutaneous at bedtime  insulin lispro (ADMELOG) corrective regimen sliding scale   SubCutaneous three times a day before meals  insulin lispro (ADMELOG) corrective regimen sliding scale   SubCutaneous at bedtime  insulin lispro Injectable (ADMELOG) 10 Unit(s) SubCutaneous before dinner  insulin lispro Injectable (ADMELOG) 5 Unit(s) SubCutaneous before lunch  insulin lispro Injectable (ADMELOG) 12 Unit(s) SubCutaneous before breakfast  pantoprazole    Tablet 40 milliGRAM(s) Oral before breakfast  risperiDONE   Tablet 3 milliGRAM(s) Oral at bedtime  traZODone 200 milliGRAM(s) Oral at bedtime      ----------------------------------------------------------------------------------------  PHYSICAL EXAM  limited exam due to telehealth  patient oriented x 3  reports weakness unchanged  sensation intact  ----------------------------------------------------------------------------------------  ASSESSMENT/PLAN 59 y/o female with a PmHx of HTN, DM type II, HLD, Anxiety, presented with right sided weakness and dysarthria, found to have acute CVA (L basal ganglia and corona radiata)  hx of covid per family (>4 weeks ago).  now off isolation precautions  beta 2   schizophrenia: risperdal  anxiety: klonopin  htn: norvasc, enalapril, clonidine  cva: asa, statin, plavix  (per neuro ASA and Plavix for 3 months, followed by ASA indefinitely)  DVT PPX - lovenox  Diet: soft, consistent carb, nectar thick liquid  continue bedside PT/OT/SLP  turn and position q 2 to prevent skin breakdown  Rehab - if further treatment would not be delayed by rehab stay, would recommend acute inpatient rehab.  Per chart patient is planned for follow up for possible R carotid stent within 2 weeks.  Please clarify timing, or if this can be delayed for acute rehab. If timing is not flexible, may benefit from subacute rehab to facilitate outpatient appointments.    Recommend ACUTE inpatient rehabilitation for the functional deficits consisting of 3 hours of therapy/day & 24 hour RN/daily PMR physician for comorbid medical management. Will continue to follow for ongoing rehab needs and recommendations.

## 2021-02-05 NOTE — PROGRESS NOTE ADULT - SUBJECTIVE AND OBJECTIVE BOX
Neurology Progress Note    S: Patient seen in covid unit on airborne and contact isolation. No new events overnight. in bed comfortable       Medication:  MEDICATIONS  (STANDING):  amLODIPine   Tablet 10 milliGRAM(s) Oral daily  aspirin  chewable 81 milliGRAM(s) Oral daily  atorvastatin 80 milliGRAM(s) Oral at bedtime  benztropine 2 milliGRAM(s) Oral at bedtime  clonazePAM  Tablet 1 milliGRAM(s) Oral two times a day  cloNIDine 0.2 milliGRAM(s) Oral three times a day  clopidogrel Tablet 75 milliGRAM(s) Oral daily  dextrose 40% Gel 15 Gram(s) Oral once  dextrose 5%. 1000 milliLiter(s) (50 mL/Hr) IV Continuous <Continuous>  dextrose 5%. 1000 milliLiter(s) (100 mL/Hr) IV Continuous <Continuous>  dextrose 5%. 1000 milliLiter(s) (100 mL/Hr) IV Continuous <Continuous>  dextrose 50% Injectable 25 Gram(s) IV Push once  dextrose 50% Injectable 12.5 Gram(s) IV Push once  dextrose 50% Injectable 25 Gram(s) IV Push once  enalapril 20 milliGRAM(s) Oral two times a day  enoxaparin Injectable 40 milliGRAM(s) SubCutaneous daily  glucagon  Injectable 1 milliGRAM(s) IntraMuscular once  glucagon  Injectable 1 milliGRAM(s) IntraMuscular once  insulin glargine Injectable (LANTUS) 32 Unit(s) SubCutaneous at bedtime  insulin lispro (ADMELOG) corrective regimen sliding scale   SubCutaneous three times a day before meals  insulin lispro (ADMELOG) corrective regimen sliding scale   SubCutaneous at bedtime  insulin lispro Injectable (ADMELOG) 10 Unit(s) SubCutaneous before dinner  insulin lispro Injectable (ADMELOG) 5 Unit(s) SubCutaneous before lunch  insulin lispro Injectable (ADMELOG) 12 Unit(s) SubCutaneous before breakfast  pantoprazole    Tablet 40 milliGRAM(s) Oral before breakfast  risperiDONE   Tablet 3 milliGRAM(s) Oral at bedtime  traZODone 200 milliGRAM(s) Oral at bedtime    MEDICATIONS  (PRN):        Vitals:  ICU Vital Signs Last 24 Hrs  T(C): 36.9 (05 Feb 2021 12:55), Max: 37.1 (05 Feb 2021 05:44)  T(F): 98.4 (05 Feb 2021 12:55), Max: 98.8 (05 Feb 2021 05:44)  HR: 68 (05 Feb 2021 12:55) (67 - 77)  BP: 127/57 (05 Feb 2021 12:55) (127/57 - 144/64)  BP(mean): --  ABP: --  ABP(mean): --  RR: 18 (05 Feb 2021 12:55) (18 - 18)  SpO2: 100% (05 Feb 2021 12:55) (96% - 100%)        General Exam:   General Appearance: Appropriately dressed and in no acute distress       Head: Normocephalic, atraumatic and no dysmorphic features  Ear, Nose, and Throat: Moist mucous membranes  CVS: S1S2+  Resp: No SOB, no wheeze or rhonchi  Abd: soft NTND  Extremities: No edema, no cyanosis  Skin: No bruises, no rashes     Neurological Exam:  MS: Oriented x3. Non-fluent speech making subtle paraphasic errors. Comprehension intact. Cannot repeat.   CN: Decreased BTT on the right. Moderate-severe R. facial. EOMI.   Motor: Right side no movement. Left side full.   Sensory: Slightly decreased sensation to LT on the right  Coordination: No dysmetria on FNF or ataxia on HTS bilaterally   Gait: deferred     I personally reviewed the below data/images/labs:    CBC Full  -  ( 05 Feb 2021 07:17 )  WBC Count : 4.34 K/uL  RBC Count : 3.64 M/uL  Hemoglobin : 10.7 g/dL  Hematocrit : 33.3 %  Platelet Count - Automated : 219 K/uL  Mean Cell Volume : 91.5 fL  Mean Cell Hemoglobin : 29.4 pg  Mean Cell Hemoglobin Concentration : 32.1 gm/dL  Auto Neutrophil # : x  Auto Lymphocyte # : x  Auto Monocyte # : x  Auto Eosinophil # : x  Auto Basophil # : x  Auto Neutrophil % : x  Auto Lymphocyte % : x  Auto Monocyte % : x  Auto Eosinophil % : x  Auto Basophil % : x    02-05    132<L>  |  96<L>  |  28<H>  ----------------------------<  267<H>  4.4   |  24  |  1.20    Ca    8.6      05 Feb 2021 07:17  Phos  3.3     02-05  Mg     1.9     02-05          < from: CT Angio Neck w/ IV Cont (01.26.21 @ 11:08) >    EXAM:  CT PERFUSION W MAPS IC      EXAM:  CT ANGIO NECK (W)AW IC      EXAM:  CT ANGIO BRAIN (W)AW IC        PROCEDURE DATE:  Jan 26 2021         INTERPRETATION:  CT ANGIOGRAPHY OF HEAD AND NECK AND CT PERFUSION WITH INTRAVENOUS CONTRAST.    CLINICAL INDICATION: Code stroke.    TECHNIQUE: Volumetric acquisition was performed from the thoracic inlet to the vertex.    A total of 70 cc of Omnipaque 350 was injected intravenously without complications.  Dose optimization techniques were utilized including kVp/mA modulation along with iterative reconstructions.  MIP image analysis was performed on a separate workstation.  CT brain perfusion was performed after intravenous injection 50 mL of intravenous contrast Omnipaque 350. Dose optimization techniques were utilized including kVp/mA modulation along with iterative reconstructions. 3D image analysis was performed on a dedicated workstation by a neuroradiologist.    DOSE REPORT: Radiation Dose Estimate (DLP) Dose 2370 mGy-cm.    COMPARISON: No prior studies have been submitted for comparison.    FINDINGS:    CT angiography:    NECK:  The aortic arch is conventional in anatomy. Origin of supraaortic arteries are patent. The common carotid arteries are normal in course and caliber. There are atherosclerotic plaques at common carotid bifurcations and carotid bulbs with significant narrowing in the right common carotid bifurcation.    There is severe atherosclerotic narrowing of the proximal portion of the right internal carotid artery immediately above the bifurcation. There is 85-90 % stenosis using NASCET criteria (normal right ICA caliber is 1.2 mm).    There is mild atherosclerotic narrowing of the proximal left internal carotid artery. There is 15-20 % stenosis using NASCET criteria (normal left ICA caliber is 3.8 mm).    Bilateral vertebral arteries are normal in course, caliber and contour, without evidence of dissection or occlusion.    Degenerative changes of the bony cervical spine are noted.    BRAIN:    There is moderate atherosclerotic narrowing of the petrocavernous ICA is bilaterally. There is severe atherosclerotic narrowing of the supraclinoid left ICA. There is multifocal moderate atherosclerotic narrowing of the M1 segments bilaterally with severe narrowingof the midportion of the M1 segment of the right MCA. The ACOM is present. The right posterior communicating artery is present and patent. The left posterior communicating artery is not well seen, probably atretic or hypoplastic. There is multifocal moderate narrowing of the V4 segment of the right vertebral artery. The left intradural vertebral artery is normal in caliber.  The visualized cerebellar arteries, basilar and bilateral posterior cerebral arteries are patent without evidence of stenoses or aneurysm.    Other findings: None.    CT Perfusion:    The regional cerebral blood flow (CBF), cerebral blood volume (CBV), and time to maximum (Tmax) for the VAHE, MCA, and PCA territories are within normal limits. There is no evidence of asymmetric or delayed territorial perfusion.      IMPRESSION:    CTA brain: There is moderate atherosclerotic narrowing in various vascular beds as described above. There is severe atherosclerotic narrowing in the supraclinoid left ICA and midportion of theM1 segment of the right MCA.    CTA NECK: There is hemodynamically significant narrowing of the proximal right ICA immediately above the bifurcation with 85-90% narrowing of its lumen.    CT perfusion: There is no evidence of asymmetric or delayed territorial perfusion.    NASCET CAROTID STENOSIS CRITERIA (distal normal appearing ICA as denominator for measurement): 0%-none, 1-49%-mild, 50-70%-moderate, 70-89%-severe, 90-99%-critical.      The threshold values for estimation of hypoperfused and infarction volume are based on previously utilized methodology in large clinical trial including [facility] [employer]IME ClinicalTrials.gov number, QVM50367551, www.nejm.org/doi/full/10.1056/UFCNoq8857787    Notification to clinician of alert:    Provider   Dr. Boni Nye  was notified about  the impression 1118  on  1/26/2021   via telephone by Neuroradiologist DOMINGUEZ Russo.  Readback confirmation was obtained.              KEHINDE RUSSO M.D., ATTENDING RADIOLOGIST  This document has been electronicallysigned. Jan 26 2021 11:29AM    < end of copied text >  < from: MR Head No Cont (01.28.21 @ 22:03) >    EXAM:  MR BRAIN        PROCEDURE DATE:  Jan 28 2021         INTERPRETATION:  Clinical indications: Code stroke.    MRI of the brain was performed using sagittal T1, axial T1 T2 T2 FLAIR diffusion and susceptibility weighted sequence.    This exam is compared prior noncontrast head CT performed on June 26, 2001    Extensive parenchymal volume loss and chronic microvascular ischemic changes are identified.    Old lacunar infarct involving the posterior right lenticular nucleus is seen.    Abnormal T2 prolongation restricted diffusion is seen involving the left basal ganglia/corona radiata region. This is compatible with an acute infarct. No hemorrhagic transformation is seen. No significant shift or herniation is again    The large vessels demonstrate normal flow voids    The visualized paranasal sinuses mastoid and middle ear regions appear clear.    IMPRESSION: Acute infarcts as described above.                LEO PICKARD M.D., ATTENDING RADIOLOGIST  This document has been electronically signed. Jan 29 2021  8:31AM    < end of copied text >    Patient name: DONTE PETERSON  YOB: 1960   Age: 60 (F)   MR#: [id number]  Study Date: 2/4/2021  Location: [address] BubbleSonographer: Marilin Veloz [facility]  2nd Sonographer: Comfort Nino RDCS  Study quality: Technically good  Referring Physician: Adolfo Gonzalez MD  Blood Pressure: 153/73 mmHg  Height: 149 cm  Weight: 77 kg  BSA: 1.7 m2  ------------------------------------------------------------------------  PROCEDURE: Transthoracic echocardiogram with 2-D, M-Mode  and complete spectral and color flow Doppler. Patient was  injected with 10 cc's of aerosolized saline.  Patient was injected with 10 cc's of aerosolized saline.  INDICATION: Cerebral infarction due to unspecified  occlusion or stenosis of unspecified cerebral artery  (I63.50)  ------------------------------------------------------------------------  DIMENSIONS:  Dimensions:     Normal Values:  LA:     4.6 cm    2.0 - 4.0 cm  Ao:     3.0 cm    2.0 - 3.8 cm  SEPTUM: 1.2 cm    0.6 - 1.2 cm  PWT:    0.9 cm    0.6 - 1.1 cm  LVIDd:  4.8 cm    3.0 - 5.6 cm  LVIDs:    ---     1.8 - 4.0 cm  Derived Variables:  LVMI: 106 g/m2  RWT: 0.37  ------------------------------------------------------------------------  OBSERVATIONS:  Mitral Valve: Normal mitral valve.  Aortic Root: Normal aortic root.  Aortic Valve: Calcified trileaflet aortic valve with normal  opening. Mild aortic regurgitation.  Left Atrium: Normal left atrium.  LA volume index = 24  cc/m2.  Left Ventricle: Moderate to severe segmental left  ventricular systolic dysfunction. Inferior amd  inferolateral wall hypokinesis Septal motion consistent  with right ventricular overload. Eccentric left ventricular  hypertrophy (dilated left ventricle with normal relative  wall thickness).  Right Heart: Normal right atrium. Right ventricular  enlargement with decreased right ventricular systolic  function. Normal tricuspid valve. Mild tricuspid  regurgitation. Normal pulmonic valve. Mild pulmonic  regurgitation.  Pericardium/PleuraNormal pericardium with no pericardial  effusion.  Hemodynamic: Estimated right ventricular systolic pressure  equals 45 mm Hg, assuming right atrial pressure equals 10  mm Hg, consistent with mild pulmonary hypertension.  ------------------------------------------------------------------------  CONCLUSIONS:  1. Calcified trileaflet aortic valve with normal opening.  Mild aortic regurgitation.  2. Eccentric left ventricular hypertrophy (dilated left  ventricle with normal relative wall thickness).  3. Moderate to severe segmental left ventricular systolic  dysfunction. Inferior amd inferolateral wall hypokinesis  Septal motion consistent with right ventricular overload.  4. Right ventricular enlargement with decreased right  ventricular systolic function.  5. Estimated pulmonary artery systolic pressure equals 45  mm Hg, assuming right atrial pressure equals 10  mm Hg,  consistent with mild pulmonary hypertension.  ------------------------------------------------------------------------  Confirmed on  2/4/2021 - 15:24:49 by Barrett Torre M.D. RPVI  ------------------------------------------------------------------------

## 2021-02-05 NOTE — PROGRESS NOTE ADULT - PROBLEM SELECTOR PROBLEM 3
Hypertension
Hypertension
Hypertension, unspecified type
Hypertension, unspecified type
Hypertension
Hypertension, unspecified type
Hypertension

## 2021-02-05 NOTE — PROGRESS NOTE ADULT - REASON FOR ADMISSION
R sided hemiparesis and facial droop

## 2021-02-05 NOTE — CHART NOTE - NSCHARTNOTEFT_GEN_A_CORE
Spoke with son Damian explained in detail it is against medical advise to leave hospital as patient had a CVA and recommend Acute Rehab setting.  Son states he understands risks involved in including death for leaving against medical advise. Attending made aware.

## 2021-02-06 LAB — CARDIOLIPIN AB SER-ACNC: POSITIVE

## 2021-02-06 RX ORDER — INSULIN GLARGINE 100 [IU]/ML
18 INJECTION, SOLUTION SUBCUTANEOUS
Qty: 100 | Refills: 0 | DISCHARGE
Start: 2021-02-06 | End: 2021-03-07

## 2021-02-06 RX ORDER — INSULIN HUMAN 100 [IU]/ML
6 INJECTION, SOLUTION SUBCUTANEOUS
Qty: 100 | Refills: 0
Start: 2021-02-06

## 2021-02-06 RX ORDER — INSULIN HUMAN 100 [IU]/ML
6 INJECTION, SOLUTION SUBCUTANEOUS
Qty: 100 | Refills: 0 | DISCHARGE
Start: 2021-02-06

## 2021-02-06 RX ORDER — METFORMIN HYDROCHLORIDE 850 MG/1
1 TABLET ORAL
Qty: 60 | Refills: 0
Start: 2021-02-06 | End: 2021-03-07

## 2021-02-06 RX ORDER — INSULIN GLARGINE 100 [IU]/ML
32 INJECTION, SOLUTION SUBCUTANEOUS
Qty: 100 | Refills: 0
Start: 2021-02-06 | End: 2021-03-07

## 2021-02-06 RX ORDER — INSULIN HUMAN 100 [IU]/ML
30 INJECTION, SOLUTION SUBCUTANEOUS
Qty: 0 | Refills: 0 | DISCHARGE

## 2021-02-06 RX ORDER — ATORVASTATIN CALCIUM 80 MG/1
1 TABLET, FILM COATED ORAL
Qty: 30 | Refills: 0
Start: 2021-02-06 | End: 2021-03-07

## 2021-04-28 NOTE — ED PROVIDER NOTE - NS_EDPROVIDERDISPOUSERTYPE_ED_A_ED
29-Apr-2021 27-Apr-2021 27-Apr-2021 27-Apr-2021 27-Apr-2021 Attending Attestation (For Attendings USE Only)...

## 2021-11-07 NOTE — PROGRESS NOTE ADULT - ASSESSMENT
60 RHF w/ DM, HTN, COVID 4 weeks ago, ?psychiatric issues unspecified, presents w/ dysarthria and R. hemiapresis. No tpa as outside window. No MT as no LVO.     CTH demonstrated hypodensity involving the L. [place] and PL of the IC, otherwise no early signs of ischemia (to my eye). R. posteiror portion of the PL of the internal capsule.   CTA head: multifocal intracranial stenosis; Severe stenosis involving the supraclinoid L. ICA, bilateral M1 moderate stenosis, severe stenosis in the mid R. M1. R. V4 severe stenosis.   CTA neck: Severe 85-90% stenosis involving the R. ICA orgin.   MR brain demonstrates L. anterior choroidal infarct.   Carotid Dopplers R. ICA moderate stenosis 50-79%; L. ICA mild 15-49% stenosis  A1c 11.6%, LDL 186mg/dL    Impression:  R. hemiparesis secondary to L. anterior choroidal territory infarct. Mechanism ICAD vs Embolic. R/o competing cardioembolic cause. The moderate carotid stenosis is symptomatic given previous infarcts.     Plan:   - would send [facility] labs including beta 2 glycoprotein (positive), lupus anticoagulant (neg) and cardiolipin Ab  - c/w ASA 81mg + Plavix 75mg for 3 months, followed by ASA indefinitely per [facility]   - given + beta 2 glycoprotiein would place on full dose lovenox x 1 month. can stop plavix, but continue asa when on lovenox.   - hematology   - Atorvastatin 80mg (titrate to LDL < 70)   - telemetry  - PT/OT/SS/SLP, OOBC  - avoid hypotension  - check FS, glucose control <180  - GI/DVT ppx  - Counseling on diet, exercise, and medication adherence was done  - Counseling on smoking cessation and alcohol consumption offered when appropriate.  - Pain assessed and judicious use of narcotics when appropriate was discussed.    - Stroke education given when appropriate.  - Importance of fall prevention discussed.   - Differential diagnosis and plan of care discussed with patient and/or family and primary team  - Thank you for allowing me to participate in the care of this patient. Call with questions.   -F/u with Dr. Parrish Ragsdale at [phone]  -F/u with Dr. Wilmer Guerrier at [phone] within 2 week for possible right carotid stent     Parrish Ragsdale MD  Vascular Neurology  Office: [phone]  18-Oct-2021 13:32

## 2022-05-17 PROBLEM — E78.00 PURE HYPERCHOLESTEROLEMIA, UNSPECIFIED: Chronic | Status: ACTIVE | Noted: 2021-01-26

## 2022-05-17 PROBLEM — I10 ESSENTIAL (PRIMARY) HYPERTENSION: Chronic | Status: ACTIVE | Noted: 2021-01-26

## 2022-05-17 PROBLEM — F41.9 ANXIETY DISORDER, UNSPECIFIED: Chronic | Status: ACTIVE | Noted: 2021-01-26

## 2022-06-28 ENCOUNTER — OUTPATIENT (OUTPATIENT)
Dept: OUTPATIENT SERVICES | Facility: HOSPITAL | Age: 62
LOS: 1 days | Discharge: HOME | End: 2022-06-28

## 2022-06-28 ENCOUNTER — APPOINTMENT (OUTPATIENT)
Dept: RADIOLOGY | Facility: HOSPITAL | Age: 62
End: 2022-06-28

## 2022-06-28 DIAGNOSIS — Z98.51 TUBAL LIGATION STATUS: Chronic | ICD-10-CM

## 2022-06-28 DIAGNOSIS — R13.10 DYSPHAGIA, UNSPECIFIED: ICD-10-CM

## 2022-06-28 DIAGNOSIS — Z98.890 OTHER SPECIFIED POSTPROCEDURAL STATES: Chronic | ICD-10-CM

## 2022-06-28 DIAGNOSIS — Z98.891 HISTORY OF UTERINE SCAR FROM PREVIOUS SURGERY: Chronic | ICD-10-CM

## 2022-06-28 PROCEDURE — 70371 SPEECH EVALUATION COMPLEX: CPT | Mod: 26

## 2022-08-05 NOTE — H&P ADULT - CONSTITUTIONAL
"Interval HPI:   Overnight events:  No acute events reported overnight  Eatin%  Nausea: No  Hypoglycemia and intervention: No  Fever: No  TPN and/or TF: No  If yes, type of TF/TPN and rate: NA    BP (!) 179/90   Pulse 85   Temp 98.2 °F (36.8 °C) (Oral)   Resp 19   Ht 5' 7" (1.702 m)   Wt 94.5 kg (208 lb 5.4 oz)   SpO2 96%   BMI 32.63 kg/m²     Labs Reviewed and Include    Recent Labs   Lab 22  0615   GLU 97   CALCIUM 8.4*   ALBUMIN 1.6*   PROT 6.1   *   K 3.9   CO2 23      BUN 21*   CREATININE 6.0*   ALKPHOS 80   ALT 6*   AST 15   BILITOT 0.3     Lab Results   Component Value Date    WBC 5.48 2022    HGB 10.4 (L) 2022    HCT 31.6 (L) 2022    MCV 94 2022     2022     No results for input(s): TSH, FREET4 in the last 168 hours.  Lab Results   Component Value Date    HGBA1C 10.1 (H) 2022       Nutritional status:   Body mass index is 32.63 kg/m².  Lab Results   Component Value Date    ALBUMIN 1.6 (L) 2022    ALBUMIN 1.6 (L) 2022    ALBUMIN 1.8 (L) 2022     Lab Results   Component Value Date    PREALBUMIN 7 (L) 2022       Estimated Creatinine Clearance: 16.3 mL/min (A) (based on SCr of 6 mg/dL (H)).    Accu-Checks  Recent Labs     22  1521 22  1923 22  0726 22  1556 22  1922 22  0721 22  1120 22  1611 22  1929 22  0823   POCTGLUCOSE 140* 254* 143* 210* 155* 77 162* 150* 98 111*       Current Medications and/or Treatments Impacting Glycemic Control  Immunotherapy:    Immunosuppressants       None          Steroids:   Hormones (From admission, onward)                None          Pressors:    Autonomic Drugs (From admission, onward)                None          Hyperglycemia/Diabetes Medications:   Antihyperglycemics (From admission, onward)                Start     Stop Route Frequency Ordered    22 0715  insulin aspart U-100 pen 10 Units         -- SubQ 3 " times daily with meals 08/04/22 2017 08/04/22 0915  insulin detemir U-100 pen 12 Units         -- SubQ 2 times daily 08/04/22 0901    07/30/22 0826  insulin aspart U-100 pen 1-10 Units         -- SubQ Before meals & nightly PRN 07/30/22 0727           detailed exam

## 2022-09-02 NOTE — SWALLOW BEDSIDE ASSESSMENT ADULT - SWALLOW EVAL: RECOMMENDED FEEDING/EATING TECHNIQUES
Numbness and weakness to lt hand x2 days    maintain upright posture during/after eating for 30 mins/oral hygiene/position upright (90 degrees)

## 2022-11-28 NOTE — PROGRESS NOTE ADULT - PROBLEM/PLAN-6
November 28, 2022           WORK EXCUSE SLIP     Anay Hay  1970        This is to certify that the above named patient has been under my care from 11/28/2022      Please excuse Anay Hay  from work thru 12/2/22 due to Covid infection. She had    a positive test 11/28/22.      Thank you,    Florian Galvin MD  ________________________________________________  [facility]  [address] [place] [id number]  [phone]      
DISPLAY PLAN FREE TEXT

## 2022-12-09 NOTE — ED ADULT NURSE NOTE - NIH STROKE SCALE: 4. FACIAL PALSY, QM
#[id number]  #[id number]  #[id number]    All signed and emailed per [facility] .       (1) Minor paralysis (flattened nasolabial fold, asymmetry on smiling)

## 2023-03-14 NOTE — OCCUPATIONAL THERAPY INITIAL EVALUATION ADULT - TOILETING, PREVIOUS LEVEL OF FUNCTION, OT EVAL
- Colonoscopy at [facility]
- Follow up in office after
- RTC after Colonoscopy to discuss findings and follow up on symptoms. Will consider additional workup based on your symptoms and Colonoscopy findings.
independent

## 2023-03-25 NOTE — PATIENT PROFILE ADULT - INFLUENZA IMMUNIZATION DATE (APPROXIMATE)
Pt resting comfortably. Answered questions regarding transfer and provided support and education. No needs at this time      25-Dec-2020

## 2023-08-01 NOTE — PROGRESS NOTE ADULT - PROBLEM/PLAN-7
Intervent Radiology
DISPLAY PLAN FREE TEXT

## 2023-10-18 ENCOUNTER — APPOINTMENT (OUTPATIENT)
Dept: SURGERY | Facility: CLINIC | Age: 63
End: 2023-10-18
Payer: MEDICARE

## 2023-10-18 VITALS
HEART RATE: 94 BPM | DIASTOLIC BLOOD PRESSURE: 70 MMHG | OXYGEN SATURATION: 96 % | SYSTOLIC BLOOD PRESSURE: 133 MMHG | TEMPERATURE: 98 F | RESPIRATION RATE: 17 BRPM | BODY MASS INDEX: 27.21 KG/M2 | HEIGHT: 59 IN | WEIGHT: 135 LBS

## 2023-10-18 DIAGNOSIS — R13.10 DYSPHAGIA, UNSPECIFIED: ICD-10-CM

## 2023-10-18 DIAGNOSIS — G45.9 TRANSIENT CEREBRAL ISCHEMIC ATTACK, UNSPECIFIED: ICD-10-CM

## 2023-10-18 DIAGNOSIS — Z78.9 OTHER SPECIFIED HEALTH STATUS: ICD-10-CM

## 2023-10-18 DIAGNOSIS — I10 ESSENTIAL (PRIMARY) HYPERTENSION: ICD-10-CM

## 2023-10-18 DIAGNOSIS — Z83.3 FAMILY HISTORY OF DIABETES MELLITUS: ICD-10-CM

## 2023-10-18 PROCEDURE — 99202 OFFICE O/P NEW SF 15 MIN: CPT

## 2023-10-18 RX ORDER — CLOPIDOGREL BISULFATE 75 MG/1
75 TABLET, FILM COATED ORAL
Refills: 0 | Status: ACTIVE | COMMUNITY

## 2023-11-19 ENCOUNTER — EMERGENCY (EMERGENCY)
Facility: HOSPITAL | Age: 63
LOS: 1 days | Discharge: HOME | End: 2023-11-19
Attending: STUDENT IN AN ORGANIZED HEALTH CARE EDUCATION/TRAINING PROGRAM | Admitting: INTERNAL MEDICINE
Payer: MEDICARE

## 2023-11-19 VITALS
HEIGHT: 59 IN | DIASTOLIC BLOOD PRESSURE: 111 MMHG | HEART RATE: 101 BPM | RESPIRATION RATE: 18 BRPM | SYSTOLIC BLOOD PRESSURE: 191 MMHG | OXYGEN SATURATION: 98 % | TEMPERATURE: 97 F | WEIGHT: 134.92 LBS

## 2023-11-19 DIAGNOSIS — E11.65 TYPE 2 DIABETES MELLITUS WITH HYPERGLYCEMIA: ICD-10-CM

## 2023-11-19 DIAGNOSIS — A41.9 SEPSIS, UNSPECIFIED ORGANISM: ICD-10-CM

## 2023-11-19 DIAGNOSIS — M84.48XA PATHOLOGICAL FRACTURE, OTHER SITE, INITIAL ENCOUNTER FOR FRACTURE: ICD-10-CM

## 2023-11-19 DIAGNOSIS — I24.89 OTHER FORMS OF ACUTE ISCHEMIC HEART DISEASE: ICD-10-CM

## 2023-11-19 DIAGNOSIS — I11.0 HYPERTENSIVE HEART DISEASE WITH HEART FAILURE: ICD-10-CM

## 2023-11-19 DIAGNOSIS — I69.30 UNSPECIFIED SEQUELAE OF CEREBRAL INFARCTION: ICD-10-CM

## 2023-11-19 DIAGNOSIS — Z88.1 ALLERGY STATUS TO OTHER ANTIBIOTIC AGENTS STATUS: ICD-10-CM

## 2023-11-19 DIAGNOSIS — M54.50 LOW BACK PAIN, UNSPECIFIED: ICD-10-CM

## 2023-11-19 DIAGNOSIS — I50.22 CHRONIC SYSTOLIC (CONGESTIVE) HEART FAILURE: ICD-10-CM
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
## 2023-11-19 DIAGNOSIS — F20.9 SCHIZOPHRENIA, UNSPECIFIED: ICD-10-CM

## 2023-11-19 DIAGNOSIS — Z93.1 GASTROSTOMY STATUS: ICD-10-CM

## 2023-11-19 DIAGNOSIS — Z98.890 OTHER SPECIFIED POSTPROCEDURAL STATES: Chronic | ICD-10-CM

## 2023-11-19 DIAGNOSIS — E11.9 TYPE 2 DIABETES MELLITUS WITHOUT COMPLICATIONS: ICD-10-CM

## 2023-11-19 DIAGNOSIS — E78.00 PURE HYPERCHOLESTEROLEMIA, UNSPECIFIED: ICD-10-CM

## 2023-11-19 DIAGNOSIS — Z93.1 GASTROSTOMY STATUS: Chronic | ICD-10-CM

## 2023-11-19 DIAGNOSIS — N30.80 OTHER CYSTITIS WITHOUT HEMATURIA: ICD-10-CM

## 2023-11-19 DIAGNOSIS — Z98.891 HISTORY OF UTERINE SCAR FROM PREVIOUS SURGERY: Chronic | ICD-10-CM

## 2023-11-19 DIAGNOSIS — F41.9 ANXIETY DISORDER, UNSPECIFIED: ICD-10-CM
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
## 2023-11-19 DIAGNOSIS — Z79.01 LONG TERM (CURRENT) USE OF ANTICOAGULANTS: ICD-10-CM

## 2023-11-19 DIAGNOSIS — M54.89 OTHER DORSALGIA: ICD-10-CM

## 2023-11-19 DIAGNOSIS — Z98.51 TUBAL LIGATION STATUS: Chronic | ICD-10-CM

## 2023-11-19 DIAGNOSIS — E78.5 HYPERLIPIDEMIA, UNSPECIFIED: ICD-10-CM

## 2023-11-19 DIAGNOSIS — I10 ESSENTIAL (PRIMARY) HYPERTENSION: ICD-10-CM

## 2023-11-19 DIAGNOSIS — Z79.84 LONG TERM (CURRENT) USE OF ORAL HYPOGLYCEMIC DRUGS: ICD-10-CM

## 2023-11-19 DIAGNOSIS — I69.351 HEMIPLEGIA AND HEMIPARESIS FOLLOWING CEREBRAL INFARCTION AFFECTING RIGHT DOMINANT SIDE: ICD-10-CM

## 2023-11-19 DIAGNOSIS — M54.9 DORSALGIA, UNSPECIFIED: ICD-10-CM

## 2023-11-19 DIAGNOSIS — R53.1 WEAKNESS: ICD-10-CM

## 2023-11-19 DIAGNOSIS — Z79.4 LONG TERM (CURRENT) USE OF INSULIN: ICD-10-CM

## 2023-11-19 LAB
ALBUMIN SERPL ELPH-MCNC: 3.1 G/DL — LOW (ref 3.3–5)
ALBUMIN SERPL ELPH-MCNC: 3.1 G/DL — LOW (ref 3.3–5)
ALP SERPL-CCNC: 113 U/L — SIGNIFICANT CHANGE UP (ref 40–120)
ALP SERPL-CCNC: 113 U/L — SIGNIFICANT CHANGE UP (ref 40–120)
ALT FLD-CCNC: 65 U/L — SIGNIFICANT CHANGE UP (ref 12–78)
ALT FLD-CCNC: 65 U/L — SIGNIFICANT CHANGE UP (ref 12–78)
ANION GAP SERPL CALC-SCNC: 6 MMOL/L — SIGNIFICANT CHANGE UP (ref 5–17)
ANION GAP SERPL CALC-SCNC: 6 MMOL/L — SIGNIFICANT CHANGE UP (ref 5–17)
APPEARANCE UR: ABNORMAL
APPEARANCE UR: ABNORMAL
AST SERPL-CCNC: 31 U/L — SIGNIFICANT CHANGE UP (ref 15–37)
AST SERPL-CCNC: 31 U/L — SIGNIFICANT CHANGE UP (ref 15–37)
BACTERIA # UR AUTO: ABNORMAL /HPF
BACTERIA # UR AUTO: ABNORMAL /HPF
BASOPHILS # BLD AUTO: 0.02 K/UL — SIGNIFICANT CHANGE UP (ref 0–0.2)
BASOPHILS # BLD AUTO: 0.02 K/UL — SIGNIFICANT CHANGE UP (ref 0–0.2)
BASOPHILS NFR BLD AUTO: 0.2 % — SIGNIFICANT CHANGE UP (ref 0–2)
BASOPHILS NFR BLD AUTO: 0.2 % — SIGNIFICANT CHANGE UP (ref 0–2)
BILIRUB SERPL-MCNC: 0.3 MG/DL — SIGNIFICANT CHANGE UP (ref 0.2–1.2)
BILIRUB SERPL-MCNC: 0.3 MG/DL — SIGNIFICANT CHANGE UP (ref 0.2–1.2)
BILIRUB UR-MCNC: NEGATIVE — SIGNIFICANT CHANGE UP
BILIRUB UR-MCNC: NEGATIVE — SIGNIFICANT CHANGE UP
BUN SERPL-MCNC: 24 MG/DL — HIGH (ref 7–23)
BUN SERPL-MCNC: 24 MG/DL — HIGH (ref 7–23)
CALCIUM SERPL-MCNC: 8.9 MG/DL — SIGNIFICANT CHANGE UP (ref 8.5–10.1)
CALCIUM SERPL-MCNC: 8.9 MG/DL — SIGNIFICANT CHANGE UP (ref 8.5–10.1)
CHLORIDE SERPL-SCNC: 105 MMOL/L — SIGNIFICANT CHANGE UP (ref 96–108)
CHLORIDE SERPL-SCNC: 105 MMOL/L — SIGNIFICANT CHANGE UP (ref 96–108)
CK MB CFR SERPL CALC: 3.3 NG/ML — SIGNIFICANT CHANGE UP (ref 0.5–3.6)
CK MB CFR SERPL CALC: 3.3 NG/ML — SIGNIFICANT CHANGE UP (ref 0.5–3.6)
CO2 SERPL-SCNC: 30 MMOL/L — SIGNIFICANT CHANGE UP (ref 22–31)
CO2 SERPL-SCNC: 30 MMOL/L — SIGNIFICANT CHANGE UP (ref 22–31)
COLOR SPEC: YELLOW — SIGNIFICANT CHANGE UP
COLOR SPEC: YELLOW — SIGNIFICANT CHANGE UP
CREAT SERPL-MCNC: 0.99 MG/DL — SIGNIFICANT CHANGE UP (ref 0.5–1.3)
CREAT SERPL-MCNC: 0.99 MG/DL — SIGNIFICANT CHANGE UP (ref 0.5–1.3)
DIFF PNL FLD: ABNORMAL
DIFF PNL FLD: ABNORMAL
EGFR: 64 ML/MIN/1.73M2 — SIGNIFICANT CHANGE UP
EGFR: 64 ML/MIN/1.73M2 — SIGNIFICANT CHANGE UP
EOSINOPHIL # BLD AUTO: 0.23 K/UL — SIGNIFICANT CHANGE UP (ref 0–0.5)
EOSINOPHIL # BLD AUTO: 0.23 K/UL — SIGNIFICANT CHANGE UP (ref 0–0.5)
EOSINOPHIL NFR BLD AUTO: 2.1 % — SIGNIFICANT CHANGE UP (ref 0–6)
EOSINOPHIL NFR BLD AUTO: 2.1 % — SIGNIFICANT CHANGE UP (ref 0–6)
EPI CELLS # UR: SIGNIFICANT CHANGE UP
EPI CELLS # UR: SIGNIFICANT CHANGE UP
GLUCOSE BLDC GLUCOMTR-MCNC: 177 MG/DL — HIGH (ref 70–99)
GLUCOSE BLDC GLUCOMTR-MCNC: 177 MG/DL — HIGH (ref 70–99)
GLUCOSE SERPL-MCNC: 204 MG/DL — HIGH (ref 70–99)
GLUCOSE SERPL-MCNC: 204 MG/DL — HIGH (ref 70–99)
GLUCOSE UR QL: NEGATIVE MG/DL — SIGNIFICANT CHANGE UP
GLUCOSE UR QL: NEGATIVE MG/DL — SIGNIFICANT CHANGE UP
HCT VFR BLD CALC: 39.7 % — SIGNIFICANT CHANGE UP (ref 34.5–45)
HCT VFR BLD CALC: 39.7 % — SIGNIFICANT CHANGE UP (ref 34.5–45)
HGB BLD-MCNC: 12.7 G/DL — SIGNIFICANT CHANGE UP (ref 11.5–15.5)
HGB BLD-MCNC: 12.7 G/DL — SIGNIFICANT CHANGE UP (ref 11.5–15.5)
IMM GRANULOCYTES NFR BLD AUTO: 0.6 % — SIGNIFICANT CHANGE UP (ref 0–0.9)
IMM GRANULOCYTES NFR BLD AUTO: 0.6 % — SIGNIFICANT CHANGE UP (ref 0–0.9)
KETONES UR-MCNC: NEGATIVE MG/DL — SIGNIFICANT CHANGE UP
KETONES UR-MCNC: NEGATIVE MG/DL — SIGNIFICANT CHANGE UP
LEUKOCYTE ESTERASE UR-ACNC: ABNORMAL
LEUKOCYTE ESTERASE UR-ACNC: ABNORMAL
LYMPHOCYTES # BLD AUTO: 3.48 K/UL — HIGH (ref 1–3.3)
LYMPHOCYTES # BLD AUTO: 3.48 K/UL — HIGH (ref 1–3.3)
LYMPHOCYTES # BLD AUTO: 31.5 % — SIGNIFICANT CHANGE UP (ref 13–44)
LYMPHOCYTES # BLD AUTO: 31.5 % — SIGNIFICANT CHANGE UP (ref 13–44)
MAGNESIUM SERPL-MCNC: 2 MG/DL — SIGNIFICANT CHANGE UP (ref 1.6–2.6)
MAGNESIUM SERPL-MCNC: 2 MG/DL — SIGNIFICANT CHANGE UP (ref 1.6–2.6)
MCHC RBC-ENTMCNC: 31.3 PG — SIGNIFICANT CHANGE UP (ref 27–34)
MCHC RBC-ENTMCNC: 31.3 PG — SIGNIFICANT CHANGE UP (ref 27–34)
MCHC RBC-ENTMCNC: 32 G/DL — SIGNIFICANT CHANGE UP (ref 32–36)
MCHC RBC-ENTMCNC: 32 G/DL — SIGNIFICANT CHANGE UP (ref 32–36)
MCV RBC AUTO: 97.8 FL — SIGNIFICANT CHANGE UP (ref 80–100)
MCV RBC AUTO: 97.8 FL — SIGNIFICANT CHANGE UP (ref 80–100)
MONOCYTES # BLD AUTO: 0.74 K/UL — SIGNIFICANT CHANGE UP (ref 0–0.9)
MONOCYTES # BLD AUTO: 0.74 K/UL — SIGNIFICANT CHANGE UP (ref 0–0.9)
MONOCYTES NFR BLD AUTO: 6.7 % — SIGNIFICANT CHANGE UP (ref 2–14)
MONOCYTES NFR BLD AUTO: 6.7 % — SIGNIFICANT CHANGE UP (ref 2–14)
NEUTROPHILS # BLD AUTO: 6.5 K/UL — SIGNIFICANT CHANGE UP (ref 1.8–7.4)
NEUTROPHILS # BLD AUTO: 6.5 K/UL — SIGNIFICANT CHANGE UP (ref 1.8–7.4)
NEUTROPHILS NFR BLD AUTO: 58.9 % — SIGNIFICANT CHANGE UP (ref 43–77)
NEUTROPHILS NFR BLD AUTO: 58.9 % — SIGNIFICANT CHANGE UP (ref 43–77)
NITRITE UR-MCNC: NEGATIVE — SIGNIFICANT CHANGE UP
NITRITE UR-MCNC: NEGATIVE — SIGNIFICANT CHANGE UP
NRBC # BLD: 0 /100 WBCS — SIGNIFICANT CHANGE UP (ref 0–0)
NRBC # BLD: 0 /100 WBCS — SIGNIFICANT CHANGE UP (ref 0–0)
PH UR: 6 — SIGNIFICANT CHANGE UP (ref 5–8)
PH UR: 6 — SIGNIFICANT CHANGE UP (ref 5–8)
PLATELET # BLD AUTO: 211 K/UL — SIGNIFICANT CHANGE UP (ref 150–400)
PLATELET # BLD AUTO: 211 K/UL — SIGNIFICANT CHANGE UP (ref 150–400)
POTASSIUM SERPL-MCNC: 4.2 MMOL/L — SIGNIFICANT CHANGE UP (ref 3.5–5.3)
POTASSIUM SERPL-MCNC: 4.2 MMOL/L — SIGNIFICANT CHANGE UP (ref 3.5–5.3)
POTASSIUM SERPL-SCNC: 4.2 MMOL/L — SIGNIFICANT CHANGE UP (ref 3.5–5.3)
POTASSIUM SERPL-SCNC: 4.2 MMOL/L — SIGNIFICANT CHANGE UP (ref 3.5–5.3)
PROT SERPL-MCNC: 6.7 GM/DL — SIGNIFICANT CHANGE UP (ref 6–8.3)
PROT SERPL-MCNC: 6.7 GM/DL — SIGNIFICANT CHANGE UP (ref 6–8.3)
PROT UR-MCNC: 300 MG/DL
PROT UR-MCNC: 300 MG/DL
RBC # BLD: 4.06 M/UL — SIGNIFICANT CHANGE UP (ref 3.8–5.2)
RBC # BLD: 4.06 M/UL — SIGNIFICANT CHANGE UP (ref 3.8–5.2)
RBC # FLD: 15.4 % — HIGH (ref 10.3–14.5)
RBC # FLD: 15.4 % — HIGH (ref 10.3–14.5)
RBC CASTS # UR COMP ASSIST: SIGNIFICANT CHANGE UP /HPF (ref 0–4)
RBC CASTS # UR COMP ASSIST: SIGNIFICANT CHANGE UP /HPF (ref 0–4)
SODIUM SERPL-SCNC: 141 MMOL/L — SIGNIFICANT CHANGE UP (ref 135–145)
SODIUM SERPL-SCNC: 141 MMOL/L — SIGNIFICANT CHANGE UP (ref 135–145)
SP GR SPEC: 1.01 — SIGNIFICANT CHANGE UP (ref 1–1.03)
SP GR SPEC: 1.01 — SIGNIFICANT CHANGE UP (ref 1–1.03)
TROPONIN I, HIGH SENSITIVITY RESULT: 58.5 NG/L — HIGH
TROPONIN I, HIGH SENSITIVITY RESULT: 58.5 NG/L — HIGH
TROPONIN I, HIGH SENSITIVITY RESULT: 72 NG/L — HIGH
TROPONIN I, HIGH SENSITIVITY RESULT: 72 NG/L — HIGH
UROBILINOGEN FLD QL: 0.2 MG/DL — SIGNIFICANT CHANGE UP (ref 0.2–1)
UROBILINOGEN FLD QL: 0.2 MG/DL — SIGNIFICANT CHANGE UP (ref 0.2–1)
WBC # BLD: 11.04 K/UL — HIGH (ref 3.8–10.5)
WBC # BLD: 11.04 K/UL — HIGH (ref 3.8–10.5)
WBC # FLD AUTO: 11.04 K/UL — HIGH (ref 3.8–10.5)
WBC # FLD AUTO: 11.04 K/UL — HIGH (ref 3.8–10.5)
WBC UR QL: >50 /HPF — SIGNIFICANT CHANGE UP (ref 0–5)
WBC UR QL: >50 /HPF — SIGNIFICANT CHANGE UP (ref 0–5)

## 2023-11-19 PROCEDURE — 71045 X-RAY EXAM CHEST 1 VIEW: CPT | Mod: 26

## 2023-11-19 PROCEDURE — 72125 CT NECK SPINE W/O DYE: CPT | Mod: 26,MA

## 2023-11-19 PROCEDURE — 99285 EMERGENCY DEPT VISIT HI MDM: CPT

## 2023-11-19 PROCEDURE — 99223 1ST HOSP IP/OBS HIGH 75: CPT

## 2023-11-19 PROCEDURE — 72128 CT CHEST SPINE W/O DYE: CPT | Mod: 26,MA

## 2023-11-19 PROCEDURE — 70450 CT HEAD/BRAIN W/O DYE: CPT | Mod: 26,MA

## 2023-11-19 PROCEDURE — 72131 CT LUMBAR SPINE W/O DYE: CPT | Mod: 26,MA

## 2023-11-19 PROCEDURE — 93306 TTE W/DOPPLER COMPLETE: CPT | Mod: 26

## 2023-11-19 PROCEDURE — 93010 ELECTROCARDIOGRAM REPORT: CPT

## 2023-11-19 RX ORDER — TRAZODONE HCL 50 MG
2 TABLET ORAL
Qty: 0 | Refills: 0 | DISCHARGE

## 2023-11-19 RX ORDER — METOPROLOL TARTRATE 50 MG
50 TABLET ORAL DAILY
Refills: 0 | Status: DISCONTINUED | OUTPATIENT
Start: 2023-11-19 | End: 2023-11-19

## 2023-11-19 RX ORDER — CEFTRIAXONE 500 MG/1
1000 INJECTION, POWDER, FOR SOLUTION INTRAMUSCULAR; INTRAVENOUS ONCE
Refills: 0 | Status: COMPLETED | OUTPATIENT
Start: 2023-11-19 | End: 2023-11-19

## 2023-11-19 RX ORDER — MORPHINE SULFATE 50 MG/1
4 CAPSULE, EXTENDED RELEASE ORAL ONCE
Refills: 0 | Status: DISCONTINUED | OUTPATIENT
Start: 2023-11-19 | End: 2023-11-19

## 2023-11-19 RX ORDER — INSULIN LISPRO 100/ML
VIAL (ML) SUBCUTANEOUS
Refills: 0 | Status: DISCONTINUED | OUTPATIENT
Start: 2023-11-19 | End: 2023-11-23

## 2023-11-19 RX ORDER — QUETIAPINE FUMARATE 200 MG/1
400 TABLET, FILM COATED ORAL AT BEDTIME
Refills: 0 | Status: DISCONTINUED | OUTPATIENT
Start: 2023-11-19 | End: 2023-11-23

## 2023-11-19 RX ORDER — BENZTROPINE MESYLATE 1 MG
1 TABLET ORAL
Qty: 0 | Refills: 0 | DISCHARGE

## 2023-11-19 RX ORDER — CEFTRIAXONE 500 MG/1
1000 INJECTION, POWDER, FOR SOLUTION INTRAMUSCULAR; INTRAVENOUS EVERY 24 HOURS
Refills: 0 | Status: COMPLETED | OUTPATIENT
Start: 2023-11-20 | End: 2023-11-21

## 2023-11-19 RX ORDER — ASCORBIC ACID 60 MG
500 TABLET,CHEWABLE ORAL DAILY
Refills: 0 | Status: DISCONTINUED | OUTPATIENT
Start: 2023-11-19 | End: 2023-11-23

## 2023-11-19 RX ORDER — SODIUM CHLORIDE 9 MG/ML
1000 INJECTION, SOLUTION INTRAVENOUS
Refills: 0 | Status: DISCONTINUED | OUTPATIENT
Start: 2023-11-19 | End: 2023-11-23

## 2023-11-19 RX ORDER — FUROSEMIDE 40 MG
20 TABLET ORAL DAILY
Refills: 0 | Status: DISCONTINUED | OUTPATIENT
Start: 2023-11-19 | End: 2023-11-21

## 2023-11-19 RX ORDER — METOPROLOL TARTRATE 50 MG
1 TABLET ORAL
Refills: 0 | DISCHARGE

## 2023-11-19 RX ORDER — DEXTROSE 50 % IN WATER 50 %
25 SYRINGE (ML) INTRAVENOUS ONCE
Refills: 0 | Status: DISCONTINUED | OUTPATIENT
Start: 2023-11-19 | End: 2023-11-23

## 2023-11-19 RX ORDER — RISPERIDONE 4 MG/1
2 TABLET ORAL AT BEDTIME
Refills: 0 | Status: DISCONTINUED | OUTPATIENT
Start: 2023-11-19 | End: 2023-11-23

## 2023-11-19 RX ORDER — METOPROLOL TARTRATE 50 MG
50 TABLET ORAL DAILY
Refills: 0 | Status: DISCONTINUED | OUTPATIENT
Start: 2023-11-19 | End: 2023-11-20

## 2023-11-19 RX ORDER — RISPERIDONE 4 MG/1
1 TABLET ORAL
Qty: 0 | Refills: 0 | DISCHARGE

## 2023-11-19 RX ORDER — ATORVASTATIN CALCIUM 80 MG/1
80 TABLET, FILM COATED ORAL AT BEDTIME
Refills: 0 | Status: DISCONTINUED | OUTPATIENT
Start: 2023-11-19 | End: 2023-11-23

## 2023-11-19 RX ORDER — ACETAMINOPHEN 500 MG
650 TABLET ORAL EVERY 6 HOURS
Refills: 0 | Status: DISCONTINUED | OUTPATIENT
Start: 2023-11-19 | End: 2023-11-23

## 2023-11-19 RX ORDER — CLONAZEPAM 1 MG
1 TABLET ORAL AT BEDTIME
Refills: 0 | Status: COMPLETED | OUTPATIENT
Start: 2023-11-19 | End: 2023-11-26

## 2023-11-19 RX ORDER — LANOLIN ALCOHOL/MO/W.PET/CERES
3 CREAM (GRAM) TOPICAL AT BEDTIME
Refills: 0 | Status: DISCONTINUED | OUTPATIENT
Start: 2023-11-19 | End: 2023-11-23

## 2023-11-19 RX ORDER — POTASSIUM CHLORIDE 20 MEQ
1 PACKET (EA) ORAL
Refills: 0 | DISCHARGE

## 2023-11-19 RX ORDER — RISPERIDONE 4 MG/1
0 TABLET ORAL
Qty: 0 | Refills: 0 | DISCHARGE

## 2023-11-19 RX ORDER — ONDANSETRON 8 MG/1
4 TABLET, FILM COATED ORAL EVERY 8 HOURS
Refills: 0 | Status: DISCONTINUED | OUTPATIENT
Start: 2023-11-19 | End: 2023-11-23

## 2023-11-19 RX ORDER — GLUCAGON INJECTION, SOLUTION 0.5 MG/.1ML
1 INJECTION, SOLUTION SUBCUTANEOUS ONCE
Refills: 0 | Status: DISCONTINUED | OUTPATIENT
Start: 2023-11-19 | End: 2023-11-23

## 2023-11-19 RX ORDER — CLONAZEPAM 1 MG
1 TABLET ORAL
Refills: 0 | DISCHARGE

## 2023-11-19 RX ORDER — POTASSIUM CHLORIDE 20 MEQ
20 PACKET (EA) ORAL DAILY
Refills: 0 | Status: DISCONTINUED | OUTPATIENT
Start: 2023-11-19 | End: 2023-11-23

## 2023-11-19 RX ORDER — CLOPIDOGREL BISULFATE 75 MG/1
75 TABLET, FILM COATED ORAL DAILY
Refills: 0 | Status: DISCONTINUED | OUTPATIENT
Start: 2023-11-19 | End: 2023-11-23

## 2023-11-19 RX ORDER — FOLIC ACID 0.8 MG
1 TABLET ORAL DAILY
Refills: 0 | Status: DISCONTINUED | OUTPATIENT
Start: 2023-11-19 | End: 2023-11-23

## 2023-11-19 RX ORDER — RISPERIDONE 4 MG/1
1 TABLET ORAL
Refills: 0 | DISCHARGE

## 2023-11-19 RX ORDER — DEXTROSE 50 % IN WATER 50 %
15 SYRINGE (ML) INTRAVENOUS ONCE
Refills: 0 | Status: DISCONTINUED | OUTPATIENT
Start: 2023-11-19 | End: 2023-11-23

## 2023-11-19 RX ORDER — FAMOTIDINE 10 MG/ML
20 INJECTION INTRAVENOUS DAILY
Refills: 0 | Status: DISCONTINUED | OUTPATIENT
Start: 2023-11-19 | End: 2023-11-23

## 2023-11-19 RX ORDER — INSULIN GLARGINE 100 [IU]/ML
10 INJECTION, SOLUTION SUBCUTANEOUS AT BEDTIME
Refills: 0 | Status: DISCONTINUED | OUTPATIENT
Start: 2023-11-19 | End: 2023-11-22

## 2023-11-19 RX ORDER — DEXTROSE 50 % IN WATER 50 %
12.5 SYRINGE (ML) INTRAVENOUS ONCE
Refills: 0 | Status: DISCONTINUED | OUTPATIENT
Start: 2023-11-19 | End: 2023-11-23

## 2023-11-19 RX ORDER — ZOLPIDEM TARTRATE 10 MG/1
10 TABLET ORAL AT BEDTIME
Refills: 0 | Status: DISCONTINUED | OUTPATIENT
Start: 2023-11-19 | End: 2023-11-22

## 2023-11-19 RX ORDER — QUETIAPINE FUMARATE 200 MG/1
1 TABLET, FILM COATED ORAL
Refills: 0 | DISCHARGE

## 2023-11-19 RX ADMIN — ATORVASTATIN CALCIUM 80 MILLIGRAM(S): 80 TABLET, FILM COATED ORAL at 22:15

## 2023-11-19 RX ADMIN — MORPHINE SULFATE 4 MILLIGRAM(S): 50 CAPSULE, EXTENDED RELEASE ORAL at 17:16

## 2023-11-19 RX ADMIN — Medication 1 MILLIGRAM(S): at 22:16

## 2023-11-19 RX ADMIN — FAMOTIDINE 20 MILLIGRAM(S): 10 INJECTION INTRAVENOUS at 22:16

## 2023-11-19 RX ADMIN — MORPHINE SULFATE 4 MILLIGRAM(S): 50 CAPSULE, EXTENDED RELEASE ORAL at 19:21

## 2023-11-19 RX ADMIN — Medication 50 MILLIGRAM(S): at 22:17

## 2023-11-19 RX ADMIN — CEFTRIAXONE 100 MILLIGRAM(S): 500 INJECTION, POWDER, FOR SOLUTION INTRAMUSCULAR; INTRAVENOUS at 16:18

## 2023-11-19 RX ADMIN — QUETIAPINE FUMARATE 400 MILLIGRAM(S): 200 TABLET, FILM COATED ORAL at 22:25

## 2023-11-19 RX ADMIN — Medication 500 MILLIGRAM(S): at 22:15

## 2023-11-19 RX ADMIN — Medication 1 MILLIGRAM(S): at 22:25

## 2023-11-19 RX ADMIN — Medication 650 MILLIGRAM(S): at 23:52

## 2023-11-19 RX ADMIN — Medication 10 MILLIGRAM(S): at 22:16

## 2023-11-19 RX ADMIN — Medication 3 MILLIGRAM(S): at 23:52

## 2023-11-19 RX ADMIN — RISPERIDONE 2 MILLIGRAM(S): 4 TABLET ORAL at 22:25

## 2023-11-19 RX ADMIN — Medication 20 MILLIGRAM(S): at 22:16

## 2023-11-19 RX ADMIN — INSULIN GLARGINE 10 UNIT(S): 100 INJECTION, SOLUTION SUBCUTANEOUS at 22:24

## 2023-11-19 RX ADMIN — CLOPIDOGREL BISULFATE 75 MILLIGRAM(S): 75 TABLET, FILM COATED ORAL at 22:16

## 2023-11-19 NOTE — H&P ADULT - NSICDXPASTSURGICALHX_GEN_ALL_CORE_FT
PAST SURGICAL HISTORY:  H/O  section     H/O hemorrhoidectomy     H/O tubal ligation      PAST SURGICAL HISTORY:  H/O  section     H/O hemorrhoidectomy     H/O tubal ligation     S/P percutaneous endoscopic gastrostomy (PEG) tube placement

## 2023-11-19 NOTE — ED ADULT NURSE NOTE - NSFALLHARMRISKINTERV_ED_ALL_ED
Assistance OOB with selected safe patient handling equipment if applicable/Assistance with ambulation/Communicate risk of Fall with Harm to all staff, patient, and family/Monitor for mental status changes and reorient to person, place, and time, as needed/Move patient closer to nursing station/within visual sight of ED staff/Provide patient with walking aids/Provide visual cue: red socks, yellow wristband, yellow gown, etc/Reinforce activity limits and safety measures with patient and family/Toileting schedule using arm’s reach rule for commode and bathroom/Use of alarms - bed, stretcher, chair and/or video monitoring/Bed in lowest position, wheels locked, appropriate side rails in place/Call bell, personal items and telephone in reach/Instruct patient to call for assistance before getting out of bed/chair/stretcher/Non-slip footwear applied when patient is off stretcher/[place]richard to call system/Physically safe environment - no spills, clutter or unnecessary equipment/Purposeful Proactive Rounding/Room/bathroom lighting operational, light cord in reach

## 2023-11-19 NOTE — ED PROVIDER NOTE - CARE PLAN
Principal Discharge DX:	Fall   1 Principal Discharge DX:	Back pain  Secondary Diagnosis:	Emphysematous cystitis

## 2023-11-19 NOTE — ED ADULT NURSE NOTE - OBJECTIVE STATEMENT
Pt BIBEMS c/o back pain. Pt AOx2, disoriented to time, states she was being placed in bathtub by aide and hit back onto bathtub. Pt states she heard "a pop." Pt poor historian, unable to answer all questions, no family at bedside at this time. Pt denies SOB, fever/chills, N/V/D, or abdominal pain. PMH CVA with R sided residual weakness, DM. Pt noted with PEG tube in place, dark liquid in PEG noted. Skin reddened all over body. Unstageable pressure injury to R heel noted.

## 2023-11-19 NOTE — ED ADULT NURSE NOTE - NEURO BEHAVIOR
carolyn      Name:     AASHISH SALEH   MRN:      [id number]        Account:        [id number]   :      1941           Admit Date:                                       Discharge Date:       Document: [id number]       cc: Yovani Germain MD    calm

## 2023-11-19 NOTE — H&P ADULT - NSICDXPASTMEDICALHX_GEN_ALL_CORE_FT
PAST MEDICAL HISTORY:  Anxiety     History of CVA with residual deficit     Hypercholesteremia     Hypertension     Insulin dependent type 2 diabetes mellitus     Schizophrenia

## 2023-11-19 NOTE — H&P ADULT - ASSESSMENT
Bushra Haley is a 62 year old female with PMHx of HTN, HLD, IDDM2, anxiety/depression, schizophrenia, and hx of CVA (with residual dysarthria and R. hemiparesis) who presented to the ED on 11/19/23 for complaints of back pain and admitted for sepsis secondary to UTI.    Sepsis secondary to UTI, concerns for emphysematous cystitis  Caregiver reports noticing reddish/brownish discharge in pull-ups about a month ago  Patient typically has 3-4 UTIs per year as per son  , RR 22, WBC 11.04K on admission  U/A with large blood, moderate leuks, negative nitrites, WBC > 50, RBC 11-25, many bacteria  CT T-spine and L-spine with findings concerning for emphysematous cystitis with multiple circumferential foci of gas along the urinary bladder wall  S/p ceftriaxone in the ED, continued  F/u blood cultures (although drawn after receiving abx as they were not ordered by ER physician), urine culture  Monitor fever curve and WBC trend  Will need repeat U/A after infection resolves  Urology recommendations appreciated no emergent urologic intervention    Back pain secondary to back injury  Reports she heard her "back pop" yesterday while caregiver was transferring her from shower chair to wheelchair, no fall   NCHCT without interval change in 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 No acute intracranial bleeding  CT C-spine with age-indeterminate concavity of the C7 and T1 superior and inferior endplates  CT T-spine and L-spine without acute fracture  S/p morphine 8 mg IV in the ED  Pain management PRN    Demand ischemia secondary to above, worsening  EKG without signs of ischemia  Trop 58.5, now 72  F/u serial trops  Consider echocardiogram if troponin continued to uptrend      Chronic medical conditions:   HTN, currently uncontrolled likely secondary to pain: /111 on admission, PTA metoprolol succinate, anticipate improvement in BP with pain control  HLD: PTA atorvastatin  IDDM2 with hyperglycemia: blood glucose 204 on admission, POC qac and qhs, PTA Lantus 10 U qhs, SSI, blood glucose goal < 180, f/u A1c  Anxiety: PTA clonazepam  Schizophrenia: PTA risperidone, quetiapine  Hx of CVA (with residual dysarthria and R. hemiparesis): PTA clopidogrel, atorvastatin     Unsure why but patient is on Bactrim 800-160 mg three times weekly. 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 Bactrim held for now.    Medication reconciliation completed with med list provided from caregiver.  Plan of care discussed with caregiver at bedside. Bushra Haley is a 62 year old female with PMHx of HTN, HLD, IDDM2, anxiety/depression, schizophrenia, and hx of CVA (with residual dysarthria and R. hemiparesis) who presented to the ED on 11/19/23 for complaints of back pain and admitted for sepsis secondary to UTI.    Sepsis secondary to UTI, concerns for emphysematous cystitis  Caregiver reports noticing reddish/brownish discharge in pull-ups about a month ago  Patient typically has 3-4 UTIs per year as per son  , RR 22, WBC 11.04K on admission  U/A with large blood, moderate leuks, negative nitrites, WBC > 50, RBC 11-25, many bacteria  CT T-spine and L-spine with findings concerning for emphysematous cystitis with multiple circumferential foci of gas along the urinary bladder wall  S/p ceftriaxone in the ED, continued  F/u blood cultures (although drawn after receiving abx as they were not ordered by ER physician), urine culture  Monitor fever curve and WBC trend  Will need repeat U/A after infection resolves  Urology recommendations appreciated no emergent urologic intervention    Back pain secondary to back injury  Reports she heard her "back pop" yesterday while caregiver was transferring her from shower chair to wheelchair, no fall   NCHCT without interval change in 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 No acute intracranial bleeding  CT C-spine with age-indeterminate concavity of the C7 and T1 superior and inferior endplates  CT T-spine and L-spine without acute fracture  S/p morphine 8 mg IV in the ED  Pain management PRN    Demand ischemia secondary to above, worsening  EKG without signs of ischemia  Trop 58.5, now 72  CT T-spine and L-spine with cardiomegaly and coronary arteriosclerosis  F/u serial trops, echocardiogram as recommended by radiology  Telemetry      Chronic medical conditions:   HTN, currently uncontrolled likely secondary to pain: /111 on admission, PTA metoprolol succinate, anticipate improvement in BP with pain control  HLD: PTA atorvastatin  IDDM2 with hyperglycemia: blood glucose 204 on admission, POC qac and qhs, PTA Lantus 10 U qhs, SSI, blood glucose goal < 180, f/u A1c  Anxiety: PTA clonazepam  Schizophrenia: PTA risperidone, quetiapine  Hx of CVA (with residual dysarthria and R. hemiparesis): PTA clopidogrel, atorvastatin     Unsure why but patient is on Bactrim 800-160 mg three times weekly. 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 Bactrim held for now.    Medication reconciliation completed with med list provided from caregiver.  Plan of care discussed with caregiver at bedside. Bushra Haley is a 62 year old female with PMHx of HTN, HLD, IDDM2, anxiety/depression, schizophrenia, and hx of CVA (with residual dysarthria and R. hemiparesis) who presented to the ED on 11/19/23 for complaints of back pain and admitted for sepsis secondary to UTI.    Sepsis secondary to UTI, concerns for emphysematous cystitis  Caregiver reports noticing reddish/brownish discharge in pull-ups about a month ago  Patient typically has 3-4 UTIs per year as per son  , RR 22, WBC 11.04K on admission  U/A with large blood, moderate leuks, negative nitrites, WBC > 50, RBC 11-25, many bacteria  CT T-spine and L-spine with findings concerning for emphysematous cystitis with multiple circumferential foci of gas along the urinary bladder wall  S/p ceftriaxone in the ED, continued  F/u blood cultures (although drawn after receiving abx as they were not ordered by ER physician), urine culture  Monitor fever curve and WBC trend  Will need repeat U/A after infection resolves  Urology recommendations appreciated no emergent urologic intervention    Back pain secondary to back injury  Reports she heard her "back pop" yesterday while caregiver was transferring her from shower chair to wheelchair, no fall   NCHCT without interval change in 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 No acute intracranial bleeding  CT C-spine with age-indeterminate concavity of the C7 and T1 superior and inferior endplates  CT T-spine and L-spine without acute fracture  S/p morphine 8 mg IV in the ED  Pain management PRN    Demand ischemia secondary to above, worsening  EKG without signs of ischemia  Trop 58.5, now 72  CT T-spine and L-spine with cardiomegaly and coronary arteriosclerosis  F/u serial trops, echocardiogram as recommended by radiology  Telemetry      Chronic medical conditions:   HTN, currently uncontrolled likely secondary to pain: /111 on admission, PTA metoprolol tartrate, anticipate improvement in BP with pain control  HLD: PTA atorvastatin  IDDM2 with hyperglycemia: blood glucose 204 on admission, POC qac and qhs, PTA Lantus 10 U qhs, SSI, blood glucose goal < 180, f/u A1c  Anxiety: PTA clonazepam  Schizophrenia: PTA risperidone, quetiapine  Hx of CVA (with residual dysarthria and R. hemiparesis): PTA clopidogrel, atorvastatin     Unsure why but patient is on Bactrim 800-160 mg three times weekly. 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 Bactrim held for now.    Medication reconciliation completed with med list provided from caregiver.  Plan of care discussed with caregiver at bedside.

## 2023-11-19 NOTE — ED PROVIDER NOTE - CAREGIVER/GUARDIAN DETAILS FREE TEXT FOR MDM ADDL HISTORY OBTAINED FROM QUESTION
Margaret (home aide) states patient was not able to sleep and she is normally not in this amount of pain

## 2023-11-19 NOTE — H&P ADULT - NSHPPHYSICALEXAM_GEN_ALL_CORE
T(C): 36.7 (11-19-23 @ 20:51), Max: 36.8 (11-19-23 @ 16:59)  HR: 90 (11-19-23 @ 20:51) (88 - 101)  BP: 150/90 (11-19-23 @ 20:51) (100/82 - 191/111)  RR: 18 (11-19-23 @ 20:51) (16 - 22)  SpO2: 100% (11-19-23 @ 20:51) (96% - 100%)    CONSTITUTIONAL: Well groomed  EYES: PERRLA and symmetric, EOMI  ENMT: Oral mucosa with moist membranes  RESP: No respiratory distress, no use of accessory muscles; CTA b/l  CV: tachycardic  GI: Soft, NT, ND, LUQ PEG tube in place (does not use it as per caregiver)  SKIN: R. heel unstageable ulcer T(C): 36.7 (11-19-23 @ 20:51), Max: 36.8 (11-19-23 @ 16:59)  HR: 90 (11-19-23 @ 20:51) (88 - 101)  BP: 150/90 (11-19-23 @ 20:51) (100/82 - 191/111)  RR: 18 (11-19-23 @ 20:51) (16 - 22)  SpO2: 100% (11-19-23 @ 20:51) (96% - 100%)    CONSTITUTIONAL: Well groomed  EYES: PERRLA and symmetric  ENMT: Oral mucosa with moist membranes  RESP: No respiratory distress, no use of accessory muscles; CTA b/l  CV: tachycardic  GI: Soft, NT, ND, LUQ PEG tube in place (does not use it as per caregiver)  SKIN: R. heel unstageable ulcer, lower back mildly tender to palpation

## 2023-11-19 NOTE — ED PROVIDER NOTE - NS ED ROS FT
General: Denies fever, chills  HEENT: Denies sensory changes, sore throat  Neck: Denies neck pain, neck stiffness  Resp: Denies coughing, SOB  Cardiovascular: Denies CP, palpitations, LE edema  GI: Denies nausea, vomiting, abdominal pain, diarrhea, constipation, blood in stool  : Denies dysuria, hematuria, frequency, incontinence  MSK: + back pain  Neuro: Denies HA, dizziness, numbness, weakness  Skin: Denies rashes.

## 2023-11-19 NOTE — ED ADULT TRIAGE NOTE - CHIEF COMPLAINT QUOTE
as per ems, pt ambulates with assistance, c/o upper back pain started yesterday, heard a "pop". as per ems, pt ambulates with assistance, c/o upper back pain started yesterday, heard a "pop". denies injury. hx: CVA, takes rx: plavix

## 2023-11-19 NOTE — CONSULT NOTE ADULT - ASSESSMENT
62F here with back pain after getting into shower (she is non ambulatory)  CT shows emphysematous cystitis  Pt afebrile, labs show UTI.  Recommend PVR, unclear why she is getting frequent UTI's  Treat UTI with abx  No emergent urologic intervention  Discussed with Dr Steinberg

## 2023-11-19 NOTE — ED PROVIDER NOTE - OBJECTIVE STATEMENT
62 F pmh HTN, HLD, DM, CVA w/ R residual weakness (non-ambulatory) presenting to the ED for upper back pain. Pt states that she was being moved by her home aide yesterday and she hit her back on the edge of the tub. Pt has had pain in her back since. She denies chest pain, shortness of breath. 62 F pmh HTN, HLD, DM, CVA w/ R residual weakness (non-ambulatory) presenting to the ED for upper back pain. Pt states that she was being moved by her home aide yesterday and she felt a pop and she has had pain in her back since. She denies chest pain, shortness of breath.

## 2023-11-19 NOTE — H&P ADULT - NSHPLABSRESULTS_GEN_ALL_CORE
12.7    )-----------( 211      ( 2023 11:52 )             39.7       141  |  105  |  24<H>  ----------------------------<  204<H>  4.2   |  30  |  0.99    Ca    8.9      2023 11:52  Mg     2.0         TPro  6.7  /  Alb  3.1<L>  /  TBili  0.3  /  DBili  x   /  AST  31  /  ALT  65  /  AlkPhos  113      Urinalysis Basic - ( 2023 14:04 )    Color: Yellow / Appearance: Cloudy / S.015 / pH: x  Gluc: x / Ketone: Negative mg/dL  / Bili: Negative / Urobili: 0.2 mg/dL   Blood: x / Protein: 300 mg/dL / Nitrite: Negative   Leuk Esterase: Moderate / RBC: 11-25 /HPF / WBC >50 /HPF   Sq Epi: x / Non Sq Epi: x / Bacteria: Many /HPF    CT head 23  IMPRESSION:  No interval change in 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 No acute intracranial bleeding.    CT C-spine without contrast 23  IMPRESSION:  Age-indeterminate concavity of the C7 and T1 superior and inferior endplates. Correlate with site of pain.    CT T-spine and L-spine without contrast:  IMPRESSION:   Findings concerning for emphysematous cystitis with multiple circumferential foci of gas along the urinary bladder wall. Correlate with urinalysis and risk factors, such as diabetes. Consider urologic evaluation.    Age-indeterminate concavity of the L1-L5 superior endplates. Correlate with site of pain.    Cardiomegaly and coronary arteriosclerosis. Consider cardiac CTA and/or echocardiogram.    Findings and recommendations were discussed with Dr. Barrera at 1:14 PM on 2023 via [employer] Teams, who indicated that the communication was understood.

## 2023-11-19 NOTE — ED PROVIDER NOTE - PHYSICAL EXAMINATION
General: Well appearing female in no acute distress  HEENT: Normocephalic, atraumatic. Moist mucous membranes. Oropharynx clear. No lymphadenopathy.  Eyes: No scleral icterus. EOMI. CIRILO.  Neck:. Soft and supple. Full ROM without pain. No midline tenderness  Cardiac: Regular rate and regular rhythm. No murmurs, rubs, gallops. Peripheral pulses 2+ and symmetric. No LE edema.  Resp: Lungs CTAB. Speaking in full sentences. No wheezes, rales or rhonchi.  Abd: Soft, non-tender, non-distended. No guarding or rebound. No scars, masses, or lesions.  Back: Spine midline R paraspinal thoracic back pain.    Skin: diffuse erythema and blisters along body (chronic)  Neuro: AO x 3. R side weakness

## 2023-11-19 NOTE — H&P ADULT - HISTORY OF PRESENT ILLNESS
Bushra Haley is a 62 year old female with PMHx of HTN, HLD, IDDM2, anxiety/depression, schizophrenia, and hx of CVA (with residual dysarthria and R. hemiparesis) who presented to the ED on 11/19/23 for complaints of back pain.    Patient reports she started having back pain yesterday. A different caregiver yesterday was transferring her from her shower chair to her wheelchair when all of a sudden, patient heard "her back pop." Severity was 10/10. Today, she was crying out in pain so the caregiver today called her son. Son called EMS to bring her to the hospital for further evaluation. Of note, caregiver states that patient had reddish brownish discharge in her pull-ups approximately one month ago. They have been trying to get an appointment with gynecology but have not yet been able to do so. Not up to date on Pap smear as per caregiver.    Of note, patient has a PEG tube which is supposed to be removed but has not been able to do so yet since she is on clopidogrel. Caregiver does not know when patient is scheduled for PEG tube removal. Patient does not use the PEG tube for feeds or for medications as she is able to tolerate PO as per caregiver. Baseline ambulation status is nonambulatory.    In the ED, afebrile, HR as elevated as 101, BP as elevated as 191/111, and RR as elevated as 22. WBC 11.04K, blood glucose 204. Troponin 58.5 and then 72. U/A with large blood, moderate leuks, negative nitrites, WBC > 50, RBC 11-25, many bacteria. NCHCT without interval change in 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 No acute intracranial bleeding. CT C-spine with age-indeterminate concavity of the C7 and T1 superior and inferior endplates. 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 Received ceftriaxone 1g IV, morphine 8 mg IV. Not hypoxic but placed on nasal cannula by ER RN.

## 2023-11-19 NOTE — ED PROVIDER NOTE - FAMILY DETAILS FREE TEXT FOR MDM ADDL HISTORY OBTAINED FROM QUESTION
Patient completed a home sleep study on 9/26/2023 that revealed moderate ERIC with overall AHI of 24.  Supine AHI was 26.  Her oxygen saturation was below 89% for 25 minutes with oxygen desaturation to a minimum of 72%.  She did have 1 episode of profound bradycardia during sleep.  PAP therapy was ordered but patient has not started it yet.  She went to the DME company and tried on a mask and was unable to tolerate the pressure.  She decided not to try PAP therapy at that time due to fear that she would not be able to tolerate it and have to pay for the device.    - Stressed the importance of treating ERIC and encouraged her to try PAP therapy.  We contacted DME company who will reach out to her and set up an appointment.  - Follow-up in 2 months for an ERIC compliance visit.   son states patient has been having pain all night

## 2023-11-19 NOTE — ED PROVIDER NOTE - CLINICAL SUMMARY MEDICAL DECISION MAKING FREE TEXT BOX
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

## 2023-11-19 NOTE — ED ADULT NURSE NOTE - CHIEF COMPLAINT QUOTE
as per ems, pt ambulates with assistance, c/o upper back pain started yesterday, heard a "pop". denies injury. hx: CVA, takes rx: plavix

## 2023-11-20 LAB
GLUCOSE BLDC GLUCOMTR-MCNC: 157 MG/DL — HIGH (ref 70–99)
GLUCOSE BLDC GLUCOMTR-MCNC: 157 MG/DL — HIGH (ref 70–99)
GLUCOSE BLDC GLUCOMTR-MCNC: 198 MG/DL — HIGH (ref 70–99)
GLUCOSE BLDC GLUCOMTR-MCNC: 198 MG/DL — HIGH (ref 70–99)
GLUCOSE BLDC GLUCOMTR-MCNC: 334 MG/DL — HIGH (ref 70–99)
GLUCOSE BLDC GLUCOMTR-MCNC: 334 MG/DL — HIGH (ref 70–99)
GLUCOSE BLDC GLUCOMTR-MCNC: 335 MG/DL — HIGH (ref 70–99)
GLUCOSE BLDC GLUCOMTR-MCNC: 335 MG/DL — HIGH (ref 70–99)
TROPONIN I, HIGH SENSITIVITY RESULT: 58.4 NG/L — HIGH
TROPONIN I, HIGH SENSITIVITY RESULT: 58.4 NG/L — HIGH

## 2023-11-20 PROCEDURE — 72100 X-RAY EXAM L-S SPINE 2/3 VWS: CPT | Mod: 26

## 2023-11-20 PROCEDURE — 72070 X-RAY EXAM THORAC SPINE 2VWS: CPT | Mod: 26

## 2023-11-20 PROCEDURE — 99233 SBSQ HOSP IP/OBS HIGH 50: CPT

## 2023-11-20 PROCEDURE — 93306 TTE W/DOPPLER COMPLETE: CPT | Mod: 26

## 2023-11-20 PROCEDURE — 72040 X-RAY EXAM NECK SPINE 2-3 VW: CPT | Mod: 26

## 2023-11-20 RX ORDER — METOPROLOL TARTRATE 50 MG
50 TABLET ORAL DAILY
Refills: 0 | Status: DISCONTINUED | OUTPATIENT
Start: 2023-11-20 | End: 2023-11-23

## 2023-11-20 RX ADMIN — Medication 10 MILLIGRAM(S): at 05:34

## 2023-11-20 RX ADMIN — Medication 1 MILLIGRAM(S): at 11:47

## 2023-11-20 RX ADMIN — ZOLPIDEM TARTRATE 10 MILLIGRAM(S): 10 TABLET ORAL at 23:56

## 2023-11-20 RX ADMIN — CLOPIDOGREL BISULFATE 75 MILLIGRAM(S): 75 TABLET, FILM COATED ORAL at 11:47

## 2023-11-20 RX ADMIN — Medication 20 MILLIEQUIVALENT(S): at 11:47

## 2023-11-20 RX ADMIN — INSULIN GLARGINE 10 UNIT(S): 100 INJECTION, SOLUTION SUBCUTANEOUS at 22:22

## 2023-11-20 RX ADMIN — Medication 1: at 11:48

## 2023-11-20 RX ADMIN — CEFTRIAXONE 100 MILLIGRAM(S): 500 INJECTION, POWDER, FOR SOLUTION INTRAMUSCULAR; INTRAVENOUS at 17:36

## 2023-11-20 RX ADMIN — FAMOTIDINE 20 MILLIGRAM(S): 10 INJECTION INTRAVENOUS at 11:48

## 2023-11-20 RX ADMIN — Medication 50 MILLIGRAM(S): at 05:34

## 2023-11-20 RX ADMIN — QUETIAPINE FUMARATE 400 MILLIGRAM(S): 200 TABLET, FILM COATED ORAL at 23:55

## 2023-11-20 RX ADMIN — Medication 650 MILLIGRAM(S): at 00:50

## 2023-11-20 RX ADMIN — ATORVASTATIN CALCIUM 80 MILLIGRAM(S): 80 TABLET, FILM COATED ORAL at 23:55

## 2023-11-20 RX ADMIN — RISPERIDONE 2 MILLIGRAM(S): 4 TABLET ORAL at 23:55

## 2023-11-20 RX ADMIN — Medication 500 MILLIGRAM(S): at 11:47

## 2023-11-20 RX ADMIN — Medication 20 MILLIGRAM(S): at 05:34

## 2023-11-20 RX ADMIN — Medication 1 MILLIGRAM(S): at 21:16

## 2023-11-20 NOTE — SWALLOW BEDSIDE ASSESSMENT ADULT - MODE OF PRESENTATION
[facility] Emergency Department Provider Note                   PCP:                Kirill Michael, APRN - CNP               Age: 33 y.o. Sex: female     No diagnosis found. DISPOSITION         New Prescriptions    No medications on file       No orders of the defined types were placed in this encounter. MDM  Number of Diagnoses or Management Options  Breast abscess  Diagnosis management comments: Bedside ultrasound reveals a fluctuant pocket of fluid. We will discharge her home with antibiotics and pain medicine and encouraged her to follow-up with a surgeon for further evaluation. Mirian Morgan is a 33 y.o. female who presents to the Emergency Department with chief complaint of    Chief Complaint   Patient presents with    Abscess      76-year-old lady presents with concerns that an abscess near her right nipple. She says she went to an urgent care and they sent her here for further evaluation. She says it has become painful and swollen over the last several days. She reports a history of previously having had a piercing through that nipple but says it got snagged on her hair few days ago and got yanked on quite painfully. She thinks that may have triggered the abscess. She denies any fevers or chills. No history of breast surgery. No other associated symptoms. Elements of this note were created using speech recognition software. As such, errors of speech recognition may be present. Review of Systems   Constitutional: Negative for chills and fever. Respiratory: Negative for cough and shortness of breath. Gastrointestinal: Negative for nausea and vomiting. Skin: Positive for color change. Right breast abscess   Neurological: Negative for dizziness and light-headedness. History reviewed. No pertinent past medical history. History reviewed. No pertinent surgical history. History reviewed. No pertinent family history.         Social straw/fed by clinician Connections:     Frequency of Communication with Friends and Family: Not on file    Frequency of Social Gatherings with Friends and Family: Not on file    Attends Judaism Services: Not on file    Active Member of Clubs or Organizations: Not on file    Attends Club or Organization Meetings: Not on file    Marital Status: Not on file        No Known Allergies     Vitals signs and nursing note reviewed. Patient Vitals for the past 4 hrs:   Temp Pulse Resp BP SpO2   05/24/22 2012 98.5 °F (36.9 °C) (!) 118 16 122/75 99 %          Physical Exam  Vitals and nursing note reviewed. Constitutional:       Appearance: Normal appearance. Cardiovascular:      Rate and Rhythm: Regular rhythm. Comments: Mild tachycardia  Skin:     Comments: Fluctuant abscess deep to her right nipple with mild erythema no nipple drainage. Neurological:      Mental Status: She is alert. Incision/Drainage    Date/Time: 5/24/2022 10:02 PM  Performed by: Conor Arnold MD  Authorized by: Conor Arnold MD     Consent:     Consent obtained:  Verbal    Consent given by:  Patient    Risks discussed:  Bleeding and incomplete drainage    Alternatives discussed:  No treatment  Location:     Type:  Abscess    Size:  2cm    Location: Right breast.  Pre-procedure details:     Skin preparation:  Betadine  Anesthesia (see MAR for exact dosages): Anesthesia method:  Local infiltration    Local anesthetic:  Lidocaine 1% WITH epi  Procedure type:     Complexity:  Simple  Procedure details:     Needle aspiration: no      Incision types:  Single straight    Incision depth:  Subcutaneous    Scalpel blade:  11    Wound management:  Probed and deloculated    Drainage:  Purulent    Drainage amount: Moderate    Packing materials:  None  Post-procedure details:     Patient tolerance of procedure:   Tolerated well, no immediate complications        Labs Reviewed - No data to display     No orders to display            Josse Coma Scale  Eye Opening: Spontaneous  Best Verbal Response: Oriented  Best Motor Response: Obeys commands  Josse Coma Scale Score: 15                     Voice dictation software was used during the making of this note. This software is not perfect and grammatical and other typographical errors may be present. This note has not been completely proofread for errors.        Conor Arnold MD  05/24/22 1258 spoon/fed by clinician

## 2023-11-20 NOTE — SWALLOW BEDSIDE ASSESSMENT ADULT - ORAL PHASE
Within functional limits Within functional limits/Decreased anterior-posterior movement of the bolus/Delayed oral transit time Within functional limits/Decreased anterior-posterior movement of the bolus/Delayed oral transit time/Lingual stasis Decreased anterior-posterior movement of the bolus/Delayed oral transit time/Lingual stasis

## 2023-11-20 NOTE — SWALLOW BEDSIDE ASSESSMENT ADULT - COMMENTS
CT BRAIN IMPRESSION (11/19/23): No interval change in 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 No acute intracranial bleeding.     CHEST XRAY 11/19/23: IMPRESSION:   No radiographic evidence of active chest disease.

## 2023-11-20 NOTE — SWALLOW BEDSIDE ASSESSMENT ADULT - PHARYNGEAL PHASE
Within functional limits Delayed pharyngeal swallow/Within functional limits Delayed pharyngeal swallow

## 2023-11-20 NOTE — PHARMACOTHERAPY INTERVENTION NOTE - COMMENTS
Spoke to pharmacist at [facility] pharmacy and confirmed the medication list.       Last date filled on 11/17/23 as follows:  Bactrim  mg-160 mg oral tablet, 1 tab(s) orally 3 times a week, 84 days supply  Potassium Chloride (Eqv-Klor-Con M20) 10 mEq oral tablet, 1 tab(s) orally once a day    Last date filled on 11/16/23 as follows:  metoprolol tartrate 50 mg oral tablet, 1 tab(s) orally twice daily, 30 days    Last date filled on 11/15/23 as follows:  QUEtiapine 400 mg oral tablet, 1 tab(s) orally once a day (at bedtime), 90 days    Last date filled on 10/18/23 as follows:  zolpidem 10 mg oral tablet, 1 tab(s) orally once a day (at bedtime), 30 days  KlonoPIN 1 mg oral tablet: 2 tab(s) orally once a day (at bedtime), 30 days    Last date filled on 10/4/23 as follows:  risperiDONE 2 mg oral tablet, 1 tab(s) orally once a day (at bedtime), 90 days     Last date filled on 9/22/23 as follows  Lantus Basaglar Pen 100 units/mL subcutaneous solution: Last Dose Taken, 10 unit(s) subcutaneous once a day (at bedtime)  folic acid 1 mg oral tablet, 1 tab(s) orally once a day, 90 days  furosemide 20 mg oral tablet, 1 tab(s) orally once a day, 90 days    Last date filled on 8/23/23  Lipitor 80 mg oral tablet, 1 tab(s) orally once a day (at bedtime), 90 days    Last date filled on 8/22/23   Prednisone 10 mg, 1 tab orally once a day, 90 days    Last date filled on 8/14/23  HumuLIN R 100 units/mL injectable solution: sliding scale, injectable 3 times a day WITH MEALS  famotidine 20 mg oral tablet, 1 tab(s) orally twice a day, 90 days

## 2023-11-20 NOTE — SWALLOW BEDSIDE ASSESSMENT ADULT - H & P REVIEW
62 year old female with PMHx of HTN, HLD, IDDM2, anxiety/depression, schizophrenia, and hx of CVA (with residual dysarthria and R. hemiparesis) who presented to the ED on 11/19/23 for complaints of back pain.Of note, patient has a PEG tube which is supposed to be removed but has not been able to do so yet since she is on clopidogrel. Caregiver does not know when patient is scheduled for PEG tube removal. Patient does not use the PEG tube for feeds or for medications as she is able to tolerate PO as per caregiver./yes

## 2023-11-20 NOTE — CONSULT NOTE ADULT - SUBJECTIVE AND OBJECTIVE BOX
Chief Complaint:  Patient is a 62y old  Female who presents with a chief complaint of back pain    HPI:  62F with PMH of HTN, HLD, DM, CVA w/ R residual weakness (non-ambulatory) presenting to the ED for upper back pain since yesterday.  Son is bedside and helps with hx.  Yesterday as pt was getting into shower with assistance of home health aide, health aide heard a loud crack.  Pt reports back pain since this event.  Pain is b/l mid thoracic area.  CT scan shows incidental finding of emphysematous cystitis.  No further information given on kidneys or ureters.  Son says pt gets UTI 3-4 times a year.  Pt denies any fevers or pain urinating.      PMH/PSH:PAST MEDICAL & SURGICAL HISTORY:  Anxiety      Hypertension      Type 2 diabetes mellitus      Hypercholesteremia      Uterine fibroid      H/O tubal ligation      H/O hemorrhoidectomy      H/O  section          Allergies:  erythromycin (Unknown)      Medications:  morphine  - Injectable 4 milliGRAM(s) IV Push Once      REVIEW OF SYSTEMS:  All other review of systems is negative unless indicated above.    Relevant Family History:   FAMILY HISTORY:  FH: type 2 diabetes        Relevant Social History:  Denies ETOH or tobacco history    Physical Exam:    Vital Signs:  Vital Signs Last 24 Hrs  T(C): 36.3 (2023 11:03), Max: 36.3 (2023 11:03)  T(F): 97.4 (2023 11:03), Max: 97.4 (2023 11:03)  HR: 92 (2023 12:16) (91 - 101)  BP: 100/82 (2023 12:16) (100/82 - 191/111)  BP(mean): --  RR: 19 (2023 12:16) (18 - 21)  SpO2: 96% (2023 12:16) (96% - 98%)    Parameters below as of 2023 12:16  Patient On (Oxygen Delivery Method): room air      Daily Height in cm: 149.86 (2023 11:03)    Daily     Constitutional: NAD, non toxic  Respiratory: Normal work of breathing  Cardiac:: RRR  Gastrointestinal: soft, NT/ND; no rebound or guarding; obese belly  Musculoskeletal:  no CVA tenderness  Neurologic: AAOx3;     Laboratory:                          12.7   11.04 )-----------( 211      ( 2023 11:52 )             39.7         141  |  105  |  24<H>  ----------------------------<  204<H>  4.2   |  30  |  0.99    Ca    8.9      2023 11:52  Mg     2.0         TPro  6.7  /  Alb  3.1<L>  /  TBili  0.3  /  DBili  x   /  AST  31  /  ALT  65  /  AlkPhos  113      LIVER FUNCTIONS - ( 2023 11:52 )  Alb: 3.1 g/dL / Pro: 6.7 gm/dL / ALK PHOS: 113 U/L / ALT: 65 U/L / AST: 31 U/L / GGT: x             Urinalysis Basic - ( 2023 14:04 )    Color: Yellow / Appearance: Cloudy / S.015 / pH: x  Gluc: x / Ketone: Negative mg/dL  / Bili: Negative / Urobili: 0.2 mg/dL   Blood: x / Protein: 300 mg/dL / Nitrite: Negative   Leuk Esterase: Moderate / RBC: 11-25 /HPF / WBC >50 /HPF   Sq Epi: x / Non Sq Epi: x / Bacteria: Many /HPF          Intake and Output      Imaging:    < from: CT Lumbar Spine No Cont (23 @ 12:08) >  CT THORACIC/LUMBAR SPINE: **Findings concerning for emphysematous   cystitis with multiple circumferential foci of gas along the urinary   bladder wall. Correlate with urinalysis and risk factors, such as   diabetes. Consider urologic evaluation.    < end of copied text >  
62y Female hx of R sided hemiparesis 2/2 CVA and schizophrenia. Ortho consulted for lower back pain. Patient unwilling to provide hx and thus subjective limited, supplemented w/ chart and collateral (son, nurse). She was being transferred yesterday to/from bed when she "felt a pop" in her back. Per nurse today she had not been endorsing any pain. No Headstrike/LOC/other orthopedic injuries at this time. No report of pain/injury elsewhere, numbness/tingling/paresthesias. She has R-sided hemiparesis from CVA but additionally rarely uses her L extremities. She is bedridden at baseline. She is urinary and bowel incontinent at baseline.      PAST MEDICAL & SURGICAL HISTORY:  Anxiety      Hypertension      Hypercholesteremia      Insulin dependent type 2 diabetes mellitus      Schizophrenia      History of CVA with residual deficit      H/O tubal ligation      H/O hemorrhoidectomy      H/O  section      S/P percutaneous endoscopic gastrostomy (PEG) tube placement        Home Medications:  Bactrim  mg-160 mg oral tablet: 1 tab(s) orally 3 times a week (2023 13:59)  clopidogrel 75 mg oral tablet: 1 tab(s) orally once a day (2023 14:06)  famotidine 20 mg oral tablet: 1 tab(s) orally once a day (2023 14:06)  folic acid 1 mg oral tablet: 1 tab(s) orally once a day (2023 14:06)  furosemide 20 mg oral tablet: 1 tab(s) orally once a day (2023 14:03)  HumuLIN R 100 units/mL injectable solution: injectable 3 times a day WITH MEALS (2023 20:52)  KlonoPIN 1 mg oral tablet: 1 tab(s) orally once a day (at bedtime) (2023 14:07)  Lantus Solostar Pen 100 units/mL subcutaneous solution: 10 unit(s) subcutaneous once a day (at bedtime) (2023 13:50)  metoprolol tartrate 50 mg oral tablet: 1 tab(s) orally once a day (2023 14:02)  Potassium Chloride (Eqv-Klor-Con M20) 20 mEq oral tablet, extended release: 1 tab(s) orally once a day (2023 14:01)  predniSONE 10 mg oral tablet: 1 tab(s) orally once a day (2023 14:00)  QUEtiapine 400 mg oral tablet: 1 tab(s) orally once a day (at bedtime) (2023 13:59)  risperiDONE 2 mg oral tablet: 1 tab(s) orally once a day (at bedtime) (2023 13:55)  Vitamin C 500 mg oral tablet: 1 tab(s) orally once a day (2023 14:06)  zolpidem 10 mg oral tablet: 1 tab(s) orally once a day (at bedtime) (2023 14:06)    Allergies    erythromycin (Unknown)    Intolerances                              12.7   11.04 )-----------( 211      ( 2023 11:52 )             39.7         141  |  105  |  24<H>  ----------------------------<  204<H>  4.2   |  30  |  0.99    Ca    8.9      2023 11:52  Mg     2.0         TPro  6.7  /  Alb  3.1<L>  /  TBili  0.3  /  DBili  x   /  AST  31  /  ALT  65  /  AlkPhos  113        Urinalysis Basic - ( 2023 14:04 )    Color: Yellow / Appearance: Cloudy / S.015 / pH: x  Gluc: x / Ketone: Negative mg/dL  / Bili: Negative / Urobili: 0.2 mg/dL   Blood: x / Protein: 300 mg/dL / Nitrite: Negative   Leuk Esterase: Moderate / RBC: 11-25 /HPF / WBC >50 /HPF   Sq Epi: x / Non Sq Epi: x / Bacteria: Many /HPF        Vital Signs Last 24 Hrs  T(C): 36.3 (2023 16:35), Max: 36.8 (2023 23:43)  T(F): 97.4 (2023 16:35), Max: 98.3 (2023 23:43)  HR: 74 (2023 16:35) (62 - 97)  BP: 142/58 (2023 16:35) (126/57 - 167/72)  BP(mean): --  RR: 18 (2023 16:35) (16 - 20)  SpO2: 100% (2023 16:35) (98% - 100%)    Parameters below as of 2023 10:41  Patient On (Oxygen Delivery Method): nasal cannula      EXAM:  General: flat affect, NAD, awake and alert, appears comfortable lying in bed  Motor: unable to assess 2/2 unwillingness to participate  Sensory: unable to assess 2/2 unwillingness to participate    Imaging:     CT C/T/LSp: Age-indeterminate compression deformities of C6 and T11. Chronic VCFs L1-5. No other acute obvious fxs appreciated.    Assessment/Plan:   62y Female with multilevel age-indeterminate to chronic vertebral compression fractures    -VCFs are likely chronic considering no active pain  -Low c/f acute cord compression or cauda equina  -Given baseline functional status and current lack of pain, would defer MR C/T/LSp since it would not   -Pain control as needed  -WBAT/OOB if possible, currently bedridden  -No acute orthopedic surgical intervention at this time  -DVT ppx per primary team  -Medical management per primary team  -Will discuss with attending and will advise if plan changes  
Full consult dictated    plan: 63 yo F with h/o HTN, HLD, IDDM2, anxiety/depression, schizophrenia, and hx of CVA (with residual dysarthria and R. hemiparesis) admitted on 11/19/23 with sepsis secondary to UTI. Pt has a peg which she no longer uses and was placed after who prior CVA.  Pt now started on bite sized diet.  If tolerated will remove peg in AM at bedside.

## 2023-11-20 NOTE — SWALLOW BEDSIDE ASSESSMENT ADULT - SWALLOW EVAL: DIAGNOSIS
Pt presented with overtly adequate oropharyngeal skills for thin liquid via cup and straw with no signs of suspected aspiration. Oral dysphagia for puree, soft and regular solid consistencies marked by +lingual pumping, +prolonged mastication time for soft and regular solids and +delayed bolus propulsion. Independent liquid wash utilized in alternation with solids to effectively clear oral cavity.

## 2023-11-20 NOTE — SWALLOW BEDSIDE ASSESSMENT ADULT - SLP GENERAL OBSERVATIONS
Pt approached seated upright in bed; alert, oriented and able to effectively communicate wants/needs despite dysarthria noted at baseline. Wet cough  noted at baseline

## 2023-11-21 LAB
A1C WITH ESTIMATED AVERAGE GLUCOSE RESULT: 9.6 % — HIGH (ref 4–5.6)
A1C WITH ESTIMATED AVERAGE GLUCOSE RESULT: 9.6 % — HIGH (ref 4–5.6)
ANION GAP SERPL CALC-SCNC: 7 MMOL/L — SIGNIFICANT CHANGE UP (ref 5–17)
ANION GAP SERPL CALC-SCNC: 7 MMOL/L — SIGNIFICANT CHANGE UP (ref 5–17)
BUN SERPL-MCNC: 29 MG/DL — HIGH (ref 7–23)
BUN SERPL-MCNC: 29 MG/DL — HIGH (ref 7–23)
CALCIUM SERPL-MCNC: 8.4 MG/DL — LOW (ref 8.5–10.1)
CALCIUM SERPL-MCNC: 8.4 MG/DL — LOW (ref 8.5–10.1)
CHLORIDE SERPL-SCNC: 103 MMOL/L — SIGNIFICANT CHANGE UP (ref 96–108)
CHLORIDE SERPL-SCNC: 103 MMOL/L — SIGNIFICANT CHANGE UP (ref 96–108)
CO2 SERPL-SCNC: 32 MMOL/L — HIGH (ref 22–31)
CO2 SERPL-SCNC: 32 MMOL/L — HIGH (ref 22–31)
CREAT SERPL-MCNC: 1.61 MG/DL — HIGH (ref 0.5–1.3)
CREAT SERPL-MCNC: 1.61 MG/DL — HIGH (ref 0.5–1.3)
EGFR: 36 ML/MIN/1.73M2 — LOW
EGFR: 36 ML/MIN/1.73M2 — LOW
ESTIMATED AVERAGE GLUCOSE: 229 MG/DL — HIGH (ref 68–114)
ESTIMATED AVERAGE GLUCOSE: 229 MG/DL — HIGH (ref 68–114)
GLUCOSE BLDC GLUCOMTR-MCNC: 250 MG/DL — HIGH (ref 70–99)
GLUCOSE BLDC GLUCOMTR-MCNC: 250 MG/DL — HIGH (ref 70–99)
GLUCOSE BLDC GLUCOMTR-MCNC: 316 MG/DL — HIGH (ref 70–99)
GLUCOSE BLDC GLUCOMTR-MCNC: 316 MG/DL — HIGH (ref 70–99)
GLUCOSE BLDC GLUCOMTR-MCNC: 318 MG/DL — HIGH (ref 70–99)
GLUCOSE BLDC GLUCOMTR-MCNC: 318 MG/DL — HIGH (ref 70–99)
GLUCOSE BLDC GLUCOMTR-MCNC: 323 MG/DL — HIGH (ref 70–99)
GLUCOSE BLDC GLUCOMTR-MCNC: 323 MG/DL — HIGH (ref 70–99)
GLUCOSE SERPL-MCNC: 347 MG/DL — HIGH (ref 70–99)
GLUCOSE SERPL-MCNC: 347 MG/DL — HIGH (ref 70–99)
HCT VFR BLD CALC: 33.8 % — LOW (ref 34.5–45)
HCT VFR BLD CALC: 33.8 % — LOW (ref 34.5–45)
HGB BLD-MCNC: 10.8 G/DL — LOW (ref 11.5–15.5)
HGB BLD-MCNC: 10.8 G/DL — LOW (ref 11.5–15.5)
MCHC RBC-ENTMCNC: 31.6 PG — SIGNIFICANT CHANGE UP (ref 27–34)
MCHC RBC-ENTMCNC: 31.6 PG — SIGNIFICANT CHANGE UP (ref 27–34)
MCHC RBC-ENTMCNC: 32 G/DL — SIGNIFICANT CHANGE UP (ref 32–36)
MCHC RBC-ENTMCNC: 32 G/DL — SIGNIFICANT CHANGE UP (ref 32–36)
MCV RBC AUTO: 98.8 FL — SIGNIFICANT CHANGE UP (ref 80–100)
MCV RBC AUTO: 98.8 FL — SIGNIFICANT CHANGE UP (ref 80–100)
NRBC # BLD: 0 /100 WBCS — SIGNIFICANT CHANGE UP (ref 0–0)
NRBC # BLD: 0 /100 WBCS — SIGNIFICANT CHANGE UP (ref 0–0)
PLATELET # BLD AUTO: 176 K/UL — SIGNIFICANT CHANGE UP (ref 150–400)
PLATELET # BLD AUTO: 176 K/UL — SIGNIFICANT CHANGE UP (ref 150–400)
POTASSIUM SERPL-MCNC: 3.9 MMOL/L — SIGNIFICANT CHANGE UP (ref 3.5–5.3)
POTASSIUM SERPL-MCNC: 3.9 MMOL/L — SIGNIFICANT CHANGE UP (ref 3.5–5.3)
POTASSIUM SERPL-SCNC: 3.9 MMOL/L — SIGNIFICANT CHANGE UP (ref 3.5–5.3)
POTASSIUM SERPL-SCNC: 3.9 MMOL/L — SIGNIFICANT CHANGE UP (ref 3.5–5.3)
RBC # BLD: 3.42 M/UL — LOW (ref 3.8–5.2)
RBC # BLD: 3.42 M/UL — LOW (ref 3.8–5.2)
RBC # FLD: 15.3 % — HIGH (ref 10.3–14.5)
RBC # FLD: 15.3 % — HIGH (ref 10.3–14.5)
SODIUM SERPL-SCNC: 142 MMOL/L — SIGNIFICANT CHANGE UP (ref 135–145)
SODIUM SERPL-SCNC: 142 MMOL/L — SIGNIFICANT CHANGE UP (ref 135–145)
WBC # BLD: 6.84 K/UL — SIGNIFICANT CHANGE UP (ref 3.8–10.5)
WBC # BLD: 6.84 K/UL — SIGNIFICANT CHANGE UP (ref 3.8–10.5)
WBC # FLD AUTO: 6.84 K/UL — SIGNIFICANT CHANGE UP (ref 3.8–10.5)
WBC # FLD AUTO: 6.84 K/UL — SIGNIFICANT CHANGE UP (ref 3.8–10.5)

## 2023-11-21 PROCEDURE — 99233 SBSQ HOSP IP/OBS HIGH 50: CPT

## 2023-11-21 RX ADMIN — Medication 1 MILLIGRAM(S): at 11:24

## 2023-11-21 RX ADMIN — Medication 500 MILLIGRAM(S): at 11:24

## 2023-11-21 RX ADMIN — Medication 650 MILLIGRAM(S): at 01:10

## 2023-11-21 RX ADMIN — CLOPIDOGREL BISULFATE 75 MILLIGRAM(S): 75 TABLET, FILM COATED ORAL at 11:24

## 2023-11-21 RX ADMIN — FAMOTIDINE 20 MILLIGRAM(S): 10 INJECTION INTRAVENOUS at 11:24

## 2023-11-21 RX ADMIN — ATORVASTATIN CALCIUM 80 MILLIGRAM(S): 80 TABLET, FILM COATED ORAL at 21:16

## 2023-11-21 RX ADMIN — QUETIAPINE FUMARATE 400 MILLIGRAM(S): 200 TABLET, FILM COATED ORAL at 21:09

## 2023-11-21 RX ADMIN — Medication 2: at 15:37

## 2023-11-21 RX ADMIN — Medication 20 MILLIGRAM(S): at 05:23

## 2023-11-21 RX ADMIN — ZOLPIDEM TARTRATE 10 MILLIGRAM(S): 10 TABLET ORAL at 21:16

## 2023-11-21 RX ADMIN — CEFTRIAXONE 100 MILLIGRAM(S): 500 INJECTION, POWDER, FOR SOLUTION INTRAMUSCULAR; INTRAVENOUS at 16:17

## 2023-11-21 RX ADMIN — RISPERIDONE 2 MILLIGRAM(S): 4 TABLET ORAL at 21:09

## 2023-11-21 RX ADMIN — Medication 4: at 11:23

## 2023-11-21 RX ADMIN — Medication 4: at 08:19

## 2023-11-21 RX ADMIN — Medication 10 MILLIGRAM(S): at 05:23

## 2023-11-21 RX ADMIN — Medication 1 MILLIGRAM(S): at 21:16

## 2023-11-21 RX ADMIN — Medication 650 MILLIGRAM(S): at 01:12

## 2023-11-21 RX ADMIN — Medication 50 MILLIGRAM(S): at 05:23

## 2023-11-21 RX ADMIN — Medication 1 APPLICATION(S): at 05:23

## 2023-11-21 RX ADMIN — Medication 20 MILLIEQUIVALENT(S): at 11:25

## 2023-11-21 RX ADMIN — Medication 1 APPLICATION(S): at 16:19

## 2023-11-21 RX ADMIN — INSULIN GLARGINE 10 UNIT(S): 100 INJECTION, SOLUTION SUBCUTANEOUS at 21:17

## 2023-11-22 ENCOUNTER — TRANSCRIPTION ENCOUNTER (OUTPATIENT)
Age: 63
End: 2023-11-22

## 2023-11-22 LAB
-  AMOXICILLIN/CLAVULANIC ACID: SIGNIFICANT CHANGE UP
-  AMOXICILLIN/CLAVULANIC ACID: SIGNIFICANT CHANGE UP
-  AMPICILLIN/SULBACTAM: SIGNIFICANT CHANGE UP
-  AMPICILLIN/SULBACTAM: SIGNIFICANT CHANGE UP
-  AMPICILLIN: SIGNIFICANT CHANGE UP
-  AMPICILLIN: SIGNIFICANT CHANGE UP
-  AZTREONAM: SIGNIFICANT CHANGE UP
-  AZTREONAM: SIGNIFICANT CHANGE UP
-  CEFAZOLIN: SIGNIFICANT CHANGE UP
-  CEFAZOLIN: SIGNIFICANT CHANGE UP
-  CEFEPIME: SIGNIFICANT CHANGE UP
-  CEFEPIME: SIGNIFICANT CHANGE UP
-  CEFOXITIN: SIGNIFICANT CHANGE UP
-  CEFOXITIN: SIGNIFICANT CHANGE UP
-  CEFTRIAXONE: SIGNIFICANT CHANGE UP
-  CEFTRIAXONE: SIGNIFICANT CHANGE UP
-  CEFUROXIME: SIGNIFICANT CHANGE UP
-  CEFUROXIME: SIGNIFICANT CHANGE UP
-  CIPROFLOXACIN: SIGNIFICANT CHANGE UP
-  CIPROFLOXACIN: SIGNIFICANT CHANGE UP
-  ERTAPENEM: SIGNIFICANT CHANGE UP
-  ERTAPENEM: SIGNIFICANT CHANGE UP
-  GENTAMICIN: SIGNIFICANT CHANGE UP
-  GENTAMICIN: SIGNIFICANT CHANGE UP
-  IMIPENEM: SIGNIFICANT CHANGE UP
-  IMIPENEM: SIGNIFICANT CHANGE UP
-  LEVOFLOXACIN: SIGNIFICANT CHANGE UP
-  LEVOFLOXACIN: SIGNIFICANT CHANGE UP
-  MEROPENEM: SIGNIFICANT CHANGE UP
-  MEROPENEM: SIGNIFICANT CHANGE UP
-  NITROFURANTOIN: SIGNIFICANT CHANGE UP
-  NITROFURANTOIN: SIGNIFICANT CHANGE UP
-  PIPERACILLIN/TAZOBACTAM: SIGNIFICANT CHANGE UP
-  PIPERACILLIN/TAZOBACTAM: SIGNIFICANT CHANGE UP
-  TOBRAMYCIN: SIGNIFICANT CHANGE UP
-  TOBRAMYCIN: SIGNIFICANT CHANGE UP
-  TRIMETHOPRIM/SULFAMETHOXAZOLE: SIGNIFICANT CHANGE UP
-  TRIMETHOPRIM/SULFAMETHOXAZOLE: SIGNIFICANT CHANGE UP
ANION GAP SERPL CALC-SCNC: 6 MMOL/L — SIGNIFICANT CHANGE UP (ref 5–17)
ANION GAP SERPL CALC-SCNC: 6 MMOL/L — SIGNIFICANT CHANGE UP (ref 5–17)
BUN SERPL-MCNC: 35 MG/DL — HIGH (ref 7–23)
BUN SERPL-MCNC: 35 MG/DL — HIGH (ref 7–23)
CALCIUM SERPL-MCNC: 8.2 MG/DL — LOW (ref 8.5–10.1)
CALCIUM SERPL-MCNC: 8.2 MG/DL — LOW (ref 8.5–10.1)
CHLORIDE SERPL-SCNC: 103 MMOL/L — SIGNIFICANT CHANGE UP (ref 96–108)
CHLORIDE SERPL-SCNC: 103 MMOL/L — SIGNIFICANT CHANGE UP (ref 96–108)
CO2 SERPL-SCNC: 29 MMOL/L — SIGNIFICANT CHANGE UP (ref 22–31)
CO2 SERPL-SCNC: 29 MMOL/L — SIGNIFICANT CHANGE UP (ref 22–31)
CREAT SERPL-MCNC: 1.02 MG/DL — SIGNIFICANT CHANGE UP (ref 0.5–1.3)
CREAT SERPL-MCNC: 1.02 MG/DL — SIGNIFICANT CHANGE UP (ref 0.5–1.3)
CULTURE RESULTS: ABNORMAL
CULTURE RESULTS: ABNORMAL
EGFR: 62 ML/MIN/1.73M2 — SIGNIFICANT CHANGE UP
EGFR: 62 ML/MIN/1.73M2 — SIGNIFICANT CHANGE UP
GLUCOSE BLDC GLUCOMTR-MCNC: 346 MG/DL — HIGH (ref 70–99)
GLUCOSE BLDC GLUCOMTR-MCNC: 346 MG/DL — HIGH (ref 70–99)
GLUCOSE BLDC GLUCOMTR-MCNC: 361 MG/DL — HIGH (ref 70–99)
GLUCOSE BLDC GLUCOMTR-MCNC: 361 MG/DL — HIGH (ref 70–99)
GLUCOSE BLDC GLUCOMTR-MCNC: 416 MG/DL — HIGH (ref 70–99)
GLUCOSE BLDC GLUCOMTR-MCNC: 416 MG/DL — HIGH (ref 70–99)
GLUCOSE BLDC GLUCOMTR-MCNC: 424 MG/DL — HIGH (ref 70–99)
GLUCOSE BLDC GLUCOMTR-MCNC: 424 MG/DL — HIGH (ref 70–99)
GLUCOSE BLDC GLUCOMTR-MCNC: 431 MG/DL — HIGH (ref 70–99)
GLUCOSE BLDC GLUCOMTR-MCNC: 431 MG/DL — HIGH (ref 70–99)
GLUCOSE BLDC GLUCOMTR-MCNC: 440 MG/DL — HIGH (ref 70–99)
GLUCOSE BLDC GLUCOMTR-MCNC: 440 MG/DL — HIGH (ref 70–99)
GLUCOSE BLDC GLUCOMTR-MCNC: 448 MG/DL — HIGH (ref 70–99)
GLUCOSE BLDC GLUCOMTR-MCNC: 448 MG/DL — HIGH (ref 70–99)
GLUCOSE BLDC GLUCOMTR-MCNC: 473 MG/DL — CRITICAL HIGH (ref 70–99)
GLUCOSE BLDC GLUCOMTR-MCNC: 473 MG/DL — CRITICAL HIGH (ref 70–99)
GLUCOSE SERPL-MCNC: 355 MG/DL — HIGH (ref 70–99)
GLUCOSE SERPL-MCNC: 355 MG/DL — HIGH (ref 70–99)
HCT VFR BLD CALC: 32 % — LOW (ref 34.5–45)
HCT VFR BLD CALC: 32 % — LOW (ref 34.5–45)
HGB BLD-MCNC: 10.3 G/DL — LOW (ref 11.5–15.5)
HGB BLD-MCNC: 10.3 G/DL — LOW (ref 11.5–15.5)
MCHC RBC-ENTMCNC: 31.4 PG — SIGNIFICANT CHANGE UP (ref 27–34)
MCHC RBC-ENTMCNC: 31.4 PG — SIGNIFICANT CHANGE UP (ref 27–34)
MCHC RBC-ENTMCNC: 32.2 G/DL — SIGNIFICANT CHANGE UP (ref 32–36)
MCHC RBC-ENTMCNC: 32.2 G/DL — SIGNIFICANT CHANGE UP (ref 32–36)
MCV RBC AUTO: 97.6 FL — SIGNIFICANT CHANGE UP (ref 80–100)
MCV RBC AUTO: 97.6 FL — SIGNIFICANT CHANGE UP (ref 80–100)
METHOD TYPE: SIGNIFICANT CHANGE UP
METHOD TYPE: SIGNIFICANT CHANGE UP
NRBC # BLD: 0 /100 WBCS — SIGNIFICANT CHANGE UP (ref 0–0)
NRBC # BLD: 0 /100 WBCS — SIGNIFICANT CHANGE UP (ref 0–0)
ORGANISM # SPEC MICROSCOPIC CNT: ABNORMAL
ORGANISM # SPEC MICROSCOPIC CNT: ABNORMAL
ORGANISM # SPEC MICROSCOPIC CNT: SIGNIFICANT CHANGE UP
ORGANISM # SPEC MICROSCOPIC CNT: SIGNIFICANT CHANGE UP
PLATELET # BLD AUTO: 165 K/UL — SIGNIFICANT CHANGE UP (ref 150–400)
PLATELET # BLD AUTO: 165 K/UL — SIGNIFICANT CHANGE UP (ref 150–400)
POTASSIUM SERPL-MCNC: 3.8 MMOL/L — SIGNIFICANT CHANGE UP (ref 3.5–5.3)
POTASSIUM SERPL-MCNC: 3.8 MMOL/L — SIGNIFICANT CHANGE UP (ref 3.5–5.3)
POTASSIUM SERPL-SCNC: 3.8 MMOL/L — SIGNIFICANT CHANGE UP (ref 3.5–5.3)
POTASSIUM SERPL-SCNC: 3.8 MMOL/L — SIGNIFICANT CHANGE UP (ref 3.5–5.3)
RBC # BLD: 3.28 M/UL — LOW (ref 3.8–5.2)
RBC # BLD: 3.28 M/UL — LOW (ref 3.8–5.2)
RBC # FLD: 15 % — HIGH (ref 10.3–14.5)
RBC # FLD: 15 % — HIGH (ref 10.3–14.5)
SODIUM SERPL-SCNC: 138 MMOL/L — SIGNIFICANT CHANGE UP (ref 135–145)
SODIUM SERPL-SCNC: 138 MMOL/L — SIGNIFICANT CHANGE UP (ref 135–145)
SPECIMEN SOURCE: SIGNIFICANT CHANGE UP
SPECIMEN SOURCE: SIGNIFICANT CHANGE UP
WBC # BLD: 6.19 K/UL — SIGNIFICANT CHANGE UP (ref 3.8–10.5)
WBC # BLD: 6.19 K/UL — SIGNIFICANT CHANGE UP (ref 3.8–10.5)
WBC # FLD AUTO: 6.19 K/UL — SIGNIFICANT CHANGE UP (ref 3.8–10.5)
WBC # FLD AUTO: 6.19 K/UL — SIGNIFICANT CHANGE UP (ref 3.8–10.5)

## 2023-11-22 PROCEDURE — 99239 HOSP IP/OBS DSCHRG MGMT >30: CPT

## 2023-11-22 RX ORDER — INSULIN LISPRO 100/ML
5 VIAL (ML) SUBCUTANEOUS ONCE
Refills: 0 | Status: COMPLETED | OUTPATIENT
Start: 2023-11-22 | End: 2023-11-22

## 2023-11-22 RX ORDER — INSULIN GLARGINE 100 [IU]/ML
8 INJECTION, SOLUTION SUBCUTANEOUS ONCE
Refills: 0 | Status: COMPLETED | OUTPATIENT
Start: 2023-11-22 | End: 2023-11-22

## 2023-11-22 RX ORDER — INSULIN LISPRO 100/ML
3 VIAL (ML) SUBCUTANEOUS
Refills: 0 | Status: DISCONTINUED | OUTPATIENT
Start: 2023-11-22 | End: 2023-11-23

## 2023-11-22 RX ORDER — INSULIN GLARGINE 100 [IU]/ML
18 INJECTION, SOLUTION SUBCUTANEOUS AT BEDTIME
Refills: 0 | Status: DISCONTINUED | OUTPATIENT
Start: 2023-11-22 | End: 2023-11-23

## 2023-11-22 RX ADMIN — Medication 1 MILLIGRAM(S): at 21:29

## 2023-11-22 RX ADMIN — Medication 1 APPLICATION(S): at 17:06

## 2023-11-22 RX ADMIN — Medication 20 MILLIEQUIVALENT(S): at 12:12

## 2023-11-22 RX ADMIN — FAMOTIDINE 20 MILLIGRAM(S): 10 INJECTION INTRAVENOUS at 12:12

## 2023-11-22 RX ADMIN — Medication 50 MILLIGRAM(S): at 05:04

## 2023-11-22 RX ADMIN — CLOPIDOGREL BISULFATE 75 MILLIGRAM(S): 75 TABLET, FILM COATED ORAL at 12:12

## 2023-11-22 RX ADMIN — INSULIN GLARGINE 18 UNIT(S): 100 INJECTION, SOLUTION SUBCUTANEOUS at 21:52

## 2023-11-22 RX ADMIN — Medication 1 MILLIGRAM(S): at 12:12

## 2023-11-22 RX ADMIN — Medication 1 APPLICATION(S): at 05:04

## 2023-11-22 RX ADMIN — Medication 500 MILLIGRAM(S): at 12:13

## 2023-11-22 RX ADMIN — Medication 6: at 11:02

## 2023-11-22 RX ADMIN — Medication 4: at 08:04

## 2023-11-22 RX ADMIN — QUETIAPINE FUMARATE 400 MILLIGRAM(S): 200 TABLET, FILM COATED ORAL at 21:28

## 2023-11-22 RX ADMIN — INSULIN GLARGINE 8 UNIT(S): 100 INJECTION, SOLUTION SUBCUTANEOUS at 12:26

## 2023-11-22 RX ADMIN — Medication 6: at 16:52

## 2023-11-22 RX ADMIN — Medication 650 MILLIGRAM(S): at 12:26

## 2023-11-22 RX ADMIN — Medication 10 MILLIGRAM(S): at 05:04

## 2023-11-22 RX ADMIN — Medication 5 UNIT(S): at 21:57

## 2023-11-22 RX ADMIN — Medication 650 MILLIGRAM(S): at 12:42

## 2023-11-22 RX ADMIN — Medication 3 UNIT(S): at 16:53

## 2023-11-22 RX ADMIN — ATORVASTATIN CALCIUM 80 MILLIGRAM(S): 80 TABLET, FILM COATED ORAL at 21:28

## 2023-11-22 RX ADMIN — RISPERIDONE 2 MILLIGRAM(S): 4 TABLET ORAL at 21:28

## 2023-11-22 RX ADMIN — ZOLPIDEM TARTRATE 10 MILLIGRAM(S): 10 TABLET ORAL at 21:28

## 2023-11-22 NOTE — PROVIDER CONTACT NOTE (OTHER) - ACTION/TREATMENT ORDERED:
No need for readmitting prior to discharge as per GALILEA Valle, since discharge papers were printed.  arranging transportation at this time.

## 2023-11-22 NOTE — DISCHARGE NOTE PROVIDER - NSDCMRMEDTOKEN_GEN_ALL_CORE_FT
Bactrim  mg-160 mg oral tablet: 1 tab(s) orally 3 times a week  clopidogrel 75 mg oral tablet: 1 tab(s) orally once a day  famotidine 20 mg oral tablet: 1 tab(s) orally once a day  folic acid 1 mg oral tablet: 1 tab(s) orally once a day  furosemide 20 mg oral tablet: 1 tab(s) orally once a day  HumuLIN R 100 units/mL injectable solution: injectable 3 times a day WITH MEALS  KlonoPIN 1 mg oral tablet: 1 tab(s) orally once a day (at bedtime)  Lantus Solostar Pen 100 units/mL subcutaneous solution: 10 unit(s) subcutaneous once a day (at bedtime)  Lipitor 80 mg oral tablet: 1 tab(s) orally once a day (at bedtime)  metoprolol tartrate 50 mg oral tablet: 1 tab(s) orally once a day  Potassium Chloride (Eqv-Klor-Con M20) 20 mEq oral tablet, extended release: 1 tab(s) orally once a day  predniSONE 10 mg oral tablet: 1 tab(s) orally once a day  QUEtiapine 400 mg oral tablet: 1 tab(s) orally once a day (at bedtime)  risperiDONE 2 mg oral tablet: 1 tab(s) orally once a day (at bedtime)  Vitamin C 500 mg oral tablet: 1 tab(s) orally once a day  zolpidem 10 mg oral tablet: 1 tab(s) orally once a day (at bedtime)   Bactrim  mg-160 mg oral tablet: 1 tab(s) orally 3 times a week  clopidogrel 75 mg oral tablet: 1 tab(s) orally once a day  famotidine 20 mg oral tablet: 1 tab(s) orally once a day  folic acid 1 mg oral tablet: 1 tab(s) orally once a day  furosemide 20 mg oral tablet: 1 tab(s) orally once a day  HumuLIN R 100 units/mL injectable solution: 6 unit(s) injectable 3 times a day WITH MEALS  KlonoPIN 1 mg oral tablet: 1 tab(s) orally once a day (at bedtime)  Lantus Solostar Pen 100 units/mL subcutaneous solution: 18 unit(s) subcutaneous once a day (at bedtime)  Lipitor 80 mg oral tablet: 1 tab(s) orally once a day (at bedtime)  metoprolol tartrate 50 mg oral tablet: 1 tab(s) orally once a day  Potassium Chloride (Eqv-Klor-Con M20) 20 mEq oral tablet, extended release: 1 tab(s) orally once a day  predniSONE 10 mg oral tablet: 1 tab(s) orally once a day  QUEtiapine 400 mg oral tablet: 1 tab(s) orally once a day (at bedtime)  risperiDONE 2 mg oral tablet: 1 tab(s) orally once a day (at bedtime)  Vitamin C 500 mg oral tablet: 1 tab(s) orally once a day  zolpidem 10 mg oral tablet: 1 tab(s) orally once a day (at bedtime)

## 2023-11-22 NOTE — DISCHARGE NOTE PROVIDER - NSDCCPCAREPLAN_GEN_ALL_CORE_FT
PRINCIPAL DISCHARGE DIAGNOSIS  Diagnosis: Back pain  Assessment and Plan of Treatment: Sepsis secondary to UTI, concerns for emphysematous cystitis  denies urinary sxs but is a poor historian. denies fever or chills   CT T-spine and L-spine with findings concerning for emphysematous cystitis with multiple circumferential foci of gas along the urinary bladder wall  S/p ceftriaxone in the ED, continued  Blood clx neg, urine clx with E. coli , S/P 3 days Ceftriaxone  X 3 days.  Urology recommendations appreciated no emergent urologic intervention  MARY ALICE  - Septic ATN vs pre/post renal   - Resolved.   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 No acute intracranial bleeding  CT C-spine with age-indeterminate concavity of the C7 and T1 superior and inferior endplates and L1-L5  ortho consult : given baseline functional status and current lack of pain, would defer MR C/T/LSp since it would not  per ortho  Demand ischemia secondary to above  EKG without signs of ischemia  CT T-spine and L-spine with cardiomegaly and coronary arteriosclerosis  echocardiogram reviewed  chronic systolic CHF  - c.w current regimen   - euvolemic   PEG tube removed by GI. passed swallow eval   HTN  - currently uncontrolled likely secondary to pain: /111 on admission, PTA metoprolol tartrate, improved  HLD: PTA atorvastatin  IDDM2 with hyperglycemia:  A1c 9.6, resume home med  Anxiety: PTA clonazepam  Schizophrenia: PTA risperidone, quetiapine  Hx of CVA (with residual dysarthria and R. hemiparesis): PTA clopidogrel, atorvastatin   Unsure why but patient is on Prednisone and Bactrim 800-160 mg three times weekly. Caregiver does not know.      SECONDARY DISCHARGE DIAGNOSES  Diagnosis: Emphysematous cystitis  Assessment and Plan of Treatment:      PRINCIPAL DISCHARGE DIAGNOSIS  Diagnosis: Back pain  Assessment and Plan of Treatment: Sepsis secondary to UTI, concerns for emphysematous cystitis  denies urinary sxs but is a poor historian. denies fever or chills   CT T-spine and L-spine with findings concerning for emphysematous cystitis with multiple circumferential foci of gas along the urinary bladder wall  S/p ceftriaxone in the ED, continued  Blood clx neg, urine clx with E. coli , S/P 3 days Ceftriaxone  X 3 days.  Urology recommendations appreciated no emergent urologic intervention  MARY ALICE  - Septic ATN vs pre/post renal   - Resolved.   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 No acute intracranial bleeding  CT C-spine with age-indeterminate concavity of the C7 and T1 superior and inferior endplates and L1-L5  ortho consult : given baseline functional status and current lack of pain, would defer MR C/T/LSp since it would not  per ortho  Demand ischemia secondary to above  EKG without signs of ischemia  CT T-spine and L-spine with cardiomegaly and coronary arteriosclerosis  echocardiogram reviewed  chronic systolic CHF  - c.w current regimen   - euvolemic   PEG tube removed by GI. passed swallow eval   HTN  - currently uncontrolled likely secondary to pain: /111 on admission, PTA metoprolol tartrate, improved  HLD: PTA atorvastatin  IDDM2 with hyperglycemia:  A1c 9.6, resume home med, increase Lantus to 18 U and pre meal to 6 U before meal  Anxiety: PTA clonazepam  Schizophrenia: PTA risperidone, quetiapine  Hx of CVA (with residual dysarthria and R. hemiparesis): PTA clopidogrel, atorvastatin   Unsure why but patient is on Prednisone and Bactrim 800-160 mg three times weekly. Caregiver does not know.      SECONDARY DISCHARGE DIAGNOSES  Diagnosis: Emphysematous cystitis  Assessment and Plan of Treatment:

## 2023-11-22 NOTE — DISCHARGE NOTE NURSING/CASE MANAGEMENT/SOCIAL WORK - PATIENT PORTAL LINK FT
You can access the FollowMyHealth Patient Portal offered by [facility] by registering at the following website: [email or web address]. By joining [facility]’s FollowMyHealth portal, you will also be able to view your health information using other applications (apps) compatible with our system.

## 2023-11-22 NOTE — DISCHARGE NOTE PROVIDER - REASON FOR ADMISSION
Office Visit    Assessment AND Plan:     1. Follow-up left Achilles tendinitis.  He has been improving with physical therapy. Has had 3 sessions so far and the swelling and discomfort have improved. He is able to walk without discomfort now.  He has 3 more sessions scheduled and then we'll determine if he needs ongoing therapy or home program after that.  Recommend continuing PT and follow-up if persistent problems after finishing therapy in the next month.  CHIEF COMPLAINT:    Chief Complaint   Patient presents with   • Follow-up     Achilles tendinitis, left leg          History of present illness:    He is here today for Follow-up left Achilles tendinitis.  He has been improving with physical therapy. Has had 3 sessions so far and the swelling and discomfort have improved. He is able to walk without discomfort now.  He has 3 more sessions scheduled and then we'll determine if he needs ongoing therapy or home program after that.    I have reviewed the past medical, family and social history sections including the medications and allergies listed in the above medical record as well as the nursing notes.     Review of systems:    All other review of systems negative, except for those noted.     Physical Exam:    Vital Signs:    Vitals:    08/23/17 1530   BP: 136/80   Pulse: 76   Resp: 16   Weight: 109.3 kg   Height: 6' 2\" (1.88 m)     Examination:    Left Achilles: mild swelling in the mid Achilles tendon area, no focal tenderness. Negative Gould's test.      The patient indicates understanding of these issues and agrees with the plan.     Sepsis secondary to UTI

## 2023-11-22 NOTE — DISCHARGE NOTE PROVIDER - HOSPITAL COURSE
Bushra Haley is a 62 year old female with PMHx of HTN, HLD, IDDM2, anxiety/depression, schizophrenia, and hx of CVA (with residual dysarthria and R. hemiparesis) who presented to the ED on 11/19/23 for complaints of back pain and admitted for sepsis secondary to UTI.    Sepsis secondary to UTI, concerns for emphysematous cystitis  denies urinary sxs but is a poor historian. denies fever or chills   CT T-spine and L-spine with findings concerning for emphysematous cystitis with multiple circumferential foci of gas along the urinary bladder wall  S/p ceftriaxone in the ED, continued  Blood clx neg, urine clx with E. coli , S/P 3 days Ceftriaxone  X 3 days.  Urology recommendations appreciated no emergent urologic intervention    MARY ALICE  - Septic ATN vs pre/post renal   - Resolved.     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 No acute intracranial bleeding  CT C-spine with age-indeterminate concavity of the C7 and T1 superior and inferior endplates and L1-L5  ortho consult : given baseline functional status and current lack of pain, would defer MR C/T/LSp since it would not  per ortho  Pain management PRN    Demand ischemia secondary to above  EKG without signs of ischemia  CT T-spine and L-spine with cardiomegaly and coronary arteriosclerosis  echocardiogram reviewed    chronic systolic CHF  - c.w current regimen   - euvolemic     PEG tube removed by GI. passed swallow eval     HTN  - currently uncontrolled likely secondary to pain: /111 on admission, PTA metoprolol tartrate, improved  HLD: PTA atorvastatin  IDDM2 with hyperglycemia:  A1c 9.6, resume home med  Anxiety: PTA clonazepam  Schizophrenia: PTA risperidone, quetiapine  Hx of CVA (with residual dysarthria and R. hemiparesis): PTA clopidogrel, atorvastatin     Unsure why but patient is on Prednisone and Bactrim 800-160 mg three times weekly. Caregiver does not know.         Bushra Haley is a 62 year old female with PMHx of HTN, HLD, IDDM2, anxiety/depression, schizophrenia, and hx of CVA (with residual dysarthria and R. hemiparesis) who presented to the ED on 11/19/23 for complaints of back pain and admitted for sepsis secondary to UTI.    Sepsis secondary to UTI, concerns for emphysematous cystitis  denies urinary sxs but is a poor historian. denies fever or chills   CT T-spine and L-spine with findings concerning for emphysematous cystitis with multiple circumferential foci of gas along the urinary bladder wall  S/p ceftriaxone in the ED, continued  Blood clx neg, urine clx with E. coli , S/P 3 days Ceftriaxone  X 3 days.  Urology recommendations appreciated no emergent urologic intervention    MARY ALICE  - Septic ATN vs pre/post renal   - Resolved.     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 No acute intracranial bleeding  CT C-spine with age-indeterminate concavity of the C7 and T1 superior and inferior endplates and L1-L5  ortho consult : given baseline functional status and current lack of pain, would defer MR C/T/LSp since it would not  per ortho  Pain management PRN    Demand ischemia secondary to above  EKG without signs of ischemia  CT T-spine and L-spine with cardiomegaly and coronary arteriosclerosis  echocardiogram reviewed    chronic systolic CHF  - c.w current regimen   - euvolemic     PEG tube removed by GI. passed swallow eval     HTN  - currently uncontrolled likely secondary to pain: /111 on admission, PTA metoprolol tartrate, improved  HLD: PTA atorvastatin  IDDM2 with hyperglycemia:  A1c 9.6, resume home med, increase Lantus to 18 U and pre meal to 6 U before meal  Anxiety: PTA clonazepam  Schizophrenia: PTA risperidone, quetiapine  Hx of CVA (with residual dysarthria and R. hemiparesis): PTA clopidogrel, atorvastatin     Unsure why but patient is on Prednisone and Bactrim 800-160 mg three times weekly. Caregiver does not know.

## 2023-11-22 NOTE — DISCHARGE NOTE NURSING/CASE MANAGEMENT/SOCIAL WORK - NSDCPEFALRISK_GEN_ALL_CORE
For information on Fall & Injury Prevention, visit: [email or web address] OR  [email or web address] OR  https://www.cdc.gov/steadi/patient.html

## 2023-11-22 NOTE — PROVIDER CONTACT NOTE (OTHER) - RECOMMENDATIONS
Admitting requesting pt to be have and admission order to place her on readmission prior to discharge.

## 2023-11-22 NOTE — DISCHARGE NOTE PROVIDER - ATTENDING DISCHARGE PHYSICAL EXAMINATION:
Vital Signs Last 24 Hrs  T(C): 36.7 (22 Nov 2023 10:40), Max: 36.9 (21 Nov 2023 23:00)  T(F): 98 (22 Nov 2023 10:40), Max: 98.4 (21 Nov 2023 23:00)  HR: 82 (22 Nov 2023 10:40) (82 - 95)  BP: 154/64 (22 Nov 2023 10:40) (139/60 - 154/64)  BP(mean): --  RR: 18 (22 Nov 2023 10:40) (18 - 18)  SpO2: 93% (22 Nov 2023 10:40) (93% - 97%)    Parameters below as of 21 Nov 2023 16:14  Patient On (Oxygen Delivery Method): room air    GENERAL: NAD  HEAD:  Atraumatic, Normocephalic  EYES: EOMI, PERRLA, conjunctiva and sclera clear  ENMT: No tonsillar erythema, exudates, or enlargement;   NECK: Supple, Normal thyroid  NERVOUS SYSTEM:  Alert & Oriented X3, Good concentration; Motor Strength 5/5 B/L upper and lower extremities; DTRs 2+ intact and symmetric  CHEST/LUNG: CTABL; No rales, rhonchi, wheezing, or rubs  HEART: Regular rate and rhythm; No murmurs, rubs, or gallops  ABDOMEN: Soft, Nontender, Nondistended; Bowel sounds present  EXTREMITIES:  2+ Peripheral Pulses, No clubbing, cyanosis, or edema

## 2023-11-23 VITALS
TEMPERATURE: 98 F | HEART RATE: 73 BPM | OXYGEN SATURATION: 94 % | DIASTOLIC BLOOD PRESSURE: 79 MMHG | RESPIRATION RATE: 18 BRPM | SYSTOLIC BLOOD PRESSURE: 115 MMHG

## 2023-11-23 LAB
GLUCOSE BLDC GLUCOMTR-MCNC: 331 MG/DL — HIGH (ref 70–99)
GLUCOSE BLDC GLUCOMTR-MCNC: 331 MG/DL — HIGH (ref 70–99)
GLUCOSE BLDC GLUCOMTR-MCNC: 345 MG/DL — HIGH (ref 70–99)
GLUCOSE BLDC GLUCOMTR-MCNC: 345 MG/DL — HIGH (ref 70–99)

## 2023-11-23 PROCEDURE — 99232 SBSQ HOSP IP/OBS MODERATE 35: CPT

## 2023-11-23 RX ADMIN — Medication 4: at 11:08

## 2023-11-23 RX ADMIN — Medication 3 UNIT(S): at 08:02

## 2023-11-23 RX ADMIN — Medication 1 MILLIGRAM(S): at 11:08

## 2023-11-23 RX ADMIN — Medication 10 MILLIGRAM(S): at 05:02

## 2023-11-23 RX ADMIN — Medication 500 MILLIGRAM(S): at 11:08

## 2023-11-23 RX ADMIN — FAMOTIDINE 20 MILLIGRAM(S): 10 INJECTION INTRAVENOUS at 11:08

## 2023-11-23 RX ADMIN — Medication 1 APPLICATION(S): at 05:03

## 2023-11-23 RX ADMIN — CLOPIDOGREL BISULFATE 75 MILLIGRAM(S): 75 TABLET, FILM COATED ORAL at 11:08

## 2023-11-23 RX ADMIN — Medication 4: at 08:02

## 2023-11-23 RX ADMIN — Medication 20 MILLIEQUIVALENT(S): at 11:08

## 2023-11-23 RX ADMIN — Medication 3 UNIT(S): at 11:09

## 2023-11-23 RX ADMIN — Medication 50 MILLIGRAM(S): at 05:02

## 2023-11-23 NOTE — PROGRESS NOTE ADULT - ASSESSMENT
Bushra Haley is a 62 year old female with PMHx of HTN, HLD, IDDM2, anxiety/depression, schizophrenia, and hx of CVA (with residual dysarthria and R. hemiparesis) who presented to the ED on 11/19/23 for complaints of back pain and admitted for sepsis secondary to UTI.    Sepsis secondary to UTI, concerns for emphysematous cystitis  , RR 22, WBC 11.04K on admission  U/A with large blood, moderate leuks, negative nitrites, WBC > 50, RBC 11-25, many bacteria  denies urinay sxs but is a poor historian. denies fever or chills   CT T-spine and L-spine with findings concerning for emphysematous cystitis with multiple circumferential foci of gas along the urinary bladder wall  S/p ceftriaxone in the ED, continued  F/u blood cultures (although drawn after receiving abx as they were not ordered by ER physician), urine culture  Monitor fever curve and WBC trend  Urology recommendations appreciated no emergent urologic intervention    MARY ALICE  - septic ATN vs pre/post renal   - check bladder scan   - encourage gentle po intake d/t chf   - hold lasix     Back pain secondary to back injury  Reports she heard her "back pop" yesterday while caregiver was transferring her from shower chair to wheelchair, no fall   NCHCT without interval change in 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 No acute intracranial bleeding  CT C-spine with age-indeterminate concavity of the C7 and T1 superior and inferior endplates and L1-L5  ortho consulr   Pain management PRN    Demand ischemia secondary to above, worsening  EKG without signs of ischemia  Trop 58.5, now 72  CT T-spine and L-spine with cardiomegaly and coronary arteriosclerosis  echocardiogram reviewed  Telemetry    chronic systolic chf  - c.w current regimen   - euvolemic     Peg tube removed by GI. passed swallow eval   :   HTN,  - currently uncontrolled likely secondary to pain: /111 on admission, PTA metoprolol tartrate, anticipate improvement in BP with pain control  HLD: PTA atorvastatin  IDDM2 with hyperglycemia: blood glucose 204 on admission, POC qac and qhs, PTA Lantus 10 U qhs, SSI, blood glucose goal < 180, f/u A1c  Anxiety: PTA clonazepam  Schizophrenia: PTA risperidone, quetiapine  Hx of CVA (with residual dysarthria and R. hemiparesis): PTA clopidogrel, atorvastatin     Unsure why but patient is on Bactrim 800-160 mg three times weekly. 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 Bactrim held for now.  
Bushra Haley is a 62 year old female with PMHx of HTN, HLD, IDDM2, anxiety/depression, schizophrenia, and hx of CVA (with residual dysarthria and R. hemiparesis) who presented to the ED on 11/19/23 for complaints of back pain and admitted for sepsis secondary to UTI.    Sepsis secondary to UTI, concerns for emphysematous cystitis  , RR 22, WBC 11.04K on admission  U/A with large blood, moderate leuks, negative nitrites, WBC > 50, RBC 11-25, many bacteria  denies urinay sxs but is a poor historian. denies fever or chills   CT T-spine and L-spine with findings concerning for emphysematous cystitis with multiple circumferential foci of gas along the urinary bladder wall  S/p ceftriaxone in the ED, continued  F/u blood cultures (although drawn after receiving abx as they were not ordered by ER physician), urine culture  Monitor fever curve and WBC trend  Will need repeat U/A after infection resolves  Urology recommendations appreciated no emergent urologic intervention    Back pain secondary to back injury  Reports she heard her "back pop" yesterday while caregiver was transferring her from shower chair to wheelchair, no fall   NCHCT without interval change in 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 No acute intracranial bleeding  CT C-spine with age-indeterminate concavity of the C7 and T1 superior and inferior endplates and L1-L5  ortho consulr   Pain management PRN    Demand ischemia secondary to above, worsening  EKG without signs of ischemia  Trop 58.5, now 72  CT T-spine and L-spine with cardiomegaly and coronary arteriosclerosis  echocardiogram as recommended by radiology  Telemetry    :   HTN,  - currently uncontrolled likely secondary to pain: /111 on admission, PTA metoprolol tartrate, anticipate improvement in BP with pain control  HLD: PTA atorvastatin  IDDM2 with hyperglycemia: blood glucose 204 on admission, POC qac and qhs, PTA Lantus 10 U qhs, SSI, blood glucose goal < 180, f/u A1c  Anxiety: PTA clonazepam  Schizophrenia: PTA risperidone, quetiapine  Hx of CVA (with residual dysarthria and R. hemiparesis): PTA clopidogrel, atorvastatin     Unsure why but patient is on Bactrim 800-160 mg three times weekly. 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 Bactrim held for now.  
Bushra Haley is a 62 year old female with PMHx of HTN, HLD, IDDM2, anxiety/depression, schizophrenia, and hx of CVA (with residual dysarthria and R. hemiparesis) who presented to the ED on 11/19/23 for complaints of back pain and admitted for sepsis secondary to UTI.    Sepsis secondary to UTI, concerns for emphysematous cystitis  denies urinary sxs but is a poor historian. denies fever or chills   CT T-spine and L-spine with findings concerning for emphysematous cystitis with multiple circumferential foci of gas along the urinary bladder wall  S/p ceftriaxone in the ED, continued  Blood clx neg, urine clx with E. coli , S/P 3 days Ceftriaxone  X 3 days.  Urology recommendations appreciated no emergent urologic intervention    MARY ALICE  - Septic ATN vs pre/post renal   - Resolved.     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 No acute intracranial bleeding  CT C-spine with age-indeterminate concavity of the C7 and T1 superior and inferior endplates and L1-L5  ortho consult : given baseline functional status and current lack of pain, would defer MR C/T/LSp since it would not  per ortho  Pain management PRN    Demand ischemia secondary to above  EKG without signs of ischemia  CT T-spine and L-spine with cardiomegaly and coronary arteriosclerosis  echocardiogram reviewed    chronic systolic CHF  - c.w current regimen   - euvolemic     PEG tube removed by GI. passed swallow eval     HTN  - currently uncontrolled likely secondary to pain: /111 on admission, PTA metoprolol tartrate, improved  HLD: PTA atorvastatin  IDDM2 with hyperglycemia:  A1c 9.6, resume home med, increase Lantus to 18 U and pre meal to 6 U before meal  Anxiety: PTA clonazepam  Schizophrenia: PTA risperidone, quetiapine  Hx of CVA (with residual dysarthria and R. hemiparesis): PTA clopidogrel, atorvastatin     Unsure why but patient is on Prednisone and Bactrim 800-160 mg three times weekly. Caregiver does not know.

## 2023-11-23 NOTE — PROGRESS NOTE ADULT - SUBJECTIVE AND OBJECTIVE BOX
Patient is a 63y old  Female who presents with a chief complaint of Sepsis secondary to UTI (22 Nov 2023 10:55)      INTERVAL HPI/OVERNIGHT EVENTS:  Pt was seen and examined, no acute events.  Pt was discharged yesterday but didn't leave as family was not able to take her home.     MEDICATIONS  (STANDING):  ascorbic acid 500 milliGRAM(s) Oral daily  atorvastatin 80 milliGRAM(s) Oral at bedtime  clonazePAM  Tablet 1 milliGRAM(s) Oral at bedtime  clopidogrel Tablet 75 milliGRAM(s) Oral daily  clotrimazole 1% Cream 1 Application(s) Topical every 12 hours  dextrose 5%. 1000 milliLiter(s) (100 mL/Hr) IV Continuous <Continuous>  dextrose 5%. 1000 milliLiter(s) (50 mL/Hr) IV Continuous <Continuous>  dextrose 50% Injectable 25 Gram(s) IV Push once  dextrose 50% Injectable 25 Gram(s) IV Push once  dextrose 50% Injectable 12.5 Gram(s) IV Push once  famotidine    Tablet 20 milliGRAM(s) Oral daily  folic acid 1 milliGRAM(s) Oral daily  glucagon  Injectable 1 milliGRAM(s) IntraMuscular once  insulin glargine Injectable (LANTUS) 18 Unit(s) SubCutaneous at bedtime  insulin lispro (ADMELOG) corrective regimen sliding scale   SubCutaneous three times a day before meals  insulin lispro Injectable (ADMELOG) 3 Unit(s) SubCutaneous three times a day before meals  metoprolol tartrate 50 milliGRAM(s) Oral daily  potassium chloride    Tablet ER 20 milliEquivalent(s) Oral daily  predniSONE   Tablet 10 milliGRAM(s) Oral daily  QUEtiapine 400 milliGRAM(s) Oral at bedtime  risperiDONE   Tablet 2 milliGRAM(s) Oral at bedtime    MEDICATIONS  (PRN):  acetaminophen     Tablet .. 650 milliGRAM(s) Oral every 6 hours PRN Temp greater or equal to 38C (100.4F), Mild Pain (1 - 3)  aluminum hydroxide/magnesium hydroxide/simethicone Suspension 30 milliLiter(s) Oral every 4 hours PRN Dyspepsia  dextrose Oral Gel 15 Gram(s) Oral once PRN Blood Glucose LESS THAN 70 milliGRAM(s)/deciliter  melatonin 3 milliGRAM(s) Oral at bedtime PRN Insomnia  ondansetron Injectable 4 milliGRAM(s) IV Push every 8 hours PRN Nausea and/or Vomiting  zolpidem 10 milliGRAM(s) Oral at bedtime PRN Insomnia      Allergies    erythromycin (Unknown)    Intolerances          Vital Signs Last 24 Hrs  T(C): 36.6 (23 Nov 2023 11:27), Max: 37 (22 Nov 2023 19:16)  T(F): 97.8 (23 Nov 2023 11:27), Max: 98.6 (22 Nov 2023 19:16)  HR: 73 (23 Nov 2023 11:27) (73 - 95)  BP: 115/79 (23 Nov 2023 11:27) (115/79 - 147/82)  BP(mean): --  RR: 18 (23 Nov 2023 11:27) (17 - 18)  SpO2: 94% (23 Nov 2023 11:27) (94% - 95%)    Parameters below as of 23 Nov 2023 04:42  Patient On (Oxygen Delivery Method): room air        PHYSICAL EXAM:  GENERAL: NAD  HEAD:  Atraumatic  EYES: PERRLA  ENMT: Mouth moist   NECK: Supple  NERVOUS SYSTEM:  Awake, alert  CHEST/LUNG: Clear  HEART: RRR, S1, S2  ABDOMEN: Soft, non tender  EXTREMITIES: no edema BL LE   SKIN: No rash    LABS:                        10.3   6.19  )-----------( 165      ( 22 Nov 2023 05:34 )             32.0     11-22    138  |  103  |  35<H>  ----------------------------<  355<H>  3.8   |  29  |  1.02    Ca    8.2<L>      22 Nov 2023 05:34        Urinalysis Basic - ( 22 Nov 2023 05:34 )    Color: x / Appearance: x / SG: x / pH: x  Gluc: 355 mg/dL / Ketone: x  / Bili: x / Urobili: x   Blood: x / Protein: x / Nitrite: x   Leuk Esterase: x / RBC: x / WBC x   Sq Epi: x / Non Sq Epi: x / Bacteria: x      CAPILLARY BLOOD GLUCOSE      POCT Blood Glucose.: 331 mg/dL (23 Nov 2023 11:01)  POCT Blood Glucose.: 345 mg/dL (23 Nov 2023 07:43)  POCT Blood Glucose.: 431 mg/dL (22 Nov 2023 23:19)  POCT Blood Glucose.: 473 mg/dL (22 Nov 2023 21:16)  POCT Blood Glucose.: 448 mg/dL (22 Nov 2023 21:15)  POCT Blood Glucose.: 440 mg/dL (22 Nov 2023 16:39)  POCT Blood Glucose.: 416 mg/dL (22 Nov 2023 16:37)      Culture - Blood (collected 20 Nov 2023 06:36)  Source: .Blood Blood-Peripheral  Preliminary Report (22 Nov 2023 15:10):    No growth at 48 Hours    Culture - Urine (collected 19 Nov 2023 14:04)  Source: Clean Catch Clean Catch (Midstream)  Final Report (22 Nov 2023 09:36):    >100,000 CFU/ml Escherichia coli  Organism: Escherichia coli (22 Nov 2023 09:36)  Organism: Escherichia coli (22 Nov 2023 09:36)      RADIOLOGY & ADDITIONAL TESTS:    Imaging Personally Reviewed:  [ ] YES  [ ] NO    Consultant(s) Notes Reviewed:  [ ] YES  [ ] NO    Care Discussed with Consultants/Other Providers [ ] YES  [ ] NO
Patient is a 62y old  Female who presents with a chief complaint of Sepsis secondary to UTI (2023 20:57)      INTERVAL HPI/OVERNIGHT EVENTS: improved back pain     MEDICATIONS  (STANDING):  ascorbic acid 500 milliGRAM(s) Oral daily  atorvastatin 80 milliGRAM(s) Oral at bedtime  cefTRIAXone   IVPB 1000 milliGRAM(s) IV Intermittent every 24 hours  clonazePAM  Tablet 1 milliGRAM(s) Oral at bedtime  clopidogrel Tablet 75 milliGRAM(s) Oral daily  clotrimazole 1% Cream 1 Application(s) Topical every 12 hours  dextrose 5%. 1000 milliLiter(s) (50 mL/Hr) IV Continuous <Continuous>  dextrose 5%. 1000 milliLiter(s) (100 mL/Hr) IV Continuous <Continuous>  dextrose 50% Injectable 25 Gram(s) IV Push once  dextrose 50% Injectable 12.5 Gram(s) IV Push once  dextrose 50% Injectable 25 Gram(s) IV Push once  famotidine    Tablet 20 milliGRAM(s) Oral daily  folic acid 1 milliGRAM(s) Oral daily  furosemide    Tablet 20 milliGRAM(s) Oral daily  glucagon  Injectable 1 milliGRAM(s) IntraMuscular once  insulin glargine Injectable (LANTUS) 10 Unit(s) SubCutaneous at bedtime  insulin lispro (ADMELOG) corrective regimen sliding scale   SubCutaneous three times a day before meals  metoprolol tartrate 50 milliGRAM(s) Oral daily  potassium chloride    Tablet ER 20 milliEquivalent(s) Oral daily  predniSONE   Tablet 10 milliGRAM(s) Oral daily  QUEtiapine 400 milliGRAM(s) Oral at bedtime  risperiDONE   Tablet 2 milliGRAM(s) Oral at bedtime    MEDICATIONS  (PRN):  acetaminophen     Tablet .. 650 milliGRAM(s) Oral every 6 hours PRN Temp greater or equal to 38C (100.4F), Mild Pain (1 - 3)  aluminum hydroxide/magnesium hydroxide/simethicone Suspension 30 milliLiter(s) Oral every 4 hours PRN Dyspepsia  dextrose Oral Gel 15 Gram(s) Oral once PRN Blood Glucose LESS THAN 70 milliGRAM(s)/deciliter  melatonin 3 milliGRAM(s) Oral at bedtime PRN Insomnia  ondansetron Injectable 4 milliGRAM(s) IV Push every 8 hours PRN Nausea and/or Vomiting  zolpidem 10 milliGRAM(s) Oral at bedtime PRN Insomnia        Allergies    erythromycin (Unknown)    Intolerances        REVIEW OF SYSTEMS:  CONSTITUTIONAL: No fever, weight loss  EYES: No eye pain, visual disturbances, or discharge  ENMT:  No difficulty hearing, tinnitus, vertigo; No sinus or throat pain  RESPIRATORY: No cough, wheezing, chills or hemoptysis; No shortness of breath  CARDIOVASCULAR: No chest pain, palpitations, dizziness, or leg swelling  GASTROINTESTINAL: No abdominal or epigastric pain. No nausea, vomiting, or hematemesis; No diarrhea or constipation. No melena or hematochezia.  GENITOURINARY: No dysuria, frequency, hematuria, or incontinence  NEUROLOGICAL: No headaches, memory loss, loss of strength, numbness, or tremors  MUSCULOSKELETAL:  back pain   PSYCHIATRIC: No depression, anxiety, mood swings, or difficulty sleeping  HEME/LYMPH: No easy bruising, or bleeding gums      Vital Signs Last 24 Hrs  T(C): 36.5 (2023 10:45), Max: 36.6 (2023 23:56)  T(F): 97.7 (2023 10:45), Max: 97.9 (2023 04:35)  HR: 75 (2023 10:45) (74 - 93)  BP: 109/62 (2023 10:45) (109/62 - 161/60)  BP(mean): --  RR: 16 (2023 10:45) (16 - 18)  SpO2: 100% (2023 10:45) (96% - 100%)    Parameters below as of 2023 10:45  Patient On (Oxygen Delivery Method): room air      Parameters below as of 2023 10:41  Patient On (Oxygen Delivery Method): nasal cannula        PHYSICAL EXAM:  GENERAL: NAD  HEAD:  Atraumatic, Normocephalic  EYES: EOMI, PERRLA, conjunctiva and sclera clear  ENMT: No tonsillar erythema, exudates, or enlargement;   NECK: Supple, Normal thyroid  NERVOUS SYSTEM:  Alert & Oriented X3, Good concentration; Motor Strength 5/5 B/L upper and lower extremities; DTRs 2+ intact and symmetric  CHEST/LUNG: CTABL; No rales, rhonchi, wheezing, or rubs  HEART: Regular rate and rhythm; No murmurs, rubs, or gallops  ABDOMEN: Soft, Nontender, Nondistended; Bowel sounds present  EXTREMITIES:  2+ Peripheral Pulses, No clubbing, cyanosis, or edema  BACK: generalized midline tenderness.  LABS:                        12.7   11.04 )-----------( 211      ( 2023 11:52 )             39.7         141  |  105  |  24<H>  ----------------------------<  204<H>  4.2   |  30  |  0.99    Ca    8.9      2023 11:52  Mg     2.0         TPro  6.7  /  Alb  3.1<L>  /  TBili  0.3  /  DBili  x   /  AST  31  /  ALT  65  /  AlkPhos  113        Urinalysis Basic - ( 2023 14:04 )    Color: Yellow / Appearance: Cloudy / S.015 / pH: x  Gluc: x / Ketone: Negative mg/dL  / Bili: Negative / Urobili: 0.2 mg/dL   Blood: x / Protein: 300 mg/dL / Nitrite: Negative   Leuk Esterase: Moderate / RBC: 11-25 /HPF / WBC >50 /HPF   Sq Epi: x / Non Sq Epi: x / Bacteria: Many /HPF      CAPILLARY BLOOD GLUCOSE      POCT Blood Glucose.: 198 mg/dL (2023 11:29)  POCT Blood Glucose.: 157 mg/dL (2023 07:31)  POCT Blood Glucose.: 177 mg/dL (2023 22:09)      RADIOLOGY & ADDITIONAL TESTS:    Imaging Personally Reviewed:  [ ] YES  [ ] NO    Consultant(s) Notes Reviewed:  [ ] YES  [ ] NO    Care Discussed with Consultants/Other Providers [ ] YES  [ ] NO
Pt stable  Desires removal of peg tube      MEDICATIONS  (STANDING):  ascorbic acid 500 milliGRAM(s) Oral daily  atorvastatin 80 milliGRAM(s) Oral at bedtime  cefTRIAXone   IVPB 1000 milliGRAM(s) IV Intermittent every 24 hours  clonazePAM  Tablet 1 milliGRAM(s) Oral at bedtime  clopidogrel Tablet 75 milliGRAM(s) Oral daily  clotrimazole 1% Cream 1 Application(s) Topical every 12 hours  dextrose 5%. 1000 milliLiter(s) (50 mL/Hr) IV Continuous <Continuous>  dextrose 5%. 1000 milliLiter(s) (100 mL/Hr) IV Continuous <Continuous>  dextrose 50% Injectable 25 Gram(s) IV Push once  dextrose 50% Injectable 25 Gram(s) IV Push once  dextrose 50% Injectable 12.5 Gram(s) IV Push once  famotidine    Tablet 20 milliGRAM(s) Oral daily  folic acid 1 milliGRAM(s) Oral daily  furosemide    Tablet 20 milliGRAM(s) Oral daily  glucagon  Injectable 1 milliGRAM(s) IntraMuscular once  insulin glargine Injectable (LANTUS) 10 Unit(s) SubCutaneous at bedtime  insulin lispro (ADMELOG) corrective regimen sliding scale   SubCutaneous three times a day before meals  metoprolol tartrate 50 milliGRAM(s) Oral daily  potassium chloride    Tablet ER 20 milliEquivalent(s) Oral daily  predniSONE   Tablet 10 milliGRAM(s) Oral daily  QUEtiapine 400 milliGRAM(s) Oral at bedtime  risperiDONE   Tablet 2 milliGRAM(s) Oral at bedtime    MEDICATIONS  (PRN):  acetaminophen     Tablet .. 650 milliGRAM(s) Oral every 6 hours PRN Temp greater or equal to 38C (100.4F), Mild Pain (1 - 3)  aluminum hydroxide/magnesium hydroxide/simethicone Suspension 30 milliLiter(s) Oral every 4 hours PRN Dyspepsia  dextrose Oral Gel 15 Gram(s) Oral once PRN Blood Glucose LESS THAN 70 milliGRAM(s)/deciliter  melatonin 3 milliGRAM(s) Oral at bedtime PRN Insomnia  ondansetron Injectable 4 milliGRAM(s) IV Push every 8 hours PRN Nausea and/or Vomiting  zolpidem 10 milliGRAM(s) Oral at bedtime PRN Insomnia      Allergies    erythromycin (Unknown)    Intolerances        Vital Signs Last 24 Hrs  T(C): 36.6 (21 Nov 2023 04:35), Max: 36.6 (20 Nov 2023 23:56)  T(F): 97.9 (21 Nov 2023 04:35), Max: 97.9 (21 Nov 2023 04:35)  HR: 83 (21 Nov 2023 04:35) (62 - 93)  BP: 128/59 (21 Nov 2023 04:35) (128/59 - 161/60)  BP(mean): --  RR: 16 (21 Nov 2023 04:35) (16 - 18)  SpO2: 96% (21 Nov 2023 04:35) (96% - 100%)    Parameters below as of 21 Nov 2023 04:35  Patient On (Oxygen Delivery Method): room air        PHYSICAL EXAM:  General: NAD.  CVS: S1, S2  Chest: air entry bilaterally present  Abd: BS present, soft, non-tender, +peg      LABS:                        10.8   6.84  )-----------( 176      ( 21 Nov 2023 05:25 )             33.8     11-21    142  |  103  |  29<H>  ----------------------------<  347<H>  3.9   |  32<H>  |  1.61<H>    Ca    8.4<L>      21 Nov 2023 05:25  Mg     2.0     11-19    TPro  6.7  /  Alb  3.1<L>  /  TBili  0.3  /  DBili  x   /  AST  31  /  ALT  65  /  AlkPhos  113  11-19        old peg tube removed at bedside without difficulty  will cover old peg site with gauze - leakage from site will hopefully decrease over next 1-2 days  continue present feedings    
Pt stable -- tolerating PO diet  No leakage from old peg site      MEDICATIONS  (STANDING):  ascorbic acid 500 milliGRAM(s) Oral daily  atorvastatin 80 milliGRAM(s) Oral at bedtime  clonazePAM  Tablet 1 milliGRAM(s) Oral at bedtime  clopidogrel Tablet 75 milliGRAM(s) Oral daily  clotrimazole 1% Cream 1 Application(s) Topical every 12 hours  dextrose 5%. 1000 milliLiter(s) (100 mL/Hr) IV Continuous <Continuous>  dextrose 5%. 1000 milliLiter(s) (50 mL/Hr) IV Continuous <Continuous>  dextrose 50% Injectable 25 Gram(s) IV Push once  dextrose 50% Injectable 25 Gram(s) IV Push once  dextrose 50% Injectable 12.5 Gram(s) IV Push once  famotidine    Tablet 20 milliGRAM(s) Oral daily  folic acid 1 milliGRAM(s) Oral daily  glucagon  Injectable 1 milliGRAM(s) IntraMuscular once  insulin glargine Injectable (LANTUS) 10 Unit(s) SubCutaneous at bedtime  insulin lispro (ADMELOG) corrective regimen sliding scale   SubCutaneous three times a day before meals  metoprolol tartrate 50 milliGRAM(s) Oral daily  potassium chloride    Tablet ER 20 milliEquivalent(s) Oral daily  predniSONE   Tablet 10 milliGRAM(s) Oral daily  QUEtiapine 400 milliGRAM(s) Oral at bedtime  risperiDONE   Tablet 2 milliGRAM(s) Oral at bedtime    MEDICATIONS  (PRN):  acetaminophen     Tablet .. 650 milliGRAM(s) Oral every 6 hours PRN Temp greater or equal to 38C (100.4F), Mild Pain (1 - 3)  aluminum hydroxide/magnesium hydroxide/simethicone Suspension 30 milliLiter(s) Oral every 4 hours PRN Dyspepsia  dextrose Oral Gel 15 Gram(s) Oral once PRN Blood Glucose LESS THAN 70 milliGRAM(s)/deciliter  melatonin 3 milliGRAM(s) Oral at bedtime PRN Insomnia  ondansetron Injectable 4 milliGRAM(s) IV Push every 8 hours PRN Nausea and/or Vomiting  zolpidem 10 milliGRAM(s) Oral at bedtime PRN Insomnia      Allergies    erythromycin (Unknown)    Intolerances        Vital Signs Last 24 Hrs  T(C): 36.2 (22 Nov 2023 04:49), Max: 36.9 (21 Nov 2023 23:00)  T(F): 97.2 (22 Nov 2023 04:49), Max: 98.4 (21 Nov 2023 23:00)  HR: 83 (22 Nov 2023 04:49) (75 - 95)  BP: 139/60 (22 Nov 2023 04:49) (109/62 - 147/59)  BP(mean): --  RR: 18 (22 Nov 2023 04:49) (16 - 18)  SpO2: 93% (22 Nov 2023 04:49) (93% - 100%)    Parameters below as of 21 Nov 2023 16:14  Patient On (Oxygen Delivery Method): room air        PHYSICAL EXAM:  General: NAD.  CVS: S1, S2  Chest: air entry bilaterally present  Abd: BS present, soft, non-tender, old peg site is clean and dry      LABS:                        10.3   6.19  )-----------( 165      ( 22 Nov 2023 05:34 )             32.0     11-22    138  |  103  |  35<H>  ----------------------------<  355<H>  3.8   |  29  |  1.02    Ca    8.2<L>      22 Nov 2023 05:34        continue present diet  Please re-consult GI if needed        
Patient is a 62y old  Female who presents with a chief complaint of Sepsis secondary to UTI (2023 20:57)      INTERVAL HPI/OVERNIGHT EVENTS: still with back pain but improved     MEDICATIONS  (STANDING):  ascorbic acid 500 milliGRAM(s) Oral daily  atorvastatin 80 milliGRAM(s) Oral at bedtime  cefTRIAXone   IVPB 1000 milliGRAM(s) IV Intermittent every 24 hours  clonazePAM  Tablet 1 milliGRAM(s) Oral at bedtime  clopidogrel Tablet 75 milliGRAM(s) Oral daily  dextrose 5%. 1000 milliLiter(s) (50 mL/Hr) IV Continuous <Continuous>  dextrose 5%. 1000 milliLiter(s) (100 mL/Hr) IV Continuous <Continuous>  dextrose 50% Injectable 25 Gram(s) IV Push once  dextrose 50% Injectable 12.5 Gram(s) IV Push once  dextrose 50% Injectable 25 Gram(s) IV Push once  famotidine    Tablet 20 milliGRAM(s) Oral daily  folic acid 1 milliGRAM(s) Oral daily  furosemide    Tablet 20 milliGRAM(s) Oral daily  glucagon  Injectable 1 milliGRAM(s) IntraMuscular once  insulin glargine Injectable (LANTUS) 10 Unit(s) SubCutaneous at bedtime  insulin lispro (ADMELOG) corrective regimen sliding scale   SubCutaneous three times a day before meals  metoprolol tartrate 50 milliGRAM(s) Oral daily  potassium chloride    Tablet ER 20 milliEquivalent(s) Oral daily  predniSONE   Tablet 10 milliGRAM(s) Oral daily  QUEtiapine 400 milliGRAM(s) Oral at bedtime  risperiDONE   Tablet 2 milliGRAM(s) Oral at bedtime    MEDICATIONS  (PRN):  acetaminophen     Tablet .. 650 milliGRAM(s) Oral every 6 hours PRN Temp greater or equal to 38C (100.4F), Mild Pain (1 - 3)  aluminum hydroxide/magnesium hydroxide/simethicone Suspension 30 milliLiter(s) Oral every 4 hours PRN Dyspepsia  dextrose Oral Gel 15 Gram(s) Oral once PRN Blood Glucose LESS THAN 70 milliGRAM(s)/deciliter  melatonin 3 milliGRAM(s) Oral at bedtime PRN Insomnia  ondansetron Injectable 4 milliGRAM(s) IV Push every 8 hours PRN Nausea and/or Vomiting  zolpidem 10 milliGRAM(s) Oral at bedtime PRN Insomnia      Allergies    erythromycin (Unknown)    Intolerances        REVIEW OF SYSTEMS:  CONSTITUTIONAL: No fever, weight loss  EYES: No eye pain, visual disturbances, or discharge  ENMT:  No difficulty hearing, tinnitus, vertigo; No sinus or throat pain  RESPIRATORY: No cough, wheezing, chills or hemoptysis; No shortness of breath  CARDIOVASCULAR: No chest pain, palpitations, dizziness, or leg swelling  GASTROINTESTINAL: No abdominal or epigastric pain. No nausea, vomiting, or hematemesis; No diarrhea or constipation. No melena or hematochezia.  GENITOURINARY: No dysuria, frequency, hematuria, or incontinence  NEUROLOGICAL: No headaches, memory loss, loss of strength, numbness, or tremors  MUSCULOSKELETAL:  back pain   PSYCHIATRIC: No depression, anxiety, mood swings, or difficulty sleeping  HEME/LYMPH: No easy bruising, or bleeding gums      Vital Signs Last 24 Hrs  T(C): 36.4 (2023 10:41), Max: 36.8 (2023 16:59)  T(F): 97.5 (2023 10:41), Max: 98.3 (2023 16:59)  HR: 62 (2023 10:41) (62 - 97)  BP: 138/61 (2023 10:41) (126/57 - 167/72)  BP(mean): --  RR: 18 (2023 10:41) (16 - 22)  SpO2: 100% (2023 10:41) (98% - 100%)    Parameters below as of 2023 10:41  Patient On (Oxygen Delivery Method): nasal cannula        PHYSICAL EXAM:  GENERAL: NAD  HEAD:  Atraumatic, Normocephalic  EYES: EOMI, PERRLA, conjunctiva and sclera clear  ENMT: No tonsillar erythema, exudates, or enlargement;   NECK: Supple, Normal thyroid  NERVOUS SYSTEM:  Alert & Oriented X3, Good concentration; Motor Strength 5/5 B/L upper and lower extremities; DTRs 2+ intact and symmetric  CHEST/LUNG: CTABL; No rales, rhonchi, wheezing, or rubs  HEART: Regular rate and rhythm; No murmurs, rubs, or gallops  ABDOMEN: Soft, Nontender, Nondistended; Bowel sounds present  EXTREMITIES:  2+ Peripheral Pulses, No clubbing, cyanosis, or edema  BACK: generalized midline tenderness.  LABS:                        12.7   11.04 )-----------( 211      ( 2023 11:52 )             39.7         141  |  105  |  24<H>  ----------------------------<  204<H>  4.2   |  30  |  0.99    Ca    8.9      2023 11:52  Mg     2.0         TPro  6.7  /  Alb  3.1<L>  /  TBili  0.3  /  DBili  x   /  AST  31  /  ALT  65  /  AlkPhos  113        Urinalysis Basic - ( 2023 14:04 )    Color: Yellow / Appearance: Cloudy / S.015 / pH: x  Gluc: x / Ketone: Negative mg/dL  / Bili: Negative / Urobili: 0.2 mg/dL   Blood: x / Protein: 300 mg/dL / Nitrite: Negative   Leuk Esterase: Moderate / RBC: 11-25 /HPF / WBC >50 /HPF   Sq Epi: x / Non Sq Epi: x / Bacteria: Many /HPF      CAPILLARY BLOOD GLUCOSE      POCT Blood Glucose.: 198 mg/dL (2023 11:29)  POCT Blood Glucose.: 157 mg/dL (2023 07:31)  POCT Blood Glucose.: 177 mg/dL (2023 22:09)      RADIOLOGY & ADDITIONAL TESTS:    Imaging Personally Reviewed:  [ ] YES  [ ] NO    Consultant(s) Notes Reviewed:  [ ] YES  [ ] NO    Care Discussed with Consultants/Other Providers [ ] YES  [ ] NO

## 2023-11-25 LAB
CULTURE RESULTS: SIGNIFICANT CHANGE UP
CULTURE RESULTS: SIGNIFICANT CHANGE UP
SPECIMEN SOURCE: SIGNIFICANT CHANGE UP
SPECIMEN SOURCE: SIGNIFICANT CHANGE UP

## 2024-03-15 ENCOUNTER — INPATIENT (INPATIENT)
Facility: HOSPITAL | Age: 64
LOS: 11 days | Discharge: HOME HEALTH | End: 2024-03-27
Attending: STUDENT IN AN ORGANIZED HEALTH CARE EDUCATION/TRAINING PROGRAM | Admitting: STUDENT IN AN ORGANIZED HEALTH CARE EDUCATION/TRAINING PROGRAM
Payer: MEDICARE

## 2024-03-15 VITALS
WEIGHT: 169.98 LBS | HEIGHT: 63 IN | RESPIRATION RATE: 18 BRPM | DIASTOLIC BLOOD PRESSURE: 65 MMHG | SYSTOLIC BLOOD PRESSURE: 150 MMHG | TEMPERATURE: 98 F | HEART RATE: 89 BPM | OXYGEN SATURATION: 99 %

## 2024-03-15 DIAGNOSIS — Z98.51 TUBAL LIGATION STATUS: Chronic | ICD-10-CM

## 2024-03-15 DIAGNOSIS — Z98.891 HISTORY OF UTERINE SCAR FROM PREVIOUS SURGERY: Chronic | ICD-10-CM

## 2024-03-15 DIAGNOSIS — Z98.890 OTHER SPECIFIED POSTPROCEDURAL STATES: Chronic | ICD-10-CM

## 2024-03-15 DIAGNOSIS — Z93.1 GASTROSTOMY STATUS: Chronic | ICD-10-CM

## 2024-03-15 PROBLEM — F20.9 SCHIZOPHRENIA, UNSPECIFIED: Chronic | Status: ACTIVE | Noted: 2023-11-19

## 2024-03-15 PROBLEM — I69.30 UNSPECIFIED SEQUELAE OF CEREBRAL INFARCTION: Chronic | Status: ACTIVE | Noted: 2023-11-19

## 2024-03-15 PROBLEM — E11.9 TYPE 2 DIABETES MELLITUS WITHOUT COMPLICATIONS: Chronic | Status: ACTIVE | Noted: 2023-11-19

## 2024-03-15 LAB
ALBUMIN SERPL ELPH-MCNC: 2.5 G/DL — LOW (ref 3.3–5)
ALP SERPL-CCNC: 98 U/L — SIGNIFICANT CHANGE UP (ref 40–120)
ALT FLD-CCNC: 31 U/L — SIGNIFICANT CHANGE UP (ref 12–78)
ANION GAP SERPL CALC-SCNC: 10 MMOL/L — SIGNIFICANT CHANGE UP (ref 5–17)
APPEARANCE UR: ABNORMAL
APTT BLD: 33.8 SEC — SIGNIFICANT CHANGE UP (ref 24.5–35.6)
AST SERPL-CCNC: 20 U/L — SIGNIFICANT CHANGE UP (ref 15–37)
BACTERIA # UR AUTO: ABNORMAL /HPF
BASOPHILS # BLD AUTO: 0.03 K/UL — SIGNIFICANT CHANGE UP (ref 0–0.2)
BASOPHILS NFR BLD AUTO: 0.4 % — SIGNIFICANT CHANGE UP (ref 0–2)
BILIRUB SERPL-MCNC: 0.3 MG/DL — SIGNIFICANT CHANGE UP (ref 0.2–1.2)
BILIRUB UR-MCNC: NEGATIVE — SIGNIFICANT CHANGE UP
BUN SERPL-MCNC: 24 MG/DL — HIGH (ref 7–23)
CALCIUM SERPL-MCNC: 8.7 MG/DL — SIGNIFICANT CHANGE UP (ref 8.5–10.1)
CHLORIDE SERPL-SCNC: 107 MMOL/L — SIGNIFICANT CHANGE UP (ref 96–108)
CO2 SERPL-SCNC: 26 MMOL/L — SIGNIFICANT CHANGE UP (ref 22–31)
COLOR SPEC: YELLOW — SIGNIFICANT CHANGE UP
CREAT SERPL-MCNC: 1.02 MG/DL — SIGNIFICANT CHANGE UP (ref 0.5–1.3)
DIFF PNL FLD: ABNORMAL
EGFR: 61 ML/MIN/1.73M2 — SIGNIFICANT CHANGE UP
EOSINOPHIL # BLD AUTO: 0.26 K/UL — SIGNIFICANT CHANGE UP (ref 0–0.5)
EOSINOPHIL NFR BLD AUTO: 3.8 % — SIGNIFICANT CHANGE UP (ref 0–6)
EPI CELLS # UR: PRESENT
GLUCOSE BLDC GLUCOMTR-MCNC: 131 MG/DL — HIGH (ref 70–99)
GLUCOSE SERPL-MCNC: 266 MG/DL — HIGH (ref 70–99)
GLUCOSE UR QL: NEGATIVE MG/DL — SIGNIFICANT CHANGE UP
HCT VFR BLD CALC: 33.6 % — LOW (ref 34.5–45)
HGB BLD-MCNC: 10.7 G/DL — LOW (ref 11.5–15.5)
IMM GRANULOCYTES NFR BLD AUTO: 0.9 % — SIGNIFICANT CHANGE UP (ref 0–0.9)
INR BLD: 0.85 RATIO — SIGNIFICANT CHANGE UP (ref 0.85–1.18)
KETONES UR-MCNC: NEGATIVE MG/DL — SIGNIFICANT CHANGE UP
LACTATE SERPL-SCNC: 2 MMOL/L — SIGNIFICANT CHANGE UP (ref 0.7–2)
LACTATE SERPL-SCNC: 2.4 MMOL/L — HIGH (ref 0.7–2)
LEUKOCYTE ESTERASE UR-ACNC: ABNORMAL
LYMPHOCYTES # BLD AUTO: 2.03 K/UL — SIGNIFICANT CHANGE UP (ref 1–3.3)
LYMPHOCYTES # BLD AUTO: 29.6 % — SIGNIFICANT CHANGE UP (ref 13–44)
MCHC RBC-ENTMCNC: 31.1 PG — SIGNIFICANT CHANGE UP (ref 27–34)
MCHC RBC-ENTMCNC: 31.8 G/DL — LOW (ref 32–36)
MCV RBC AUTO: 97.7 FL — SIGNIFICANT CHANGE UP (ref 80–100)
MONOCYTES # BLD AUTO: 0.42 K/UL — SIGNIFICANT CHANGE UP (ref 0–0.9)
MONOCYTES NFR BLD AUTO: 6.1 % — SIGNIFICANT CHANGE UP (ref 2–14)
NEUTROPHILS # BLD AUTO: 4.05 K/UL — SIGNIFICANT CHANGE UP (ref 1.8–7.4)
NEUTROPHILS NFR BLD AUTO: 59.2 % — SIGNIFICANT CHANGE UP (ref 43–77)
NITRITE UR-MCNC: NEGATIVE — SIGNIFICANT CHANGE UP
NRBC # BLD: 0 /100 WBCS — SIGNIFICANT CHANGE UP (ref 0–0)
PH UR: 6 — SIGNIFICANT CHANGE UP (ref 5–8)
PLATELET # BLD AUTO: 242 K/UL — SIGNIFICANT CHANGE UP (ref 150–400)
POTASSIUM SERPL-MCNC: 3.8 MMOL/L — SIGNIFICANT CHANGE UP (ref 3.5–5.3)
POTASSIUM SERPL-SCNC: 3.8 MMOL/L — SIGNIFICANT CHANGE UP (ref 3.5–5.3)
PROT SERPL-MCNC: 5.8 GM/DL — LOW (ref 6–8.3)
PROT UR-MCNC: 300 MG/DL
PROTHROM AB SERPL-ACNC: 10.2 SEC — SIGNIFICANT CHANGE UP (ref 9.5–13)
RAPID RVP RESULT: SIGNIFICANT CHANGE UP
RBC # BLD: 3.44 M/UL — LOW (ref 3.8–5.2)
RBC # FLD: 16.6 % — HIGH (ref 10.3–14.5)
RBC CASTS # UR COMP ASSIST: 25 /HPF — HIGH (ref 0–4)
SARS-COV-2 RNA SPEC QL NAA+PROBE: SIGNIFICANT CHANGE UP
SODIUM SERPL-SCNC: 143 MMOL/L — SIGNIFICANT CHANGE UP (ref 135–145)
SP GR SPEC: 1.01 — SIGNIFICANT CHANGE UP (ref 1–1.03)
UROBILINOGEN FLD QL: 0.2 MG/DL — SIGNIFICANT CHANGE UP (ref 0.2–1)
WBC # BLD: 6.85 K/UL — SIGNIFICANT CHANGE UP (ref 3.8–10.5)
WBC # FLD AUTO: 6.85 K/UL — SIGNIFICANT CHANGE UP (ref 3.8–10.5)
WBC UR QL: 50 /HPF — HIGH (ref 0–5)

## 2024-03-15 PROCEDURE — 73630 X-RAY EXAM OF FOOT: CPT | Mod: 26,50

## 2024-03-15 PROCEDURE — 71045 X-RAY EXAM CHEST 1 VIEW: CPT | Mod: 26

## 2024-03-15 PROCEDURE — 93925 LOWER EXTREMITY STUDY: CPT | Mod: 26

## 2024-03-15 PROCEDURE — 70450 CT HEAD/BRAIN W/O DYE: CPT | Mod: 26,MC

## 2024-03-15 PROCEDURE — 99285 EMERGENCY DEPT VISIT HI MDM: CPT

## 2024-03-15 PROCEDURE — 99222 1ST HOSP IP/OBS MODERATE 55: CPT

## 2024-03-15 PROCEDURE — 93010 ELECTROCARDIOGRAM REPORT: CPT

## 2024-03-15 PROCEDURE — 93923 UPR/LXTR ART STDY 3+ LVLS: CPT | Mod: 26

## 2024-03-15 RX ORDER — QUETIAPINE FUMARATE 200 MG/1
400 TABLET, FILM COATED ORAL AT BEDTIME
Refills: 0 | Status: DISCONTINUED | OUTPATIENT
Start: 2024-03-15 | End: 2024-03-27

## 2024-03-15 RX ORDER — FOLIC ACID 0.8 MG
1 TABLET ORAL DAILY
Refills: 0 | Status: DISCONTINUED | OUTPATIENT
Start: 2024-03-15 | End: 2024-03-27

## 2024-03-15 RX ORDER — METOPROLOL TARTRATE 50 MG
50 TABLET ORAL DAILY
Refills: 0 | Status: DISCONTINUED | OUTPATIENT
Start: 2024-03-15 | End: 2024-03-27

## 2024-03-15 RX ORDER — DEXTROSE 50 % IN WATER 50 %
12.5 SYRINGE (ML) INTRAVENOUS ONCE
Refills: 0 | Status: DISCONTINUED | OUTPATIENT
Start: 2024-03-15 | End: 2024-03-27

## 2024-03-15 RX ORDER — CLOPIDOGREL BISULFATE 75 MG/1
1 TABLET, FILM COATED ORAL
Refills: 0 | DISCHARGE

## 2024-03-15 RX ORDER — INSULIN LISPRO 100/ML
VIAL (ML) SUBCUTANEOUS
Refills: 0 | Status: DISCONTINUED | OUTPATIENT
Start: 2024-03-15 | End: 2024-03-27

## 2024-03-15 RX ORDER — SODIUM CHLORIDE 9 MG/ML
1000 INJECTION INTRAMUSCULAR; INTRAVENOUS; SUBCUTANEOUS ONCE
Refills: 0 | Status: COMPLETED | OUTPATIENT
Start: 2024-03-15 | End: 2024-03-15

## 2024-03-15 RX ORDER — CEFTRIAXONE 500 MG/1
1000 INJECTION, POWDER, FOR SOLUTION INTRAMUSCULAR; INTRAVENOUS EVERY 24 HOURS
Refills: 0 | Status: DISCONTINUED | OUTPATIENT
Start: 2024-03-16 | End: 2024-03-18

## 2024-03-15 RX ORDER — ZOLPIDEM TARTRATE 10 MG/1
5 TABLET ORAL AT BEDTIME
Refills: 0 | Status: DISCONTINUED | OUTPATIENT
Start: 2024-03-15 | End: 2024-03-22

## 2024-03-15 RX ORDER — RISPERIDONE 4 MG/1
2 TABLET ORAL AT BEDTIME
Refills: 0 | Status: DISCONTINUED | OUTPATIENT
Start: 2024-03-15 | End: 2024-03-27

## 2024-03-15 RX ORDER — ZOLPIDEM TARTRATE 10 MG/1
5 TABLET ORAL AT BEDTIME
Refills: 0 | Status: DISCONTINUED | OUTPATIENT
Start: 2024-03-15 | End: 2024-03-20

## 2024-03-15 RX ORDER — KETOROLAC TROMETHAMINE 30 MG/ML
15 SYRINGE (ML) INJECTION EVERY 6 HOURS
Refills: 0 | Status: DISCONTINUED | OUTPATIENT
Start: 2024-03-15 | End: 2024-03-20

## 2024-03-15 RX ORDER — DEXTROSE 50 % IN WATER 50 %
25 SYRINGE (ML) INTRAVENOUS ONCE
Refills: 0 | Status: DISCONTINUED | OUTPATIENT
Start: 2024-03-15 | End: 2024-03-27

## 2024-03-15 RX ORDER — DEXTROSE 50 % IN WATER 50 %
15 SYRINGE (ML) INTRAVENOUS ONCE
Refills: 0 | Status: DISCONTINUED | OUTPATIENT
Start: 2024-03-15 | End: 2024-03-27

## 2024-03-15 RX ORDER — MORPHINE SULFATE 50 MG/1
4 CAPSULE, EXTENDED RELEASE ORAL ONCE
Refills: 0 | Status: DISCONTINUED | OUTPATIENT
Start: 2024-03-15 | End: 2024-03-16

## 2024-03-15 RX ORDER — INSULIN LISPRO 100/ML
VIAL (ML) SUBCUTANEOUS AT BEDTIME
Refills: 0 | Status: DISCONTINUED | OUTPATIENT
Start: 2024-03-15 | End: 2024-03-27

## 2024-03-15 RX ORDER — GLUCAGON INJECTION, SOLUTION 0.5 MG/.1ML
1 INJECTION, SOLUTION SUBCUTANEOUS ONCE
Refills: 0 | Status: DISCONTINUED | OUTPATIENT
Start: 2024-03-15 | End: 2024-03-27

## 2024-03-15 RX ORDER — ATORVASTATIN CALCIUM 80 MG/1
80 TABLET, FILM COATED ORAL AT BEDTIME
Refills: 0 | Status: DISCONTINUED | OUTPATIENT
Start: 2024-03-15 | End: 2024-03-27

## 2024-03-15 RX ORDER — FUROSEMIDE 40 MG
20 TABLET ORAL DAILY
Refills: 0 | Status: DISCONTINUED | OUTPATIENT
Start: 2024-03-15 | End: 2024-03-22

## 2024-03-15 RX ORDER — CLONAZEPAM 1 MG
1 TABLET ORAL AT BEDTIME
Refills: 0 | Status: DISCONTINUED | OUTPATIENT
Start: 2024-03-15 | End: 2024-03-22

## 2024-03-15 RX ORDER — FAMOTIDINE 10 MG/ML
20 INJECTION INTRAVENOUS DAILY
Refills: 0 | Status: DISCONTINUED | OUTPATIENT
Start: 2024-03-15 | End: 2024-03-23

## 2024-03-15 RX ORDER — ACETAMINOPHEN 500 MG
1000 TABLET ORAL ONCE
Refills: 0 | Status: COMPLETED | OUTPATIENT
Start: 2024-03-15 | End: 2024-03-15

## 2024-03-15 RX ORDER — CEFTRIAXONE 500 MG/1
1000 INJECTION, POWDER, FOR SOLUTION INTRAMUSCULAR; INTRAVENOUS ONCE
Refills: 0 | Status: COMPLETED | OUTPATIENT
Start: 2024-03-15 | End: 2024-03-15

## 2024-03-15 RX ORDER — SODIUM CHLORIDE 9 MG/ML
1000 INJECTION, SOLUTION INTRAVENOUS
Refills: 0 | Status: DISCONTINUED | OUTPATIENT
Start: 2024-03-15 | End: 2024-03-27

## 2024-03-15 RX ORDER — MORPHINE SULFATE 50 MG/1
4 CAPSULE, EXTENDED RELEASE ORAL ONCE
Refills: 0 | Status: DISCONTINUED | OUTPATIENT
Start: 2024-03-15 | End: 2024-03-15

## 2024-03-15 RX ORDER — CLOPIDOGREL BISULFATE 75 MG/1
75 TABLET, FILM COATED ORAL DAILY
Refills: 0 | Status: DISCONTINUED | OUTPATIENT
Start: 2024-03-15 | End: 2024-03-27

## 2024-03-15 RX ORDER — ASCORBIC ACID 60 MG
500 TABLET,CHEWABLE ORAL DAILY
Refills: 0 | Status: DISCONTINUED | OUTPATIENT
Start: 2024-03-15 | End: 2024-03-22

## 2024-03-15 RX ORDER — INSULIN GLARGINE 100 [IU]/ML
10 INJECTION, SOLUTION SUBCUTANEOUS AT BEDTIME
Refills: 0 | Status: DISCONTINUED | OUTPATIENT
Start: 2024-03-15 | End: 2024-03-27

## 2024-03-15 RX ORDER — FOLIC ACID 0.8 MG
1 TABLET ORAL
Refills: 0 | DISCHARGE

## 2024-03-15 RX ADMIN — Medication 1 MILLIGRAM(S): at 23:40

## 2024-03-15 RX ADMIN — CEFTRIAXONE 100 MILLIGRAM(S): 500 INJECTION, POWDER, FOR SOLUTION INTRAMUSCULAR; INTRAVENOUS at 23:51

## 2024-03-15 RX ADMIN — SODIUM CHLORIDE 1000 MILLILITER(S): 9 INJECTION INTRAMUSCULAR; INTRAVENOUS; SUBCUTANEOUS at 12:47

## 2024-03-15 RX ADMIN — Medication 20 MILLIGRAM(S): at 23:32

## 2024-03-15 RX ADMIN — CLOPIDOGREL BISULFATE 75 MILLIGRAM(S): 75 TABLET, FILM COATED ORAL at 23:40

## 2024-03-15 RX ADMIN — QUETIAPINE FUMARATE 400 MILLIGRAM(S): 200 TABLET, FILM COATED ORAL at 23:31

## 2024-03-15 RX ADMIN — Medication 400 MILLIGRAM(S): at 21:16

## 2024-03-15 RX ADMIN — FAMOTIDINE 20 MILLIGRAM(S): 10 INJECTION INTRAVENOUS at 23:32

## 2024-03-15 RX ADMIN — MORPHINE SULFATE 4 MILLIGRAM(S): 50 CAPSULE, EXTENDED RELEASE ORAL at 13:30

## 2024-03-15 RX ADMIN — Medication 500 MILLIGRAM(S): at 23:31

## 2024-03-15 RX ADMIN — ATORVASTATIN CALCIUM 80 MILLIGRAM(S): 80 TABLET, FILM COATED ORAL at 23:31

## 2024-03-15 RX ADMIN — MORPHINE SULFATE 4 MILLIGRAM(S): 50 CAPSULE, EXTENDED RELEASE ORAL at 12:47

## 2024-03-15 RX ADMIN — Medication 1 MILLIGRAM(S): at 23:31

## 2024-03-15 RX ADMIN — RISPERIDONE 2 MILLIGRAM(S): 4 TABLET ORAL at 23:32

## 2024-03-15 RX ADMIN — SODIUM CHLORIDE 1000 MILLILITER(S): 9 INJECTION INTRAMUSCULAR; INTRAVENOUS; SUBCUTANEOUS at 13:47

## 2024-03-15 RX ADMIN — CEFTRIAXONE 100 MILLIGRAM(S): 500 INJECTION, POWDER, FOR SOLUTION INTRAMUSCULAR; INTRAVENOUS at 21:56

## 2024-03-15 NOTE — ED ADULT NURSE NOTE - NSFALLOOBATTEMPT_ED_ALL_ED
Health Maintenance Summary     Topic Due On Due Status Completed On Postpone Until Reason    IMMUNIZATION - DTaP/Tdap/Td Jan 20, 2022 Not Due Jan 20, 2012      Immunization-Influenza Sep 1, 2017 Postponed  Jan 16, 2018 Patient Refused    Depression Screening Aug 23, 1980 Overdue             Patient is due for topics as listed above, he wishes to decline at this time . No

## 2024-03-15 NOTE — ED ADULT TRIAGE NOTE - CHIEF COMPLAINT QUOTE
BIBEMS from home c/o AAO x 3 slow to respond. 911 activated by daughter stating that pt is more slow and not normal since 0930 pt c/o right leg and left heal pain pt with hx of CVA with right sided deficit.

## 2024-03-15 NOTE — ED PROVIDER NOTE - CLINICAL SUMMARY MEDICAL DECISION MAKING FREE TEXT BOX
62 y/o F with PMH HTN. HLD, DM, CVA (residual right sided weakness) BIBA from home for AMS. Patient reportedly more slow to respond to questions today. In the ED, vitals stable. Patient is well appearing in NAD. Endorsing pain to her bilateral feet. On exam, +gangrenous L 2nd toe and right heel ulcer. Podiatry consulted and recommend vascular consult.

## 2024-03-15 NOTE — CONSULT NOTE ADULT - SUBJECTIVE AND OBJECTIVE BOX
Patient is a 63y old  Female who presents with a chief complaint of     HPI:      PAST MEDICAL & SURGICAL HISTORY:  Anxiety      Hypertension      Hypercholesteremia      Insulin dependent type 2 diabetes mellitus      Schizophrenia      History of CVA with residual deficit      H/O tubal ligation      H/O hemorrhoidectomy      H/O  section      S/P percutaneous endoscopic gastrostomy (PEG) tube placement          MEDICATIONS  (STANDING):    MEDICATIONS  (PRN):      Allergies    erythromycin (Unknown)    Intolerances        VITALS:    Vital Signs Last 24 Hrs  T(C): 37.4 (15 Mar 2024 12:41), Max: 37.4 (15 Mar 2024 12:41)  T(F): 99.3 (15 Mar 2024 12:41), Max: 99.3 (15 Mar 2024 12:41)  HR: 87 (15 Mar 2024 12:41) (87 - 89)  BP: 103/70 (15 Mar 2024 12:41) (103/70 - 150/65)  BP(mean): 81 (15 Mar 2024 12:41) (81 - 81)  RR: 17 (15 Mar 2024 12:41) (17 - 18)  SpO2: 95% (15 Mar 2024 12:41) (95% - 99%)    Parameters below as of 15 Mar 2024 12:41  Patient On (Oxygen Delivery Method): room air        LABS:                CAPILLARY BLOOD GLUCOSE              LOWER EXTREMITY PHYSICAL EXAM:    Vascular: DP/PT 0/4, B/L, CFT <3seconds B/L, Temperature gradient warm to cool, B/L.   Neuro: Unable to assess  Musculoskeletal/Ortho: unremarkable  Skin: Right foot posterior lateral heel well adhered eschar, no drainage, no bogginess, no fluctuance, no malodor, no tracking or tunneling. Left foot distal tip of hallux and medial 2nd digit gangrene with early ischemic changes, no drainage, no bogginess, no fluctuance, no malodor, no tracking or tunneling.    RADIOLOGY & ADDITIONAL STUDIES:     Patient is a 63y old  Female who presents with a chief complaint of     HPI:  Pt presents from home c/o AAO x 3 slow to respond. 911 activated by daughter stating that pt is more slow and not normal since 930 & pt c/o right leg and left heel pain pt with hx of CVA with right sided deficit.        PAST MEDICAL & SURGICAL HISTORY:  Anxiety      Hypertension      Hypercholesteremia      Insulin dependent type 2 diabetes mellitus      Schizophrenia      History of CVA with residual deficit      H/O tubal ligation      H/O hemorrhoidectomy      H/O  section      S/P percutaneous endoscopic gastrostomy (PEG) tube placement          MEDICATIONS  (STANDING):    MEDICATIONS  (PRN):      Allergies    erythromycin (Unknown)    Intolerances        VITALS:    Vital Signs Last 24 Hrs  T(C): 37.4 (15 Mar 2024 12:41), Max: 37.4 (15 Mar 2024 12:41)  T(F): 99.3 (15 Mar 2024 12:41), Max: 99.3 (15 Mar 2024 12:41)  HR: 87 (15 Mar 2024 12:41) (87 - 89)  BP: 103/70 (15 Mar 2024 12:41) (103/70 - 150/65)  BP(mean): 81 (15 Mar 2024 12:41) (81 - 81)  RR: 17 (15 Mar 2024 12:41) (17 - 18)  SpO2: 95% (15 Mar 2024 12:41) (95% - 99%)    Parameters below as of 15 Mar 2024 12:41  Patient On (Oxygen Delivery Method): room air        LABS:                CAPILLARY BLOOD GLUCOSE              LOWER EXTREMITY PHYSICAL EXAM:    Vascular: DP/PT 0/4, B/L, CFT <3seconds B/L, Temperature gradient warm to cool, B/L.   Neuro: Unable to assess  Musculoskeletal/Ortho: unremarkable  Skin: Right foot posterior lateral heel well adhered eschar, no drainage, no bogginess, no fluctuance, no malodor, no tracking or tunneling. Left foot distal tip of hallux and medial 2nd digit gangrene with early ischemic changes, no drainage, no bogginess, no fluctuance, no malodor, no tracking or tunneling.    RADIOLOGY & ADDITIONAL STUDIES:

## 2024-03-15 NOTE — H&P ADULT - HISTORY OF PRESENT ILLNESS
63 year old female with PMHx of HTN, HLD, IDDM2, anxiety/depression, schizophrenia, and hx of CVA (with residual dysarthria and R. hemiparesis) who presented to the ED due to change in mental status. Daughter called EMS as she noted  pt more slow to respond to questions today. Pt also c/o pain to L foot and R foot where she has ulcerations for the past month. She also is noted to have generalized rash to her chest, face, and abdomen which she states has been present for "days". She denies fever, chills, N/V/D, chest pain, or dyspnea.       63 year old female with PMHx of HTN, HLD, IDDM2, anxiety/depression, schizophrenia, and hx of CVA (with residual dysarthria and R. hemiparesis) who presented to the ED due to change in mental state. Daughter called EMS as she noted  pt more slow to respond to questions today. Pt also c/o pain to L foot and R foot where she has ulcerations for the past month. She also is noted to have generalized rash to her chest, face, and abdomen which she states has been present for about 1 week. She denies fever, chills, N/V/D, chest pain, or dyspnea.

## 2024-03-15 NOTE — H&P ADULT - NSHPLABSRESULTS_GEN_ALL_CORE
T(C): 36.3 (03-15-24 @ 19:03), Max: 37.4 (03-15-24 @ 12:41)  HR: 86 (03-15-24 @ 19:03) (74 - 89)  BP: 132/63 (03-15-24 @ 19:03) (103/70 - 150/65)  RR: 17 (03-15-24 @ 19:03) (15 - 19)  SpO2: 98% (03-15-24 @ 19:03) (95% - 100%)                        10.7   6.85  )-----------( 242      ( 15 Mar 2024 12:50 )             33.6     -15    143  |  107  |  24<H>  ----------------------------<  266<H>  3.8   |  26  |  1.02    Ca    8.7      15 Mar 2024 12:50    TPro  5.8<L>  /  Alb  2.5<L>  /  TBili  0.3  /  DBili  x   /  AST  20  /  ALT  31  /  AlkPhos  98  03-15    LIVER FUNCTIONS - ( 15 Mar 2024 12:50 )  Alb: 2.5 g/dL / Pro: 5.8 gm/dL / ALK PHOS: 98 U/L / ALT: 31 U/L / AST: 20 U/L / GGT: x           PT/INR - ( 15 Mar 2024 12:50 )   PT: 10.2 sec;   INR: 0.85 ratio         PTT - ( 15 Mar 2024 12:50 )  PTT:33.8 sec  Urinalysis Basic - ( 15 Mar 2024 16:34 )    Color: Yellow / Appearance: Cloudy / S.010 / pH: x  Gluc: x / Ketone: Negative mg/dL  / Bili: Negative / Urobili: 0.2 mg/dL   Blood: x / Protein: 300 mg/dL / Nitrite: Negative   Leuk Esterase: Moderate / RBC: 25 /HPF / WBC 50 /HPF   Sq Epi: x / Non Sq Epi: x / Bacteria: Too Numerous to count /HPF          ascorbic acid 500 milliGRAM(s) Oral daily  atorvastatin 80 milliGRAM(s) Oral at bedtime  clonazePAM  Tablet 1 milliGRAM(s) Oral at bedtime  clopidogrel Tablet 75 milliGRAM(s) Oral daily  dextrose 5%. 1000 milliLiter(s) IV Continuous <Continuous>  dextrose 5%. 1000 milliLiter(s) IV Continuous <Continuous>  dextrose 50% Injectable 12.5 Gram(s) IV Push once  dextrose 50% Injectable 25 Gram(s) IV Push once  dextrose 50% Injectable 25 Gram(s) IV Push once  dextrose Oral Gel 15 Gram(s) Oral once PRN  famotidine    Tablet 20 milliGRAM(s) Oral daily  folic acid 1 milliGRAM(s) Oral daily  furosemide    Tablet 20 milliGRAM(s) Oral daily  glucagon  Injectable 1 milliGRAM(s) IntraMuscular once  insulin glargine Injectable (LANTUS) 10 Unit(s) SubCutaneous at bedtime  insulin lispro (ADMELOG) corrective regimen sliding scale   SubCutaneous three times a day before meals  insulin lispro (ADMELOG) corrective regimen sliding scale   SubCutaneous at bedtime  metoprolol succinate ER 50 milliGRAM(s) Oral daily  predniSONE   Tablet 10 milliGRAM(s) Oral daily  QUEtiapine 400 milliGRAM(s) Oral at bedtime  risperiDONE   Tablet 2 milliGRAM(s) Oral at bedtime  zolpidem 5 milliGRAM(s) Oral at bedtime PRN  zolpidem 5 milliGRAM(s) Oral at bedtime PRN T(C): 36.3 (03-15-24 @ 19:03), Max: 37.4 (03-15-24 @ 12:41)  HR: 86 (03-15-24 @ 19:03) (74 - 89)  BP: 132/63 (03-15-24 @ 19:03) (103/70 - 150/65)  RR: 17 (03-15-24 @ 19:03) (15 - 19)  SpO2: 98% (03-15-24 @ 19:03) (95% - 100%)                        10.7   6.85  )-----------( 242      ( 15 Mar 2024 12:50 )             33.6     -15    143  |  107  |  24<H>  ----------------------------<  266<H>  3.8   |  26  |  1.02    Ca    8.7      15 Mar 2024 12:50    TPro  5.8<L>  /  Alb  2.5<L>  /  TBili  0.3  /  DBili  x   /  AST  20  /  ALT  31  /  AlkPhos  98  03-15    LIVER FUNCTIONS - ( 15 Mar 2024 12:50 )  Alb: 2.5 g/dL / Pro: 5.8 gm/dL / ALK PHOS: 98 U/L / ALT: 31 U/L / AST: 20 U/L / GGT: x           PT/INR - ( 15 Mar 2024 12:50 )   PT: 10.2 sec;   INR: 0.85 ratio         PTT - ( 15 Mar 2024 12:50 )  PTT:33.8 sec  Urinalysis Basic - ( 15 Mar 2024 16:34 )    Color: Yellow / Appearance: Cloudy / S.010 / pH: x  Gluc: x / Ketone: Negative mg/dL  / Bili: Negative / Urobili: 0.2 mg/dL   Blood: x / Protein: 300 mg/dL / Nitrite: Negative   Leuk Esterase: Moderate / RBC: 25 /HPF / WBC 50 /HPF   Sq Epi: x / Non Sq Epi: x / Bacteria: Too Numerous to count /HPF    < from: [facility] Physiol Extremity Lower 3+ Level, BI (03.15.24 @ 15:57) >    Impression:  Right lower extremity: The ankle brachial index is unobtainable. The   pulse waveforms are diffusely diminished particularly below the knee.    Left lower extremity: The ankle brachial index is unobtainable. The pulse   waveforms are diffusely diminished particularly below the knee.    Unable to obtain segmental pressures due to calcified noncompressible   vessels.    < end of copied text >    IMPRESSION:  *  Extensive bilateral plaque with diffuse low velocity monophasic flow   throughout both legs. Aortoiliac Inflow disease cannot be excluded.  *  Focally elevated velocities in the right popliteal and right mid   posterior tibial arteries suggestive of stenoses.    --- End of Report ---      < end of copied text >      ascorbic acid 500 milliGRAM(s) Oral daily  atorvastatin 80 milliGRAM(s) Oral at bedtime  clonazePAM  Tablet 1 milliGRAM(s) Oral at bedtime  clopidogrel Tablet 75 milliGRAM(s) Oral daily  dextrose 5%. 1000 milliLiter(s) IV Continuous <Continuous>  dextrose 5%. 1000 milliLiter(s) IV Continuous <Continuous>  dextrose 50% Injectable 12.5 Gram(s) IV Push once  dextrose 50% Injectable 25 Gram(s) IV Push once  dextrose 50% Injectable 25 Gram(s) IV Push once  dextrose Oral Gel 15 Gram(s) Oral once PRN  famotidine    Tablet 20 milliGRAM(s) Oral daily  folic acid 1 milliGRAM(s) Oral daily  furosemide    Tablet 20 milliGRAM(s) Oral daily  glucagon  Injectable 1 milliGRAM(s) IntraMuscular once  insulin glargine Injectable (LANTUS) 10 Unit(s) SubCutaneous at bedtime  insulin lispro (ADMELOG) corrective regimen sliding scale   SubCutaneous three times a day before meals  insulin lispro (ADMELOG) corrective regimen sliding scale   SubCutaneous at bedtime  metoprolol succinate ER 50 milliGRAM(s) Oral daily  predniSONE   Tablet 10 milliGRAM(s) Oral daily  QUEtiapine 400 milliGRAM(s) Oral at bedtime  risperiDONE   Tablet 2 milliGRAM(s) Oral at bedtime  zolpidem 5 milliGRAM(s) Oral at bedtime PRN  zolpidem 5 milliGRAM(s) Oral at bedtime PRN

## 2024-03-15 NOTE — ED ADULT NURSE NOTE - ED STAT RN HANDOFF DETAILS
Report from MAISHA Bosch. Patient is AAOx2, sitting comfortably in bed with no complaints at this time. Respirations equal and unlabored. No acute distress noted at this time. Remains on cardiac and SpO2 monitor. Awaiting vascular consult at this time.

## 2024-03-15 NOTE — H&P ADULT - ASSESSMENT
Chronic medical conditions:   HTN, currently uncontrolled likely secondary to pain: /111 on admission, PTA metoprolol tartrate, anticipate improvement in BP with pain control  HLD: PTA atorvastatin  IDDM2 with hyperglycemia: blood glucose 204 on admission, POC qac and qhs, PTA Lantus 10 U qhs, SSI, blood glucose goal < 180, f/u A1c  Anxiety: PTA clonazepam  Schizophrenia: PTA risperidone, quetiapine  Hx of CVA (with residual dysarthria and R. hemiparesis): PTA clopidogrel, atorvastatin            63 year old female with PMHx of HTN, HLD, IDDM2, anxiety/depression, schizophrenia, and hx of CVA (with residual dysarthria and R. hemiparesis) who presented to the ED due to change in mental state. Pt noted to have b/l foot wounds and is being admitted for further work up. Pt also being admitted for UTI and metabolic encephalopathy.    # B/L Heel wounds  #Severe PVD  - appreciate Podiatry consult  - PVR and US as above  - f/u Vascular consult  - pain control    # UTI   - c/w Rocephin  - f/u urine culture    # Severe Truncal rash  - unclear etiology ? contact dermatitis, fungal etiology or exanthem  - consider Derm consult    #HTN/HLD  - c/w BB, atorvastatin    # DM2  - FS qAC and HS w/ Lantus and SSI    # Schizophrenia  - c/w risperidone, quetiapine    #Hx of CVA (with residual dysarthria and R. hemiparesis)  - c/w clopidogrel, atorvastatin     # DVT ppx - Lovenox subq

## 2024-03-15 NOTE — ED PROVIDER NOTE - PHYSICAL EXAMINATION
GENERAL: Awake, alert, NAD  HEENT: NC/AT, moist mucous membranes  LUNGS: CTAB, no wheezes or crackles   CARDIAC: RRR, no m/r/g  ABDOMEN: Soft, non tender, non distended, no rebound, no guarding  EXT: 1+ lower extremity edema, decreased DP pulses bilaterally, +gangrenous 2nd toe, +R heel ulcer, cap refill <3 seconds    NEURO: A&Ox2. Moving all extremities.  SKIN: Warm and dry. +diffuse maculopapular rash overlying the chest, abdomen, face  PSYCH: Normal affect.

## 2024-03-15 NOTE — H&P ADULT - NSHPPHYSICALEXAM_GEN_ALL_CORE
PHYSICAL EXAM:    Vital Signs Last 24 Hrs  T(C): 36.3 (15 Mar 2024 19:03), Max: 37.4 (15 Mar 2024 12:41)  T(F): 97.4 (15 Mar 2024 19:03), Max: 99.3 (15 Mar 2024 12:41)  HR: 86 (15 Mar 2024 19:03) (74 - 89)  BP: 132/63 (15 Mar 2024 19:03) (103/70 - 150/65)  BP(mean): 73 (15 Mar 2024 16:32) (64 - 81)  RR: 17 (15 Mar 2024 19:03) (15 - 19)  SpO2: 98% (15 Mar 2024 19:03) (95% - 100%)    Parameters below as of 15 Mar 2024 19:03  Patient On (Oxygen Delivery Method): room air        GENERAL: Pt lying in bed comfortably in NAD  HEENT:  Atraumatic, EOMI, PERRL, conjunctiva and sclera clear, MMM  NECK: Supple, No JVD  CHEST/LUNG: Clear to auscultation bilaterally; No rales, rhonchi, wheezing or rubs. Unlabored respirations  HEART: Regular rate and rhythm; No murmurs, rubs, or gallops  ABDOMEN: Bowel sounds present; Soft, Nontender, Nondistended. No guarding or rigidity    EXTREMITIES:  2+ Peripheral Pulses, brisk capillary refill. No clubbing, cyanosis, or edema  NEUROLOGICAL:  Alert & Oriented X3, speech clear. Answers questions appropriately. Full and equal strength B/L upper and lower extremities. No deficits   MSK: FROM x 4 extremities   SKIN: No rashes or lesions PHYSICAL EXAM:    Vital Signs Last 24 Hrs  T(C): 36.3 (15 Mar 2024 19:03), Max: 37.4 (15 Mar 2024 12:41)  T(F): 97.4 (15 Mar 2024 19:03), Max: 99.3 (15 Mar 2024 12:41)  HR: 86 (15 Mar 2024 19:03) (74 - 89)  BP: 132/63 (15 Mar 2024 19:03) (103/70 - 150/65)  BP(mean): 73 (15 Mar 2024 16:32) (64 - 81)  RR: 17 (15 Mar 2024 19:03) (15 - 19)  SpO2: 98% (15 Mar 2024 19:03) (95% - 100%)    Parameters below as of 15 Mar 2024 19:03  Patient On (Oxygen Delivery Method): room air        GENERAL: Pt lying in bed crying in pain  HEENT:  Atraumatic, EOMI, PERRL, conjunctiva and sclera clear, MMM  NECK: Supple,   CHEST/LUNG: Clear to auscultation bilaterally;  HEART: Regular rate and rhythm; No murmurs, rubs, or gallops  ABDOMEN: Bowel sounds present; Soft, Nontender, Nondistended. No guarding or rigidity    EXTREMITIES:  1+ TP. B/l foot wound wrapped, heel  NEUROLOGICAL:  Alert & Oriented X2,   MSK: FROM x 4 extremities   SKIN: extensive maculo-papular rash on trunk

## 2024-03-15 NOTE — ED ADULT NURSE NOTE - NSICDXPASTSURGICALHX_GEN_ALL_CORE_FT
PAST SURGICAL HISTORY:  H/O  section     H/O hemorrhoidectomy     H/O tubal ligation     S/P percutaneous endoscopic gastrostomy (PEG) tube placement

## 2024-03-15 NOTE — CONSULT NOTE ADULT - ASSESSMENT
63F presents with bilateral foot wounds  - Patient seen and evaluated  - Afebrile, labs pending  - Right foot posterior lateral heel well adhered eschar, no drainage, no bogginess, no fluctuance, no malodor, no tracking or tunneling. Left foot distal tip of hallux and medial 2nd digit gangrene with early ischemic changes, no drainage, no bogginess, no fluctuance, no malodor, no tracking or tunneling.  - Bilateral foot xray: pending  - No culture 2/2 no acute signs of infection/drainage  - Recommend vascular consult  - Recommend BLAIR/PVR  - Ordered Z-flows  - Strict precaution with Z-flows to be worn at all times when in bed   - No acute podiatric surgical intervention; local wound care  - Patient stable for discharge pending vascular recommendations  - Discussed with attending   63F presents with bilateral foot wounds  - Patient seen and evaluated  - Afebrile, labs pending  - Right foot posterior lateral heel well adhered eschar, no drainage, no bogginess, no fluctuance, no malodor, no tracking or tunneling. Left foot distal tip of hallux and medial 2nd digit gangrene with early ischemic changes, no drainage, no bogginess, no fluctuance, no malodor, no tracking or tunneling.  - Bilateral foot xray: pending  - No culture 2/2 no acute signs of infection/drainage  - Recommend vascular consult  - Recommend BLAIR/PVR and arterial duplex  - Ordered Z-flows  - Strict precaution with Z-flows to be worn at all times when in bed   - No acute podiatric surgical intervention at this time; continue with local wound care  - further recommendations pending above work up  - Discussed with attending

## 2024-03-15 NOTE — ED ADULT NURSE NOTE - OBJECTIVE STATEMENT
patient alert and oriented x2. patient 64 yo female w/ PMH HTN. HLD, DM, CVA (residual right sided weakness) BIBA from home for AMS. as per AMS patient daughter states patient is more slow to respond since 9:30 AM. patient currently complaining of left heel pain. patient noted to have necrotic left second toe and necrotic right heel. patient also noted to have generalized maculopapular rash to chest, face, abdomen, and perineal marty. patient states rash is intermittent and has been present for "a few days." patient speaking in complete sentences, airway patent. patient placed on cardiac monitor.

## 2024-03-15 NOTE — ED PROVIDER NOTE - OBJECTIVE STATEMENT
64 y/o F with PMH HTN. HLD, DM, CVA (residual right sided weakness) BIBA from home for AMS. Patient reportedly more slow to respond to questions today. She is endorsing pain to L foot and R foot where she has ulcerations. She also is noted to have generalized rash to her chest, face, and abdomen which she states has been present for "days". She denies fever, chills, N/V/D, chest pain, or dyspnea.

## 2024-03-15 NOTE — ED ADULT NURSE NOTE - NSFALLRISKINTERV_ED_ALL_ED
Assistance OOB with selected safe patient handling equipment if applicable/Assistance with ambulation/Communicate fall risk and risk factors to all staff, patient, and family/Monitor gait and stability/Provide visual cue: yellow wristband, yellow gown, etc/Reinforce activity limits and safety measures with patient and family/Call bell, personal items and telephone in reach/Instruct patient to call for assistance before getting out of bed/chair/stretcher/Non-slip footwear applied when patient is off stretcher/[place] to call system/Physically safe environment - no spills, clutter or unnecessary equipment/Purposeful Proactive Rounding/Room/bathroom lighting operational, light cord in reach

## 2024-03-16 LAB
A1C WITH ESTIMATED AVERAGE GLUCOSE RESULT: 8.8 % — HIGH (ref 4–5.6)
CRP SERPL-MCNC: 41 MG/L — HIGH
ERYTHROCYTE [SEDIMENTATION RATE] IN BLOOD: 41 MM/HR — HIGH (ref 0–20)
ESTIMATED AVERAGE GLUCOSE: 206 MG/DL — HIGH (ref 68–114)
GLUCOSE BLDC GLUCOMTR-MCNC: 130 MG/DL — HIGH (ref 70–99)
GLUCOSE BLDC GLUCOMTR-MCNC: 206 MG/DL — HIGH (ref 70–99)
GLUCOSE BLDC GLUCOMTR-MCNC: 366 MG/DL — HIGH (ref 70–99)
GLUCOSE BLDC GLUCOMTR-MCNC: 396 MG/DL — HIGH (ref 70–99)

## 2024-03-16 PROCEDURE — 99223 1ST HOSP IP/OBS HIGH 75: CPT | Mod: FS

## 2024-03-16 PROCEDURE — 99232 SBSQ HOSP IP/OBS MODERATE 35: CPT

## 2024-03-16 PROCEDURE — 99222 1ST HOSP IP/OBS MODERATE 55: CPT

## 2024-03-16 RX ORDER — ENOXAPARIN SODIUM 100 MG/ML
40 INJECTION SUBCUTANEOUS EVERY 24 HOURS
Refills: 0 | Status: DISCONTINUED | OUTPATIENT
Start: 2024-03-16 | End: 2024-03-22

## 2024-03-16 RX ORDER — NYSTATIN CREAM 100000 [USP'U]/G
1 CREAM TOPICAL
Refills: 0 | Status: DISCONTINUED | OUTPATIENT
Start: 2024-03-16 | End: 2024-03-27

## 2024-03-16 RX ADMIN — Medication 15 MILLIGRAM(S): at 07:18

## 2024-03-16 RX ADMIN — CLOPIDOGREL BISULFATE 75 MILLIGRAM(S): 75 TABLET, FILM COATED ORAL at 11:11

## 2024-03-16 RX ADMIN — Medication 10: at 16:30

## 2024-03-16 RX ADMIN — INSULIN GLARGINE 10 UNIT(S): 100 INJECTION, SOLUTION SUBCUTANEOUS at 21:43

## 2024-03-16 RX ADMIN — Medication 10 MILLIGRAM(S): at 06:27

## 2024-03-16 RX ADMIN — RISPERIDONE 2 MILLIGRAM(S): 4 TABLET ORAL at 21:43

## 2024-03-16 RX ADMIN — Medication 50 MILLIGRAM(S): at 06:27

## 2024-03-16 RX ADMIN — Medication 15 MILLIGRAM(S): at 08:00

## 2024-03-16 RX ADMIN — Medication 3: at 21:44

## 2024-03-16 RX ADMIN — ATORVASTATIN CALCIUM 80 MILLIGRAM(S): 80 TABLET, FILM COATED ORAL at 21:47

## 2024-03-16 RX ADMIN — Medication 4: at 11:18

## 2024-03-16 RX ADMIN — ENOXAPARIN SODIUM 40 MILLIGRAM(S): 100 INJECTION SUBCUTANEOUS at 06:27

## 2024-03-16 RX ADMIN — Medication 500 MILLIGRAM(S): at 11:11

## 2024-03-16 RX ADMIN — NYSTATIN CREAM 1 APPLICATION(S): 100000 CREAM TOPICAL at 06:28

## 2024-03-16 RX ADMIN — Medication 20 MILLIGRAM(S): at 06:27

## 2024-03-16 RX ADMIN — Medication 1 MILLIGRAM(S): at 11:10

## 2024-03-16 RX ADMIN — CEFTRIAXONE 100 MILLIGRAM(S): 500 INJECTION, POWDER, FOR SOLUTION INTRAMUSCULAR; INTRAVENOUS at 23:46

## 2024-03-16 RX ADMIN — FAMOTIDINE 20 MILLIGRAM(S): 10 INJECTION INTRAVENOUS at 11:11

## 2024-03-16 RX ADMIN — Medication 1 MILLIGRAM(S): at 21:52

## 2024-03-16 RX ADMIN — QUETIAPINE FUMARATE 400 MILLIGRAM(S): 200 TABLET, FILM COATED ORAL at 21:43

## 2024-03-16 NOTE — PROGRESS NOTE ADULT - SUBJECTIVE AND OBJECTIVE BOX
63 year old female with PMHx of HTN, HLD, IDDM2, anxiety/depression, schizophrenia, and hx of CVA (with residual dysarthria and R. hemiparesis) who presented to the ED due to change in mental state. Pt noted to have b/l foot wounds and is being admitted for further work up. Pt also being admitted for UTI and metabolic encephalopathy. He is lying in bed in NAD.      MEDICATIONS  (STANDING):  ascorbic acid 500 milliGRAM(s) Oral daily  atorvastatin 80 milliGRAM(s) Oral at bedtime  cefTRIAXone   IVPB 1000 milliGRAM(s) IV Intermittent every 24 hours  clonazePAM  Tablet 1 milliGRAM(s) Oral at bedtime  clopidogrel Tablet 75 milliGRAM(s) Oral daily  dextrose 5%. 1000 milliLiter(s) (100 mL/Hr) IV Continuous <Continuous>  dextrose 5%. 1000 milliLiter(s) (50 mL/Hr) IV Continuous <Continuous>  dextrose 50% Injectable 25 Gram(s) IV Push once  dextrose 50% Injectable 25 Gram(s) IV Push once  dextrose 50% Injectable 12.5 Gram(s) IV Push once  enoxaparin Injectable 40 milliGRAM(s) SubCutaneous every 24 hours  famotidine    Tablet 20 milliGRAM(s) Oral daily  folic acid 1 milliGRAM(s) Oral daily  furosemide    Tablet 20 milliGRAM(s) Oral daily  glucagon  Injectable 1 milliGRAM(s) IntraMuscular once  insulin glargine Injectable (LANTUS) 10 Unit(s) SubCutaneous at bedtime  insulin lispro (ADMELOG) corrective regimen sliding scale   SubCutaneous three times a day before meals  insulin lispro (ADMELOG) corrective regimen sliding scale   SubCutaneous at bedtime  metoprolol succinate ER 50 milliGRAM(s) Oral daily  nystatin Powder 1 Application(s) Topical two times a day  predniSONE   Tablet 10 milliGRAM(s) Oral daily  QUEtiapine 400 milliGRAM(s) Oral at bedtime  risperiDONE   Tablet 2 milliGRAM(s) Oral at bedtime    MEDICATIONS  (PRN):  dextrose Oral Gel 15 Gram(s) Oral once PRN Blood Glucose LESS THAN 70 milliGRAM(s)/deciliter  ketorolac   Injectable 15 milliGRAM(s) IV Push every 6 hours PRN Moderate Pain (4 - 6)  oxycodone    5 mG/acetaminophen 325 mG 1 Tablet(s) Oral every 6 hours PRN Severe Pain (7 - 10)  zolpidem 5 milliGRAM(s) Oral at bedtime PRN Insomnia  zolpidem 5 milliGRAM(s) Oral at bedtime PRN Insomnia      Allergies    erythromycin (Unknown)    Intolerances        Vital Signs Last 24 Hrs  T(C): 36.4 (16 Mar 2024 10:59), Max: 36.9 (15 Mar 2024 23:54)  T(F): 97.6 (16 Mar 2024 10:59), Max: 98.4 (15 Mar 2024 23:54)  HR: 92 (16 Mar 2024 10:59) (76 - 96)  BP: 136/72 (16 Mar 2024 10:59) (108/82 - 147/60)   RR: 18 (16 Mar 2024 10:59) (15 - 18)  SpO2: 93% (16 Mar 2024 10:59) (93% - 98%)    Parameters below as of 16 Mar 2024 10:59  Patient On (Oxygen Delivery Method): room air        PHYSICAL EXAM:  GENERAL: NAD, well-groomed, well-developed  NERVOUS SYSTEM:  Alert & confused  Pt was not cooperative w/ further exam     LABS:                        10.7   6.85  )-----------( 242      ( 15 Mar 2024 12:50 )             33.6     03-15    143  |  107  |  24<H>  ----------------------------<  266<H>  3.8   |  26  |  1.02    Ca    8.7      15 Mar 2024 12:50    TPro  5.8<L>  /  Alb  2.5<L>  /  TBili  0.3  /  DBili  x   /  AST  20  /  ALT  31  /  AlkPhos  98  03-15    PT/INR - ( 15 Mar 2024 12:50 )   PT: 10.2 sec;   INR: 0.85 ratio         PTT - ( 15 Mar 2024 12:50 )  PTT:33.8 sec  Urinalysis Basic - ( 15 Mar 2024 16:34 )    Color: Yellow / Appearance: Cloudy / S.010 / pH: x  Gluc: x / Ketone: Negative mg/dL  / Bili: Negative / Urobili: 0.2 mg/dL   Blood: x / Protein: 300 mg/dL / Nitrite: Negative   Leuk Esterase: Moderate / RBC: 25 /HPF / WBC 50 /HPF   Sq Epi: x / Non Sq Epi: x / Bacteria: Too Numerous to count /HPF      CAPILLARY BLOOD GLUCOSE      POCT Blood Glucose.: 396 mg/dL (16 Mar 2024 16:26)  POCT Blood Glucose.: 206 mg/dL (16 Mar 2024 11:13)  POCT Blood Glucose.: 130 mg/dL (16 Mar 2024 07:45)  POCT Blood Glucose.: 131 mg/dL (15 Mar 2024 23:29)      Culture - Blood (collected 15 Mar 2024 13:19)  Source: .Blood Blood-Peripheral  Preliminary Report (16 Mar 2024 18:02):    No growth at 24 hours      RADIOLOGY & ADDITIONAL TESTS:    03-15-24 @ 07:01  -  24 @ 07:00  --------------------------------------------------------  IN:    IV PiggyBack: 100 mL  Total IN: 100 mL    OUT:  Total OUT: 0 mL    Total NET: 100 mL      24 @ 07:01  -  24 @ 18:22  --------------------------------------------------------  IN:  Total IN: 0 mL    OUT:    Voided (mL): 850 mL  Total OUT: 850 mL    Total NET: -850 mL

## 2024-03-16 NOTE — CONSULT NOTE ADULT - ASSESSMENT
63 year old female with PMHx of HTN, HLD, IDDM2, anxiety/depression, schizophrenia, and hx of CVA (with residual dysarthria and R. hemiparesis) who presented to the ED due to change in mental state. Daughter called EMS as she noted  pt more slow to respond to questions today. Pt also c/o pain to L foot and R foot where she has ulcerations for the past month. She also is noted to have generalized rash to her chest, face, and abdomen which she states has been present for about 1 week.    Plan: 63 year old female with PMHx of HTN, HLD, IDDM2, anxiety/depression, schizophrenia, and hx of CVA (with residual dysarthria and R. hemiparesis) who presented to the ED due to change in mental state. Daughter called EMS as she noted  pt more slow to respond to questions today. Pt also c/o pain to L foot and R foot where she has ulcerations for the past month. She also is noted to have generalized rash to her chest, face, and abdomen which she states has been present for about 1 week.    Plan:  Continue medical/podiatry management

## 2024-03-16 NOTE — PROGRESS NOTE ADULT - ASSESSMENT
63 year old female with PMHx of HTN, HLD, IDDM2, anxiety/depression, schizophrenia, and hx of CVA (with residual dysarthria and R. hemiparesis) who presented to the ED due to change in mental state. Pt noted to have b/l foot wounds and is being admitted for further work up. Pt also being admitted for UTI and metabolic encephalopathy.    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 w/ severe PVD  - appreciate Podiatry consult - planning on local wound care vs left foot partial 2nd ray resection pending vasc recs  - ABIs couldn't be obtained and US showed extensive bilateral plaque with diffuse low velocity monophasic flow throughout both legs w/ focally elevated velocities in the right popliteal and right mid posterior tibial arteries suggestive of stenoses  - Vascular following   - pain control    UTI   - c/w Rocephin  - f/u urine culture    metabolic encephalopathy  - may be due to UTI  - CT head showed chronic bilateral basal ganglia infarctions and a chronic left corona radiata infarction w/ mild to moderate chronic microvascular changes without evidence of an acute transcortical infarction or hemorrhage    Severe Truncal rash  - unclear etiology ? contact dermatitis, fungal etiology or exanthem     HTN  - c/w Toprol XL    HLD  - c/w atorvastatin    DM2  - c/w Lantus and SSI  - Hgb A1C is 8.8    Schizophrenia  - c/w Klonopin, risperidone & quetiapine    Hx of CVA (with residual dysarthria and R. hemiparesis)  - c/w clopidogrel & atorvastatin     Prophylaxis:  DVT: Lovenox  GI: Pepcid

## 2024-03-16 NOTE — PROGRESS NOTE ADULT - SUBJECTIVE AND OBJECTIVE BOX
63 year old female with PMHx of HTN, HLD, IDDM2, anxiety/depression, schizophrenia, and hx of CVA (with residual dysarthria and R. hemiparesis) who presented to the ED due to change in mental state. Pt noted to have b/l foot wounds and is being admitted for further work up. Pt also being admitted for UTI and metabolic encephalopathy. He is lying in bed in NAD.    MEDICATIONS  (STANDING):  ascorbic acid 500 milliGRAM(s) Oral daily  atorvastatin 80 milliGRAM(s) Oral at bedtime  cefTRIAXone   IVPB 1000 milliGRAM(s) IV Intermittent every 24 hours  clonazePAM  Tablet 1 milliGRAM(s) Oral at bedtime  clopidogrel Tablet 75 milliGRAM(s) Oral daily  dextrose 5%. 1000 milliLiter(s) (50 mL/Hr) IV Continuous <Continuous>  dextrose 5%. 1000 milliLiter(s) (100 mL/Hr) IV Continuous <Continuous>  dextrose 50% Injectable 25 Gram(s) IV Push once  dextrose 50% Injectable 25 Gram(s) IV Push once  dextrose 50% Injectable 12.5 Gram(s) IV Push once  enoxaparin Injectable 40 milliGRAM(s) SubCutaneous every 24 hours  famotidine    Tablet 20 milliGRAM(s) Oral daily  folic acid 1 milliGRAM(s) Oral daily  furosemide    Tablet 20 milliGRAM(s) Oral daily  glucagon  Injectable 1 milliGRAM(s) IntraMuscular once  insulin glargine Injectable (LANTUS) 10 Unit(s) SubCutaneous at bedtime  insulin lispro (ADMELOG) corrective regimen sliding scale   SubCutaneous three times a day before meals  insulin lispro (ADMELOG) corrective regimen sliding scale   SubCutaneous at bedtime  metoprolol succinate ER 50 milliGRAM(s) Oral daily  nystatin Powder 1 Application(s) Topical two times a day  predniSONE   Tablet 10 milliGRAM(s) Oral daily  QUEtiapine 400 milliGRAM(s) Oral at bedtime  risperiDONE   Tablet 2 milliGRAM(s) Oral at bedtime    MEDICATIONS  (PRN):  dextrose Oral Gel 15 Gram(s) Oral once PRN Blood Glucose LESS THAN 70 milliGRAM(s)/deciliter  ketorolac   Injectable 15 milliGRAM(s) IV Push every 6 hours PRN Moderate Pain (4 - 6)  oxycodone    5 mG/acetaminophen 325 mG 1 Tablet(s) Oral every 6 hours PRN Severe Pain (7 - 10)  zolpidem 5 milliGRAM(s) Oral at bedtime PRN Insomnia  zolpidem 5 milliGRAM(s) Oral at bedtime PRN Insomnia      Allergies    erythromycin (Unknown)    Intolerances        Vital Signs Last 24 Hrs  T(C): 36.4 (16 Mar 2024 10:59), Max: 36.9 (15 Mar 2024 23:54)  T(F): 97.6 (16 Mar 2024 10:59), Max: 98.4 (15 Mar 2024 23:54)  HR: 92 (16 Mar 2024 10:59) (74 - 96)  BP: 136/72 (16 Mar 2024 10:59) (105/54 - 147/60)  BP(mean): 73 (15 Mar 2024 16:32) (64 - 73)  RR: 18 (16 Mar 2024 10:59) (15 - 19)  SpO2: 93% (16 Mar 2024 10:59) (93% - 100%)    Parameters below as of 16 Mar 2024 10:59  Patient On (Oxygen Delivery Method): room air        PHYSICAL EXAM:  GENERAL: NAD, well-groomed, well-developed  HEAD:  Atraumatic, Normocephalic  EYES: EOMI, PERRLA   NECK: Supple   NERVOUS SYSTEM:  Alert    CHEST/LUNG: Clear to auscultation bilaterally; No rales, rhonchi, wheezing, or rubs  HEART: Regular rate and rhythm; No murmurs, rubs, or gallops  ABDOMEN: Soft, Nontender, Nondistended; Bowel sounds present  EXTREMITIES:  No clubbing, cyanosis, or edema       LABS:                        10.7   6.85  )-----------( 242      ( 15 Mar 2024 12:50 )             33.6     03-15    143  |  107  |  24<H>  ----------------------------<  266<H>  3.8   |  26  |  1.02    Ca    8.7      15 Mar 2024 12:50    TPro  5.8<L>  /  Alb  2.5<L>  /  TBili  0.3  /  DBili  x   /  AST  20  /  ALT  31  /  AlkPhos  98  03-15    PT/INR - ( 15 Mar 2024 12:50 )   PT: 10.2 sec;   INR: 0.85 ratio         PTT - ( 15 Mar 2024 12:50 )  PTT:33.8 sec  Urinalysis Basic - ( 15 Mar 2024 16:34 )    Color: Yellow / Appearance: Cloudy / S.010 / pH: x  Gluc: x / Ketone: Negative mg/dL  / Bili: Negative / Urobili: 0.2 mg/dL   Blood: x / Protein: 300 mg/dL / Nitrite: Negative   Leuk Esterase: Moderate / RBC: 25 /HPF / WBC 50 /HPF   Sq Epi: x / Non Sq Epi: x / Bacteria: Too Numerous to count /HPF      CAPILLARY BLOOD GLUCOSE      POCT Blood Glucose.: 206 mg/dL (16 Mar 2024 11:13)  POCT Blood Glucose.: 130 mg/dL (16 Mar 2024 07:45)  POCT Blood Glucose.: 131 mg/dL (15 Mar 2024 23:29)      RADIOLOGY & ADDITIONAL TESTS:    03-15-24 @ 07:01  -  24 @ 07:00  --------------------------------------------------------  IN:    IV PiggyBack: 100 mL  Total IN: 100 mL    OUT:  Total OUT: 0 mL    Total NET: 100 mL       63 year old female with PMHx of HTN, HLD, IDDM2, anxiety/depression, schizophrenia, and hx of CVA (with residual dysarthria and R. hemiparesis) who presented to the ED due to change in mental state. Pt noted to have b/l foot wounds and is being admitted for further work up. Pt also being admitted for UTI and metabolic encephalopathy. He is lying in bed in NAD.    MEDICATIONS  (STANDING):  ascorbic acid 500 milliGRAM(s) Oral daily  atorvastatin 80 milliGRAM(s) Oral at bedtime  cefTRIAXone   IVPB 1000 milliGRAM(s) IV Intermittent every 24 hours  clonazePAM  Tablet 1 milliGRAM(s) Oral at bedtime  clopidogrel Tablet 75 milliGRAM(s) Oral daily  dextrose 5%. 1000 milliLiter(s) (50 mL/Hr) IV Continuous <Continuous>  dextrose 5%. 1000 milliLiter(s) (100 mL/Hr) IV Continuous <Continuous>  dextrose 50% Injectable 25 Gram(s) IV Push once  dextrose 50% Injectable 25 Gram(s) IV Push once  dextrose 50% Injectable 12.5 Gram(s) IV Push once  enoxaparin Injectable 40 milliGRAM(s) SubCutaneous every 24 hours  famotidine    Tablet 20 milliGRAM(s) Oral daily  folic acid 1 milliGRAM(s) Oral daily  furosemide    Tablet 20 milliGRAM(s) Oral daily  glucagon  Injectable 1 milliGRAM(s) IntraMuscular once  insulin glargine Injectable (LANTUS) 10 Unit(s) SubCutaneous at bedtime  insulin lispro (ADMELOG) corrective regimen sliding scale   SubCutaneous three times a day before meals  insulin lispro (ADMELOG) corrective regimen sliding scale   SubCutaneous at bedtime  metoprolol succinate ER 50 milliGRAM(s) Oral daily  nystatin Powder 1 Application(s) Topical two times a day  predniSONE   Tablet 10 milliGRAM(s) Oral daily  QUEtiapine 400 milliGRAM(s) Oral at bedtime  risperiDONE   Tablet 2 milliGRAM(s) Oral at bedtime    MEDICATIONS  (PRN):  dextrose Oral Gel 15 Gram(s) Oral once PRN Blood Glucose LESS THAN 70 milliGRAM(s)/deciliter  ketorolac   Injectable 15 milliGRAM(s) IV Push every 6 hours PRN Moderate Pain (4 - 6)  oxycodone    5 mG/acetaminophen 325 mG 1 Tablet(s) Oral every 6 hours PRN Severe Pain (7 - 10)  zolpidem 5 milliGRAM(s) Oral at bedtime PRN Insomnia  zolpidem 5 milliGRAM(s) Oral at bedtime PRN Insomnia      Allergies    erythromycin (Unknown)    Intolerances        Vital Signs Last 24 Hrs  T(C): 36.4 (16 Mar 2024 10:59), Max: 36.9 (15 Mar 2024 23:54)  T(F): 97.6 (16 Mar 2024 10:59), Max: 98.4 (15 Mar 2024 23:54)  HR: 92 (16 Mar 2024 10:59) (74 - 96)  BP: 136/72 (16 Mar 2024 10:59) (105/54 - 147/60)  BP(mean): 73 (15 Mar 2024 16:32) (64 - 73)  RR: 18 (16 Mar 2024 10:59) (15 - 19)  SpO2: 93% (16 Mar 2024 10:59) (93% - 100%)    Parameters below as of 16 Mar 2024 10:59  Patient On (Oxygen Delivery Method): room air        PHYSICAL EXAM:  GENERAL: NAD,   NERVOUS SYSTEM:  Alert & Confused  Pt not cooperative w/ further exam     LABS:                        10.7   6.85  )-----------( 242      ( 15 Mar 2024 12:50 )             33.6     03-15    143  |  107  |  24<H>  ----------------------------<  266<H>  3.8   |  26  |  1.02    Ca    8.7      15 Mar 2024 12:50    TPro  5.8<L>  /  Alb  2.5<L>  /  TBili  0.3  /  DBili  x   /  AST  20  /  ALT  31  /  AlkPhos  98  03-15    PT/INR - ( 15 Mar 2024 12:50 )   PT: 10.2 sec;   INR: 0.85 ratio         PTT - ( 15 Mar 2024 12:50 )  PTT:33.8 sec  Urinalysis Basic - ( 15 Mar 2024 16:34 )    Color: Yellow / Appearance: Cloudy / S.010 / pH: x  Gluc: x / Ketone: Negative mg/dL  / Bili: Negative / Urobili: 0.2 mg/dL   Blood: x / Protein: 300 mg/dL / Nitrite: Negative   Leuk Esterase: Moderate / RBC: 25 /HPF / WBC 50 /HPF   Sq Epi: x / Non Sq Epi: x / Bacteria: Too Numerous to count /HPF      CAPILLARY BLOOD GLUCOSE      POCT Blood Glucose.: 206 mg/dL (16 Mar 2024 11:13)  POCT Blood Glucose.: 130 mg/dL (16 Mar 2024 07:45)  POCT Blood Glucose.: 131 mg/dL (15 Mar 2024 23:29)      RADIOLOGY & ADDITIONAL TESTS:    03-15-24 @ 07:01  -  24 @ 07:00  --------------------------------------------------------  IN:    IV PiggyBack: 100 mL  Total IN: 100 mL    OUT:  Total OUT: 0 mL    Total NET: 100 mL

## 2024-03-16 NOTE — PROGRESS NOTE ADULT - SUBJECTIVE AND OBJECTIVE BOX
Patient is a 63y old  Female who presents with a chief complaint of PVD, Heel wounds, UTI (16 Mar 2024 02:42)       INTERVAL HPI/OVERNIGHT EVENTS:  Patient seen and evaluated at bedside.  Pt is resting comfortable in NAD. Denies N/V/F/C.    Allergies    erythromycin (Unknown)    Intolerances        Vital Signs Last 24 Hrs  T(C): 36.7 (16 Mar 2024 05:07), Max: 37.4 (15 Mar 2024 12:41)  T(F): 98.1 (16 Mar 2024 05:07), Max: 99.3 (15 Mar 2024 12:41)  HR: 96 (16 Mar 2024 05:07) (74 - 96)  BP: 116/62 (16 Mar 2024 05:07) (103/70 - 150/65)  BP(mean): 73 (15 Mar 2024 16:32) (64 - 81)  RR: 18 (16 Mar 2024 05:07) (15 - 19)  SpO2: 95% (16 Mar 2024 05:07) (95% - 100%)    Parameters below as of 16 Mar 2024 05:07  Patient On (Oxygen Delivery Method): room air        LABS:                        10.7   6.85  )-----------( 242      ( 15 Mar 2024 12:50 )             33.6     03-15    143  |  107  |  24<H>  ----------------------------<  266<H>  3.8   |  26  |  1.02    Ca    8.7      15 Mar 2024 12:50    TPro  5.8<L>  /  Alb  2.5<L>  /  TBili  0.3  /  DBili  x   /  AST  20  /  ALT  31  /  AlkPhos  98  03-15    PT/INR - ( 15 Mar 2024 12:50 )   PT: 10.2 sec;   INR: 0.85 ratio         PTT - ( 15 Mar 2024 12:50 )  PTT:33.8 sec  Urinalysis Basic - ( 15 Mar 2024 16:34 )    Color: Yellow / Appearance: Cloudy / S.010 / pH: x  Gluc: x / Ketone: Negative mg/dL  / Bili: Negative / Urobili: 0.2 mg/dL   Blood: x / Protein: 300 mg/dL / Nitrite: Negative   Leuk Esterase: Moderate / RBC: 25 /HPF / WBC 50 /HPF   Sq Epi: x / Non Sq Epi: x / Bacteria: Too Numerous to count /HPF      CAPILLARY BLOOD GLUCOSE      POCT Blood Glucose.: 130 mg/dL (16 Mar 2024 07:45)  POCT Blood Glucose.: 131 mg/dL (15 Mar 2024 23:29)      Lower Extremity Physical Exam:    Vascular: DP/PT 0/4, B/L, CFT <3seconds B/L, Temperature gradient warm to cool, B/L.   Neuro: Unable to assess  Musculoskeletal/Ortho: unremarkable  Skin: Right foot posterior lateral heel well adhered eschar, no drainage, no bogginess, no fluctuance, no malodor, no tracking or tunneling, no acute signs of infection. Left foot hallux distal tuft and medial 2nd digit medial to plantar dry gangrene with early ischemic changes, no drainage, no bogginess, no fluctuance, no malodor, no tracking or tunneling, no acute signs of infection.    RADIOLOGY & ADDITIONAL TESTS:

## 2024-03-16 NOTE — CONSULT NOTE ADULT - SUBJECTIVE AND OBJECTIVE BOX
UROLOGY CONSULT NOTE    Patient is a 63y old  Female who presents with a chief complaint of PVD, Heel wounds, UTI (15 Mar 2024 19:58)    HPI:        63 year old female with PMHx of HTN, HLD, IDDM2, anxiety/depression, schizophrenia, and hx of CVA (with residual dysarthria and R. hemiparesis) who presented to the ED due to change in mental state. Daughter called EMS as she noted  pt more slow to respond to questions today. Pt also c/o pain to L foot and R foot where she has ulcerations for the past month. She also is noted to have generalized rash to her chest, face, and abdomen which she states has been present for about 1 week. She denies fever, chills, N/V/D, chest pain, or dyspnea. (15 Mar 2024 19:58)    Patient seen and examined at bedside. Pt c/o pain to L foot and R foot where she has ulcerations for the past month. Patient denies fever, chills, nausea, vomiting, constipation, diarrhea, melena, hematochezia, dysuria, hematuria, chest pain, shortness of breath, dizziness, cough.    REVIEW OF SYSTEMS:  CONSTITUTIONAL: No fever, weight loss, or fatigue  EYES: No eye pain, visual disturbances, discharge  ENMT:  No difficulty hearing, tinnitus, vertigo; No sinus or throat pain  NECK: No pain or stiffness  BREASTS: No pain, masses, or nipple discharge  RESPIRATORY: No cough, wheezing, chills or hemoptysis; No shortness of breath  CARDIOVASCULAR: No chest pain, palpitations, dizziness, or leg swelling  GASTROINTESTINAL: No abdominal or epigastric pain. No nausea, vomiting, or hematemesis; No diarrhea or constipation. No melena or hematochezia.  GENITOURINARY: No dysuria, frequency, hematuria, or incontinence  NEUROLOGICAL: No headaches, memory loss, loss of strength, numbness, or tremors  SKIN: No itching, burning, rashes, or lesions   LYMPH NODES: No enlarged glands  ENDOCRINE: No heat or cold intolerance; No hair loss  MUSCULOSKELETAL: No joint pain or swelling; No muscle, back, or extremity pain  PSYCHIATRIC: No depression, anxiety, mood swings, or difficulty sleeping  HEME/LYMPH: No easy bruising, or bleeding gums  ALLERY AND IMMUNOLOGIC: No hives or eczema     PAST MEDICAL & SURGICAL HISTORY:  Anxiety  Hypertension  Hypercholesteremia  Insulin dependent type 2 diabetes mellitus  Schizophrenia  History of CVA with residual deficit  H/O tubal ligation  H/O hemorrhoidectomy  H/O  section  S/P percutaneous endoscopic gastrostomy (PEG) tube placement    MEDICATIONS  (STANDING):  ascorbic acid 500 milliGRAM(s) Oral daily  atorvastatin 80 milliGRAM(s) Oral at bedtime  cefTRIAXone   IVPB 1000 milliGRAM(s) IV Intermittent every 24 hours  clonazePAM  Tablet 1 milliGRAM(s) Oral at bedtime  clopidogrel Tablet 75 milliGRAM(s) Oral daily  dextrose 5%. 1000 milliLiter(s) (50 mL/Hr) IV Continuous <Continuous>  dextrose 5%. 1000 milliLiter(s) (100 mL/Hr) IV Continuous <Continuous>  dextrose 50% Injectable 25 Gram(s) IV Push once  dextrose 50% Injectable 25 Gram(s) IV Push once  dextrose 50% Injectable 12.5 Gram(s) IV Push once  enoxaparin Injectable 40 milliGRAM(s) SubCutaneous every 24 hours  famotidine    Tablet 20 milliGRAM(s) Oral daily  folic acid 1 milliGRAM(s) Oral daily  furosemide    Tablet 20 milliGRAM(s) Oral daily  glucagon  Injectable 1 milliGRAM(s) IntraMuscular once  insulin glargine Injectable (LANTUS) 10 Unit(s) SubCutaneous at bedtime  insulin lispro (ADMELOG) corrective regimen sliding scale   SubCutaneous three times a day before meals  insulin lispro (ADMELOG) corrective regimen sliding scale   SubCutaneous at bedtime  metoprolol succinate ER 50 milliGRAM(s) Oral daily  nystatin Powder 1 Application(s) Topical two times a day  predniSONE   Tablet 10 milliGRAM(s) Oral daily  QUEtiapine 400 milliGRAM(s) Oral at bedtime  risperiDONE   Tablet 2 milliGRAM(s) Oral at bedtime    MEDICATIONS  (PRN):  dextrose Oral Gel 15 Gram(s) Oral once PRN Blood Glucose LESS THAN 70 milliGRAM(s)/deciliter  ketorolac   Injectable 15 milliGRAM(s) IV Push every 6 hours PRN Moderate Pain (4 - 6)  oxycodone    5 mG/acetaminophen 325 mG 1 Tablet(s) Oral every 6 hours PRN Severe Pain (7 - 10)  zolpidem 5 milliGRAM(s) Oral at bedtime PRN Insomnia  zolpidem 5 milliGRAM(s) Oral at bedtime PRN Insomnia    Allergies  erythromycin (Unknown)    SOCIAL HISTORY: Denies tobacco, alcohol, illicit drug use.            FAMILY HISTORY:  FH: type 2 diabetes    Vital Signs Last 24 Hrs  T(C): 36.9 (15 Mar 2024 23:54), Max: 37.4 (15 Mar 2024 12:41)  T(F): 98.4 (15 Mar 2024 23:54), Max: 99.3 (15 Mar 2024 12:41)  HR: 95 (15 Mar 2024 23:54) (74 - 95)  BP: 147/60 (15 Mar 2024 23:54) (103/70 - 150/65)  BP(mean): 73 (15 Mar 2024 16:32) (64 - 81)  RR: 17 (15 Mar 2024 23:54) (15 - 19)  SpO2: 98% (15 Mar 2024 23:54) (95% - 100%)    Parameters below as of 15 Mar 2024 23:54  Patient On (Oxygen Delivery Method): room air    PHYSICAL EXAM:  CONSTITUTIONAL: NAD, well-developed  HEAD:  Atraumatic, Normocephalic  EYES: Conjunctiva and sclera clear  ENMT: No tonsillar erythema, exudates, or enlargement; Moist mucous membranes, No lesions  NECK: Supple, No JVD, Normal thyroid  NERVOUS SYSTEM:  Alert & Oriented X3  RESPIRATORY: Clear to auscultation bilaterally; No rales, rhonchi, wheezing  CARDIOVASCULAR: Regular rate and rhythm. S1S2  GASTROINTESTINAL: Nondistended, +BS, soft, nontender, no guarding, no rigidity   MUSCULOSKELETAL: B/l LE warm. +DP and PT pulses b/l. +SILT. Compartments soft. Calves nontender b/l. Left 1st & 2nd toe ulcer. Right heel ulcer. No bleeding, purulence, or drainage.     LABS:                        10.7   6.85  )-----------( 242      ( 15 Mar 2024 12:50 )             33.6     03-15    143  |  107  |  24<H>  ----------------------------<  266<H>  3.8   |  26  |  1.02    Ca    8.7      15 Mar 2024 12:50    TPro  5.8<L>  /  Alb  2.5<L>  /  TBili  0.3  /  DBili  x   /  AST  20  /  ALT  31  /  AlkPhos  98  03-15    PT/INR - ( 15 Mar 2024 12:50 )   PT: 10.2 sec;   INR: 0.85 ratio      PTT - ( 15 Mar 2024 12:50 )  PTT:33.8 sec    Urinalysis Basic - ( 15 Mar 2024 16:34 )    Color: Yellow / Appearance: Cloudy / S.010 / pH: x  Gluc: x / Ketone: Negative mg/dL  / Bili: Negative / Urobili: 0.2 mg/dL   Blood: x / Protein: 300 mg/dL / Nitrite: Negative   Leuk Esterase: Moderate / RBC: 25 /HPF / WBC 50 /HPF   Sq Epi: x / Non Sq Epi: x / Bacteria: Too Numerous to count /HPF      < from: [facility] Physiol Extremity Lower 3+ Level, BI (03.15.24 @ 15:57) >  Findings/  Impression:  Right lower extremity: The ankle brachial index is unobtainable. The   pulse waveforms are diffusely diminished particularly below the knee.    Left lower extremity: The ankle brachial index is unobtainable. The pulse   waveforms are diffusely diminished particularly below the knee.    Unable to obtain segmental pressures due to calcified noncompressible   vessels.    < end of copied text >    < from: US Duplex Arterial Lower Ext Compl, Bilateral (03.15.24 @ 15:06) >  Extensive bilateral plaque. No soft tissue abnormalities are demonstrated   in either leg. Flow phase patterns and peak systolic velocity   measurements (in cm/s) were observed as follows:    RIGHT:  CFA: Monophasic; 25  Proximal SFA: Monophasic; 29  Mid SFA: Monophasic; 39  Distal SFA: Monophasic;  28  Popliteal: Monophasic;  105  Anterior tibial: Monophasic; 15, 15, 29  Posterior tibial: Monophasic; 23, 100, 54  Peroneal: Monophasic;  12, 11, 11  Dorsalis pedis: Monophasic;  5    LEFT:  CFA: Monophasic; 37  Proximal SFA: Monophasic; 33  Mid SFA: Monophasic; 39  Distal SFA: Monophasic; 37  Popliteal: Monophasic;  60  Anterior tibial: Monophasic; 42, 22, 35  Posterior tibial: Monophasic; 24, 30, 15  Peroneal: Monophasic; 33, 33, 30  Dorsalis pedis: Monophasic;  36    IMPRESSION:  *  Extensive bilateral plaque with diffuse low velocity monophasic flow   throughout both legs. Aortoiliac Inflow disease cannot be excluded.  *  Focally elevated velocities in the right popliteal and right mid   posterior tibial arteries suggestive of stenoses.    < end of copied text >    < from: CT Head No Cont (03.15.24 @ 13:17) >  IMPRESSION:  Mild to moderate chronic microvascular changes without   evidence of an acute transcortical infarction or hemorrhage.    < end of copied text >

## 2024-03-16 NOTE — CONSULT NOTE ADULT - NS ATTEND AMEND GEN_ALL_CORE FT
I have seen and examiend the patient and the vascular studies are reviewed. The patient most likely has inflow and out flow arterial issues. wound recommend CTA, of Aorta and Bilateral LE Runoff.  Locally apply Betadine to dry the toe and heel wounds. ?/ Angio

## 2024-03-16 NOTE — PATIENT PROFILE ADULT - FALL HARM RISK - HARM RISK INTERVENTIONS
Assistance with ambulation/Assistance OOB with selected safe patient handling equipment/Communicate Risk of Fall with Harm to all staff/Discuss with provider need for PT consult/Monitor gait and stability/Reinforce activity limits and safety measures with patient and family/Tailored Fall Risk Interventions/Visual Cue: Yellow wristband and red socks/Bed in lowest position, wheels locked, appropriate side rails in place/Call bell, personal items and telephone in reach/Instruct patient to call for assistance before getting out of bed or chair/Non-slip footwear when patient is out of bed/[place]y to call system/Physically safe environment - no spills, clutter or unnecessary equipment/Purposeful Proactive Rounding/Room/bathroom lighting operational, light cord in reach

## 2024-03-16 NOTE — PROGRESS NOTE ADULT - ASSESSMENT
63F presents with BL eschars and left foot 2nd digit dry gangrene.  - Patient seen and evaluated.  - Afebrile, no leukocytosis, ESR/CRP ordered.  - Right foot posterior lateral heel well adhered eschar, no drainage, no bogginess, no fluctuance, no malodor, no tracking or tunneling, no acute signs of infection. Left foot hallux distal tuft and medial 2nd digit medial to plantar dry gangrene with early ischemic changes, no drainage, no bogginess, no fluctuance, no malodor, no tracking or tunneling, no acute signs of infection.  - BL Foot X-Ray: No gas, no OM.  - No culture 2/2 no acute signs of infection.  - Vasc recs, appreciated.  - BLAIR/ PVR: BL BLAIR unobtainable, BL waveforms diminished below the knee.  - Recommend BL z-flows at all times while in bed or resting in chair.  - No acute podiatric surgical intervention at this time,  - Pod Plan: Local wound care pending vasc recs.  - Seen with attending. 63F presents with BL eschars and left foot 2nd digit dry gangrene.  - Patient seen and evaluated.  - Afebrile, no leukocytosis, ESR/CRP ordered.  - Right foot posterior lateral heel well adhered eschar, no drainage, no bogginess, no fluctuance, no malodor, no tracking or tunneling, no acute signs of infection. Left foot hallux distal tuft and medial 2nd digit medial to plantar dry gangrene with early ischemic changes, no drainage, no bogginess, no fluctuance, no malodor, no tracking or tunneling, no acute signs of infection.  - BL Foot X-Ray: No gas, no OM.  - No culture 2/2 no acute signs of infection.  - Vasc recs, appreciated.  - BLAIR/ PVR: BL BLAIR unobtainable, BL waveforms diminished below the knee.  - Recommend BL z-flows at all times while in bed or resting in chair.  - No acute podiatric surgical intervention at this time.  - Pod Plan: Local wound care vs left foot partial 2nd ray resection pending vasc recs.  - Please document medical clearance for possible podiatric procedure under anesthesia.  - Seen with attending. 63F presents with BL eschars and left foot 2nd digit dry gangrene.  - Patient seen and evaluated.  - Afebrile, no leukocytosis, ESR/CRP ordered.  - Right foot posterior lateral heel well adhered eschar, no drainage, no bogginess, no fluctuance, no malodor, no tracking or tunneling, no acute signs of infection. Left foot hallux distal tuft and medial 2nd digit medial to plantar dry gangrene with early ischemic changes, no drainage, no bogginess, no fluctuance, no malodor, no tracking or tunneling, no acute signs of infection.  - BL Foot X-Ray: No gas, no OM.  - No culture 2/2 no acute signs of infection.  - Vasc recs, appreciated.  - BLAIR/ PVR: BL BLAIR unobtainable, BL waveforms diminished below the knee.  - Recommend BL z-flows at all times while in bed or resting in chair.  - Pod Plan: Local wound care vs left foot partial 2nd ray resection pending vasc recs.  - Please document medical clearance for possible podiatric procedure under anesthesia.  - Seen with attending.

## 2024-03-17 LAB
ANION GAP SERPL CALC-SCNC: 6 MMOL/L — SIGNIFICANT CHANGE UP (ref 5–17)
BUN SERPL-MCNC: 22 MG/DL — SIGNIFICANT CHANGE UP (ref 7–23)
CALCIUM SERPL-MCNC: 8.5 MG/DL — SIGNIFICANT CHANGE UP (ref 8.5–10.1)
CHLORIDE SERPL-SCNC: 107 MMOL/L — SIGNIFICANT CHANGE UP (ref 96–108)
CO2 SERPL-SCNC: 29 MMOL/L — SIGNIFICANT CHANGE UP (ref 22–31)
CREAT SERPL-MCNC: 1.14 MG/DL — SIGNIFICANT CHANGE UP (ref 0.5–1.3)
EGFR: 54 ML/MIN/1.73M2 — LOW
GLUCOSE BLDC GLUCOMTR-MCNC: 121 MG/DL — HIGH (ref 70–99)
GLUCOSE BLDC GLUCOMTR-MCNC: 176 MG/DL — HIGH (ref 70–99)
GLUCOSE BLDC GLUCOMTR-MCNC: 218 MG/DL — HIGH (ref 70–99)
GLUCOSE BLDC GLUCOMTR-MCNC: 273 MG/DL — HIGH (ref 70–99)
GLUCOSE SERPL-MCNC: 299 MG/DL — HIGH (ref 70–99)
HCT VFR BLD CALC: 28.9 % — LOW (ref 34.5–45)
HGB BLD-MCNC: 9.4 G/DL — LOW (ref 11.5–15.5)
MAGNESIUM SERPL-MCNC: 1.9 MG/DL — SIGNIFICANT CHANGE UP (ref 1.6–2.6)
MCHC RBC-ENTMCNC: 31.5 PG — SIGNIFICANT CHANGE UP (ref 27–34)
MCHC RBC-ENTMCNC: 32.5 G/DL — SIGNIFICANT CHANGE UP (ref 32–36)
MCV RBC AUTO: 97 FL — SIGNIFICANT CHANGE UP (ref 80–100)
NRBC # BLD: 0 /100 WBCS — SIGNIFICANT CHANGE UP (ref 0–0)
PHOSPHATE SERPL-MCNC: 3.2 MG/DL — SIGNIFICANT CHANGE UP (ref 2.5–4.5)
PLATELET # BLD AUTO: 222 K/UL — SIGNIFICANT CHANGE UP (ref 150–400)
POTASSIUM SERPL-MCNC: 3.5 MMOL/L — SIGNIFICANT CHANGE UP (ref 3.5–5.3)
POTASSIUM SERPL-SCNC: 3.5 MMOL/L — SIGNIFICANT CHANGE UP (ref 3.5–5.3)
RBC # BLD: 2.98 M/UL — LOW (ref 3.8–5.2)
RBC # FLD: 16.4 % — HIGH (ref 10.3–14.5)
SODIUM SERPL-SCNC: 142 MMOL/L — SIGNIFICANT CHANGE UP (ref 135–145)
WBC # BLD: 5.46 K/UL — SIGNIFICANT CHANGE UP (ref 3.8–10.5)
WBC # FLD AUTO: 5.46 K/UL — SIGNIFICANT CHANGE UP (ref 3.8–10.5)

## 2024-03-17 PROCEDURE — 99232 SBSQ HOSP IP/OBS MODERATE 35: CPT

## 2024-03-17 RX ADMIN — NYSTATIN CREAM 1 APPLICATION(S): 100000 CREAM TOPICAL at 05:49

## 2024-03-17 RX ADMIN — Medication 4: at 11:01

## 2024-03-17 RX ADMIN — QUETIAPINE FUMARATE 400 MILLIGRAM(S): 200 TABLET, FILM COATED ORAL at 22:01

## 2024-03-17 RX ADMIN — NYSTATIN CREAM 1 APPLICATION(S): 100000 CREAM TOPICAL at 17:39

## 2024-03-17 RX ADMIN — Medication 20 MILLIGRAM(S): at 05:49

## 2024-03-17 RX ADMIN — Medication 15 MILLIGRAM(S): at 12:44

## 2024-03-17 RX ADMIN — CEFTRIAXONE 100 MILLIGRAM(S): 500 INJECTION, POWDER, FOR SOLUTION INTRAMUSCULAR; INTRAVENOUS at 23:36

## 2024-03-17 RX ADMIN — ENOXAPARIN SODIUM 40 MILLIGRAM(S): 100 INJECTION SUBCUTANEOUS at 05:49

## 2024-03-17 RX ADMIN — Medication 500 MILLIGRAM(S): at 11:44

## 2024-03-17 RX ADMIN — Medication 1 MILLIGRAM(S): at 11:43

## 2024-03-17 RX ADMIN — INSULIN GLARGINE 10 UNIT(S): 100 INJECTION, SOLUTION SUBCUTANEOUS at 22:01

## 2024-03-17 RX ADMIN — Medication 50 MILLIGRAM(S): at 05:49

## 2024-03-17 RX ADMIN — ATORVASTATIN CALCIUM 80 MILLIGRAM(S): 80 TABLET, FILM COATED ORAL at 22:01

## 2024-03-17 RX ADMIN — Medication 15 MILLIGRAM(S): at 11:44

## 2024-03-17 RX ADMIN — CLOPIDOGREL BISULFATE 75 MILLIGRAM(S): 75 TABLET, FILM COATED ORAL at 11:44

## 2024-03-17 RX ADMIN — RISPERIDONE 2 MILLIGRAM(S): 4 TABLET ORAL at 22:01

## 2024-03-17 RX ADMIN — Medication 1 MILLIGRAM(S): at 22:01

## 2024-03-17 RX ADMIN — FAMOTIDINE 20 MILLIGRAM(S): 10 INJECTION INTRAVENOUS at 11:43

## 2024-03-17 RX ADMIN — Medication 6: at 08:05

## 2024-03-17 NOTE — PROGRESS NOTE ADULT - ASSESSMENT
63 year old female with PMHx of HTN, HLD, IDDM2, anxiety/depression, schizophrenia, and hx of CVA (with residual dysarthria and R. hemiparesis) who presented to the ED due to change in mental state. Pt noted to have b/l foot wounds and is being admitted for further work up. Pt also being admitted for UTI and metabolic encephalopathy.    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 w/ severe PVD  - appreciate Podiatry consult - planning on local wound care vs left foot partial 2nd ray resection pending vasc recs  - ABIs couldn't be obtained and US showed extensive bilateral plaque with diffuse low velocity monophasic flow throughout both legs w/ focally elevated velocities in the right popliteal and right mid posterior tibial arteries suggestive of stenoses  - Vascular following   - pain control    UTI   - c/w Rocephin  - urine culture growing E. coli    metabolic encephalopathy  - may be due to UTI  - CT head showed chronic bilateral basal ganglia infarctions and a chronic left corona radiata infarction w/ mild to moderate chronic microvascular changes without evidence of an acute transcortical infarction or hemorrhage    Severe Truncal rash  - unclear etiology ? contact dermatitis, fungal etiology or exanthem     HTN  - c/w Toprol XL    HLD  - c/w atorvastatin    DM2  - c/w Lantus and SSI  - Hgb A1C is 8.8    Schizophrenia  - c/w Klonopin, risperidone & quetiapine    Hx of CVA (with residual dysarthria and R. hemiparesis)  - c/w clopidogrel & atorvastatin     Prophylaxis:  DVT: Lovenox  GI: Pepcid

## 2024-03-17 NOTE — PROGRESS NOTE ADULT - SUBJECTIVE AND OBJECTIVE BOX
63 year old female with PMHx of HTN, HLD, IDDM2, anxiety/depression, schizophrenia, and hx of CVA (with residual dysarthria and R. hemiparesis) who presented to the ED due to change in mental state. Pt noted to have b/l foot wounds and is being admitted for further work up. Pt also being admitted for UTI and metabolic encephalopathy. He is lying in bed in NAD.    MEDICATIONS  (STANDING):  ascorbic acid 500 milliGRAM(s) Oral daily  atorvastatin 80 milliGRAM(s) Oral at bedtime  cefTRIAXone   IVPB 1000 milliGRAM(s) IV Intermittent every 24 hours  clonazePAM  Tablet 1 milliGRAM(s) Oral at bedtime  clopidogrel Tablet 75 milliGRAM(s) Oral daily  dextrose 5%. 1000 milliLiter(s) (50 mL/Hr) IV Continuous <Continuous>  dextrose 5%. 1000 milliLiter(s) (100 mL/Hr) IV Continuous <Continuous>  dextrose 50% Injectable 25 Gram(s) IV Push once  dextrose 50% Injectable 25 Gram(s) IV Push once  dextrose 50% Injectable 12.5 Gram(s) IV Push once  enoxaparin Injectable 40 milliGRAM(s) SubCutaneous every 24 hours  famotidine    Tablet 20 milliGRAM(s) Oral daily  folic acid 1 milliGRAM(s) Oral daily  furosemide    Tablet 20 milliGRAM(s) Oral daily  glucagon  Injectable 1 milliGRAM(s) IntraMuscular once  insulin glargine Injectable (LANTUS) 10 Unit(s) SubCutaneous at bedtime  insulin lispro (ADMELOG) corrective regimen sliding scale   SubCutaneous three times a day before meals  insulin lispro (ADMELOG) corrective regimen sliding scale   SubCutaneous at bedtime  metoprolol succinate ER 50 milliGRAM(s) Oral daily  nystatin Powder 1 Application(s) Topical two times a day  QUEtiapine 400 milliGRAM(s) Oral at bedtime  risperiDONE   Tablet 2 milliGRAM(s) Oral at bedtime    MEDICATIONS  (PRN):  dextrose Oral Gel 15 Gram(s) Oral once PRN Blood Glucose LESS THAN 70 milliGRAM(s)/deciliter  ketorolac   Injectable 15 milliGRAM(s) IV Push every 6 hours PRN Moderate Pain (4 - 6)  oxycodone    5 mG/acetaminophen 325 mG 1 Tablet(s) Oral every 6 hours PRN Severe Pain (7 - 10)  zolpidem 5 milliGRAM(s) Oral at bedtime PRN Insomnia  zolpidem 5 milliGRAM(s) Oral at bedtime PRN Insomnia      Allergies    erythromycin (Unknown)    Intolerances        Vital Signs Last 24 Hrs  T(C): 36.6 (17 Mar 2024 17:01), Max: 37 (17 Mar 2024 10:44)  T(F): 97.8 (17 Mar 2024 17:01), Max: 98.6 (17 Mar 2024 10:44)  HR: 102 (17 Mar 2024 17:01) (91 - 118)  BP: 154/83 (17 Mar 2024 17:01) (123/55 - 154/83)  BP(mean): --  RR: 18 (17 Mar 2024 17:01) (18 - 18)  SpO2: 94% (17 Mar 2024 17:01) (93% - 95%)    Parameters below as of 17 Mar 2024 10:44  Patient On (Oxygen Delivery Method): room air        PHYSICAL EXAM:  GENERAL: NAD   HEAD:  Atraumatic, Normocephalic  EYES: EOMI, PERRLA,   NECK: Supple   NERVOUS SYSTEM:  Alert & confused  CHEST/LUNG: Clear to auscultation bilaterally; No rales, rhonchi, wheezing, or rubs  HEART: Regular rate and rhythm; No murmurs, rubs, or gallops  ABDOMEN: Soft, Nontender, Nondistended; Bowel sounds present  EXTREMITIES:  bilateral feet wounds wrapped  LYMPH: No lymphadenopathy noted  SKIN: rash on truck, face and abd    LABS:                        9.4    5.46  )-----------( 222      ( 17 Mar 2024 05:50 )             28.9     03-17    142  |  107  |  22  ----------------------------<  299<H>  3.5   |  29  |  1.14    Ca    8.5      17 Mar 2024 05:50  Phos  3.2     03-17  Mg     1.9     03-17        Urinalysis Basic - ( 17 Mar 2024 05:50 )    Color: x / Appearance: x / SG: x / pH: x  Gluc: 299 mg/dL / Ketone: x  / Bili: x / Urobili: x   Blood: x / Protein: x / Nitrite: x   Leuk Esterase: x / RBC: x / WBC x   Sq Epi: x / Non Sq Epi: x / Bacteria: x      CAPILLARY BLOOD GLUCOSE      POCT Blood Glucose.: 121 mg/dL (17 Mar 2024 16:37)  POCT Blood Glucose.: 218 mg/dL (17 Mar 2024 10:59)  POCT Blood Glucose.: 273 mg/dL (17 Mar 2024 07:55)  POCT Blood Glucose.: 366 mg/dL (16 Mar 2024 21:17)      Culture - Urine (collected 15 Mar 2024 16:34)  Source: Clean Catch Clean Catch (Midstream)  Preliminary Report (17 Mar 2024 02:08):    >100,000 CFU/ml Escherichia coli    Culture - Blood (collected 15 Mar 2024 13:19)  Source: .Blood Blood-Peripheral  Preliminary Report (16 Mar 2024 18:02):    No growth at 24 hours    Culture - Blood (collected 15 Mar 2024 13:09)  Source: .Blood Blood-Peripheral  Preliminary Report (16 Mar 2024 19:02):    No growth at 24 hours      RADIOLOGY & ADDITIONAL TESTS:    03-16-24 @ 07:01  -  03-17-24 @ 07:00  --------------------------------------------------------  IN:  Total IN: 0 mL    OUT:    Voided (mL): 850 mL  Total OUT: 850 mL    Total NET: -850 mL

## 2024-03-18 LAB
-  AMOXICILLIN/CLAVULANIC ACID: SIGNIFICANT CHANGE UP
-  AMPICILLIN/SULBACTAM: SIGNIFICANT CHANGE UP
-  AMPICILLIN: SIGNIFICANT CHANGE UP
-  AZTREONAM: SIGNIFICANT CHANGE UP
-  CEFAZOLIN: SIGNIFICANT CHANGE UP
-  CEFEPIME: SIGNIFICANT CHANGE UP
-  CEFOXITIN: SIGNIFICANT CHANGE UP
-  CEFTRIAXONE: SIGNIFICANT CHANGE UP
-  CEFUROXIME: SIGNIFICANT CHANGE UP
-  CIPROFLOXACIN: SIGNIFICANT CHANGE UP
-  ERTAPENEM: SIGNIFICANT CHANGE UP
-  GENTAMICIN: SIGNIFICANT CHANGE UP
-  IMIPENEM: SIGNIFICANT CHANGE UP
-  LEVOFLOXACIN: SIGNIFICANT CHANGE UP
-  MEROPENEM: SIGNIFICANT CHANGE UP
-  NITROFURANTOIN: SIGNIFICANT CHANGE UP
-  PIPERACILLIN/TAZOBACTAM: SIGNIFICANT CHANGE UP
-  TOBRAMYCIN: SIGNIFICANT CHANGE UP
-  TRIMETHOPRIM/SULFAMETHOXAZOLE: SIGNIFICANT CHANGE UP
CULTURE RESULTS: ABNORMAL
GLUCOSE BLDC GLUCOMTR-MCNC: 177 MG/DL — HIGH (ref 70–99)
GLUCOSE BLDC GLUCOMTR-MCNC: 201 MG/DL — HIGH (ref 70–99)
GLUCOSE BLDC GLUCOMTR-MCNC: 323 MG/DL — HIGH (ref 70–99)
GLUCOSE BLDC GLUCOMTR-MCNC: 324 MG/DL — HIGH (ref 70–99)
METHOD TYPE: SIGNIFICANT CHANGE UP
ORGANISM # SPEC MICROSCOPIC CNT: ABNORMAL
ORGANISM # SPEC MICROSCOPIC CNT: SIGNIFICANT CHANGE UP
SPECIMEN SOURCE: SIGNIFICANT CHANGE UP

## 2024-03-18 PROCEDURE — 99232 SBSQ HOSP IP/OBS MODERATE 35: CPT | Mod: FS

## 2024-03-18 PROCEDURE — 99232 SBSQ HOSP IP/OBS MODERATE 35: CPT

## 2024-03-18 RX ORDER — CEPHALEXIN 500 MG
500 CAPSULE ORAL EVERY 12 HOURS
Refills: 0 | Status: COMPLETED | OUTPATIENT
Start: 2024-03-18 | End: 2024-03-20

## 2024-03-18 RX ADMIN — Medication 1 MILLIGRAM(S): at 22:08

## 2024-03-18 RX ADMIN — Medication 1 MILLIGRAM(S): at 11:44

## 2024-03-18 RX ADMIN — ATORVASTATIN CALCIUM 80 MILLIGRAM(S): 80 TABLET, FILM COATED ORAL at 22:08

## 2024-03-18 RX ADMIN — NYSTATIN CREAM 1 APPLICATION(S): 100000 CREAM TOPICAL at 17:12

## 2024-03-18 RX ADMIN — Medication 8: at 08:45

## 2024-03-18 RX ADMIN — QUETIAPINE FUMARATE 400 MILLIGRAM(S): 200 TABLET, FILM COATED ORAL at 22:17

## 2024-03-18 RX ADMIN — CLOPIDOGREL BISULFATE 75 MILLIGRAM(S): 75 TABLET, FILM COATED ORAL at 11:44

## 2024-03-18 RX ADMIN — Medication 4: at 17:11

## 2024-03-18 RX ADMIN — INSULIN GLARGINE 10 UNIT(S): 100 INJECTION, SOLUTION SUBCUTANEOUS at 22:14

## 2024-03-18 RX ADMIN — ENOXAPARIN SODIUM 40 MILLIGRAM(S): 100 INJECTION SUBCUTANEOUS at 05:38

## 2024-03-18 RX ADMIN — NYSTATIN CREAM 1 APPLICATION(S): 100000 CREAM TOPICAL at 05:40

## 2024-03-18 RX ADMIN — Medication 50 MILLIGRAM(S): at 05:38

## 2024-03-18 RX ADMIN — FAMOTIDINE 20 MILLIGRAM(S): 10 INJECTION INTRAVENOUS at 11:44

## 2024-03-18 RX ADMIN — Medication 20 MILLIGRAM(S): at 05:40

## 2024-03-18 RX ADMIN — Medication 500 MILLIGRAM(S): at 11:45

## 2024-03-18 RX ADMIN — Medication 500 MILLIGRAM(S): at 18:08

## 2024-03-18 RX ADMIN — RISPERIDONE 2 MILLIGRAM(S): 4 TABLET ORAL at 22:17

## 2024-03-18 RX ADMIN — Medication 8: at 11:45

## 2024-03-18 NOTE — PROGRESS NOTE ADULT - SUBJECTIVE AND OBJECTIVE BOX
SURGERY PROGRESS HPI:  Pt seen and examined at bedside. She si c/o of pain to both feet.  No acute events overnight.       Vital Signs Last 24 Hrs  T(C): 36.6 (18 Mar 2024 11:19), Max: 37.1 (17 Mar 2024 23:53)  T(F): 97.8 (18 Mar 2024 11:19), Max: 98.7 (17 Mar 2024 23:53)  HR: 98 (18 Mar 2024 11:19) (98 - 112)  BP: 145/75 (18 Mar 2024 11:19) (135/83 - 154/83)  BP(mean): --  RR: 18 (18 Mar 2024 11:19) (18 - 19)  SpO2: 94% (18 Mar 2024 11:19) (93% - 95%)    Parameters below as of 18 Mar 2024 11:19  Patient On (Oxygen Delivery Method): room air          PHYSICAL EXAM:    GENERAL: NAD  HEAD:  Atraumatic, Normocephalic  CHEST/LUNG: Normal work of breathing  HEART: RRR   ABDOMEN: non distended, soft, non tender, no guarding  EXTREMITIES:  4cm eschar on medial right heel, no infectious changes.  Left 2,3, 4th toes dark, dry gangrene, without drainage.  TTP  VASC: no palpable pulses to either DP, PT, popliteal or femoral.

## 2024-03-18 NOTE — DIETITIAN INITIAL EVALUATION ADULT - PERTINENT LABORATORY DATA
03-17    142  |  107  |  22  ----------------------------<  299<H>  3.5   |  29  |  1.14    Ca    8.5      17 Mar 2024 05:50  Phos  3.2     03-17  Mg     1.9     03-17    POCT Blood Glucose.: 323 mg/dL (03-18-24 @ 10:59)  A1C with Estimated Average Glucose Result: 8.8 % (03-16-24 @ 07:30)  A1C with Estimated Average Glucose Result: 9.6 % (11-21-23 @ 05:25)

## 2024-03-18 NOTE — PHARMACOTHERAPY INTERVENTION NOTE - COMMENTS
Recommended to de-escalate ceftriaxone to cephalexin based on urine culture results. 
Recommended to switch to PO cephalexin

## 2024-03-18 NOTE — PROGRESS NOTE ADULT - SUBJECTIVE AND OBJECTIVE BOX
63 year old female with PMHx of HTN, HLD, IDDM2, anxiety/depression, schizophrenia, and hx of CVA (with residual dysarthria and R. hemiparesis) who presented to the ED due to change in mental state. Pt noted to have b/l foot wounds and is being admitted for further work up. Pt also being admitted for UTI and metabolic encephalopathy. She is lying in bed in NAD.      MEDICATIONS  (STANDING):  ascorbic acid 500 milliGRAM(s) Oral daily  atorvastatin 80 milliGRAM(s) Oral at bedtime  cephalexin 500 milliGRAM(s) Oral every 12 hours  clonazePAM  Tablet 1 milliGRAM(s) Oral at bedtime  clopidogrel Tablet 75 milliGRAM(s) Oral daily  dextrose 5%. 1000 milliLiter(s) (50 mL/Hr) IV Continuous <Continuous>  dextrose 5%. 1000 milliLiter(s) (100 mL/Hr) IV Continuous <Continuous>  dextrose 50% Injectable 25 Gram(s) IV Push once  dextrose 50% Injectable 25 Gram(s) IV Push once  dextrose 50% Injectable 12.5 Gram(s) IV Push once  enoxaparin Injectable 40 milliGRAM(s) SubCutaneous every 24 hours  famotidine    Tablet 20 milliGRAM(s) Oral daily  folic acid 1 milliGRAM(s) Oral daily  furosemide    Tablet 20 milliGRAM(s) Oral daily  glucagon  Injectable 1 milliGRAM(s) IntraMuscular once  insulin glargine Injectable (LANTUS) 10 Unit(s) SubCutaneous at bedtime  insulin lispro (ADMELOG) corrective regimen sliding scale   SubCutaneous three times a day before meals  insulin lispro (ADMELOG) corrective regimen sliding scale   SubCutaneous at bedtime  metoprolol succinate ER 50 milliGRAM(s) Oral daily  nystatin Powder 1 Application(s) Topical two times a day  QUEtiapine 400 milliGRAM(s) Oral at bedtime  risperiDONE   Tablet 2 milliGRAM(s) Oral at bedtime    MEDICATIONS  (PRN):  dextrose Oral Gel 15 Gram(s) Oral once PRN Blood Glucose LESS THAN 70 milliGRAM(s)/deciliter  ketorolac   Injectable 15 milliGRAM(s) IV Push every 6 hours PRN Moderate Pain (4 - 6)  oxycodone    5 mG/acetaminophen 325 mG 1 Tablet(s) Oral every 6 hours PRN Severe Pain (7 - 10)  zolpidem 5 milliGRAM(s) Oral at bedtime PRN Insomnia  zolpidem 5 milliGRAM(s) Oral at bedtime PRN Insomnia      Allergies    erythromycin (Unknown)    Intolerances        Vital Signs Last 24 Hrs  T(C): 36.2 (18 Mar 2024 17:57), Max: 37.1 (17 Mar 2024 23:53)  T(F): 97.1 (18 Mar 2024 17:57), Max: 98.7 (17 Mar 2024 23:53)  HR: 102 (18 Mar 2024 17:57) (98 - 112)  BP: 140/70 (18 Mar 2024 17:57) (135/83 - 153/59)  BP(mean): --  RR: 18 (18 Mar 2024 17:57) (18 - 19)  SpO2: 96% (18 Mar 2024 17:57) (93% - 96%)    Parameters below as of 18 Mar 2024 17:57  Patient On (Oxygen Delivery Method): room air        PHYSICAL EXAM:  GENERAL: NAD   HEAD:  Atraumatic, Normocephalic  EYES: EOMI, PERRLA,   NECK: Supple   NERVOUS SYSTEM:  Alert & confused  CHEST/LUNG: Clear to auscultation bilaterally; No rales, rhonchi, wheezing, or rubs  HEART: Regular rate and rhythm; No murmurs, rubs, or gallops  ABDOMEN: Soft, Nontender, Nondistended; Bowel sounds present  EXTREMITIES:  bilateral feet wounds wrapped  LYMPH: No lymphadenopathy noted  SKIN: rash on truck, face and abd      LABS:                        9.4    5.46  )-----------( 222      ( 17 Mar 2024 05:50 )             28.9     03-17    142  |  107  |  22  ----------------------------<  299<H>  3.5   |  29  |  1.14    Ca    8.5      17 Mar 2024 05:50  Phos  3.2     03-17  Mg     1.9     03-17        Urinalysis Basic - ( 17 Mar 2024 05:50 )    Color: x / Appearance: x / SG: x / pH: x  Gluc: 299 mg/dL / Ketone: x  / Bili: x / Urobili: x   Blood: x / Protein: x / Nitrite: x   Leuk Esterase: x / RBC: x / WBC x   Sq Epi: x / Non Sq Epi: x / Bacteria: x      CAPILLARY BLOOD GLUCOSE      POCT Blood Glucose.: 201 mg/dL (18 Mar 2024 16:44)  POCT Blood Glucose.: 323 mg/dL (18 Mar 2024 10:59)  POCT Blood Glucose.: 324 mg/dL (18 Mar 2024 07:51)  POCT Blood Glucose.: 176 mg/dL (17 Mar 2024 21:31)      Culture - Urine (collected 15 Mar 2024 16:34)  Source: Clean Catch Clean Catch (Midstream)  Final Report (18 Mar 2024 09:40):    >100,000 CFU/ml Escherichia coli  Organism: Escherichia coli (18 Mar 2024 09:40)  Organism: Escherichia coli (18 Mar 2024 09:40)    Culture - Blood (collected 15 Mar 2024 13:19)  Source: .Blood Blood-Peripheral  Preliminary Report (18 Mar 2024 18:01):    No growth at 72 Hours    Culture - Blood (collected 15 Mar 2024 13:09)  Source: .Blood Blood-Peripheral  Preliminary Report (17 Mar 2024 19:01):    No growth at 48 Hours      RADIOLOGY & ADDITIONAL TESTS:    03-17-24 @ 07:01  -  03-18-24 @ 07:00  --------------------------------------------------------  IN:  Total IN: 0 mL    OUT:    Voided (mL): 400 mL  Total OUT: 400 mL    Total NET: -400 mL

## 2024-03-18 NOTE — DIETITIAN INITIAL EVALUATION ADULT - NS FNS DIET ORDER
Diet, DASH/TLC:   Sodium & Cholesterol Restricted  Consistent Carbohydrate {Evening Snack} (03-15-24 @ 20:00)

## 2024-03-18 NOTE — PROGRESS NOTE ADULT - SUBJECTIVE AND OBJECTIVE BOX
Patient is a 63y old  Female who presents with a chief complaint of GANGRENE, UTI     (18 Mar 2024 12:00)       INTERVAL HPI/OVERNIGHT EVENTS:  Patient seen and evaluated at bedside.  Pt is resting comfortable in NAD. Denies N/V/F/C.    Allergies    erythromycin (Unknown)    Intolerances        Vital Signs Last 24 Hrs  T(C): 36.6 (18 Mar 2024 11:19), Max: 37.1 (17 Mar 2024 23:53)  T(F): 97.8 (18 Mar 2024 11:19), Max: 98.7 (17 Mar 2024 23:53)  HR: 98 (18 Mar 2024 11:19) (98 - 112)  BP: 145/75 (18 Mar 2024 11:19) (135/83 - 153/59)  BP(mean): --  RR: 18 (18 Mar 2024 11:19) (18 - 19)  SpO2: 94% (18 Mar 2024 11:19) (93% - 95%)    Parameters below as of 18 Mar 2024 11:19  Patient On (Oxygen Delivery Method): room air        LABS:                        9.4    5.46  )-----------( 222      ( 17 Mar 2024 05:50 )             28.9     03-17    142  |  107  |  22  ----------------------------<  299<H>  3.5   |  29  |  1.14    Ca    8.5      17 Mar 2024 05:50  Phos  3.2     03-17  Mg     1.9     03-17        Urinalysis Basic - ( 17 Mar 2024 05:50 )    Color: x / Appearance: x / SG: x / pH: x  Gluc: 299 mg/dL / Ketone: x  / Bili: x / Urobili: x   Blood: x / Protein: x / Nitrite: x   Leuk Esterase: x / RBC: x / WBC x   Sq Epi: x / Non Sq Epi: x / Bacteria: x      CAPILLARY BLOOD GLUCOSE      POCT Blood Glucose.: 201 mg/dL (18 Mar 2024 16:44)  POCT Blood Glucose.: 323 mg/dL (18 Mar 2024 10:59)  POCT Blood Glucose.: 324 mg/dL (18 Mar 2024 07:51)  POCT Blood Glucose.: 176 mg/dL (17 Mar 2024 21:31)      Lower Extremity Physical Exam:  Vascular: DP/PT 0/4, B/L, CFT <3seconds B/L, Temperature gradient warm to cool, B/L.   Neuro: Unable to assess  Musculoskeletal/Ortho: unremarkable  Skin: Right foot posterior lateral heel well adhered eschar, no drainage, no bogginess, no fluctuance, no malodor, no tracking or tunneling, no acute signs of infection. Left foot hallux distal tuft and medial 2nd digit medial to plantar dry gangrene with early ischemic changes, no drainage, no bogginess, no fluctuance, no malodor, no tracking or tunneling, no acute signs of infection.    RADIOLOGY & ADDITIONAL TESTS:

## 2024-03-18 NOTE — PROGRESS NOTE ADULT - ASSESSMENT
63 year old female with PMHx of HTN, HLD, IDDM2, anxiety/depression, schizophrenia, and hx of CVA (with residual dysarthria and R. hemiparesis) who presented to the ED due to change in mental state. Pt noted to have b/l foot wounds and is being admitted for further work up. Pt also being admitted for UTI and metabolic encephalopathy.    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 w/ severe PVD  - appreciate Podiatry consult - planning on local wound care vs left foot partial 2nd ray resection pending vasc recs  - ABIs couldn't be obtained and US showed extensive bilateral plaque with diffuse low velocity monophasic flow throughout both legs w/ focally elevated velocities in the right popliteal and right mid posterior tibial arteries suggestive of stenoses  - will get CTA aorta with b/l LE runoff as per vascular  - pain control    UTI   - change Rocephin to keflex   - urine culture grew E. coli    metabolic encephalopathy  - may be due to UTI  - CT head showed chronic bilateral basal ganglia infarctions and a chronic left corona radiata infarction w/ mild to moderate chronic microvascular changes without evidence of an acute transcortical infarction or hemorrhage    Severe Truncal rash  - unclear etiology ? contact dermatitis, fungal etiology or exanthem     HTN  - c/w Toprol XL    HLD  - c/w atorvastatin    DM2  - c/w Lantus and SSI  - Hgb A1C is 8.8    Schizophrenia  - c/w Klonopin, risperidone & quetiapine    Hx of CVA (with residual dysarthria and R. hemiparesis)  - c/w clopidogrel & atorvastatin     Prophylaxis:  DVT: Lovenox  GI: Pepcid

## 2024-03-18 NOTE — PROGRESS NOTE ADULT - ASSESSMENT
63F presents with BL eschars and left foot 2nd digit dry gangrene.  - Patient seen and evaluated.  - Afebrile, no leukocytosis  - Right foot posterior lateral heel well adhered eschar, no drainage, no bogginess, no fluctuance, no malodor, no tracking or tunneling, no acute signs of infection. Left foot hallux distal tuft and medial 2nd digit medial to plantar dry gangrene with early ischemic changes, no drainage, no bogginess, no fluctuance, no malodor, no tracking or tunneling, no acute signs of infection.  - BL Foot X-Ray: No gas, no OM.  - No culture 2/2 no acute signs of infection.  - Vasc recs, appreciated.  - BLAIR/ PVR: BL BLAIR unobtainable, BL waveforms diminished below the knee.  - Recommend BL z-flows at all times while in bed or resting in chair.  - Pod Plan: Local wound care vs left foot partial 2nd ray resection pending vasc recs.  - Please document medical clearance for possible podiatric procedure under anesthesia.  - Discussed with attending.

## 2024-03-18 NOTE — DIETITIAN INITIAL EVALUATION ADULT - PERTINENT MEDS FT
MEDICATIONS  (STANDING):  ascorbic acid 500 milliGRAM(s) Oral daily  atorvastatin 80 milliGRAM(s) Oral at bedtime  cefTRIAXone   IVPB 1000 milliGRAM(s) IV Intermittent every 24 hours  clonazePAM  Tablet 1 milliGRAM(s) Oral at bedtime  clopidogrel Tablet 75 milliGRAM(s) Oral daily  dextrose 5%. 1000 milliLiter(s) (100 mL/Hr) IV Continuous <Continuous>  dextrose 5%. 1000 milliLiter(s) (50 mL/Hr) IV Continuous <Continuous>  dextrose 50% Injectable 25 Gram(s) IV Push once  dextrose 50% Injectable 25 Gram(s) IV Push once  dextrose 50% Injectable 12.5 Gram(s) IV Push once  enoxaparin Injectable 40 milliGRAM(s) SubCutaneous every 24 hours  famotidine    Tablet 20 milliGRAM(s) Oral daily  folic acid 1 milliGRAM(s) Oral daily  furosemide    Tablet 20 milliGRAM(s) Oral daily  glucagon  Injectable 1 milliGRAM(s) IntraMuscular once  insulin glargine Injectable (LANTUS) 10 Unit(s) SubCutaneous at bedtime  insulin lispro (ADMELOG) corrective regimen sliding scale   SubCutaneous three times a day before meals  insulin lispro (ADMELOG) corrective regimen sliding scale   SubCutaneous at bedtime  metoprolol succinate ER 50 milliGRAM(s) Oral daily  nystatin Powder 1 Application(s) Topical two times a day  QUEtiapine 400 milliGRAM(s) Oral at bedtime  risperiDONE   Tablet 2 milliGRAM(s) Oral at bedtime    MEDICATIONS  (PRN):  dextrose Oral Gel 15 Gram(s) Oral once PRN Blood Glucose LESS THAN 70 milliGRAM(s)/deciliter  ketorolac   Injectable 15 milliGRAM(s) IV Push every 6 hours PRN Moderate Pain (4 - 6)  oxycodone    5 mG/acetaminophen 325 mG 1 Tablet(s) Oral every 6 hours PRN Severe Pain (7 - 10)  zolpidem 5 milliGRAM(s) Oral at bedtime PRN Insomnia  zolpidem 5 milliGRAM(s) Oral at bedtime PRN Insomnia

## 2024-03-18 NOTE — DIETITIAN INITIAL EVALUATION ADULT - OTHER INFO
Pt seen on medical floor, adm w/ PVD ; heel wounds ; UTI ; change in mental status. PMHx CVA ( w/ residual dysarthria and R hemiparesis: metabolic encephalopathy ; Severe truncal rash ; DM 2 ( 3/16 - A1c = 8.8 %). No reports of N/V/D/C/Chewing/Swallowing issues, No food allergies. Pt lives w/ her family. Her son and daugyhter do the food shopping / cooking. Appears WDWN, BMI = 30.3 on adm. Pt appeared confused and therefore unable to provide diet hx. Left educational material at bedside for family ( pressure ulcer nutrition therapy ; plate method ; What's My A1c ; Blood glucose goals).

## 2024-03-18 NOTE — PROGRESS NOTE ADULT - ASSESSMENT
63 year old female with PMHx of HTN, HLD, IDDM2, anxiety/depression, schizophrenia, and hx of CVA (with residual dysarthria and R. hemiparesis) who presented to the ED due to change in mental state.   States she lives at home with  and daughter, c/o pain to feet  No pulses felt at LE's  eschar and dry gangrene to both feet    Recommend CTA aorta with b/l LE runoff  Pt seen and discussed with Dr Christina

## 2024-03-18 NOTE — DIETITIAN INITIAL EVALUATION ADULT - ETIOLOGY
Physiological causes requiring modified carbohydrate intake Increased physiological demand for protein - energy

## 2024-03-19 DIAGNOSIS — I67.9 CEREBROVASCULAR DISEASE, UNSPECIFIED: ICD-10-CM

## 2024-03-19 DIAGNOSIS — Z86.73 PERSONAL HISTORY OF TRANSIENT ISCHEMIC ATTACK (TIA), AND CEREBRAL INFARCTION WITHOUT RESIDUAL DEFICITS: ICD-10-CM

## 2024-03-19 LAB
GLUCOSE BLDC GLUCOMTR-MCNC: 192 MG/DL — HIGH (ref 70–99)
GLUCOSE BLDC GLUCOMTR-MCNC: 221 MG/DL — HIGH (ref 70–99)
GLUCOSE BLDC GLUCOMTR-MCNC: 225 MG/DL — HIGH (ref 70–99)
GLUCOSE BLDC GLUCOMTR-MCNC: 285 MG/DL — HIGH (ref 70–99)

## 2024-03-19 PROCEDURE — 99222 1ST HOSP IP/OBS MODERATE 55: CPT

## 2024-03-19 PROCEDURE — 99223 1ST HOSP IP/OBS HIGH 75: CPT

## 2024-03-19 PROCEDURE — 99239 HOSP IP/OBS DSCHRG MGMT >30: CPT

## 2024-03-19 RX ADMIN — FAMOTIDINE 20 MILLIGRAM(S): 10 INJECTION INTRAVENOUS at 12:00

## 2024-03-19 RX ADMIN — Medication 500 MILLIGRAM(S): at 12:01

## 2024-03-19 RX ADMIN — Medication 1 MILLIGRAM(S): at 12:01

## 2024-03-19 RX ADMIN — RISPERIDONE 2 MILLIGRAM(S): 4 TABLET ORAL at 22:01

## 2024-03-19 RX ADMIN — Medication 15 MILLIGRAM(S): at 23:02

## 2024-03-19 RX ADMIN — Medication 2: at 08:22

## 2024-03-19 RX ADMIN — INSULIN GLARGINE 10 UNIT(S): 100 INJECTION, SOLUTION SUBCUTANEOUS at 22:05

## 2024-03-19 RX ADMIN — Medication 20 MILLIGRAM(S): at 06:17

## 2024-03-19 RX ADMIN — ATORVASTATIN CALCIUM 80 MILLIGRAM(S): 80 TABLET, FILM COATED ORAL at 22:01

## 2024-03-19 RX ADMIN — QUETIAPINE FUMARATE 400 MILLIGRAM(S): 200 TABLET, FILM COATED ORAL at 22:01

## 2024-03-19 RX ADMIN — NYSTATIN CREAM 1 APPLICATION(S): 100000 CREAM TOPICAL at 06:26

## 2024-03-19 RX ADMIN — NYSTATIN CREAM 1 APPLICATION(S): 100000 CREAM TOPICAL at 17:38

## 2024-03-19 RX ADMIN — Medication 500 MILLIGRAM(S): at 06:17

## 2024-03-19 RX ADMIN — ENOXAPARIN SODIUM 40 MILLIGRAM(S): 100 INJECTION SUBCUTANEOUS at 06:21

## 2024-03-19 RX ADMIN — Medication 1 MILLIGRAM(S): at 22:07

## 2024-03-19 RX ADMIN — Medication 50 MILLIGRAM(S): at 06:17

## 2024-03-19 RX ADMIN — CLOPIDOGREL BISULFATE 75 MILLIGRAM(S): 75 TABLET, FILM COATED ORAL at 12:00

## 2024-03-19 RX ADMIN — Medication 4: at 12:03

## 2024-03-19 RX ADMIN — Medication 500 MILLIGRAM(S): at 17:36

## 2024-03-19 RX ADMIN — Medication 6: at 17:35

## 2024-03-19 NOTE — PROGRESS NOTE ADULT - ASSESSMENT
63 year old female with history of HTN, HLD, IDDM2, anxiety/depression, schizophrenia, and hx of CVA (with residual dysarthria and R. hemiparesis) who presented to the ED due to change in mental state. Pt noted to have b/l foot wounds and is being admitted for further work up. Pt also being admitted for UTI and metabolic encephalopathy.    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 w/ severe PVD  - appreciate Podiatry consult - planning on local wound care vs left foot partial 2nd ray resection pending vasc recs  - ABIs couldn't be obtained and US showed extensive bilateral plaque with diffuse low velocity monophasic flow throughout both legs w/ focally elevated velocities in the right popliteal and right mid posterior tibial arteries suggestive of stenoses  - will get CTA aorta with b/l LE runoff as per vascular  - as per cardio, patient will need a nuclear stress test to eval for underlying ischemia prior to proceeding to OR unless the procedure becomes urgent   - pain control    UTI   - changed Rocephin to keflex   - urine culture grew E. coli    metabolic encephalopathy  - may be due to UTI  - CT head showed chronic bilateral basal ganglia infarctions and a chronic left corona radiata infarction w/ mild to moderate chronic microvascular changes without evidence of an acute transcortical infarction or hemorrhage    Severe Truncal rash  - unclear etiology ? contact dermatitis, fungal etiology or exanthem     HTN  - c/w Toprol XL    HLD  - c/w atorvastatin    DM2  - c/w Lantus and SSI  - Hgb A1C is 8.8    Hx of Schizophrenia  - c/w Klonopin, risperidone & quetiapine  - as per psych, patient likely never had Schizophrenia and likely has a Mood Disorder/Anxiety spectrum symptoms with or without soft psychotic sxs w/ significant cerebral Vascular Disease, multiple CVAs and Neurocognitive deficits   - as of today, the patient does not appear to have decision making capacity in regards to surgery     Hx of CVA (with residual dysarthria and R. hemiparesis)  - c/w clopidogrel & atorvastatin     Prophylaxis:  DVT: Lovenox  GI: Pepcid    Called the pt's janie Richards [phone] and updated her on the pt's status and plan of care.  63 year old female with history of HTN, HLD, IDDM2, anxiety/depression, schizophrenia, and hx of CVA (with residual dysarthria and R. hemiparesis) who presented to the ED due to change in mental state. Pt noted to have b/l foot wounds and is being admitted for further work up. Pt also being admitted for UTI and metabolic encephalopathy.    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 w/ severe PVD  - appreciate Podiatry consult - planning on local wound care vs left foot partial 2nd ray resection pending vasc recs  - ABIs couldn't be obtained and US showed extensive bilateral plaque with diffuse low velocity monophasic flow throughout both legs w/ focally elevated velocities in the right popliteal and right mid posterior tibial arteries suggestive of stenoses  - will get CTA aorta with b/l LE runoff as per vascular  - as per cardio, patient will need a nuclear stress test to eval for underlying ischemia prior to proceeding to OR unless the procedure becomes urgent   - pain control    UTI   - changed Rocephin to keflex   - urine culture grew E. coli    metabolic encephalopathy  - may be due to UTI  - CT head showed chronic bilateral basal ganglia infarctions and a chronic left corona radiata infarction w/ mild to moderate chronic microvascular changes without evidence of an acute transcortical infarction or hemorrhage  - improved, patient likely at baseline  - as per psych, patient likely never had Schizophrenia and likely has a Mood Disorder/Anxiety spectrum symptoms with or without soft psychotic sxs w/ significant cerebral Vascular Disease, multiple CVAs and Neurocognitive deficits   - as of today, the patient does not appear to have decision making capacity in regards to surgery       Severe Truncal rash  - unclear etiology ? contact dermatitis, fungal etiology or exanthem     HTN  - c/w Toprol XL    HLD  - c/w atorvastatin    DM2  - c/w Lantus and SSI  - Hgb A1C is 8.8    Hx of Schizophrenia  - c/w Klonopin, risperidone & quetiapine  - as per psych, patient likely never had Schizophrenia and likely has a Mood Disorder/Anxiety spectrum symptoms with or without soft psychotic sxs w/ significant cerebral Vascular Disease, multiple CVAs and Neurocognitive deficits   - as of today, the patient does not appear to have decision making capacity in regards to surgery     Hx of CVA (with residual dysarthria and R. hemiparesis)  - c/w clopidogrel & atorvastatin     Prophylaxis:  DVT: Lovenox  GI: Pepcid    Called the pt's janie Richards [phone] and updated her on the pt's status and plan of care.

## 2024-03-19 NOTE — BH CONSULTATION LIAISON ASSESSMENT NOTE - HPI (INCLUDE ILLNESS QUALITY, SEVERITY, DURATION, TIMING, CONTEXT, MODIFYING FACTORS, ASSOCIATED SIGNS AND SYMPTOMS)
64 yo F, , domiciled with family in  private home, admitted with foot pain, AMS, PMH significant for HF w/ LV EF 40% with segmental dysfunction severe TR, mod AI and mod pHTN; CVA 2/2021 (left basal ganglia and corona radiata infarcts) residual right sided weakness); CTA head: multifocal intracranial stenosis; Severe stenosis involving the supraclinoid L. ICA, bilateral M1 moderate stenosis, severe stenosis in the mid R. M1. R. V4 severe stenosis. CTA neck: Severe 85-90% stenosis involving the R. ICA orgin. (1/27/2021 at [facility]). Seen by Cardiology - recommended nuclear stress test to eval for underlying ischemia prior to proceeding to OR. Seen by Vascular Surgery - No pulses felt at LE's; eschar and dry gangrene to both feet.  64 yo F, , domiciled with family in  private home, admitted with foot pain, AMS, PMH significant for HF w/ LV EF 40% with segmental dysfunction severe TR, mod AI and mod pHTN; CVA 2/2021 (left basal ganglia and corona radiata infarcts, lacunar infarct in posterior lenticular area) residual right sided weakness); CTA head: multifocal intracranial stenosis; Severe stenosis involving the supraclinoid L. ICA, bilateral M1 moderate stenosis, severe stenosis in the mid R. M1. R. V4 severe stenosis. CTA neck: Severe 85-90% stenosis involving the R. ICA orgin. (1/27/2021 at [facility]). Seen by Cardiology - recommended nuclear stress test to eval for underlying ischemia prior to proceeding to OR. Seen by Vascular Surgery - No pulses felt at LE's; eschar and dry gangrene to both feet.  62 yo F, single, domiciled with family in  private home, mother of 2 adult sons (Pt designated son Ruben as her surrogate decision maker), admitted with foot pain, AMS, PMH significant for HF w/ LV EF 40% with segmental dysfunction severe TR, mod AI and mod pHTN; CVA 2/2021 (left basal ganglia and corona radiata infarcts, lacunar infarct in posterior lenticular area) residual right sided weakness); CTA head: multifocal intracranial stenosis; Severe stenosis involving the supraclinoid L. ICA, bilateral M1 moderate stenosis, severe stenosis in the mid R. M1. R. V4 severe stenosis. CTA neck: Severe 85-90% stenosis involving the R. ICA orgin. (1/27/2021 at [facility]). Seen by Cardiology - recommended nuclear stress test to eval for underlying ischemia prior to proceeding to OR. Seen by Vascular Surgery - No pulses felt at LE's; eschar and dry gangrene to both feet.     EXA: awake, alert but with blunted affect, + significant latency with impaired processing speed, paucity of thought and content, soft volume of speech, underproduction. Patient able to answer questions in a very basic manner in short sentences. Patient recalls that she came to the hospital for foot pain, does not recall any conversation about surgery etc. When surgery mentioned, Patient said 'I don't want that" but not able to further clarify why not. When asked who she would want her help make decisions, she says "Ruben." She shakes head no when asked about a spouse. Moreover, Patient cannot recall why she was admitted to [facility] years ago. She reports she has had an "ok" mood, and nodded when asked if she noted more difficulty in thinking post strokes. Denies suicidality.,

## 2024-03-19 NOTE — BH CONSULTATION LIAISON ASSESSMENT NOTE - NSBHCONSULTRECOMMENDOTHER_PSY_A_CORE FT
no formal treatment or medications can reverse or improve cognitive dysfunction secondary to severe cerebral vascular disease  no formal treatment or medications can reverse or improve cognitive dysfunction secondary to severe cerebral vascular disease   - Pt designated son Ruben as her surrogate decision maker

## 2024-03-19 NOTE — CONSULT NOTE ADULT - SUBJECTIVE AND OBJECTIVE BOX
DONTE PETERSON  [id number]    Date of Consult:  3/19/24  CHIEF COMPLAINT:  PAD  HISTORY OF PRESENT ILLNESS:  63F with HTN, HLD, DM2, schizophrenia, hx of CVA initially p.w AMS, found to have worsening PAD with L foot ulcer with plans for surgical intervention per daughter, Pt endorsing L foot pain x 1 month. pt resting, laying flat, no resp distress. pt refusing to answer any questions refused exam. pt had a tte in  which revealed LV EF 40% with segmental dysfunction severe TR, mod AI and mod pHTN. EKG here showing non specific st changes with lateral TWI.   asked pt is she has cp or sob, pt refused to answer.         Allergies    erythromycin (Unknown)    Intolerances    	    MEDICATIONS:  clopidogrel Tablet 75 milliGRAM(s) Oral daily  enoxaparin Injectable 40 milliGRAM(s) SubCutaneous every 24 hours  furosemide    Tablet 20 milliGRAM(s) Oral daily  metoprolol succinate ER 50 milliGRAM(s) Oral daily    cephalexin 500 milliGRAM(s) Oral every 12 hours      clonazePAM  Tablet 1 milliGRAM(s) Oral at bedtime  ketorolac   Injectable 15 milliGRAM(s) IV Push every 6 hours PRN  oxycodone    5 mG/acetaminophen 325 mG 1 Tablet(s) Oral every 6 hours PRN  QUEtiapine 400 milliGRAM(s) Oral at bedtime  risperiDONE   Tablet 2 milliGRAM(s) Oral at bedtime  zolpidem 5 milliGRAM(s) Oral at bedtime PRN  zolpidem 5 milliGRAM(s) Oral at bedtime PRN    famotidine    Tablet 20 milliGRAM(s) Oral daily    atorvastatin 80 milliGRAM(s) Oral at bedtime  dextrose 50% Injectable 25 Gram(s) IV Push once  dextrose 50% Injectable 12.5 Gram(s) IV Push once  dextrose 50% Injectable 25 Gram(s) IV Push once  dextrose Oral Gel 15 Gram(s) Oral once PRN  glucagon  Injectable 1 milliGRAM(s) IntraMuscular once  insulin glargine Injectable (LANTUS) 10 Unit(s) SubCutaneous at bedtime  insulin lispro (ADMELOG) corrective regimen sliding scale   SubCutaneous three times a day before meals  insulin lispro (ADMELOG) corrective regimen sliding scale   SubCutaneous at bedtime    ascorbic acid 500 milliGRAM(s) Oral daily  dextrose 5%. 1000 milliLiter(s) IV Continuous <Continuous>  dextrose 5%. 1000 milliLiter(s) IV Continuous <Continuous>  folic acid 1 milliGRAM(s) Oral daily  nystatin Powder 1 Application(s) Topical two times a day      PAST MEDICAL & SURGICAL HISTORY:  Anxiety      Hypertension      Hypercholesteremia      Insulin dependent type 2 diabetes mellitus      Schizophrenia      History of CVA with residual deficit      H/O tubal ligation      H/O hemorrhoidectomy      H/O  section      S/P percutaneous endoscopic gastrostomy (PEG) tube placement          FAMILY HISTORY:  FH: type 2 diabetes        SOCIAL HISTORY:    unable to assess    REVIEW OF SYSTEMS:  See HPI. Otherwise, 10 point ROS done and otherwise negative.    PHYSICAL EXAM:  T(C): 37.2 (24 @ 05:40), Max: 37.3 (24 @ 00:05)  HR: 102 (24 @ 05:40) (98 - 107)  BP: 128/57 (24 @ 05:40) (125/74 - 145/75)  RR: 18 (24 @ 05:40) (18 - 18)  SpO2: 93% (24 @ 05:40) (93% - 96%)  Wt(kg): --  I&O's Summary    18 Mar 2024 07:01  -  19 Mar 2024 07:00  --------------------------------------------------------  IN: 0 mL / OUT: 400 mL / NET: -400 mL      pt refused exam       LABS:	 	              proBNP:   Lipid Profile:   HgA1c:   TSH:       CARDIAC MARKERS:            TELEMETRY: 	    ECG:  	  RADIOLOGY:  OTHER: 	    PREVIOUS DIAGNOSTIC TESTING:    [ ] Echocardiogram:< from: TTE Echo Complete w/ Contrast w/ Doppler (23 @ 08:30) >  Summary:   1. Left ventricular ejection fraction, by visual estimation, is 40 to   45%.   2. Mildly to moderately decreased global left ventricular systolic   function.   3. Multiple left ventricular regional wall motion abnormalities exist.   Seewall motion findings.   4. Normal left ventricular internal cavity size.   5. Spectral Doppler shows impaired relaxation pattern of left   ventricular myocardial filling (Grade I diastolic dysfunction).   6. Mildly enlarged left atrium.   7. Moderate-severe tricuspid regurgitation.   8. Mild to moderate aortic regurgitation.   9. Sclerotic aortic valve with normal opening.  10. Estimated pulmonary artery systolic pressure is 55.1 mmHg assuming a   right atrial pressure of 3 mmHg, which is consistent with moderate   pulmonary hypertension.    < end of copied text >    [ ]  Catheterization:  [ ] Stress Test:  	  	  ASSESSMENT/PLAN: 	    Mustapha Strickland MD

## 2024-03-19 NOTE — BH CONSULTATION LIAISON ASSESSMENT NOTE - NSBHCHARTREVIEWINVESTIGATE_PSY_A_CORE FT
CT Head No Cont (03.15.24 @ 13:17)  Mild to moderate chronic microvascular changes without evidence of an acute transcortical infarction or hemorrhage.

## 2024-03-19 NOTE — BH CONSULTATION LIAISON ASSESSMENT NOTE - SUMMARY
NOT LIKELY that Patient ever had Schizophrenia. Likely had a Mood Disorder/Anxiety spectrum symptoms with or without soft psychotic sxs. Irrespective of this, her current issue(s) not psychiatric in nature. Significant cerebral Vascular Disease including multiple chronic strokes (L basal ganglia/ corona radiata), added to severe multifocal intracranial stenosis involving the supraclinoid L. ICA, mid - R. M1. R. V4  neck and M1 bilateral moderate stenosis. PLUS Severe 85-90% stenosis involving the R. ICA orgin all found > 3 years ago with likely subsequent progression.

## 2024-03-19 NOTE — BH CONSULTATION LIAISON ASSESSMENT NOTE - RISK ASSESSMENT
no acute risk factors.  Chronic risk factors: psychiatric hx; significant medical issues with irreversible sequela which impact everyday functioning. Protective factors: female gender, age < 65 yrs; medication and treatment compliant; no recent suicide attempts; no self-injurious behavior; no hx of aggression/violence; no substance use; no legal issues; stable domicile; strong family support; access to health services. No acute risk factors identified.

## 2024-03-19 NOTE — BH CONSULTATION LIAISON ASSESSMENT NOTE - CURRENT MEDICATION
MEDICATIONS  (STANDING):  ascorbic acid 500 milliGRAM(s) Oral daily  atorvastatin 80 milliGRAM(s) Oral at bedtime  cephalexin 500 milliGRAM(s) Oral every 12 hours  clonazePAM  Tablet 1 milliGRAM(s) Oral at bedtime  clopidogrel Tablet 75 milliGRAM(s) Oral daily  dextrose 5%. 1000 milliLiter(s) (50 mL/Hr) IV Continuous <Continuous>  dextrose 5%. 1000 milliLiter(s) (100 mL/Hr) IV Continuous <Continuous>  dextrose 50% Injectable 25 Gram(s) IV Push once  dextrose 50% Injectable 12.5 Gram(s) IV Push once  dextrose 50% Injectable 25 Gram(s) IV Push once  enoxaparin Injectable 40 milliGRAM(s) SubCutaneous every 24 hours  famotidine    Tablet 20 milliGRAM(s) Oral daily  folic acid 1 milliGRAM(s) Oral daily  furosemide    Tablet 20 milliGRAM(s) Oral daily  glucagon  Injectable 1 milliGRAM(s) IntraMuscular once  insulin glargine Injectable (LANTUS) 10 Unit(s) SubCutaneous at bedtime  insulin lispro (ADMELOG) corrective regimen sliding scale   SubCutaneous three times a day before meals  insulin lispro (ADMELOG) corrective regimen sliding scale   SubCutaneous at bedtime  metoprolol succinate ER 50 milliGRAM(s) Oral daily  nystatin Powder 1 Application(s) Topical two times a day  QUEtiapine 400 milliGRAM(s) Oral at bedtime  risperiDONE   Tablet 2 milliGRAM(s) Oral at bedtime    MEDICATIONS  (PRN):  dextrose Oral Gel 15 Gram(s) Oral once PRN Blood Glucose LESS THAN 70 milliGRAM(s)/deciliter  ketorolac   Injectable 15 milliGRAM(s) IV Push every 6 hours PRN Moderate Pain (4 - 6)  oxycodone    5 mG/acetaminophen 325 mG 1 Tablet(s) Oral every 6 hours PRN Severe Pain (7 - 10)  zolpidem 5 milliGRAM(s) Oral at bedtime PRN Insomnia  zolpidem 5 milliGRAM(s) Oral at bedtime PRN Insomnia

## 2024-03-19 NOTE — PROGRESS NOTE ADULT - SUBJECTIVE AND OBJECTIVE BOX
63 year old female with PMHx of HTN, HLD, IDDM2, anxiety/depression, schizophrenia, and hx of CVA (with residual dysarthria and R. hemiparesis) who presented to the ED due to change in mental state. Pt noted to have b/l foot wounds and is being admitted for further work up. Pt also being admitted for UTI and metabolic encephalopathy. She is lying in bed in NAD.    MEDICATIONS  (STANDING):  ascorbic acid 500 milliGRAM(s) Oral daily  atorvastatin 80 milliGRAM(s) Oral at bedtime  cephalexin 500 milliGRAM(s) Oral every 12 hours  clonazePAM  Tablet 1 milliGRAM(s) Oral at bedtime  clopidogrel Tablet 75 milliGRAM(s) Oral daily  dextrose 5%. 1000 milliLiter(s) (50 mL/Hr) IV Continuous <Continuous>  dextrose 5%. 1000 milliLiter(s) (100 mL/Hr) IV Continuous <Continuous>  dextrose 50% Injectable 25 Gram(s) IV Push once  dextrose 50% Injectable 12.5 Gram(s) IV Push once  dextrose 50% Injectable 25 Gram(s) IV Push once  enoxaparin Injectable 40 milliGRAM(s) SubCutaneous every 24 hours  famotidine    Tablet 20 milliGRAM(s) Oral daily  folic acid 1 milliGRAM(s) Oral daily  furosemide    Tablet 20 milliGRAM(s) Oral daily  glucagon  Injectable 1 milliGRAM(s) IntraMuscular once  insulin glargine Injectable (LANTUS) 10 Unit(s) SubCutaneous at bedtime  insulin lispro (ADMELOG) corrective regimen sliding scale   SubCutaneous three times a day before meals  insulin lispro (ADMELOG) corrective regimen sliding scale   SubCutaneous at bedtime  metoprolol succinate ER 50 milliGRAM(s) Oral daily  nystatin Powder 1 Application(s) Topical two times a day  QUEtiapine 400 milliGRAM(s) Oral at bedtime  risperiDONE   Tablet 2 milliGRAM(s) Oral at bedtime    MEDICATIONS  (PRN):  dextrose Oral Gel 15 Gram(s) Oral once PRN Blood Glucose LESS THAN 70 milliGRAM(s)/deciliter  ketorolac   Injectable 15 milliGRAM(s) IV Push every 6 hours PRN Moderate Pain (4 - 6)  oxycodone    5 mG/acetaminophen 325 mG 1 Tablet(s) Oral every 6 hours PRN Severe Pain (7 - 10)  zolpidem 5 milliGRAM(s) Oral at bedtime PRN Insomnia  zolpidem 5 milliGRAM(s) Oral at bedtime PRN Insomnia      Allergies    erythromycin (Unknown)    Intolerances        Vital Signs Last 24 Hrs  T(C): 36.9 (19 Mar 2024 10:59), Max: 37.3 (19 Mar 2024 00:05)  T(F): 98.4 (19 Mar 2024 10:59), Max: 99.1 (19 Mar 2024 00:05)  HR: 100 (19 Mar 2024 10:59) (100 - 107)  BP: 114/81 (19 Mar 2024 10:59) (114/81 - 128/57)  BP(mean): --  RR: 18 (19 Mar 2024 10:59) (18 - 18)  SpO2: 93% (19 Mar 2024 10:59) (93% - 93%)    Parameters below as of 19 Mar 2024 10:59  Patient On (Oxygen Delivery Method): room air        PHYSICAL EXAM:  GENERAL: NAD   HEAD:  Atraumatic, Normocephalic  EYES: EOMI, PERRLA,   NECK: Supple   NERVOUS SYSTEM:  Alert & confused  CHEST/LUNG: Clear to auscultation bilaterally; No rales, rhonchi, wheezing, or rubs  HEART: Regular rate and rhythm; No murmurs, rubs, or gallops  ABDOMEN: Soft, Nontender, Nondistended; Bowel sounds present  EXTREMITIES:  bilateral feet wounds wrapped  LYMPH: No lymphadenopathy noted  SKIN: rash on truck, face and abd    LABS:    POCT Blood Glucose.: 285 mg/dL (19 Mar 2024 16:40)  POCT Blood Glucose.: 225 mg/dL (19 Mar 2024 11:20)  POCT Blood Glucose.: 192 mg/dL (19 Mar 2024 07:51)  POCT Blood Glucose.: 177 mg/dL (18 Mar 2024 21:45)      Culture - Urine (collected 15 Mar 2024 16:34)  Source: Clean Catch Clean Catch (Midstream)  Final Report (18 Mar 2024 09:40):    >100,000 CFU/ml Escherichia coli  Organism: Escherichia coli (18 Mar 2024 09:40)  Organism: Escherichia coli (18 Mar 2024 09:40)    Culture - Blood (collected 15 Mar 2024 13:19)  Source: .Blood Blood-Peripheral  Preliminary Report (19 Mar 2024 18:01):    No growth at 4 days    Culture - Blood (collected 15 Mar 2024 13:09)  Source: .Blood Blood-Peripheral  Preliminary Report (18 Mar 2024 19:01):    No growth at 72 Hours      RADIOLOGY & ADDITIONAL TESTS:    03-18-24 @ 07:01  -  03-19-24 @ 07:00  --------------------------------------------------------  IN:  Total IN: 0 mL    OUT:    Voided (mL): 400 mL  Total OUT: 400 mL    Total NET: -400 mL      03-19-24 @ 07:01  -  03-19-24 @ 18:32  --------------------------------------------------------  IN:    Oral Fluid: 120 mL  Total IN: 120 mL    OUT:  Total OUT: 0 mL    Total NET: 120 mL

## 2024-03-19 NOTE — BH CONSULTATION LIAISON ASSESSMENT NOTE - NSBHCHARTREVIEWVS_PSY_A_CORE FT
Vital Signs Last 24 Hrs  T(C): 36.9 (19 Mar 2024 10:59), Max: 37.3 (19 Mar 2024 00:05)  T(F): 98.4 (19 Mar 2024 10:59), Max: 99.1 (19 Mar 2024 00:05)  HR: 100 (19 Mar 2024 10:59) (100 - 107)  BP: 114/81 (19 Mar 2024 10:59) (114/81 - 140/70)  BP(mean): --  RR: 18 (19 Mar 2024 10:59) (18 - 18)  SpO2: 93% (19 Mar 2024 10:59) (93% - 96%)    Parameters below as of 19 Mar 2024 10:59  Patient On (Oxygen Delivery Method): room air

## 2024-03-19 NOTE — CONSULT NOTE ADULT - ASSESSMENT
63F with HTN, HLD, DM2, schizophrenia, hx of CVA initially p.w AMS, found to have worsening PAD with L foot ulcer with plans for surgical intervention.        pt is comfortable appearing, resting, laying flat, no resp distress.   pt refusing to answer any questions refused exam.   tte from 11/23 which revealed LV EF 40% with segmental dysfunction severe TR, mod AI and mod pHTN.   EKG here showing non specific st changes with lateral TWI.   pt with several risk factors for cad, unable to assess functional capacity  unclear if pt has capacity to answer any questions, will defer to primary team   unable to assess for cp, functional capacity  would check nuclear stress test to eval for underlying ischemia prior to proceeding to OR  if procedure is urgent then no cardiac testing is indicated and pt is at least intermediate to high risk  further comments to follow results of ischemic eval

## 2024-03-19 NOTE — BH CONSULTATION LIAISON ASSESSMENT NOTE - NSBHCHARTREVIEWLAB_PSY_A_CORE FT
No          10.7   6.85  )-----------( 242      ( 15 Mar 2024 12:50 )             33.6     03-15    143  |  107  |  24<H>  ----------------------------<  266<H>  3.8   |  26  |  1.02    Ca    8.7      15 Mar 2024 12:50    TPro  5.8<L>  /  Alb  2.5<L>  /  TBili  0.3  /  DBili  x   /  AST  20  /  ALT  31  /  AlkPhos  98  03-15    PT/INR - ( 15 Mar 2024 12:50 )   PT: 10.2 sec;   INR: 0.85 ratio         PTT - ( 15 Mar 2024 12:50 )  PTT:33.8 sec  Urinalysis Basic - ( 15 Mar 2024 16:34 )    Color: Yellow / Appearance: Cloudy / S.010 / pH: x  Gluc: x / Ketone: Negative mg/dL  / Bili: Negative / Urobili: 0.2 mg/dL   Blood: x / Protein: 300 mg/dL / Nitrite: Negative   Leuk Esterase: Moderate / RBC: 25 /HPF / WBC 50 /HPF   Sq Epi: x / Non Sq Epi: x / Bacteria: Too Numerous to count /HPF

## 2024-03-20 ENCOUNTER — RESULT REVIEW (OUTPATIENT)
Age: 64
End: 2024-03-20

## 2024-03-20 LAB
ANION GAP SERPL CALC-SCNC: 7 MMOL/L — SIGNIFICANT CHANGE UP (ref 5–17)
BUN SERPL-MCNC: 20 MG/DL — SIGNIFICANT CHANGE UP (ref 7–23)
CALCIUM SERPL-MCNC: 8.3 MG/DL — LOW (ref 8.5–10.1)
CHLORIDE SERPL-SCNC: 110 MMOL/L — HIGH (ref 96–108)
CO2 SERPL-SCNC: 25 MMOL/L — SIGNIFICANT CHANGE UP (ref 22–31)
CREAT SERPL-MCNC: 0.78 MG/DL — SIGNIFICANT CHANGE UP (ref 0.5–1.3)
CULTURE RESULTS: SIGNIFICANT CHANGE UP
CULTURE RESULTS: SIGNIFICANT CHANGE UP
EGFR: 85 ML/MIN/1.73M2 — SIGNIFICANT CHANGE UP
GLUCOSE BLDC GLUCOMTR-MCNC: 186 MG/DL — HIGH (ref 70–99)
GLUCOSE BLDC GLUCOMTR-MCNC: 218 MG/DL — HIGH (ref 70–99)
GLUCOSE BLDC GLUCOMTR-MCNC: 228 MG/DL — HIGH (ref 70–99)
GLUCOSE BLDC GLUCOMTR-MCNC: 249 MG/DL — HIGH (ref 70–99)
GLUCOSE SERPL-MCNC: 203 MG/DL — HIGH (ref 70–99)
HCT VFR BLD CALC: 29.1 % — LOW (ref 34.5–45)
HGB BLD-MCNC: 9.4 G/DL — LOW (ref 11.5–15.5)
MAGNESIUM SERPL-MCNC: 1.6 MG/DL — SIGNIFICANT CHANGE UP (ref 1.6–2.6)
MCHC RBC-ENTMCNC: 31.3 PG — SIGNIFICANT CHANGE UP (ref 27–34)
MCHC RBC-ENTMCNC: 32.3 G/DL — SIGNIFICANT CHANGE UP (ref 32–36)
MCV RBC AUTO: 97 FL — SIGNIFICANT CHANGE UP (ref 80–100)
NRBC # BLD: 0 /100 WBCS — SIGNIFICANT CHANGE UP (ref 0–0)
PHOSPHATE SERPL-MCNC: 3.8 MG/DL — SIGNIFICANT CHANGE UP (ref 2.5–4.5)
PLATELET # BLD AUTO: 194 K/UL — SIGNIFICANT CHANGE UP (ref 150–400)
POTASSIUM SERPL-MCNC: 3.5 MMOL/L — SIGNIFICANT CHANGE UP (ref 3.5–5.3)
POTASSIUM SERPL-SCNC: 3.5 MMOL/L — SIGNIFICANT CHANGE UP (ref 3.5–5.3)
RBC # BLD: 3 M/UL — LOW (ref 3.8–5.2)
RBC # FLD: 16.5 % — HIGH (ref 10.3–14.5)
SODIUM SERPL-SCNC: 142 MMOL/L — SIGNIFICANT CHANGE UP (ref 135–145)
SPECIMEN SOURCE: SIGNIFICANT CHANGE UP
SPECIMEN SOURCE: SIGNIFICANT CHANGE UP
TROPONIN I, HIGH SENSITIVITY RESULT: 55.4 NG/L — HIGH
WBC # BLD: 6.02 K/UL — SIGNIFICANT CHANGE UP (ref 3.8–10.5)
WBC # FLD AUTO: 6.02 K/UL — SIGNIFICANT CHANGE UP (ref 3.8–10.5)

## 2024-03-20 PROCEDURE — 73706 CT ANGIO LWR EXTR W/O&W/DYE: CPT | Mod: 26,50,59

## 2024-03-20 PROCEDURE — 99232 SBSQ HOSP IP/OBS MODERATE 35: CPT

## 2024-03-20 PROCEDURE — 74174 CTA ABD&PLVS W/CONTRAST: CPT | Mod: 26

## 2024-03-20 PROCEDURE — 99231 SBSQ HOSP IP/OBS SF/LOW 25: CPT

## 2024-03-20 PROCEDURE — 99233 SBSQ HOSP IP/OBS HIGH 50: CPT

## 2024-03-20 PROCEDURE — 93306 TTE W/DOPPLER COMPLETE: CPT | Mod: 26

## 2024-03-20 RX ORDER — POTASSIUM CHLORIDE 20 MEQ
40 PACKET (EA) ORAL ONCE
Refills: 0 | Status: COMPLETED | OUTPATIENT
Start: 2024-03-20 | End: 2024-03-20

## 2024-03-20 RX ORDER — REGADENOSON 0.08 MG/ML
0.4 INJECTION, SOLUTION INTRAVENOUS ONCE
Refills: 0 | Status: COMPLETED | OUTPATIENT
Start: 2024-03-20 | End: 2024-03-21

## 2024-03-20 RX ORDER — MAGNESIUM SULFATE 500 MG/ML
1 VIAL (ML) INJECTION ONCE
Refills: 0 | Status: COMPLETED | OUTPATIENT
Start: 2024-03-20 | End: 2024-03-20

## 2024-03-20 RX ADMIN — Medication 500 MILLIGRAM(S): at 05:40

## 2024-03-20 RX ADMIN — FAMOTIDINE 20 MILLIGRAM(S): 10 INJECTION INTRAVENOUS at 12:00

## 2024-03-20 RX ADMIN — Medication 1 MILLIGRAM(S): at 12:00

## 2024-03-20 RX ADMIN — ATORVASTATIN CALCIUM 80 MILLIGRAM(S): 80 TABLET, FILM COATED ORAL at 22:01

## 2024-03-20 RX ADMIN — QUETIAPINE FUMARATE 400 MILLIGRAM(S): 200 TABLET, FILM COATED ORAL at 22:05

## 2024-03-20 RX ADMIN — NYSTATIN CREAM 1 APPLICATION(S): 100000 CREAM TOPICAL at 17:16

## 2024-03-20 RX ADMIN — Medication 15 MILLIGRAM(S): at 09:54

## 2024-03-20 RX ADMIN — Medication 4: at 08:07

## 2024-03-20 RX ADMIN — Medication 100 GRAM(S): at 15:11

## 2024-03-20 RX ADMIN — Medication 2: at 11:58

## 2024-03-20 RX ADMIN — Medication 4: at 17:15

## 2024-03-20 RX ADMIN — RISPERIDONE 2 MILLIGRAM(S): 4 TABLET ORAL at 22:04

## 2024-03-20 RX ADMIN — Medication 15 MILLIGRAM(S): at 22:08

## 2024-03-20 RX ADMIN — Medication 1 MILLIGRAM(S): at 22:01

## 2024-03-20 RX ADMIN — NYSTATIN CREAM 1 APPLICATION(S): 100000 CREAM TOPICAL at 05:44

## 2024-03-20 RX ADMIN — Medication 20 MILLIGRAM(S): at 05:40

## 2024-03-20 RX ADMIN — Medication 50 MILLIGRAM(S): at 05:40

## 2024-03-20 RX ADMIN — INSULIN GLARGINE 10 UNIT(S): 100 INJECTION, SOLUTION SUBCUTANEOUS at 22:11

## 2024-03-20 RX ADMIN — ZOLPIDEM TARTRATE 5 MILLIGRAM(S): 10 TABLET ORAL at 22:08

## 2024-03-20 RX ADMIN — CLOPIDOGREL BISULFATE 75 MILLIGRAM(S): 75 TABLET, FILM COATED ORAL at 12:01

## 2024-03-20 RX ADMIN — Medication 15 MILLIGRAM(S): at 10:54

## 2024-03-20 RX ADMIN — Medication 500 MILLIGRAM(S): at 12:00

## 2024-03-20 RX ADMIN — ENOXAPARIN SODIUM 40 MILLIGRAM(S): 100 INJECTION SUBCUTANEOUS at 05:42

## 2024-03-20 RX ADMIN — Medication 40 MILLIEQUIVALENT(S): at 15:11

## 2024-03-20 NOTE — PHYSICAL THERAPY INITIAL EVALUATION ADULT - RISK REDUCTION/PREVENTION, PT EVAL
fall risk due to strength deficits in all extremities, difficulty in bed mobility and transfers, unsteady standing , fall risk./risk factors

## 2024-03-20 NOTE — PHYSICAL THERAPY INITIAL EVALUATION ADULT - ADDITIONAL COMMENTS
As per daughter prior to this admission her mother was able to participate in bed mobility with supervision, do transfers with min A of 1 from bed<>chair or wheelchair, toilet transfers with min A of 1 by HHA that stays 8 hours/day/7days a week or with family members.

## 2024-03-20 NOTE — PHYSICAL THERAPY INITIAL EVALUATION ADULT - DIAGNOSIS, PT EVAL
Detail Level: Detailed
Detail Level: Simple
Detail Level: Generalized
Pt presents with strength deficits in the UE and LE, difficulty in bed mobility and transfers (*as per pt's daughter the patient is non ambulatory prior but able to participate doing transfers bed<>chair , toilet transfers)r

## 2024-03-20 NOTE — PHYSICAL THERAPY INITIAL EVALUATION ADULT - CRITERIA FOR SKILLED THERAPEUTIC INTERVENTIONS
Decreased strength, endurance, difficulty in bed mobility and transfers  (as per daughter pt was non ambulatory but able to participate in bed mobility and transfers) bed to chair, and toilet transfers/impairments found/functional limitations in following categories/risk reduction/prevention/anticipated discharge recommendation

## 2024-03-20 NOTE — PROGRESS NOTE ADULT - ASSESSMENT
63F with HTN, HLD, DM2, schizophrenia, hx of CVA initially p.w AMS, found to have worsening PAD with L foot ulcer with plans for surgical intervention.    pt is comfortable appearing, resting, laying flat, no resp distress.   tte from 11/23 which revealed LV EF 40% with segmental dysfunction severe TR, mod AI and mod pHTN.   EKG here showing non specific st changes with lateral TWI.   pt with several risk factors for cad, unable to assess functional capacity  would check nuclear stress test to eval for underlying ischemia prior to proceeding to OR  Pt's daughter Susie has been making decisions on behalf of pt, discussed with her and agrees with proceeding with NST ()  if procedure is urgent then no cardiac testing is indicated

## 2024-03-20 NOTE — PHYSICAL THERAPY INITIAL EVALUATION ADULT - NSPTDISCHREC_GEN_A_CORE
for strengthening ex., improve endurance and be able to partiicapate in bed mobility and especially in safe transfers at home from bed to wheelchair or chair and toilet transfers/Home PT immune

## 2024-03-20 NOTE — PHYSICAL THERAPY INITIAL EVALUATION ADULT - PRECAUTIONS/LIMITATIONS, REHAB EVAL
fall risk, presents strength deficits in all extremities, difficulty in bed mobilty, transfers and ambulation/fall precautions

## 2024-03-20 NOTE — PHYSICAL THERAPY INITIAL EVALUATION ADULT - STRENGTHENING, PT EVAL
Improve strength in all extremities by 1/2 to 1 grade and be able to participate in bed mobility and stand pivot transfers safely , prevent falls.

## 2024-03-20 NOTE — PHYSICAL THERAPY INITIAL EVALUATION ADULT - LIVES WITH, PROFILE
details… As per mc Richards pt lives with her and her spouse in a pvt house, has 3 steps with no rails to enter the house, once inside the house she stays on the main floor with all amenities./spouse

## 2024-03-20 NOTE — PHYSICAL THERAPY INITIAL EVALUATION ADULT - FOLLOWS COMMANDS/ANSWERS QUESTIONS, REHAB EVAL
with constant verbal, visual and tactie cues/75% of the time/50% of the time/able to follow single-step instructions

## 2024-03-20 NOTE — PROGRESS NOTE ADULT - SUBJECTIVE AND OBJECTIVE BOX
Patient: DONTE PETERSON [id number] 63y Female                            Hospitalist Attending Note    No complaints.  No pain.    ____________________PHYSICAL EXAM:  GENERAL:  NAD  HEENT: NCAT  CARDIOVASCULAR:  S1, S2  LUNGS: CTAB  ABDOMEN:  soft, (-) tenderness, (-) distension, (+) bowel sounds, (-) guarding, (-) rebound (-) rigidity  EXTREMITIES:  b/l food dressing in place.    ____________________         VITALS:  Vital Signs Last 24 Hrs  T(C): 36.7 (20 Mar 2024 12:04), Max: 37.4 (19 Mar 2024 23:41)  T(F): 98.1 (20 Mar 2024 12:04), Max: 99.3 (19 Mar 2024 23:41)  HR: 89 (20 Mar 2024 12:04) (89 - 109)  BP: 138/75 (20 Mar 2024 12:04) (108/58 - 143/71)  BP(mean): --  RR: 18 (20 Mar 2024 12:04) (16 - 18)  SpO2: 97% (20 Mar 2024 12:04) (92% - 97%)    Parameters below as of 20 Mar 2024 12:04  Patient On (Oxygen Delivery Method): room air     Daily     Daily   CAPILLARY BLOOD GLUCOSE      POCT Blood Glucose.: 186 mg/dL (20 Mar 2024 11:07)  POCT Blood Glucose.: 218 mg/dL (20 Mar 2024 07:44)  POCT Blood Glucose.: 221 mg/dL (19 Mar 2024 21:43)  POCT Blood Glucose.: 285 mg/dL (19 Mar 2024 16:40)    I&O's Summary    19 Mar 2024 07:01  -  20 Mar 2024 07:00  --------------------------------------------------------  IN: 120 mL / OUT: 450 mL / NET: -330 mL    20 Mar 2024 07:01  -  20 Mar 2024 15:42  --------------------------------------------------------  IN: 120 mL / OUT: 0 mL / NET: 120 mL        HISTORY:  PAST MEDICAL & SURGICAL HISTORY:  Anxiety      Hypertension      Hypercholesteremia      Insulin dependent type 2 diabetes mellitus      Schizophrenia      History of CVA with residual deficit      H/O tubal ligation      H/O hemorrhoidectomy      H/O  section      S/P percutaneous endoscopic gastrostomy (PEG) tube placement      Allergies    erythromycin (Unknown)    Intolerances       LABS:                        9.4    6.02  )-----------( 194      ( 20 Mar 2024 07:50 )             29.1     03-20    142  |  110<H>  |  20  ----------------------------<  203<H>  3.5   |  25  |  0.78    Ca    8.3<L>      20 Mar 2024 07:50  Phos  3.8     03-20  Mg     1.6     03-20          Urinalysis Basic - ( 20 Mar 2024 07:50 )    Color: x / Appearance: x / SG: x / pH: x  Gluc: 203 mg/dL / Ketone: x  / Bili: x / Urobili: x   Blood: x / Protein: x / Nitrite: x   Leuk Esterase: x / RBC: x / WBC x   Sq Epi: x / Non Sq Epi: x / Bacteria: x              MEDICATIONS:  MEDICATIONS  (STANDING):  ascorbic acid 500 milliGRAM(s) Oral daily  atorvastatin 80 milliGRAM(s) Oral at bedtime  clonazePAM  Tablet 1 milliGRAM(s) Oral at bedtime  clopidogrel Tablet 75 milliGRAM(s) Oral daily  dextrose 5%. 1000 milliLiter(s) (50 mL/Hr) IV Continuous <Continuous>  dextrose 5%. 1000 milliLiter(s) (100 mL/Hr) IV Continuous <Continuous>  dextrose 50% Injectable 25 Gram(s) IV Push once  dextrose 50% Injectable 12.5 Gram(s) IV Push once  dextrose 50% Injectable 25 Gram(s) IV Push once  enoxaparin Injectable 40 milliGRAM(s) SubCutaneous every 24 hours  famotidine    Tablet 20 milliGRAM(s) Oral daily  folic acid 1 milliGRAM(s) Oral daily  furosemide    Tablet 20 milliGRAM(s) Oral daily  glucagon  Injectable 1 milliGRAM(s) IntraMuscular once  insulin glargine Injectable (LANTUS) 10 Unit(s) SubCutaneous at bedtime  insulin lispro (ADMELOG) corrective regimen sliding scale   SubCutaneous three times a day before meals  insulin lispro (ADMELOG) corrective regimen sliding scale   SubCutaneous at bedtime  metoprolol succinate ER 50 milliGRAM(s) Oral daily  nystatin Powder 1 Application(s) Topical two times a day  QUEtiapine 400 milliGRAM(s) Oral at bedtime  regadenoson Injectable 0.4 milliGRAM(s) IV Push once  risperiDONE   Tablet 2 milliGRAM(s) Oral at bedtime    MEDICATIONS  (PRN):  dextrose Oral Gel 15 Gram(s) Oral once PRN Blood Glucose LESS THAN 70 milliGRAM(s)/deciliter  ketorolac   Injectable 15 milliGRAM(s) IV Push every 6 hours PRN Moderate Pain (4 - 6)  oxycodone    5 mG/acetaminophen 325 mG 1 Tablet(s) Oral every 6 hours PRN Severe Pain (7 - 10)  zolpidem 5 milliGRAM(s) Oral at bedtime PRN Insomnia  zolpidem 5 milliGRAM(s) Oral at bedtime PRN Insomnia

## 2024-03-20 NOTE — PHYSICAL THERAPY INITIAL EVALUATION ADULT - IMPAIRMENTS FOUND, PT EVAL
aerobic capacity/endurance/arousal, attention, and cognition/ergonomics and body mechanics/fine motor/gross motor/joint integrity and mobility/muscle strength/neuromotor development and sensory integration/poor safety awareness/posture/reflex integrity

## 2024-03-20 NOTE — PHYSICAL THERAPY INITIAL EVALUATION ADULT - PERTINENT HX OF CURRENT PROBLEM, REHAB EVAL
Pt is admitted with dx Gangre, UTI,  Pmhx: multiple strokes, Schizophrenia, IDDM, Anxiety, Neurocognitive deficits.

## 2024-03-20 NOTE — PROGRESS NOTE ADULT - SUBJECTIVE AND OBJECTIVE BOX
Patient is a 63y old  Female who presents with a chief complaint of PVD, Heel wounds, UTI (19 Mar 2024 18:31)       INTERVAL HPI/OVERNIGHT EVENTS:  Patient seen and evaluated at bedside.  Pt is resting comfortable in NAD. Denies N/V/F/C.    Allergies    erythromycin (Unknown)    Intolerances        Vital Signs Last 24 Hrs  T(C): 36.6 (20 Mar 2024 05:25), Max: 37.4 (19 Mar 2024 23:41)  T(F): 97.8 (20 Mar 2024 05:25), Max: 99.3 (19 Mar 2024 23:41)  HR: 96 (20 Mar 2024 05:25) (96 - 109)  BP: 136/61 (20 Mar 2024 05:25) (108/58 - 136/61)  BP(mean): --  RR: 16 (20 Mar 2024 05:25) (16 - 18)  SpO2: 92% (20 Mar 2024 05:25) (92% - 93%)    Parameters below as of 20 Mar 2024 05:25  Patient On (Oxygen Delivery Method): room air        LABS:              CAPILLARY BLOOD GLUCOSE      POCT Blood Glucose.: 218 mg/dL (20 Mar 2024 07:44)  POCT Blood Glucose.: 221 mg/dL (19 Mar 2024 21:43)  POCT Blood Glucose.: 285 mg/dL (19 Mar 2024 16:40)  POCT Blood Glucose.: 225 mg/dL (19 Mar 2024 11:20)      Lower Extremity Physical Exam:  Vascular: DP/PT 0/4, B/L, CFT <3seconds B/L, Temperature gradient warm to cool, B/L.   Neuro: Unable to assess  Musculoskeletal/Ortho: unremarkable  Skin: Right foot posterior lateral heel well adhered eschar, no drainage, no bogginess, no fluctuance, no malodor, no tracking or tunneling, no acute signs of infection. Left foot hallux distal tuft and medial 2nd digit medial to plantar dry gangrene with early ischemic changes, no drainage, no bogginess, no fluctuance, no malodor, no tracking or tunneling, no acute signs of infection.    RADIOLOGY & ADDITIONAL TESTS:

## 2024-03-20 NOTE — PHYSICAL THERAPY INITIAL EVALUATION ADULT - PATIENT/FAMILY/SIGNIFICANT OTHER GOALS STATEMENT, PT EVAL
Pt's daughter states "I would like to see my mom gets stronger so she can participate doine transfers safely."

## 2024-03-20 NOTE — PHYSICAL THERAPY INITIAL EVALUATION ADULT - LEVEL OF INDEPENDENCE: STAIR NEGOTIATION, REHAB EVAL
(baseline as per dtr - needed to be carried on the steps at home on a wheel chair with 2-3 person assitst/unable to perform

## 2024-03-20 NOTE — PROGRESS NOTE ADULT - ASSESSMENT
63 year old female with history of HTN, HLD, IDDM2, anxiety/depression, schizophrenia, and hx of CVA (with residual dysarthria and R. hemiparesis) who presented to the ED due to change in mental state. Pt noted to have b/l foot wounds and is being admitted for further work up. Pt also being admitted for UTI and metabolic encephalopathy.    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 w/ severe PVD  - appreciate Podiatry consult - planning on local wound care vs left foot partial 2nd ray resection pending vasc recs  - ABIs couldn't be obtained and US showed extensive bilateral plaque with diffuse low velocity monophasic flow throughout both legs w/ focally elevated velocities in the right popliteal and right mid posterior tibial arteries suggestive of stenoses  - will get CTA aorta with b/l LE runoff as per vascular  - as per cardio, patient will need a nuclear stress test to eval for underlying ischemia prior to proceeding to OR unless the procedure becomes urgent   - pain control    UTI   - changed Rocephin to keflex   - urine culture grew E. coli    metabolic encephalopathy  - may be due to UTI  - CT head showed chronic bilateral basal ganglia infarctions and a chronic left corona radiata infarction w/ mild to moderate chronic microvascular changes without evidence of an acute transcortical infarction or hemorrhage  - improved, patient likely at baseline  - as per psych, patient likely never had Schizophrenia and likely has a Mood Disorder/Anxiety spectrum symptoms with or without soft psychotic sxs w/ significant cerebral Vascular Disease, multiple CVAs and Neurocognitive deficits   - as of today, the patient does not appear to have decision making capacity in regards to surgery       Severe Truncal rash  - unclear etiology ? contact dermatitis, fungal etiology or exanthem     HTN  - c/w Toprol XL    HLD  - c/w atorvastatin    DM2  - c/w Lantus and SSI  - Hgb A1C is 8.8    Hx of Schizophrenia  - c/w Klonopin, risperidone & quetiapine  - as per psych, patient likely never had Schizophrenia and likely has a Mood Disorder/Anxiety spectrum symptoms with or without soft psychotic sxs w/ significant cerebral Vascular Disease, multiple CVAs and Neurocognitive deficits   - as of today, the patient does not appear to have decision making capacity in regards to surgery     Hx of CVA (with residual dysarthria and R. hemiparesis)  - c/w clopidogrel & atorvastatin     Prophylaxis:  DVT: Lovenox  GI: Pepcid

## 2024-03-20 NOTE — PHYSICAL THERAPY INITIAL EVALUATION ADULT - GENERAL OBSERVATIONS, REHAB EVAL
Pt is awake, needs encouragement to participate, not in distress, denies pain at rest, c/o mild pain in the feet with activities, no pain at rest.

## 2024-03-20 NOTE — PHYSICAL THERAPY INITIAL EVALUATION ADULT - LEVEL OF INDEPENDENCE: GAIT, REHAB EVAL
pt unable to propel , only was able to do stand pivot transfers (her baseline as per daughter Susie)

## 2024-03-20 NOTE — PROGRESS NOTE ADULT - SUBJECTIVE AND OBJECTIVE BOX
Patient is a 63y old  Female who presents with PVD, Heel wounds, UTI (20 Mar 2024 08:04)    PAST MEDICAL & SURGICAL HISTORY:  Anxiety    Hypertension    Hypercholesteremia    Insulin dependent type 2 diabetes mellitus    Schizophrenia    History of CVA with residual deficit    H/O tubal ligation    H/O hemorrhoidectomy    H/O  section    S/P percutaneous endoscopic gastrostomy (PEG) tube placement    INTERVAL HISTORY:  	  MEDICATIONS:  MEDICATIONS  (STANDING):  ascorbic acid 500 milliGRAM(s) Oral daily  atorvastatin 80 milliGRAM(s) Oral at bedtime  clonazePAM  Tablet 1 milliGRAM(s) Oral at bedtime  clopidogrel Tablet 75 milliGRAM(s) Oral daily  enoxaparin Injectable 40 milliGRAM(s) SubCutaneous every 24 hours  famotidine    Tablet 20 milliGRAM(s) Oral daily  folic acid 1 milliGRAM(s) Oral daily  furosemide    Tablet 20 milliGRAM(s) Oral daily  insulin glargine Injectable (LANTUS) 10 Unit(s) SubCutaneous at bedtime  insulin lispro (ADMELOG) corrective regimen sliding scale   SubCutaneous three times a day before meals  insulin lispro (ADMELOG) corrective regimen sliding scale   SubCutaneous at bedtime  metoprolol succinate ER 50 milliGRAM(s) Oral daily  nystatin Powder 1 Application(s) Topical two times a day  QUEtiapine 400 milliGRAM(s) Oral at bedtime  risperiDONE   Tablet 2 milliGRAM(s) Oral at bedtime    MEDICATIONS  (PRN):  dextrose Oral Gel 15 Gram(s) Oral once PRN Blood Glucose LESS THAN 70 milliGRAM(s)/deciliter  ketorolac   Injectable 15 milliGRAM(s) IV Push every 6 hours PRN Moderate Pain (4 - 6)  oxycodone    5 mG/acetaminophen 325 mG 1 Tablet(s) Oral every 6 hours PRN Severe Pain (7 - 10)  zolpidem 5 milliGRAM(s) Oral at bedtime PRN Insomnia  zolpidem 5 milliGRAM(s) Oral at bedtime PRN Insomnia    Vitals:  T(F): 98.1 (24 @ 12:04), Max: 99.3 (24 @ 23:41)  HR: 89 (24 @ 12:04) (89 - 109)  BP: 138/75 (24 @ 12:04) (108/58 - 143/71)  RR: 18 (24 @ 12:04) (16 - 18)  SpO2: 97% (24 @ 12:04) (92% - 97%)     @ 07:01  -   @ 07:00  --------------------------------------------------------  IN:    Oral Fluid: 120 mL  Total IN: 120 mL    OUT:    Voided (mL): 450 mL  Total OUT: 450 mL    Total NET: -330 mL     @ 07:01  -   @ 14:06  --------------------------------------------------------  IN:    Oral Fluid: 120 mL  Total IN: 120 mL    OUT:  Total OUT: 0 mL    Total NET: 120 mL    PHYSICAL EXAM:  Neuro: Awake, responsive  CV: S1 S2 RRR + SM  Lungs: CTABL  GI: Soft, BS +, ND, NT  Extremities: trace LE edema, dressing both feet intact     RADIOLOGY:  < from: Xray Chest 1 View- PORTABLE-Urgent (03.15.24 @ 13:09) >  IMPRESSION: No acute finding or change.    < end of copied text >    DIAGNOSTIC TESTING:    [x ] Echocardiogram:   < from: TTE Echo Complete w/ Contrast w/ Doppler (23 @ 08:30) >  Left Ventricle: The left ventricular internal cavity size is normal.  Global LV systolic function was mildly to moderately decreased. Left   ventricular ejection fraction, by visual estimation, is 40 to 45%.   Spectral Doppler shows impaired relaxation pattern of left ventricular   myocardial filling (Grade I diastolicdysfunction).      LV Wall Scoring:  The basal and mid anterior septum and basal inferoseptal segment are   akinetic.  The entire lateral wall, entire apex, entire anterior wall, entire   inferior  wall, and mid and apical inferior septum are hypokinetic.    Right Ventricle: The right ventricle was not well visualized.  Left Atrium: Mildly enlarged left atrium.  Right Atrium: The right atrium was not well visualized.  Pericardium: Trivial pericardial effusion is present.  Mitral Valve: Mild thickening of the anterior and posterior mitral valve   leaflets. There is mild mitral annular calcification. Trace mitral valve   regurgitation is seen.  Tricuspid Valve: Moderate-severe tricuspid regurgitation is visualized.   Estimated pulmonary artery systolic pressure is 55.1 mmHg assuming a   right atrial pressure of 3 mmHg, which is consistent with moderate   pulmonary hypertension.  Aortic Valve: The aortic valve is trileaflet. Sclerotic aortic valve with   normal opening. Mild to moderate aortic valve regurgitation is seen.  Pulmonic Valve: The pulmonic valve was not well visualized. Trace   pulmonic valve regurgitation.  Aorta: Aortic root measured at Sinus of Valsalva is normal.  Pulmonary Artery: The pulmonary artery is not well seen.    Summary:   1. Left ventricular ejection fraction, by visual estimation, is 40 to   45%.   2. Mildly to moderately decreased global left ventricular systolic   function.   3. Multiple left ventricular regional wall motion abnormalities exist.   See wall motion findings.   4. Normal left ventricular internal cavity size.   5. Spectral Doppler shows impaired relaxation pattern of left   ventricular myocardial filling (Grade I diastolic dysfunction).   6. Mildly enlarged left atrium.   7. Moderate-severe tricuspid regurgitation.   8. Mild to moderate aortic regurgitation.   9. Sclerotic aortic valve with normal opening.  10. Estimated pulmonary artery systolic pressure is 55.1 mmHg assuming a   right atrial pressure of 3 mmHg, which is consistent with moderate   pulmonary hypertension.    < end of copied text >    LABS:	 	    20 Mar 2024 07:50    142    |  110    |  20     ----------------------------<  203    3.5     |  25     |  0.78     Ca    8.3        20 Mar 2024 07:50  Phos  3.8       20 Mar 2024 07:50  Mg     1.6       20 Mar 2024 07:50                        9.4    6.02  )-----------( 194      ( 20 Mar 2024 07:50 )             29.1

## 2024-03-21 LAB
ANION GAP SERPL CALC-SCNC: 9 MMOL/L — SIGNIFICANT CHANGE UP (ref 5–17)
BUN SERPL-MCNC: 22 MG/DL — SIGNIFICANT CHANGE UP (ref 7–23)
CALCIUM SERPL-MCNC: 8.4 MG/DL — LOW (ref 8.5–10.1)
CHLORIDE SERPL-SCNC: 108 MMOL/L — SIGNIFICANT CHANGE UP (ref 96–108)
CO2 SERPL-SCNC: 26 MMOL/L — SIGNIFICANT CHANGE UP (ref 22–31)
CREAT SERPL-MCNC: 1.32 MG/DL — HIGH (ref 0.5–1.3)
EGFR: 45 ML/MIN/1.73M2 — LOW
GLUCOSE BLDC GLUCOMTR-MCNC: 177 MG/DL — HIGH (ref 70–99)
GLUCOSE BLDC GLUCOMTR-MCNC: 180 MG/DL — HIGH (ref 70–99)
GLUCOSE BLDC GLUCOMTR-MCNC: 235 MG/DL — HIGH (ref 70–99)
GLUCOSE BLDC GLUCOMTR-MCNC: 320 MG/DL — HIGH (ref 70–99)
GLUCOSE SERPL-MCNC: 290 MG/DL — HIGH (ref 70–99)
HCT VFR BLD CALC: 29.5 % — LOW (ref 34.5–45)
HGB BLD-MCNC: 9.3 G/DL — LOW (ref 11.5–15.5)
MAGNESIUM SERPL-MCNC: 2.1 MG/DL — SIGNIFICANT CHANGE UP (ref 1.6–2.6)
MCHC RBC-ENTMCNC: 31.2 PG — SIGNIFICANT CHANGE UP (ref 27–34)
MCHC RBC-ENTMCNC: 31.5 G/DL — LOW (ref 32–36)
MCV RBC AUTO: 99 FL — SIGNIFICANT CHANGE UP (ref 80–100)
NRBC # BLD: 0 /100 WBCS — SIGNIFICANT CHANGE UP (ref 0–0)
PLATELET # BLD AUTO: 203 K/UL — SIGNIFICANT CHANGE UP (ref 150–400)
POTASSIUM SERPL-MCNC: 4 MMOL/L — SIGNIFICANT CHANGE UP (ref 3.5–5.3)
POTASSIUM SERPL-SCNC: 4 MMOL/L — SIGNIFICANT CHANGE UP (ref 3.5–5.3)
RBC # BLD: 2.98 M/UL — LOW (ref 3.8–5.2)
RBC # FLD: 16.8 % — HIGH (ref 10.3–14.5)
SODIUM SERPL-SCNC: 143 MMOL/L — SIGNIFICANT CHANGE UP (ref 135–145)
TROPONIN I, HIGH SENSITIVITY RESULT: 68.5 NG/L — HIGH
WBC # BLD: 6.69 K/UL — SIGNIFICANT CHANGE UP (ref 3.8–10.5)
WBC # FLD AUTO: 6.69 K/UL — SIGNIFICANT CHANGE UP (ref 3.8–10.5)

## 2024-03-21 PROCEDURE — 99233 SBSQ HOSP IP/OBS HIGH 50: CPT

## 2024-03-21 PROCEDURE — 78452 HT MUSCLE IMAGE SPECT MULT: CPT | Mod: 26

## 2024-03-21 PROCEDURE — 99232 SBSQ HOSP IP/OBS MODERATE 35: CPT

## 2024-03-21 PROCEDURE — 99231 SBSQ HOSP IP/OBS SF/LOW 25: CPT

## 2024-03-21 RX ADMIN — NYSTATIN CREAM 1 APPLICATION(S): 100000 CREAM TOPICAL at 17:10

## 2024-03-21 RX ADMIN — ATORVASTATIN CALCIUM 80 MILLIGRAM(S): 80 TABLET, FILM COATED ORAL at 21:46

## 2024-03-21 RX ADMIN — NYSTATIN CREAM 1 APPLICATION(S): 100000 CREAM TOPICAL at 07:10

## 2024-03-21 RX ADMIN — ENOXAPARIN SODIUM 40 MILLIGRAM(S): 100 INJECTION SUBCUTANEOUS at 05:37

## 2024-03-21 RX ADMIN — Medication 15 MILLIGRAM(S): at 00:10

## 2024-03-21 RX ADMIN — INSULIN GLARGINE 10 UNIT(S): 100 INJECTION, SOLUTION SUBCUTANEOUS at 21:46

## 2024-03-21 RX ADMIN — QUETIAPINE FUMARATE 400 MILLIGRAM(S): 200 TABLET, FILM COATED ORAL at 21:45

## 2024-03-21 RX ADMIN — CLOPIDOGREL BISULFATE 75 MILLIGRAM(S): 75 TABLET, FILM COATED ORAL at 13:25

## 2024-03-21 RX ADMIN — RISPERIDONE 2 MILLIGRAM(S): 4 TABLET ORAL at 21:46

## 2024-03-21 RX ADMIN — Medication 20 MILLIGRAM(S): at 05:36

## 2024-03-21 RX ADMIN — FAMOTIDINE 20 MILLIGRAM(S): 10 INJECTION INTRAVENOUS at 13:26

## 2024-03-21 RX ADMIN — REGADENOSON 0.4 MILLIGRAM(S): 0.08 INJECTION, SOLUTION INTRAVENOUS at 12:14

## 2024-03-21 RX ADMIN — Medication 1 MILLIGRAM(S): at 21:46

## 2024-03-21 RX ADMIN — Medication 1 MILLIGRAM(S): at 13:25

## 2024-03-21 RX ADMIN — Medication 50 MILLIGRAM(S): at 05:36

## 2024-03-21 RX ADMIN — Medication 2: at 16:29

## 2024-03-21 RX ADMIN — Medication 500 MILLIGRAM(S): at 13:25

## 2024-03-21 RX ADMIN — Medication 8: at 07:59

## 2024-03-21 NOTE — BH CONSULTATION LIAISON PROGRESS NOTE - NSICDXBHSECONDARYDX_PSY_ALL_CORE
Cerebral vascular disease   I67.9  History of multiple strokes   Z86.73  
Cerebral vascular disease   I67.9  History of multiple strokes   Z86.73

## 2024-03-21 NOTE — BH CONSULTATION LIAISON PROGRESS NOTE - NSBHCHARTREVIEWLAB_PSY_A_CORE FT
03-21    143  |  108  |  22  ----------------------------<  290<H>  4.0   |  26  |  1.32<H>    Ca    8.4<L>      21 Mar 2024 06:08  Phos  3.8     03-20  Mg     2.1     03-21    
 03-20    142  |  110<H>  |  20  ----------------------------<  203<H>  3.5   |  25  |  0.78    Ca    8.3<L>      20 Mar 2024 07:50  Phos  3.8     03-20  Mg     1.6     03-20

## 2024-03-21 NOTE — PROGRESS NOTE ADULT - SUBJECTIVE AND OBJECTIVE BOX
Pt seen and examined at bedside with Dr. Bullock, pt awaiting stress test. Per pt she does not ambulate but stands and takes steps when being transferred, last time she stood was yesterday. Foot pain adequately controlled with pain meds    T(F): 98 (03-21-24 @ 09:26), Max: 100.6 (03-20-24 @ 23:41)  HR: 88 (03-21-24 @ 09:44) (87 - 106)  BP: 103/49 (03-21-24 @ 09:44) (103/49 - 139/64)  RR: 16 (03-21-24 @ 09:44) (15 - 18)  SpO2: 94% (03-21-24 @ 09:44) (92% - 95%)  Wt(kg): --  CAPILLARY BLOOD GLUCOSE      POCT Blood Glucose.: 180 mg/dL (21 Mar 2024 13:23)  POCT Blood Glucose.: 320 mg/dL (21 Mar 2024 07:53)  POCT Blood Glucose.: 249 mg/dL (20 Mar 2024 21:24)  POCT Blood Glucose.: 228 mg/dL (20 Mar 2024 16:17)      PHYSICAL EXAM:      LABS:                        9.3    6.69  )-----------( 203      ( 21 Mar 2024 06:08 )             29.5     03-21    143  |  108  |  22  ----------------------------<  290<H>  4.0   |  26  |  1.32<H>    Ca    8.4<L>      21 Mar 2024 06:08  Phos  3.8     03-20  Mg     2.1     03-21        I&O's Detail    20 Mar 2024 07:01  -  21 Mar 2024 07:00  --------------------------------------------------------  IN:    IV PiggyBack: 50 mL    Oral Fluid: 120 mL  Total IN: 170 mL    OUT:    Voided (mL): 1350 mL  Total OUT: 1350 mL    Total NET: -1180 mL      Culture Results:   >100,000 CFU/ml Escherichia coli *!* (03-15 @ 16:34)  Culture Results:   No growth at 5 days (03-15 @ 13:19)  Culture Results:   No growth at 5 days (03-15 @ 13:09)    FINDINGS:  CENTRAL ARTERIAL SYSTEM:  ABDOMINAL AORTA: Mild plaque without aneurysm or occlusion.  Celiac: Normal caliber patent.  SMA: Normal caliber patent.  KEREN: Stenosis suggested in the proximal segment.  Renal Arteries: Right renal artery stenosis is suggested. An accessory   right renal artery is present. Left renal artery shows early bifurcation,   however normal caliber patent.    RIGHT LOWER EXTREMITY:    Common Iliac artery: Normal caliber patent.  External Iliac: Normal caliber patent.  Internal Iliac: Patent.    Common femoral: Diffusely calcified but normal caliber patent.  SFA: Diffusely calcified but normal caliber patent.  Profunda: Diffusely calcified but patent.    Popliteal: Heavily calcified limiting evaluation. Focal stenosis is   suggested above the knee.  Anterior Tibial (AT): Heavily calcified small caliber vessel limiting   evaluation.  Tibioperoneal trunk (TPT): Heavily calcified small caliber vessel   limiting evaluation.  Posterior Tibial (PT): Heavily calcified small caliber vessel limiting   evaluation.  Peroneal: Heavily calcified small caliber vessel limiting evaluation.  Dorsalis Pedis (DP): Heavily calcified small caliber vessel limiting   evaluation.  Plantar: Heavily calcified small caliber vessel limiting evaluation.    LEFTLOWER EXTREMITY:    Common Iliac artery: Normal caliber patent.  External Iliac: Normal caliber patent.  Internal Iliac: Patent.    Common femoral: Diffusely calcified but normal caliber patent.  SFA: Diffusely calcified but normal caliber patent.  Profunda: Diffusely calcified but patent.    Popliteal: Heavily calcified limiting evaluation. Focal stenosis is   suggested above the knee.  AT: Heavily calcified small caliber vessel limiting evaluation.  TPT: Heavily calcified small caliber vessel limiting evaluation.  PT: Heavily calcified small caliber vessel limiting evaluation.  Peroneal: Heavily calcified small caliber vessel limiting evaluation.  DP: Heavily calcified small caliber vessel limiting evaluation.  Plantar: Heavily calcified small caliber vessel limiting evaluation.    ADDITIONAL FINDINGS: Survey of the remaining abdomen and pelvis is   unremarkable.    IMPRESSION:  *  AORTOILIAC INFLOW: No significant inflow disease.  *  RIGHT LEG: Diffusely calcified but patent common femoral and femoral   arteries. Suggestion of a focal stenosis in the popliteal artery above   the knee. Infrapopliteal runoff is heavily calcified limiting evaluation.  *  LEFT LEG: Diffusely calcified but patent common femoral and femoral   arteries. Suggestion of a focal stenosis in the popliteal artery above   the knee. Infrapopliteal runoff is heavily calcified limiting evaluation.      63 year old female with PMHx of HTN, HLD, IDDM2, anxiety/depression, schizophrenia, and hx of CVA (with residual dysarthria and R. hemiparesis) who presented to the ED due to change in mental state.   Pt with eschar and dry gangrene to both feet  Imaging reviewed with Kobe Valdez, pt may benefit from angiogram with possible angioplasty, Dr. Bullock to discuss with IR Attending  pt will need medical optimization for possible angiogram  cont medical management

## 2024-03-21 NOTE — BH CONSULTATION LIAISON PROGRESS NOTE - NSBHCHARTREVIEWVS_PSY_A_CORE FT
Vital Signs Last 24 Hrs  T(C): 36.7 (21 Mar 2024 09:26), Max: 38.1 (20 Mar 2024 23:41)  T(F): 98 (21 Mar 2024 09:26), Max: 100.6 (20 Mar 2024 23:41)  HR: 88 (21 Mar 2024 09:44) (87 - 106)  BP: 103/49 (21 Mar 2024 09:44) (103/49 - 139/64)  BP(mean): --  RR: 16 (21 Mar 2024 09:44) (15 - 18)  SpO2: 94% (21 Mar 2024 09:44) (92% - 95%)    Parameters below as of 21 Mar 2024 05:34  Patient On (Oxygen Delivery Method): room air    
Vital Signs Last 24 Hrs  T(C): 36.7 (20 Mar 2024 12:04), Max: 37.4 (19 Mar 2024 23:41)  T(F): 98.1 (20 Mar 2024 12:04), Max: 99.3 (19 Mar 2024 23:41)  HR: 89 (20 Mar 2024 12:04) (89 - 109)  BP: 138/75 (20 Mar 2024 12:04) (108/58 - 143/71)  BP(mean): --  RR: 18 (20 Mar 2024 12:04) (16 - 18)  SpO2: 97% (20 Mar 2024 12:04) (92% - 97%)    Parameters below as of 20 Mar 2024 12:04  Patient On (Oxygen Delivery Method): room air

## 2024-03-21 NOTE — PROGRESS NOTE ADULT - ASSESSMENT
63 year old female with history of HTN, HLD, IDDM2, anxiety/depression, schizophrenia, and hx of CVA (with residual dysarthria and R. hemiparesis) who presented to the ED due to change in mental state. Pt noted to have b/l foot wounds and is being admitted for further work up. Pt also being admitted for UTI and metabolic encephalopathy.    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 w/ severe PVD  - appreciate Podiatry consult - planning on local wound care vs left foot partial 2nd ray resection pending vasc recs  - ABIs couldn't be obtained and US showed extensive bilateral plaque with diffuse low velocity monophasic flow throughout both legs w/ focally elevated velocities in the right popliteal and right mid posterior tibial arteries suggestive of stenoses  - CTA aorta with b/l LE runoff as per vascular  - pain control  -stress test for pre op optimization    UTI   - changed Rocephin to keflex   - urine culture grew E. coli    metabolic encephalopathy  - may be due to UTI  - CT head showed chronic bilateral basal ganglia infarctions and a chronic left corona radiata infarction w/ mild to moderate chronic microvascular changes without evidence of an acute transcortical infarction or hemorrhage  - improved, patient likely at baseline  - as per psych, patient likely never had Schizophrenia and likely has a Mood Disorder/Anxiety spectrum symptoms with or without soft psychotic sxs w/ significant cerebral Vascular Disease, multiple CVAs and Neurocognitive deficits   - as of today, the patient does not appear to have decision making capacity in regards to surgery       Severe Truncal rash  - unclear etiology ? contact dermatitis, fungal etiology or exanthem     HTN  - c/w Toprol XL    HLD  - c/w atorvastatin    DM2  - c/w Lantus and SSI  - Hgb A1C is 8.8    Hx of Schizophrenia  - c/w Klonopin, risperidone & quetiapine  - as per psych, patient likely never had Schizophrenia and likely has a Mood Disorder/Anxiety spectrum symptoms with or without soft psychotic sxs w/ significant cerebral Vascular Disease, multiple CVAs and Neurocognitive deficits   - as of today, the patient does not appear to have decision making capacity in regards to surgery     Hx of CVA (with residual dysarthria and R. hemiparesis)  - c/w clopidogrel & atorvastatin     Prophylaxis:  DVT: Lovenox  GI: Pepcid

## 2024-03-21 NOTE — PROGRESS NOTE ADULT - ASSESSMENT
63F with HTN, HLD, DM2, schizophrenia, hx of CVA initially p.w AMS, found to have worsening PAD with L foot ulcer with plans for surgical intervention.    pt is comfortable appearing, resting with her eyes closed, no resp distress.   tte from 11/23 which revealed LV EF 40% with segmental dysfunction severe TR, mod AI and mod pHTN.   EKG here showing non specific st changes with lateral TWI.   pt with several risk factors for cad, unable to assess functional capacity  would check nuclear stress test to eval for underlying ischemia prior to proceeding to OR if pt able to cooperate   Pt's daughter Susie has been making decisions on behalf of pt, discussed with her and agrees with proceeding with NST ()  if procedure is urgent then no cardiac testing is indicated

## 2024-03-21 NOTE — BH CONSULTATION LIAISON PROGRESS NOTE - NSBHCONSULTRECOMMENDOTHER_PSY_A_CORE FT
3/19/24: no formal treatment or medications can reverse or improve cognitive dysfunction secondary to severe cerebral vascular disease   - Pt designated son Ruben as her surrogate decision maker   3/20/24:Patient will need assistance from her family (ie son Ruben) when it comes to decision-making for a more complex treatment proposal such as a surgical procedure   3/21/24; no new recommendations  - [facility] Psychiatry signing off
3/19/24: no formal treatment or medications can reverse or improve cognitive dysfunction secondary to severe cerebral vascular disease   - Pt designated son Ruben as her surrogate decision maker   3/20/24:Patient will need assistance from her family (ie son Ruben) when it comes to decision-making for a more complex treatment proposal such as a surgical procedure   - [facility] Psychiat[facility] signing off

## 2024-03-21 NOTE — PROGRESS NOTE ADULT - SUBJECTIVE AND OBJECTIVE BOX
Patient is a 63y old  Female who presents with PVD, Heel wounds, UTI (20 Mar 2024 15:40)    PAST MEDICAL & SURGICAL HISTORY:  Anxiety    Hypertension    Hypercholesteremia    Insulin dependent type 2 diabetes mellitus    Schizophrenia    History of CVA with residual deficit    H/O tubal ligation    H/O hemorrhoidectomy    H/O  section    S/P percutaneous endoscopic gastrostomy (PEG) tube placement    INTERVAL HISTORY: in no acute distress, resting with eyes closed, open eyes when speaking to to her and says "leave her alone"  	  MEDICATIONS:  MEDICATIONS  (STANDING):  ascorbic acid 500 milliGRAM(s) Oral daily  atorvastatin 80 milliGRAM(s) Oral at bedtime  clonazePAM  Tablet 1 milliGRAM(s) Oral at bedtime  clopidogrel Tablet 75 milliGRAM(s) Oral daily  enoxaparin Injectable 40 milliGRAM(s) SubCutaneous every 24 hours  famotidine    Tablet 20 milliGRAM(s) Oral daily  folic acid 1 milliGRAM(s) Oral daily  furosemide    Tablet 20 milliGRAM(s) Oral daily  insulin glargine Injectable (LANTUS) 10 Unit(s) SubCutaneous at bedtime  insulin lispro (ADMELOG) corrective regimen sliding scale   SubCutaneous three times a day before meals  insulin lispro (ADMELOG) corrective regimen sliding scale   SubCutaneous at bedtime  metoprolol succinate ER 50 milliGRAM(s) Oral daily  nystatin Powder 1 Application(s) Topical two times a day  QUEtiapine 400 milliGRAM(s) Oral at bedtime  regadenoson Injectable 0.4 milliGRAM(s) IV Push once  risperiDONE   Tablet 2 milliGRAM(s) Oral at bedtime    MEDICATIONS  (PRN):  dextrose Oral Gel 15 Gram(s) Oral once PRN Blood Glucose LESS THAN 70 milliGRAM(s)/deciliter  oxycodone    5 mG/acetaminophen 325 mG 1 Tablet(s) Oral every 6 hours PRN Severe Pain (7 - 10)  zolpidem 5 milliGRAM(s) Oral at bedtime PRN Insomnia  zolpidem 5 milliGRAM(s) Oral at bedtime PRN Insomnia    Vitals:  T(F): 97.7 (24 @ 05:16), Max: 100.6 (24 @ 23:41)  HR: 92 (24 @ 05:16) (89 - 106)  BP: 117/70 (24 @ 05:16) (117/70 - 143/71)  RR: 18 (24 @ 05:34) (18 - 18)  SpO2: 95% (24 @ 05:34) (92% - 97%)     @ 07:01  -   @ 07:00  --------------------------------------------------------  IN:    IV PiggyBack: 50 mL    Oral Fluid: 120 mL  Total IN: 170 mL    OUT:    Voided (mL): 1350 mL  Total OUT: 1350 mL    Total NET: -1180 mL    PHYSICAL EXAM:  Neuro: Awake, responsive  CV: S1 S2 RRR + SM  Lungs: CTABL  GI: Soft, BS +, ND, NT  Extremities: both feet dsg intact      RADIOLOGY: < from: CT Angio Lower Extremity w/ IV Cont, Bilateral (24 @ 14:00) >  IMPRESSION:  *  AORTOILIAC INFLOW: No significant inflow disease.  *  RIGHT LEG: Diffusely calcified but patent common femoral and femoral   arteries. Suggestion of a focal stenosis in the popliteal artery above   the knee. Infrapopliteal runoff is heavily calcified limiting evaluation.  *  LEFT LEG: Diffusely calcified but patent common femoral and femoral   arteries. Suggestion of a focal stenosis in the popliteal artery above   the knee. Infrapopliteal runoff is heavily calcified limiting evaluation.    < end of copied text >    DIAGNOSTIC TESTING:    [x ] Echocardiogram:   < from: TTE Echo Complete w/o Contrast w/ Doppler (24 @ 14:01) >  Left Ventricle:  Global LV systolic function was mildly decreased. Left ventricular   ejection fraction, by visual estimation, is 40 to 45%.  Right Ventricle: The right ventricle was not well visualized.  Left Atrium: The left atrium is normal in size. Mildly enlarged left   atrium.  Pericardium: There is no evidence of pericardial effusion.  Mitral Valve: There is mild mitral annular calcification. Mild to   moderate mitral valve regurgitation is seen.  Tricuspid Valve: Structurally normal tricuspid valve, with normal leaflet   excursion. Moderate-severe tricuspid regurgitation is visualized.   Estimated pulmonary artery systolic pressure is 58.4 mmHg assuming a   right atrial pressure of 3mmHg, which is consistent with moderate   pulmonary hypertension.  Aortic Valve: The aortic valve is trileaflet. Sclerotic aortic valve with   normal opening. Moderate aortic valve regurgitation is seen.  Pulmonic Valve: The pulmonic valve was not well visualized.  Aorta: The aortic root is normal in size and structure.  Venous: The inferior vena cava was normal sized, with respiratory size   variation greater than 50%.      Summary:   1. Technically difficult study.   2. Mildly decreased global left ventricular systolic function.   3. Left ventricular ejection fraction, by visual estimation, is 40 to   45%.   4. Mildly enlarged left atrium.   5. Mild mitral annular calcification.   6. Mild to moderate mitral valve regurgitation.   7. Sclerotic aortic valve with normal opening.   8. Moderate aortic regurgitation.   9. Structurally normal tricuspid valve, with normal leaflet excursion.  10. Moderate-severe tricuspid regurgitation.  11. Estimated pulmonary artery systolic pressure is 58.4 mmHg assuming a   right atrial pressure of 3 mmHg, which is consistent with moderate   pulmonary hypertension.    < end of copied text >    LABS:	 	    CARDIAC MARKERS:  Troponin I, High Sensitivity Result: 68.5 ng/L ( @ 06:08)  Troponin I, High Sensitivity Result: 55.4 ng/L ( @ 16:10)    21 Mar 2024 06:08    143    |  108    |  22     ----------------------------<  290    4.0     |  26     |  1.32   20 Mar 2024 07:50    142    |  110    |  20     ----------------------------<  203    3.5     |  25     |  0.78     Ca    8.4        21 Mar 2024 06:08  Phos  3.8       20 Mar 2024 07:50  Mg     2.1       21 Mar 2024 06:08                        9.3    6.69  )-----------( 203      ( 21 Mar 2024 06:08 )             29.5 ,                       9.4    6.02  )-----------( 194      ( 20 Mar 2024 07:50 )             29.1

## 2024-03-21 NOTE — BH CONSULTATION LIAISON PROGRESS NOTE - CURRENT MEDICATION
MEDICATIONS  (STANDING):  ascorbic acid 500 milliGRAM(s) Oral daily  atorvastatin 80 milliGRAM(s) Oral at bedtime  clonazePAM  Tablet 1 milliGRAM(s) Oral at bedtime  clopidogrel Tablet 75 milliGRAM(s) Oral daily  dextrose 5%. 1000 milliLiter(s) (50 mL/Hr) IV Continuous <Continuous>  dextrose 5%. 1000 milliLiter(s) (100 mL/Hr) IV Continuous <Continuous>  dextrose 50% Injectable 25 Gram(s) IV Push once  dextrose 50% Injectable 12.5 Gram(s) IV Push once  dextrose 50% Injectable 25 Gram(s) IV Push once  enoxaparin Injectable 40 milliGRAM(s) SubCutaneous every 24 hours  famotidine    Tablet 20 milliGRAM(s) Oral daily  folic acid 1 milliGRAM(s) Oral daily  furosemide    Tablet 20 milliGRAM(s) Oral daily  glucagon  Injectable 1 milliGRAM(s) IntraMuscular once  insulin glargine Injectable (LANTUS) 10 Unit(s) SubCutaneous at bedtime  insulin lispro (ADMELOG) corrective regimen sliding scale   SubCutaneous three times a day before meals  insulin lispro (ADMELOG) corrective regimen sliding scale   SubCutaneous at bedtime  metoprolol succinate ER 50 milliGRAM(s) Oral daily  nystatin Powder 1 Application(s) Topical two times a day  QUEtiapine 400 milliGRAM(s) Oral at bedtime  risperiDONE   Tablet 2 milliGRAM(s) Oral at bedtime    MEDICATIONS  (PRN):  dextrose Oral Gel 15 Gram(s) Oral once PRN Blood Glucose LESS THAN 70 milliGRAM(s)/deciliter  oxycodone    5 mG/acetaminophen 325 mG 1 Tablet(s) Oral every 6 hours PRN Severe Pain (7 - 10)  zolpidem 5 milliGRAM(s) Oral at bedtime PRN Insomnia  zolpidem 5 milliGRAM(s) Oral at bedtime PRN Insomnia  
MEDICATIONS  (STANDING):  ascorbic acid 500 milliGRAM(s) Oral daily  atorvastatin 80 milliGRAM(s) Oral at bedtime  clonazePAM  Tablet 1 milliGRAM(s) Oral at bedtime  clopidogrel Tablet 75 milliGRAM(s) Oral daily  dextrose 5%. 1000 milliLiter(s) (50 mL/Hr) IV Continuous <Continuous>  dextrose 5%. 1000 milliLiter(s) (100 mL/Hr) IV Continuous <Continuous>  dextrose 50% Injectable 25 Gram(s) IV Push once  dextrose 50% Injectable 12.5 Gram(s) IV Push once  dextrose 50% Injectable 25 Gram(s) IV Push once  enoxaparin Injectable 40 milliGRAM(s) SubCutaneous every 24 hours  famotidine    Tablet 20 milliGRAM(s) Oral daily  folic acid 1 milliGRAM(s) Oral daily  furosemide    Tablet 20 milliGRAM(s) Oral daily  glucagon  Injectable 1 milliGRAM(s) IntraMuscular once  insulin glargine Injectable (LANTUS) 10 Unit(s) SubCutaneous at bedtime  insulin lispro (ADMELOG) corrective regimen sliding scale   SubCutaneous three times a day before meals  insulin lispro (ADMELOG) corrective regimen sliding scale   SubCutaneous at bedtime  metoprolol succinate ER 50 milliGRAM(s) Oral daily  nystatin Powder 1 Application(s) Topical two times a day  QUEtiapine 400 milliGRAM(s) Oral at bedtime  risperiDONE   Tablet 2 milliGRAM(s) Oral at bedtime    MEDICATIONS  (PRN):  dextrose Oral Gel 15 Gram(s) Oral once PRN Blood Glucose LESS THAN 70 milliGRAM(s)/deciliter  ketorolac   Injectable 15 milliGRAM(s) IV Push every 6 hours PRN Moderate Pain (4 - 6)  oxycodone    5 mG/acetaminophen 325 mG 1 Tablet(s) Oral every 6 hours PRN Severe Pain (7 - 10)  zolpidem 5 milliGRAM(s) Oral at bedtime PRN Insomnia  zolpidem 5 milliGRAM(s) Oral at bedtime PRN Insomnia

## 2024-03-21 NOTE — BH CONSULTATION LIAISON PROGRESS NOTE - NSBHCHARTREVIEWINVESTIGATE_PSY_A_CORE FT
CT Head No Cont (03.15.24 @ 13:17)  Mild to moderate chronic microvascular changes without evidence of an acute transcortical infarction or hemorrhage.      
CT Head No Cont (03.15.24 @ 13:17)  Mild to moderate chronic microvascular changes without evidence of an acute transcortical infarction or hemorrhage.

## 2024-03-21 NOTE — BH CONSULTATION LIAISON PROGRESS NOTE - NSBHATTESTBILLING_PSY_A_CORE
47315-Bszfwukoxc OBS or IP - low complexity OR 25-34 mins
[id number]-Ofsfvfxtlc OBS or IP - low complexity OR 25-34 mins

## 2024-03-21 NOTE — BH CONSULTATION LIAISON PROGRESS NOTE - NSBHFUPINTERVALHXFT_PSY_A_CORE
Seen by Vascular - Pt may benefit from angiogram with possible angioplasty. Awaiting nuclear stress test prior to OR as per cardiology recommendations. Otherwise no significant interval events. Patient is maintaining the same psychiatric clinical presentation consistent with her baseline. Pt has no specific complaints. Pt reports she has had an "ok" mood, and nodded when asked if she noted more difficulty in thinking post strokes. Denies suicidality. 
Patient seen by PT - cooperated and followed direction. Seen by Podiatry - possible procedure. Otherwise no signfiicant interval events. Patient is maintaining the same psychiatric clinical presentation consistent with her baseline.

## 2024-03-21 NOTE — PROGRESS NOTE ADULT - SUBJECTIVE AND OBJECTIVE BOX
patient seen and examined  for stress test today      Objective:  Vitals  T(C): 36.7 (03-21-24 @ 09:26), Max: 38.1 (03-20-24 @ 23:41)  HR: 88 (03-21-24 @ 09:44) (87 - 106)  BP: 103/49 (03-21-24 @ 09:44) (103/49 - 139/64)  RR: 16 (03-21-24 @ 09:44) (15 - 18)  SpO2: 94% (03-21-24 @ 09:44) (92% - 97%)    Physical Exam:  General: comfortable, no acute distress  HEENT: Atraumatic, no LAD, trachea midline, PERRLA  Cardiovascular: normal s1s2, no murmurs, gallops or fricition rubs  Pulmonary: clear to ausculation Bilaterally, no wheezing , rhonchi  Gastrointestinal: soft non tender non distended, no masses felt, no organomegally  Muscloskeletal: no lower extremity edema, intact bilateral lower extremity pulses  Neurological: CN II-12 intact. No focal weakness  Psychiatrical: normal mood, cooperative  SKIN: no rash, lesions or ulcers    Labs:                          9.3    6.69  )-----------( 203      ( 21 Mar 2024 06:08 )             29.5     03-21    143  |  108  |  22  ----------------------------<  290<H>  4.0   |  26  |  1.32<H>    Ca    8.4<L>      21 Mar 2024 06:08  Phos  3.8     03-20  Mg     2.1     03-21              Active Medications  MEDICATIONS  (STANDING):  ascorbic acid 500 milliGRAM(s) Oral daily  atorvastatin 80 milliGRAM(s) Oral at bedtime  clonazePAM  Tablet 1 milliGRAM(s) Oral at bedtime  clopidogrel Tablet 75 milliGRAM(s) Oral daily  dextrose 5%. 1000 milliLiter(s) (50 mL/Hr) IV Continuous <Continuous>  dextrose 5%. 1000 milliLiter(s) (100 mL/Hr) IV Continuous <Continuous>  dextrose 50% Injectable 25 Gram(s) IV Push once  dextrose 50% Injectable 12.5 Gram(s) IV Push once  dextrose 50% Injectable 25 Gram(s) IV Push once  enoxaparin Injectable 40 milliGRAM(s) SubCutaneous every 24 hours  famotidine    Tablet 20 milliGRAM(s) Oral daily  folic acid 1 milliGRAM(s) Oral daily  furosemide    Tablet 20 milliGRAM(s) Oral daily  glucagon  Injectable 1 milliGRAM(s) IntraMuscular once  insulin glargine Injectable (LANTUS) 10 Unit(s) SubCutaneous at bedtime  insulin lispro (ADMELOG) corrective regimen sliding scale   SubCutaneous three times a day before meals  insulin lispro (ADMELOG) corrective regimen sliding scale   SubCutaneous at bedtime  metoprolol succinate ER 50 milliGRAM(s) Oral daily  nystatin Powder 1 Application(s) Topical two times a day  QUEtiapine 400 milliGRAM(s) Oral at bedtime  regadenoson Injectable 0.4 milliGRAM(s) IV Push once  risperiDONE   Tablet 2 milliGRAM(s) Oral at bedtime    MEDICATIONS  (PRN):  dextrose Oral Gel 15 Gram(s) Oral once PRN Blood Glucose LESS THAN 70 milliGRAM(s)/deciliter  oxycodone    5 mG/acetaminophen 325 mG 1 Tablet(s) Oral every 6 hours PRN Severe Pain (7 - 10)  zolpidem 5 milliGRAM(s) Oral at bedtime PRN Insomnia  zolpidem 5 milliGRAM(s) Oral at bedtime PRN Insomnia

## 2024-03-22 LAB
ALBUMIN SERPL ELPH-MCNC: 2.3 G/DL — LOW (ref 3.3–5)
ALP SERPL-CCNC: 83 U/L — SIGNIFICANT CHANGE UP (ref 40–120)
ALT FLD-CCNC: 20 U/L — SIGNIFICANT CHANGE UP (ref 12–78)
ANION GAP SERPL CALC-SCNC: 6 MMOL/L — SIGNIFICANT CHANGE UP (ref 5–17)
APPEARANCE UR: ABNORMAL
AST SERPL-CCNC: 18 U/L — SIGNIFICANT CHANGE UP (ref 15–37)
BACTERIA # UR AUTO: ABNORMAL /HPF
BASOPHILS # BLD AUTO: 0.03 K/UL — SIGNIFICANT CHANGE UP (ref 0–0.2)
BASOPHILS NFR BLD AUTO: 0.4 % — SIGNIFICANT CHANGE UP (ref 0–2)
BILIRUB SERPL-MCNC: 0.4 MG/DL — SIGNIFICANT CHANGE UP (ref 0.2–1.2)
BILIRUB UR-MCNC: NEGATIVE — SIGNIFICANT CHANGE UP
BUN SERPL-MCNC: 30 MG/DL — HIGH (ref 7–23)
CALCIUM SERPL-MCNC: 8.6 MG/DL — SIGNIFICANT CHANGE UP (ref 8.5–10.1)
CHLORIDE SERPL-SCNC: 110 MMOL/L — HIGH (ref 96–108)
CO2 SERPL-SCNC: 28 MMOL/L — SIGNIFICANT CHANGE UP (ref 22–31)
COLOR SPEC: YELLOW — SIGNIFICANT CHANGE UP
CREAT SERPL-MCNC: 2.69 MG/DL — HIGH (ref 0.5–1.3)
DIFF PNL FLD: ABNORMAL
EGFR: 19 ML/MIN/1.73M2 — LOW
EOSINOPHIL # BLD AUTO: 0.31 K/UL — SIGNIFICANT CHANGE UP (ref 0–0.5)
EOSINOPHIL NFR BLD AUTO: 4.6 % — SIGNIFICANT CHANGE UP (ref 0–6)
GLUCOSE BLDC GLUCOMTR-MCNC: 188 MG/DL — HIGH (ref 70–99)
GLUCOSE BLDC GLUCOMTR-MCNC: 217 MG/DL — HIGH (ref 70–99)
GLUCOSE BLDC GLUCOMTR-MCNC: 295 MG/DL — HIGH (ref 70–99)
GLUCOSE BLDC GLUCOMTR-MCNC: 318 MG/DL — HIGH (ref 70–99)
GLUCOSE SERPL-MCNC: 189 MG/DL — HIGH (ref 70–99)
GLUCOSE UR QL: NEGATIVE MG/DL — SIGNIFICANT CHANGE UP
HCT VFR BLD CALC: 29.1 % — LOW (ref 34.5–45)
HGB BLD-MCNC: 9 G/DL — LOW (ref 11.5–15.5)
IMM GRANULOCYTES NFR BLD AUTO: 0.6 % — SIGNIFICANT CHANGE UP (ref 0–0.9)
KETONES UR-MCNC: ABNORMAL MG/DL
LEUKOCYTE ESTERASE UR-ACNC: ABNORMAL
LYMPHOCYTES # BLD AUTO: 1.93 K/UL — SIGNIFICANT CHANGE UP (ref 1–3.3)
LYMPHOCYTES # BLD AUTO: 28.9 % — SIGNIFICANT CHANGE UP (ref 13–44)
MCHC RBC-ENTMCNC: 30.6 PG — SIGNIFICANT CHANGE UP (ref 27–34)
MCHC RBC-ENTMCNC: 30.9 G/DL — LOW (ref 32–36)
MCV RBC AUTO: 99 FL — SIGNIFICANT CHANGE UP (ref 80–100)
MONOCYTES # BLD AUTO: 0.63 K/UL — SIGNIFICANT CHANGE UP (ref 0–0.9)
MONOCYTES NFR BLD AUTO: 9.4 % — SIGNIFICANT CHANGE UP (ref 2–14)
NEUTROPHILS # BLD AUTO: 3.74 K/UL — SIGNIFICANT CHANGE UP (ref 1.8–7.4)
NEUTROPHILS NFR BLD AUTO: 56.1 % — SIGNIFICANT CHANGE UP (ref 43–77)
NITRITE UR-MCNC: NEGATIVE — SIGNIFICANT CHANGE UP
NRBC # BLD: 0 /100 WBCS — SIGNIFICANT CHANGE UP (ref 0–0)
PH UR: 6 — SIGNIFICANT CHANGE UP (ref 5–8)
PLATELET # BLD AUTO: 203 K/UL — SIGNIFICANT CHANGE UP (ref 150–400)
POTASSIUM SERPL-MCNC: 4 MMOL/L — SIGNIFICANT CHANGE UP (ref 3.5–5.3)
POTASSIUM SERPL-SCNC: 4 MMOL/L — SIGNIFICANT CHANGE UP (ref 3.5–5.3)
PROT SERPL-MCNC: 5.6 GM/DL — LOW (ref 6–8.3)
PROT UR-MCNC: >=1000 MG/DL
RBC # BLD: 2.94 M/UL — LOW (ref 3.8–5.2)
RBC # FLD: 17.1 % — HIGH (ref 10.3–14.5)
RBC CASTS # UR COMP ASSIST: ABNORMAL /HPF
SODIUM SERPL-SCNC: 144 MMOL/L — SIGNIFICANT CHANGE UP (ref 135–145)
SP GR SPEC: >1.03 — HIGH (ref 1–1.03)
UROBILINOGEN FLD QL: 1 MG/DL — SIGNIFICANT CHANGE UP (ref 0.2–1)
WBC # BLD: 6.68 K/UL — SIGNIFICANT CHANGE UP (ref 3.8–10.5)
WBC # FLD AUTO: 6.68 K/UL — SIGNIFICANT CHANGE UP (ref 3.8–10.5)
WBC UR QL: 10 /HPF — HIGH (ref 0–5)

## 2024-03-22 PROCEDURE — 99233 SBSQ HOSP IP/OBS HIGH 50: CPT

## 2024-03-22 PROCEDURE — 76775 US EXAM ABDO BACK WALL LIM: CPT | Mod: 26

## 2024-03-22 RX ORDER — SODIUM CHLORIDE 9 MG/ML
1000 INJECTION, SOLUTION INTRAVENOUS
Refills: 0 | Status: COMPLETED | OUTPATIENT
Start: 2024-03-22 | End: 2024-03-23

## 2024-03-22 RX ORDER — HEPARIN SODIUM 5000 [USP'U]/ML
5000 INJECTION INTRAVENOUS; SUBCUTANEOUS EVERY 8 HOURS
Refills: 0 | Status: DISCONTINUED | OUTPATIENT
Start: 2024-03-22 | End: 2024-03-23

## 2024-03-22 RX ADMIN — Medication 4: at 07:55

## 2024-03-22 RX ADMIN — INSULIN GLARGINE 10 UNIT(S): 100 INJECTION, SOLUTION SUBCUTANEOUS at 21:20

## 2024-03-22 RX ADMIN — FAMOTIDINE 20 MILLIGRAM(S): 10 INJECTION INTRAVENOUS at 11:50

## 2024-03-22 RX ADMIN — SODIUM CHLORIDE 100 MILLILITER(S): 9 INJECTION, SOLUTION INTRAVENOUS at 18:27

## 2024-03-22 RX ADMIN — HEPARIN SODIUM 5000 UNIT(S): 5000 INJECTION INTRAVENOUS; SUBCUTANEOUS at 21:19

## 2024-03-22 RX ADMIN — Medication 6: at 16:13

## 2024-03-22 RX ADMIN — RISPERIDONE 2 MILLIGRAM(S): 4 TABLET ORAL at 21:22

## 2024-03-22 RX ADMIN — NYSTATIN CREAM 1 APPLICATION(S): 100000 CREAM TOPICAL at 18:30

## 2024-03-22 RX ADMIN — Medication 50 MILLIGRAM(S): at 05:17

## 2024-03-22 RX ADMIN — Medication 2: at 11:49

## 2024-03-22 RX ADMIN — ATORVASTATIN CALCIUM 80 MILLIGRAM(S): 80 TABLET, FILM COATED ORAL at 21:22

## 2024-03-22 RX ADMIN — CLOPIDOGREL BISULFATE 75 MILLIGRAM(S): 75 TABLET, FILM COATED ORAL at 11:50

## 2024-03-22 RX ADMIN — Medication 1 MILLIGRAM(S): at 11:50

## 2024-03-22 RX ADMIN — QUETIAPINE FUMARATE 400 MILLIGRAM(S): 200 TABLET, FILM COATED ORAL at 21:22

## 2024-03-22 RX ADMIN — ZOLPIDEM TARTRATE 5 MILLIGRAM(S): 10 TABLET ORAL at 21:25

## 2024-03-22 RX ADMIN — Medication 2: at 21:27

## 2024-03-22 RX ADMIN — ENOXAPARIN SODIUM 40 MILLIGRAM(S): 100 INJECTION SUBCUTANEOUS at 05:21

## 2024-03-22 RX ADMIN — Medication 20 MILLIGRAM(S): at 05:17

## 2024-03-22 RX ADMIN — NYSTATIN CREAM 1 APPLICATION(S): 100000 CREAM TOPICAL at 05:21

## 2024-03-22 RX ADMIN — Medication 1 MILLIGRAM(S): at 21:19

## 2024-03-22 NOTE — PROGRESS NOTE ADULT - SUBJECTIVE AND OBJECTIVE BOX
[facility] Division of Hospital Medicine  Fred Freeman MD  Available via MS Teams  Pager: [phone]    SUBJECTIVE / OVERNIGHT EVENTS:  - no events overnight, AOX3 but somewhat confused/lethargic. no acute issues overnight, urinating okay.   ADDITIONAL REVIEW OF SYSTEMS:    MEDICATIONS  (STANDING):  atorvastatin 80 milliGRAM(s) Oral at bedtime  clonazePAM  Tablet 1 milliGRAM(s) Oral at bedtime  clopidogrel Tablet 75 milliGRAM(s) Oral daily  dextrose 5%. 1000 milliLiter(s) (100 mL/Hr) IV Continuous <Continuous>  dextrose 5%. 1000 milliLiter(s) (50 mL/Hr) IV Continuous <Continuous>  dextrose 50% Injectable 25 Gram(s) IV Push once  dextrose 50% Injectable 12.5 Gram(s) IV Push once  dextrose 50% Injectable 25 Gram(s) IV Push once  famotidine    Tablet 20 milliGRAM(s) Oral daily  folic acid 1 milliGRAM(s) Oral daily  glucagon  Injectable 1 milliGRAM(s) IntraMuscular once  insulin glargine Injectable (LANTUS) 10 Unit(s) SubCutaneous at bedtime  insulin lispro (ADMELOG) corrective regimen sliding scale   SubCutaneous three times a day before meals  insulin lispro (ADMELOG) corrective regimen sliding scale   SubCutaneous at bedtime  metoprolol succinate ER 50 milliGRAM(s) Oral daily  nystatin Powder 1 Application(s) Topical two times a day  QUEtiapine 400 milliGRAM(s) Oral at bedtime  risperiDONE   Tablet 2 milliGRAM(s) Oral at bedtime    MEDICATIONS  (PRN):  dextrose Oral Gel 15 Gram(s) Oral once PRN Blood Glucose LESS THAN 70 milliGRAM(s)/deciliter  oxycodone    5 mG/acetaminophen 325 mG 1 Tablet(s) Oral every 6 hours PRN Severe Pain (7 - 10)  zolpidem 5 milliGRAM(s) Oral at bedtime PRN Insomnia  zolpidem 5 milliGRAM(s) Oral at bedtime PRN Insomnia      I&O's Summary    21 Mar 2024 07:01  -  22 Mar 2024 07:00  --------------------------------------------------------  IN: 0 mL / OUT: 300 mL / NET: -300 mL    22 Mar 2024 07:01  -  22 Mar 2024 12:25  --------------------------------------------------------  IN: 0 mL / OUT: 150 mL / NET: -150 mL        PHYSICAL EXAM:  Vital Signs Last 24 Hrs  T(C): 36.6 (22 Mar 2024 09:45), Max: 37 (21 Mar 2024 23:29)  T(F): 97.8 (22 Mar 2024 09:45), Max: 98.6 (21 Mar 2024 23:29)  HR: 87 (22 Mar 2024 09:45) (87 - 95)  BP: 109/53 (22 Mar 2024 09:45) (109/53 - 136/60)  BP(mean): --  RR: 18 (22 Mar 2024 09:45) (16 - 20)  SpO2: 98% (22 Mar 2024 09:45) (92% - 98%)    Parameters below as of 22 Mar 2024 09:45  Patient On (Oxygen Delivery Method): room air      CONSTITUTIONAL: NAD, well-developed, well-groomed  EYES: PERRLA; conjunctiva and sclera clear  ENMT: Moist oral mucosa, no pharyngeal injection or exudates;  RESPIRATORY: Normal respiratory effort; lungs are clear to auscultation bilaterally  CARDIOVASCULAR: Regular rate and rhythm, normal S1 and S2, no murmur/rub/gallop; No lower extremity edema; Peripheral pulses are 2+ bilaterally  ABDOMEN: Nontender to palpation, normoactive bowel sounds, no rebound/guarding;  MUSCULOSKELETAL:  b/l lower extremity in bandages, wrapped   PSYCH: A+O to person, place, and time; affect calm  SKIN: No rashes; no palpable lesions    LABS:                        9.0    6.68  )-----------( 203      ( 22 Mar 2024 05:55 )             29.1     03-22    144  |  110<H>  |  30<H>  ----------------------------<  189<H>  4.0   |  28  |  2.69<H>    Ca    8.6      22 Mar 2024 05:55  Mg     2.1     03-21    TPro  5.6<L>  /  Alb  2.3<L>  /  TBili  0.4  /  DBili  x   /  AST  18  /  ALT  20  /  AlkPhos  83  03-22          Urinalysis Basic - ( 22 Mar 2024 10:41 )    Color: Yellow / Appearance: Turbid / SG: >1.030 / pH: x  Gluc: x / Ketone: Trace mg/dL  / Bili: Negative / Urobili: 1.0 mg/dL   Blood: x / Protein: >=1000 mg/dL / Nitrite: Negative   Leuk Esterase: Moderate / RBC: Too Numerous to count /HPF / WBC 10 /HPF   Sq Epi: x / Non Sq Epi: x / Bacteria: Too Numerous to count /HPF        SARS-CoV-2: NotDetec (15 Mar 2024 13:09)

## 2024-03-22 NOTE — PROGRESS NOTE ADULT - ASSESSMENT
63F with HTN, HLD, DM2, schizophrenia, hx of CVA initially p.w AMS, found to have worsening PAD with L foot ulcer with plans for surgical intervention.    pt is comfortable appearing, resting with her eyes closed, no resp distress.   tte from 11/23 which revealed LV EF 40% with segmental dysfunction severe TR, mod AI and mod pHTN.   EKG here showing non specific st changes with lateral TWI.   nuclear stress test  done here shows  No scintigraphic evidence for myocardial infarct or ischemia.  Reduced left ventricular ejection fraction may be due to a non-ischemic cardiomyopathy. EF 32%  cont with bbl, plavix, statin  no ACE-I/ARB in setting of [facility] AL[facility], s/p CTA, dc lasix, renal eval   63F with HTN, HLD, DM2, schizophrenia, hx of CVA initially p.w AMS, found to have worsening PAD with L foot ulcer with plans for surgical intervention.    pt is comfortable appearing, resting with her eyes closed, no resp distress.   tte from 11/23 which revealed LV EF 40% with segmental dysfunction severe TR, mod AI and mod pHTN.   EKG here showing non specific st changes with lateral TWI.   nuclear stress test  done here shows  No scintigraphic evidence for myocardial infarct or ischemia.  Reduced left ventricular ejection fraction may be due to a non-ischemic cardiomyopathy. EF 32%  cont with bbl, plavix, statin  no ACE-I/ARB in setting of MARY ALICE, s/p CTA, dc lasix, renal eval ->pt with no overt fluid overload      will only follow as needed

## 2024-03-22 NOTE — PROGRESS NOTE ADULT - NS ATTEND AMEND GEN_ALL_CORE FT
Bilateral foot wounds  Nonpalp pedal pulses  CTA reviewed. No significant inflow disease. Vessels are heavily calcified, suggestion of bilateral popliteal artery stenosis. Tibial vessels assessment limited by heavily calcified vessels.   As patient still stands to transfer, would benefit from lower extremity angiograms.  Cont plavix, statin for med management of pad  heel offloading  podiatry follow up on foot wounds
patient seen and examined   labs and vitals reviewed  agree with above assessment and plan  pt cont to refuse questions/exam  plan for nuc stress today
The patient is seen and examined and the feet are unchanged.  patient with CVA, H/O carotid stenosis, Bilateral feet with left 2nd toe and right heel dry gangrene. No pedal pulses.  plan-- CTA Aorta with Runoff. suspect iliac/femoaral artery  stenosis.
patient seen and examined   labs and vitals reviewed  agree with above assessment and plan
patient seen and examined   labs and vitals reviewed  agree with above assessment and plan  plans for surgical intervention worsening PAD, no date set  tte reviewed  nuc stress without acute ischemic changes  unable to assess additional questions as pt refuses to answer, participate in exam  pt is intermediate to high risk for any procedure and is optimized from a cv perspective and may proceed without additional cv testing

## 2024-03-22 NOTE — PROGRESS NOTE ADULT - SUBJECTIVE AND OBJECTIVE BOX
Patient is a 63y old  Female who presents with PVD, Heel wounds, UTI (22 Mar 2024 08:53)    PAST MEDICAL & SURGICAL HISTORY:  Anxiety    Hypertension    Hypercholesteremia    Insulin dependent type 2 diabetes mellitus    Schizophrenia    History of CVA with residual deficit    H/O tubal ligation    H/O hemorrhoidectomy    H/O  section    S/P percutaneous endoscopic gastrostomy (PEG) tube placement    INTERVAL HISTORY:  	  MEDICATIONS:  MEDICATIONS  (STANDING):  atorvastatin 80 milliGRAM(s) Oral at bedtime  clonazePAM  Tablet 1 milliGRAM(s) Oral at bedtime  clopidogrel Tablet 75 milliGRAM(s) Oral daily  famotidine    Tablet 20 milliGRAM(s) Oral daily  folic acid 1 milliGRAM(s) Oral daily  insulin glargine Injectable (LANTUS) 10 Unit(s) SubCutaneous at bedtime  insulin lispro (ADMELOG) corrective regimen sliding scale   SubCutaneous three times a day before meals  insulin lispro (ADMELOG) corrective regimen sliding scale   SubCutaneous at bedtime  metoprolol succinate ER 50 milliGRAM(s) Oral daily  nystatin Powder 1 Application(s) Topical two times a day  QUEtiapine 400 milliGRAM(s) Oral at bedtime  risperiDONE   Tablet 2 milliGRAM(s) Oral at bedtime    MEDICATIONS  (PRN):  dextrose Oral Gel 15 Gram(s) Oral once PRN Blood Glucose LESS THAN 70 milliGRAM(s)/deciliter  oxycodone    5 mG/acetaminophen 325 mG 1 Tablet(s) Oral every 6 hours PRN Severe Pain (7 - 10)  zolpidem 5 milliGRAM(s) Oral at bedtime PRN Insomnia  zolpidem 5 milliGRAM(s) Oral at bedtime PRN Insomnia    Vitals:  T(F): 97.7 (24 @ 05:09), Max: 98.6 (24 @ 23:29)  HR: 91 (24 @ 05:09) (88 - 95)  BP: 127/49 (24 @ 05:09) (103/49 - 136/60)  RR: 16 (24 @ 05:09) (16 - 20)  SpO2: 94% (24 @ 05:09) (92% - 95%)     @ 07:01  -   @ 07:00  --------------------------------------------------------  IN:  Total IN: 0 mL    OUT:    Voided (mL): 300 mL  Total OUT: 300 mL    Total NET: -300 mL    PHYSICAL EXAM:  Neuro: Awake, responsive  CV: S1 S2 RRR  Lungs: CTABL  GI: Soft, BS +, ND, NT  Extremities:     RADIOLOGY: < from: CT Angio Lower Extremity w/ IV Cont, Bilateral (24 @ 14:00) >  *  AORTOILIAC INFLOW: No significant inflow disease.  *  RIGHT LEG: Diffusely calcified but patent common femoral and femoral   arteries. Suggestion of a focal stenosis in the popliteal artery above   the knee. Infrapopliteal runoff is heavily calcified limiting evaluation.  *  LEFT LEG: Diffusely calcified but patent common femoral and femoral   arteries. Suggestion of a focal stenosis in the popliteal artery above   the knee. Infrapopliteal runoff is heavily calcified limiting evaluation.    < end of copied text >    < from: Xray Chest 1 View- PORTABLE-Urgent (03.15.24 @ 13:09) >  IMPRESSION: No acute finding or change.    < end of copied text >    DIAGNOSTIC TESTING:    [x ] Echocardiogram:    < from: TTE Echo Complete w/o Contrast w/ Doppler (24 @ 14:01) >  Left Ventricle:  Global LV systolic function was mildly decreased. Left ventricular   ejection fraction, by visual estimation, is 40 to 45%.  Right Ventricle: The right ventricle was not well visualized.  Left Atrium: The left atrium is normal in size. Mildly enlarged left   atrium.  Pericardium: There is no evidence of pericardial effusion.  Mitral Valve: There is mild mitral annular calcification. Mild to   moderate mitral valve regurgitation is seen.  Tricuspid Valve: Structurally normal tricuspid valve, with normal leaflet   excursion. Moderate-severe tricuspid regurgitation is visualized.   Estimated pulmonary artery systolic pressure is 58.4 mmHg assuming a   right atrial pressure of 3mmHg, which is consistent with moderate   pulmonary hypertension.  Aortic Valve: The aortic valve is trileaflet. Sclerotic aortic valve with   normal opening. Moderate aortic valve regurgitation is seen.  Pulmonic Valve: The pulmonic valve was not well visualized.  Aorta: The aortic root is normal in size and structure.  Venous: The inferior vena cava was normal sized, with respiratory size   variation greater than 50%.      Summary:   1. Technically difficult study.   2. Mildly decreased global left ventricular systolic function.   3. Left ventricular ejection fraction, by visual estimation, is 40 to   45%.   4. Mildly enlarged left atrium.   5. Mild mitral annular calcification.   6. Mild to moderate mitral valve regurgitation.   7. Sclerotic aortic valve with normal opening.   8. Moderate aortic regurgitation.   9. Structurally normal tricuspid valve, with normal leaflet excursion.  10. Moderate-severe tricuspid regurgitation.  11. Estimated pulmonary artery systolic pressure is 58.4 mmHg assuming a   right atrial pressure of 3 mmHg, which is consistent with moderate   pulmonary hypertension.    < end of copied text >    [x ] Stress Test:  < from: NM Stress Test, Dual Isotope (24 @ 12:50) >    1. No scintigraphic evidence for myocardial infarct or ischemia.  Reduced   left ventricular ejection fraction may be due to a non-ischemic   cardiomyopathy.    2. There is normal left ventricular contractility, normal calculated   ejection fraction and normal myocardial thickening at rest. Overall post   stress ejection fraction was 32%    < end of copied text >    LABS:	 	    CARDIAC MARKERS:  Troponin I, High Sensitivity Result: 68.5 ng/L ( @ 06:08)  Troponin I, High Sensitivity Result: 55.4 ng/L ( @ 16:10)    22 Mar 2024 05:55    144    |  110    |  30     ----------------------------<  189    4.0     |  28     |  2.69   21 Mar 2024 06:08    143    |  108    |  22     ----------------------------<  290    4.0     |  26     |  1.32   20 Mar 2024 07:50    142    |  110    |  20     ----------------------------<  203    3.5     |  25     |  0.78     Ca    8.6        22 Mar 2024 05:55  Mg     2.1       21 Mar 2024 06:08    TPro  5.6    /  Alb  2.3    /  TBili  0.4    /  DBili  x      /  AST  18     /  ALT  20     /  AlkPhos  83     22 Mar 2024 05:55                        9.0    6.68  )-----------(       ( 22 Mar 2024 05:55 )             29.1 ,                       9.3    6.69  )-----------(       ( 21 Mar 2024 06:08 )             29.5 ,                       9.4    6.02  )-----------(       ( 20 Mar 2024 07:50 )             29.1              Patient is a 63y old  Female who presents with PVD, Heel wounds, UTI (22 Mar 2024 08:53)    PAST MEDICAL & SURGICAL HISTORY:  Anxiety    Hypertension    Hypercholesteremia    Insulin dependent type 2 diabetes mellitus    Schizophrenia    History of CVA with residual deficit    H/O tubal ligation    H/O hemorrhoidectomy    H/O  section    S/P percutaneous endoscopic gastrostomy (PEG) tube placement    INTERVAL HISTORY: in no distress, resting in bed with eye closed, responds with eyes closed  	  MEDICATIONS:  MEDICATIONS  (STANDING):  atorvastatin 80 milliGRAM(s) Oral at bedtime  clonazePAM  Tablet 1 milliGRAM(s) Oral at bedtime  clopidogrel Tablet 75 milliGRAM(s) Oral daily  famotidine    Tablet 20 milliGRAM(s) Oral daily  folic acid 1 milliGRAM(s) Oral daily  insulin glargine Injectable (LANTUS) 10 Unit(s) SubCutaneous at bedtime  insulin lispro (ADMELOG) corrective regimen sliding scale   SubCutaneous three times a day before meals  insulin lispro (ADMELOG) corrective regimen sliding scale   SubCutaneous at bedtime  metoprolol succinate ER 50 milliGRAM(s) Oral daily  nystatin Powder 1 Application(s) Topical two times a day  QUEtiapine 400 milliGRAM(s) Oral at bedtime  risperiDONE   Tablet 2 milliGRAM(s) Oral at bedtime    MEDICATIONS  (PRN):  dextrose Oral Gel 15 Gram(s) Oral once PRN Blood Glucose LESS THAN 70 milliGRAM(s)/deciliter  oxycodone    5 mG/acetaminophen 325 mG 1 Tablet(s) Oral every 6 hours PRN Severe Pain (7 - 10)  zolpidem 5 milliGRAM(s) Oral at bedtime PRN Insomnia  zolpidem 5 milliGRAM(s) Oral at bedtime PRN Insomnia    Vitals:  T(F): 97.7 (24 @ 05:09), Max: 98.6 (24 @ 23:29)  HR: 91 (24 @ 05:09) (88 - 95)  BP: 127/49 (24 @ 05:09) (103/49 - 136/60)  RR: 16 (24 @ 05:09) (16 - 20)  SpO2: 94% (24 @ 05:09) (92% - 95%)     @ 07:01  -   @ 07:00  --------------------------------------------------------  IN:  Total IN: 0 mL    OUT:    Voided (mL): 300 mL  Total OUT: 300 mL    Total NET: -300 mL    PHYSICAL EXAM:  Neuro: Awake, responsive  CV: S1 S2 RRR + SM  Lungs: CTABL  GI: Soft, BS +, ND, NT  Extremities: dsg to both feet intact     RADIOLOGY: < from: CT Angio Lower Extremity w/ IV Cont, Bilateral (24 @ 14:00) >  *  AORTOILIAC INFLOW: No significant inflow disease.  *  RIGHT LEG: Diffusely calcified but patent common femoral and femoral   arteries. Suggestion of a focal stenosis in the popliteal artery above   the knee. Infrapopliteal runoff is heavily calcified limiting evaluation.  *  LEFT LEG: Diffusely calcified but patent common femoral and femoral   arteries. Suggestion of a focal stenosis in the popliteal artery above   the knee. Infrapopliteal runoff is heavily calcified limiting evaluation.    < end of copied text >    < from: Xray Chest 1 View- PORTABLE-Urgent (03.15.24 @ 13:09) >  IMPRESSION: No acute finding or change.    < end of copied text >    DIAGNOSTIC TESTING:    [x ] Echocardiogram:    < from: TTE Echo Complete w/o Contrast w/ Doppler (24 @ 14:01) >  Left Ventricle:  Global LV systolic function was mildly decreased. Left ventricular   ejection fraction, by visual estimation, is 40 to 45%.  Right Ventricle: The right ventricle was not well visualized.  Left Atrium: The left atrium is normal in size. Mildly enlarged left   atrium.  Pericardium: There is no evidence of pericardial effusion.  Mitral Valve: There is mild mitral annular calcification. Mild to   moderate mitral valve regurgitation is seen.  Tricuspid Valve: Structurally normal tricuspid valve, with normal leaflet   excursion. Moderate-severe tricuspid regurgitation is visualized.   Estimated pulmonary artery systolic pressure is 58.4 mmHg assuming a   right atrial pressure of 3mmHg, which is consistent with moderate   pulmonary hypertension.  Aortic Valve: The aortic valve is trileaflet. Sclerotic aortic valve with   normal opening. Moderate aortic valve regurgitation is seen.  Pulmonic Valve: The pulmonic valve was not well visualized.  Aorta: The aortic root is normal in size and structure.  Venous: The inferior vena cava was normal sized, with respiratory size   variation greater than 50%.      Summary:   1. Technically difficult study.   2. Mildly decreased global left ventricular systolic function.   3. Left ventricular ejection fraction, by visual estimation, is 40 to   45%.   4. Mildly enlarged left atrium.   5. Mild mitral annular calcification.   6. Mild to moderate mitral valve regurgitation.   7. Sclerotic aortic valve with normal opening.   8. Moderate aortic regurgitation.   9. Structurally normal tricuspid valve, with normal leaflet excursion.  10. Moderate-severe tricuspid regurgitation.  11. Estimated pulmonary artery systolic pressure is 58.4 mmHg assuming a   right atrial pressure of 3 mmHg, which is consistent with moderate   pulmonary hypertension.    < end of copied text >    [x ] Stress Test:  < from: NM Stress Test, Dual Isotope (24 @ 12:50) >    1. No scintigraphic evidence for myocardial infarct or ischemia.  Reduced   left ventricular ejection fraction may be due to a non-ischemic   cardiomyopathy.    2. There is normal left ventricular contractility, normal calculated   ejection fraction and normal myocardial thickening at rest. Overall post   stress ejection fraction was 32%    < end of copied text >    LABS:	 	    CARDIAC MARKERS:  Troponin I, High Sensitivity Result: 68.5 ng/L ( @ 06:08)  Troponin I, High Sensitivity Result: 55.4 ng/L ( @ 16:10)    22 Mar 2024 05:55    144    |  110    |  30     ----------------------------<  189    4.0     |  28     |  2.69   21 Mar 2024 06:08    143    |  108    |  22     ----------------------------<  290    4.0     |  26     |  1.32   20 Mar 2024 07:50    142    |  110    |  20     ----------------------------<  203    3.5     |  25     |  0.78     Ca    8.6        22 Mar 2024 05:55  Mg     2.1       21 Mar 2024 06:08    TPro  5.6    /  Alb  2.3    /  TBili  0.4    /  DBili  x      /  AST  18     /  ALT  20     /  AlkPhos  83     22 Mar 2024 05:55                        9.0    6.68  )-----------( 203      ( 22 Mar 2024 05:55 )             29.1 ,                       9.3    6.69  )-----------( 203      ( 21 Mar 2024 06:08 )             29.5 ,                       9.4    6.02  )-----------( 194      ( 20 Mar 2024 07:50 )             29.1

## 2024-03-22 NOTE — CONSULT NOTE ADULT - SUBJECTIVE AND OBJECTIVE BOX
Patient chart reviewed, full consult to follow.     MARY ALICE suspected multifactorial, contrast and NSAIDs related ATN;     D/C further Ketorolac     Will follow     Thank you for the courtesy of this consultation. [facility], [facility]  NEPHROLOGY AND HYPERTENSION  [address]  [address]  [place] [address]  [phone]    MD LIBERTAD AYALA MD YELENA ROSENBERG, MD BINNY KOSHY, MD CHRISTOPHER CAPUTO, MD EDWARD BOVER, MD      Information from chart:  "Patient is a 63y old  Female who presents with a chief complaint of PVD, Heel wounds, UTI (22 Mar 2024 10:49)    HPI:        63 year old female with PMHx of HTN, HLD, IDDM2, anxiety/depression, schizophrenia, and hx of CVA (with residual dysarthria and R. hemiparesis) who presented to the ED due to change in mental state. Daughter called EMS as she noted  pt more slow to respond to questions today. Pt also c/o pain to L foot and R foot where she has ulcerations for the past month. She also is noted to have generalized rash to her chest, face, and abdomen which she states has been present for about 1 week. She denies fever, chills, N/V/D, chest pain, or dyspnea. (15 Mar 2024 19:58)   "  Patient comfortable no distress.  Noted contrast study 3/20 coupled with Ketorolac IV administration 5 doses along with oral furosemide.     PAST MEDICAL & SURGICAL HISTORY:  Anxiety      Hypertension      Hypercholesteremia      Insulin dependent type 2 diabetes mellitus      Schizophrenia      History of CVA with residual deficit      H/O tubal ligation      H/O hemorrhoidectomy      H/O  section      S/P percutaneous endoscopic gastrostomy (PEG) tube placement        FAMILY HISTORY:  FH: type 2 diabetes      Allergies    erythromycin (Unknown)    Intolerances      Home Medications:  Bactrim  mg-160 mg oral tablet: 1 tab(s) orally 3 times a week (15 Mar 2024 22:04)  clopidogrel 75 mg oral tablet: 1 tab(s) orally once a day (15 Mar 2024 22:04)  famotidine 20 mg oral tablet: 1 tab(s) orally once a day (15 Mar 2024 22:04)  folic acid 1 mg oral tablet: 1 tab(s) orally once a day (15 Mar 2024 22:04)  furosemide 20 mg oral tablet: 1 tab(s) orally once a day (15 Mar 2024 22:04)  HumuLIN R 100 units/mL injectable solution: 6 unit(s) injectable 3 times a day WITH MEALS (15 Mar 2024 22:04)  KlonoPIN 1 mg oral tablet: 1 tab(s) orally once a day (at bedtime) (2023 14:07)  Lantus Solostar Pen 100 units/mL subcutaneous solution: 18 unit(s) subcutaneous once a day (at bedtime) (2023 13:00)  metoprolol tartrate 50 mg oral tablet: 1 tab(s) orally once a day (2023 14:02)  Potassium Chloride (Eqv-Klor-Con M20) 20 mEq oral tablet, extended release: 1 tab(s) orally once a day (2023 14:01)  predniSONE 10 mg oral tablet: 1 tab(s) orally once a day (2023 14:00)  QUEtiapine 400 mg oral tablet: 1 tab(s) orally once a day (at bedtime) (2023 13:59)  risperiDONE 2 mg oral tablet: 1 tab(s) orally once a day (at bedtime) (2023 13:55)  Vitamin C 500 mg oral tablet: 1 tab(s) orally once a day (2023 14:06)  zolpidem 10 mg oral tablet: 1 tab(s) orally once a day (at bedtime) (2023 14:06)    MEDICATIONS  (STANDING):  atorvastatin 80 milliGRAM(s) Oral at bedtime  clonazePAM  Tablet 1 milliGRAM(s) Oral at bedtime  clopidogrel Tablet 75 milliGRAM(s) Oral daily  dextrose 5%. 1000 milliLiter(s) (100 mL/Hr) IV Continuous <Continuous>  dextrose 5%. 1000 milliLiter(s) (50 mL/Hr) IV Continuous <Continuous>  dextrose 50% Injectable 25 Gram(s) IV Push once  dextrose 50% Injectable 12.5 Gram(s) IV Push once  dextrose 50% Injectable 25 Gram(s) IV Push once  famotidine    Tablet 20 milliGRAM(s) Oral daily  folic acid 1 milliGRAM(s) Oral daily  glucagon  Injectable 1 milliGRAM(s) IntraMuscular once  heparin   Injectable 5000 Unit(s) SubCutaneous every 8 hours  insulin glargine Injectable (LANTUS) 10 Unit(s) SubCutaneous at bedtime  insulin lispro (ADMELOG) corrective regimen sliding scale   SubCutaneous three times a day before meals  insulin lispro (ADMELOG) corrective regimen sliding scale   SubCutaneous at bedtime  metoprolol succinate ER 50 milliGRAM(s) Oral daily  nystatin Powder 1 Application(s) Topical two times a day  QUEtiapine 400 milliGRAM(s) Oral at bedtime  risperiDONE   Tablet 2 milliGRAM(s) Oral at bedtime    MEDICATIONS  (PRN):  dextrose Oral Gel 15 Gram(s) Oral once PRN Blood Glucose LESS THAN 70 milliGRAM(s)/deciliter  oxycodone    5 mG/acetaminophen 325 mG 1 Tablet(s) Oral every 6 hours PRN Severe Pain (7 - 10)  zolpidem 5 milliGRAM(s) Oral at bedtime PRN Insomnia  zolpidem 5 milliGRAM(s) Oral at bedtime PRN Insomnia    Vital Signs Last 24 Hrs  T(C): 36.3 (22 Mar 2024 17:17), Max: 37 (21 Mar 2024 23:29)  T(F): 97.4 (22 Mar 2024 17:17), Max: 98.6 (21 Mar 2024 23:29)  HR: 87 (22 Mar 2024 17:17) (87 - 95)  BP: 136/78 (22 Mar 2024 17:17) (109/53 - 136/78)  BP(mean): --  RR: 18 (22 Mar 2024 17:17) (16 - 20)  SpO2: 94% (22 Mar 2024 17:17) (92% - 98%)    Parameters below as of 22 Mar 2024 17:17  Patient On (Oxygen Delivery Method): room air        Daily     Daily     24 @ 07:  -  24 @ 07:00  --------------------------------------------------------  IN: 0 mL / OUT: 300 mL / NET: -300 mL    24 @ 07:01  -  24 @ 17:28  --------------------------------------------------------  IN: 0 mL / OUT: 150 mL / NET: -150 mL      CAPILLARY BLOOD GLUCOSE      POCT Blood Glucose.: 295 mg/dL (22 Mar 2024 16:04)  POCT Blood Glucose.: 188 mg/dL (22 Mar 2024 11:06)  POCT Blood Glucose.: 217 mg/dL (22 Mar 2024 07:27)  POCT Blood Glucose.: 235 mg/dL (21 Mar 2024 21:29)    PHYSICAL EXAM:      T(C): 36.3 (24 @ 17:17), Max: 37 (24 @ 23:29)  HR: 87 (24 @ 17:17) (87 - 95)  BP: 136/78 (24 @ 17:17) (109/53 - 136/78)  RR: 18 (24 @ 17:17) (16 - 20)  SpO2: 94% (24 @ 17:17) (92% - 98%)  Wt(kg): --  Lungs clear  Heart S1S2  Abd soft NT ND  Extremities:   tr edema                  144  |  110<H>  |  30<H>  ----------------------------<  189<H>  4.0   |  28  |  2.69<H>    Ca    8.6      22 Mar 2024 05:55  Mg     2.1         TPro  5.6<L>  /  Alb  2.3<L>  /  TBili  0.4  /  DBili  x   /  AST  18  /  ALT  20  /  AlkPhos  83                            9.0    6.68  )-----------(       ( 22 Mar 2024 05:55 )             29.1     Creatinine Trend: 2.69<--, 1.32<--, 0.78<--, 1.14<--, 1.02<--  Urinalysis Basic - ( 22 Mar 2024 10:41 )    Color: Yellow / Appearance: Turbid / SG: >1.030 / pH: x  Gluc: x / Ketone: Trace mg/dL  / Bili: Negative / Urobili: 1.0 mg/dL   Blood: x / Protein: >=1000 mg/dL / Nitrite: Negative   Leuk Esterase: Moderate / RBC: Too Numerous to count /HPF / WBC 10 /HPF   Sq Epi: x / Non Sq Epi: x / Bacteria: Too Numerous to count /HPF      Urinalysis (24 @ 10:41)    pH Urine: 6.0   Glucose Qualitative, Urine: Negative mg/dL   Blood, Urine: Large   Color: Yellow   Urine Appearance: Turbid   Bilirubin: Negative   Ketone - Urine: Trace mg/dL   Specific Gravity: >1.030   Protein, Urine: >=1000 mg/dL   Urobilinogen: 1.0 mg/dL   Nitrite: Negative   Leukocyte Esterase Concentration: Moderate          Assessment   MARY ALICE suspected multifactorial, pre renal azotemia, NSAIDs and diuretic effect; risk for iodinated contrast related ATN;  R/O underlying HTN and diabetic nephrosclerosis vs primary GN ( membranous, JOSE MARIA, FSGS,) vs paraprotein related proteinuria     Plan  D/C further Ketorolac and Lasix  Empiric IVF challenge   Urine prot: creatinine ratio  Paraprotein screen   Antiphospholipase A2 r AB  Will follow     Feliz Santos MD                Will follow     Thank you for the courtesy of this consultation.

## 2024-03-22 NOTE — PROGRESS NOTE ADULT - ASSESSMENT
63 year old female with history of HTN, HLD, IDDM2, anxiety/depression, schizophrenia, and hx of CVA (with residual dysarthria and R. hemiparesis) who presented to the ED due to change in mental state. Pt noted to have b/l foot wounds and is being admitted for further work up. Pt also being admitted for UTI and metabolic encephalopathy.    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 w/ severe PVD  - appreciate Podiatry consult - planning on local wound care vs left foot partial 2nd ray resection pending vasc recs  - ABIs couldn't be obtained and US showed extensive bilateral plaque with diffuse low velocity monophasic flow throughout both legs w/ focally elevated velocities in the right popliteal and right mid posterior tibial arteries suggestive of stenoses  - CTA aorta with b/l LE runoff as per vascular  - pain control  - stress test for pre op optimization, nonischemic, shows EF 32%, f/u further cardiac recs     #MARY ALICE  - MARY ALICE to 2.69 from 1.32 and 0.78  - likely 2/2 to LOLLY, NSAID use, will dc other nephrotoxic agents ( dc lasix, vit c lovenox nsaids  - nephrology consulted, f/u recs   - hold off IVF given EF 32%    UTI   - changed Rocephin to keflex   - urine culture grew E. coli  - completed course of abx     metabolic encephalopathy  - may be due to UTI  - CT head showed chronic bilateral basal ganglia infarctions and a chronic left corona radiata infarction w/ mild to moderate chronic microvascular changes without evidence of an acute transcortical infarction or hemorrhage  - improved, patient likely at baseline  - as per psych, patient likely never had Schizophrenia and likely has a Mood Disorder/Anxiety spectrum symptoms with or without soft psychotic sxs w/ significant cerebral Vascular Disease, multiple CVAs and Neurocognitive deficits   - as of today, the patient does not appear to have decision making capacity in regards to surgery       Severe Truncal rash  - unclear etiology ? contact dermatitis, fungal etiology or exanthem     HTN  - c/w Toprol XL    HLD  - c/w atorvastatin    DM2  - c/w Lantus and SSI  - Hgb A1C is 8.8    Hx of Schizophrenia  - c/w Klonopin, risperidone & quetiapine  - as per psych, patient likely never had Schizophrenia and likely has a Mood Disorder/Anxiety spectrum symptoms with or without soft psychotic sxs w/ significant cerebral Vascular Disease, multiple CVAs and Neurocognitive deficits   - as of today, the patient does not appear to have decision making capacity in regards to surgery     Hx of CVA (with residual dysarthria and R. hemiparesis)  - c/w clopidogrel & atorvastatin     Prophylaxis:  DVT: HSQ TID  GI: Pepcid

## 2024-03-22 NOTE — PROGRESS NOTE ADULT - SUBJECTIVE AND OBJECTIVE BOX
Patient is a 63y old  Female who presents with a chief complaint of PVD, Heel wounds, UTI (22 Mar 2024 09:33)       INTERVAL HPI/OVERNIGHT EVENTS:  Patient seen and evaluated at bedside.  Pt is resting comfortable in NAD. Denies N/V/F/C.    Allergies    erythromycin (Unknown)    Intolerances        Vital Signs Last 24 Hrs  T(C): 36.5 (22 Mar 2024 05:09), Max: 37 (21 Mar 2024 23:29)  T(F): 97.7 (22 Mar 2024 05:09), Max: 98.6 (21 Mar 2024 23:29)  HR: 91 (22 Mar 2024 05:09) (91 - 95)  BP: 127/49 (22 Mar 2024 05:09) (127/49 - 136/60)  BP(mean): --  RR: 16 (22 Mar 2024 05:09) (16 - 20)  SpO2: 94% (22 Mar 2024 05:09) (92% - 95%)    Parameters below as of 22 Mar 2024 05:09  Patient On (Oxygen Delivery Method): room air        LABS:                        9.0    6.68  )-----------( 203      ( 22 Mar 2024 05:55 )             29.1     03-22    144  |  110<H>  |  30<H>  ----------------------------<  189<H>  4.0   |  28  |  2.69<H>    Ca    8.6      22 Mar 2024 05:55  Mg     2.1     03-21    TPro  5.6<L>  /  Alb  2.3<L>  /  TBili  0.4  /  DBili  x   /  AST  18  /  ALT  20  /  AlkPhos  83  03-22      Urinalysis Basic - ( 22 Mar 2024 05:55 )    Color: x / Appearance: x / SG: x / pH: x  Gluc: 189 mg/dL / Ketone: x  / Bili: x / Urobili: x   Blood: x / Protein: x / Nitrite: x   Leuk Esterase: x / RBC: x / WBC x   Sq Epi: x / Non Sq Epi: x / Bacteria: x      CAPILLARY BLOOD GLUCOSE      POCT Blood Glucose.: 217 mg/dL (22 Mar 2024 07:27)  POCT Blood Glucose.: 235 mg/dL (21 Mar 2024 21:29)  POCT Blood Glucose.: 177 mg/dL (21 Mar 2024 16:26)  POCT Blood Glucose.: 180 mg/dL (21 Mar 2024 13:23)      Lower Extremity Physical Exam:  Vascular: DP/PT 0/4, B/L, CFT <3seconds B/L, Temperature gradient warm to cool, B/L.   Neuro: Unable to assess  Musculoskeletal/Ortho: unremarkable  Skin: Right foot posterior lateral heel well adhered eschar, no drainage, no bogginess, no fluctuance, no malodor, no tracking or tunneling, no acute signs of infection. Left foot hallux distal tuft and medial 2nd digit medial to plantar dry gangrene with early ischemic changes, no drainage, no bogginess, no fluctuance, no malodor, no tracking or tunneling, no acute signs of infection.    RADIOLOGY & ADDITIONAL TESTS:

## 2024-03-23 LAB
ANION GAP SERPL CALC-SCNC: 9 MMOL/L — SIGNIFICANT CHANGE UP (ref 5–17)
APPEARANCE UR: ABNORMAL
APTT BLD: 35.6 SEC — SIGNIFICANT CHANGE UP (ref 24.5–35.6)
BACTERIA # UR AUTO: ABNORMAL /HPF
BASOPHILS # BLD AUTO: 0.02 K/UL — SIGNIFICANT CHANGE UP (ref 0–0.2)
BASOPHILS NFR BLD AUTO: 0.3 % — SIGNIFICANT CHANGE UP (ref 0–2)
BILIRUB UR-MCNC: ABNORMAL
BUN SERPL-MCNC: 39 MG/DL — HIGH (ref 7–23)
CALCIUM SERPL-MCNC: 8 MG/DL — LOW (ref 8.5–10.1)
CHLORIDE SERPL-SCNC: 103 MMOL/L — SIGNIFICANT CHANGE UP (ref 96–108)
CO2 SERPL-SCNC: 24 MMOL/L — SIGNIFICANT CHANGE UP (ref 22–31)
COLOR SPEC: ABNORMAL
CREAT ?TM UR-MCNC: 125 MG/DL — SIGNIFICANT CHANGE UP
CREAT SERPL-MCNC: 3.96 MG/DL — HIGH (ref 0.5–1.3)
DIFF PNL FLD: ABNORMAL
EGFR: 12 ML/MIN/1.73M2 — LOW
EOSINOPHIL # BLD AUTO: 0.33 K/UL — SIGNIFICANT CHANGE UP (ref 0–0.5)
EOSINOPHIL NFR BLD AUTO: 4.7 % — SIGNIFICANT CHANGE UP (ref 0–6)
EPI CELLS # UR: PRESENT
GLUCOSE BLDC GLUCOMTR-MCNC: 114 MG/DL — HIGH (ref 70–99)
GLUCOSE BLDC GLUCOMTR-MCNC: 127 MG/DL — HIGH (ref 70–99)
GLUCOSE BLDC GLUCOMTR-MCNC: 197 MG/DL — HIGH (ref 70–99)
GLUCOSE BLDC GLUCOMTR-MCNC: 207 MG/DL — HIGH (ref 70–99)
GLUCOSE SERPL-MCNC: 199 MG/DL — HIGH (ref 70–99)
GLUCOSE UR QL: NEGATIVE MG/DL — SIGNIFICANT CHANGE UP
HCT VFR BLD CALC: 27.2 % — LOW (ref 34.5–45)
HCT VFR BLD CALC: 27.3 % — LOW (ref 34.5–45)
HGB BLD-MCNC: 8.6 G/DL — LOW (ref 11.5–15.5)
HGB BLD-MCNC: 9 G/DL — LOW (ref 11.5–15.5)
IMM GRANULOCYTES NFR BLD AUTO: 0.4 % — SIGNIFICANT CHANGE UP (ref 0–0.9)
INR BLD: 0.85 RATIO — SIGNIFICANT CHANGE UP (ref 0.85–1.18)
KETONES UR-MCNC: NEGATIVE MG/DL — SIGNIFICANT CHANGE UP
LEUKOCYTE ESTERASE UR-ACNC: ABNORMAL
LYMPHOCYTES # BLD AUTO: 1.78 K/UL — SIGNIFICANT CHANGE UP (ref 1–3.3)
LYMPHOCYTES # BLD AUTO: 25.5 % — SIGNIFICANT CHANGE UP (ref 13–44)
MAGNESIUM SERPL-MCNC: 2.3 MG/DL — SIGNIFICANT CHANGE UP (ref 1.6–2.6)
MCHC RBC-ENTMCNC: 30.8 PG — SIGNIFICANT CHANGE UP (ref 27–34)
MCHC RBC-ENTMCNC: 31.5 PG — SIGNIFICANT CHANGE UP (ref 27–34)
MCHC RBC-ENTMCNC: 31.6 G/DL — LOW (ref 32–36)
MCHC RBC-ENTMCNC: 33 G/DL — SIGNIFICANT CHANGE UP (ref 32–36)
MCV RBC AUTO: 95.5 FL — SIGNIFICANT CHANGE UP (ref 80–100)
MCV RBC AUTO: 97.5 FL — SIGNIFICANT CHANGE UP (ref 80–100)
MONOCYTES # BLD AUTO: 0.51 K/UL — SIGNIFICANT CHANGE UP (ref 0–0.9)
MONOCYTES NFR BLD AUTO: 7.3 % — SIGNIFICANT CHANGE UP (ref 2–14)
NEUTROPHILS # BLD AUTO: 4.3 K/UL — SIGNIFICANT CHANGE UP (ref 1.8–7.4)
NEUTROPHILS NFR BLD AUTO: 61.8 % — SIGNIFICANT CHANGE UP (ref 43–77)
NITRITE UR-MCNC: POSITIVE
NRBC # BLD: 0 /100 WBCS — SIGNIFICANT CHANGE UP (ref 0–0)
NRBC # BLD: 0 /100 WBCS — SIGNIFICANT CHANGE UP (ref 0–0)
OSMOLALITY UR: 486 MOSM/KG — SIGNIFICANT CHANGE UP (ref 50–1200)
PH UR: 6 — SIGNIFICANT CHANGE UP (ref 5–8)
PHOSPHATE SERPL-MCNC: 5.3 MG/DL — HIGH (ref 2.5–4.5)
PLATELET # BLD AUTO: 203 K/UL — SIGNIFICANT CHANGE UP (ref 150–400)
PLATELET # BLD AUTO: 209 K/UL — SIGNIFICANT CHANGE UP (ref 150–400)
POTASSIUM SERPL-MCNC: 4.2 MMOL/L — SIGNIFICANT CHANGE UP (ref 3.5–5.3)
POTASSIUM SERPL-SCNC: 4.2 MMOL/L — SIGNIFICANT CHANGE UP (ref 3.5–5.3)
POTASSIUM UR-SCNC: 75.2 MMOL/L — SIGNIFICANT CHANGE UP
PROT ?TM UR-MCNC: 726 MG/DL — HIGH (ref 0–12)
PROT SERPL-MCNC: 4.7 G/DL — LOW (ref 6–8.3)
PROT UR-MCNC: >=1000 MG/DL
PROT/CREAT UR-RTO: 5.8 RATIO — HIGH (ref 0–0.2)
PROTHROM AB SERPL-ACNC: 10.3 SEC — SIGNIFICANT CHANGE UP (ref 9.5–13)
RBC # BLD: 2.79 M/UL — LOW (ref 3.8–5.2)
RBC # BLD: 2.86 M/UL — LOW (ref 3.8–5.2)
RBC # FLD: 17.2 % — HIGH (ref 10.3–14.5)
RBC # FLD: 17.3 % — HIGH (ref 10.3–14.5)
RBC CASTS # UR COMP ASSIST: ABNORMAL /HPF
SODIUM SERPL-SCNC: 136 MMOL/L — SIGNIFICANT CHANGE UP (ref 135–145)
SODIUM UR-SCNC: 65 MMOL/L — SIGNIFICANT CHANGE UP
SP GR SPEC: 1.02 — SIGNIFICANT CHANGE UP (ref 1–1.03)
UROBILINOGEN FLD QL: 0.2 MG/DL — SIGNIFICANT CHANGE UP (ref 0.2–1)
UUN UR-MCNC: 233 MG/DL — SIGNIFICANT CHANGE UP
WBC # BLD: 6.97 K/UL — SIGNIFICANT CHANGE UP (ref 3.8–10.5)
WBC # BLD: 7.61 K/UL — SIGNIFICANT CHANGE UP (ref 3.8–10.5)
WBC # FLD AUTO: 6.97 K/UL — SIGNIFICANT CHANGE UP (ref 3.8–10.5)
WBC # FLD AUTO: 7.61 K/UL — SIGNIFICANT CHANGE UP (ref 3.8–10.5)
WBC UR QL: 20 /HPF — HIGH (ref 0–5)

## 2024-03-23 PROCEDURE — 99232 SBSQ HOSP IP/OBS MODERATE 35: CPT

## 2024-03-23 RX ORDER — SODIUM CHLORIDE 9 MG/ML
1000 INJECTION INTRAMUSCULAR; INTRAVENOUS; SUBCUTANEOUS
Refills: 0 | Status: DISCONTINUED | OUTPATIENT
Start: 2024-03-23 | End: 2024-03-24

## 2024-03-23 RX ORDER — OLANZAPINE 15 MG/1
2.5 TABLET, FILM COATED ORAL ONCE
Refills: 0 | Status: COMPLETED | OUTPATIENT
Start: 2024-03-23 | End: 2024-03-23

## 2024-03-23 RX ORDER — CEFTRIAXONE 500 MG/1
1000 INJECTION, POWDER, FOR SOLUTION INTRAMUSCULAR; INTRAVENOUS EVERY 24 HOURS
Refills: 0 | Status: DISCONTINUED | OUTPATIENT
Start: 2024-03-23 | End: 2024-03-27

## 2024-03-23 RX ADMIN — NYSTATIN CREAM 1 APPLICATION(S): 100000 CREAM TOPICAL at 17:51

## 2024-03-23 RX ADMIN — SODIUM CHLORIDE 100 MILLILITER(S): 9 INJECTION, SOLUTION INTRAVENOUS at 05:50

## 2024-03-23 RX ADMIN — Medication 50 MILLIGRAM(S): at 05:50

## 2024-03-23 RX ADMIN — INSULIN GLARGINE 10 UNIT(S): 100 INJECTION, SOLUTION SUBCUTANEOUS at 21:39

## 2024-03-23 RX ADMIN — CLOPIDOGREL BISULFATE 75 MILLIGRAM(S): 75 TABLET, FILM COATED ORAL at 11:40

## 2024-03-23 RX ADMIN — Medication 1 MILLIGRAM(S): at 11:40

## 2024-03-23 RX ADMIN — SODIUM CHLORIDE 60 MILLILITER(S): 9 INJECTION INTRAMUSCULAR; INTRAVENOUS; SUBCUTANEOUS at 21:39

## 2024-03-23 RX ADMIN — Medication 2: at 08:18

## 2024-03-23 RX ADMIN — OLANZAPINE 2.5 MILLIGRAM(S): 15 TABLET, FILM COATED ORAL at 23:34

## 2024-03-23 RX ADMIN — NYSTATIN CREAM 1 APPLICATION(S): 100000 CREAM TOPICAL at 05:51

## 2024-03-23 RX ADMIN — Medication 4: at 11:38

## 2024-03-23 RX ADMIN — HEPARIN SODIUM 5000 UNIT(S): 5000 INJECTION INTRAVENOUS; SUBCUTANEOUS at 05:49

## 2024-03-23 RX ADMIN — CEFTRIAXONE 100 MILLIGRAM(S): 500 INJECTION, POWDER, FOR SOLUTION INTRAMUSCULAR; INTRAVENOUS at 18:32

## 2024-03-23 NOTE — PROGRESS NOTE ADULT - SUBJECTIVE AND OBJECTIVE BOX
Cliff Alfredo   1939   [id number]       06/09/2023     Chief Complaint   Patient presents with   • Follow-up     Leg pain        HPI   This is an 83-year-old female referred by Dr. Wesley Sanon for symptoms of right leg pain, she initially had pain into the left leg however those symptoms resolved.  Her symptoms started approximately 1 month ago after she lifted some groceries and have not let up.  She endorses pain with walking and bearing weight on the right foot.  The patient had previously seen Dr. Morley in 2021 with pain in the right shin and foot and occasionally into the entire leg.  She was treated with gabapentin and physical therapy and seemed to be doing fairly well.  She took her old prescription of Neurontin for a few days which we did not provide any relief.    Chronic Illnesses:  Degenerative osteoarthritis  Past Medical History:  No date: Aortic stenosis  No date: Back problem  No date: Bronchitis  No date: Colon polyps  No date: CECI (generalized anxiety disorder)  No date: Headache  No date: High cholesterol  No date: HLD (hyperlipidemia)  No date: Hx of bronchitis  No date: Low back pain  No date: Osteoarthritis of knees, bilateral  No date: Seasonal allergies     Past Surgical History:   Procedure Laterality Date   • APPENDECTOMY  1945   • BREAST BIOPSY Right 2002   • COLONOSCOPY  2012   • HYSTERECTOMY  1969        No Known Allergies       Current Outpatient Medications:   •  Ascorbic Acid (VITAMIN C PO), Take  by mouth Daily., Disp: , Rfl:   •  Cyanocobalamin (VITAMIN B-12 PO), Take  by mouth Daily., Disp: , Rfl:   •  FOLIC ACID PO, Take  by mouth Daily., Disp: , Rfl:   •  psyllium (METAMUCIL) 0.52 g capsule, Metamucil 0.52 gram capsule  Take 2 capsules every day by oral route., Disp: , Rfl:   •  rosuvastatin (CRESTOR) 40 MG tablet, rosuvastatin 40 mg tablet  1 hs, Disp: , Rfl:   •  sertraline (ZOLOFT) 50 MG tablet, Daily., Disp: , Rfl:   •  VITAMIN D PO, Take  by mouth Daily., Disp:  , Rfl:   •  VITAMIN E PO, Take  by mouth Daily., Disp: , Rfl:   •  zolpidem (AMBIEN) 5 MG tablet, every night at bedtime., Disp: , Rfl:   •  traMADol (ULTRAM) 50 MG tablet, Take 1 tablet by mouth Every 6 (Six) Hours As Needed for Moderate Pain or Severe Pain., Disp: 20 tablet, Rfl: 0     Social History     Socioeconomic History   • Marital status:      Spouse name: N/A   • Number of children: 3   • Years of education: College   Tobacco Use   • Smoking status: Never   • Smokeless tobacco: Never   Vaping Use   • Vaping Use: Never used   Substance and Sexual Activity   • Alcohol use: No   • Drug use: No   • Sexual activity: Defer     Partners: Male     Comment:         family history includes Arthritis in her father; Cancer in her brother and mother; Colon cancer in her brother; Diabetes in her brother; Heart attack in her brother; Heart disease in her brother; Hyperlipidemia in her mother; Hypertension in her brother; Stroke in her mother; Subarachnoid hemorrhage in her father.     Social History    Tobacco Use      Smoking status: Never      Smokeless tobacco: Never       Gait & Balance Assessment :  Risk assessment for falls. Fall precautions.  universal fall precautions, such as;   • Using gait aids a cane, walker at the appropriate height at all times for ambulation or if necessary a wheelchair  • Removing all area rugs and coffee tables to create a safe environment at home  • Ensure clean, dry floors  • Wearing supportive footwear and properly fitting clothing  • Ensure bed/chair is appropriate height and patient's feet can touch the floor  • Using a shower transfer bench  • Using walk-in shower and having shower safety bars installed  • Ensure proper lighting, minimize glare  • Have nightlights operational and in use  • Participation in an exercise program for gait training, balance training and strength  • Avoid carrying laundry up and down steps  • Ensure proper compliance and organization of  Resting, comfortable on RA    Vital Signs Last 24 Hrs  T(C): 36.9 (24 @ 16:52), Max: 37 (24 @ 10:50)  T(F): 98.5 (24 @ 16:52), Max: 98.6 (24 @ 10:50)  HR: 90 (24 @ 16:52) (85 - 91)  BP: 113/65 (24 @ 16:52) (113/65 - 142/63)  RR: 19 (24 @ 16:52) (17 - 19)  SpO2: 95% (24 @ 16:52) (94% - 97%)    I&O's Detail    22 Mar 2024 07:01  -  23 Mar 2024 07:00  --------------------------------------------------------  IN:    Oral Fluid: 480 mL    sodium chloride 0.45%: 1000 mL  Total IN: 1480 mL    OUT:    Voided (mL): 250 mL  Total OUT: 250 mL    Lungs b/l air entry  Heart S1S2  Abd soft ND  Extremities: no edema                        9.0    7.61  )-----------( 209      ( 23 Mar 2024 16:40 )             27.3     23 Mar 2024 08:05    136    |  103    |  39     ----------------------------<  199    4.2     |  24     |  3.96     Ca    8.0        23 Mar 2024 08:05  Phos  5.3       23 Mar 2024 08:05  Mg     2.3       23 Mar 2024 08:05    TPro  4.7    /  Alb  x      /  TBili  x      /  DBili  x      /  AST  x      /  ALT  x      /  AlkPhos  x      23 Mar 2024 08:05    LIVER FUNCTIONS - ( 23 Mar 2024 08:05 )  Alb: x     / Pro: 4.7 g/dL / ALK PHOS: x     / ALT: x     / AST: x     / GGT: x           PT/INR - ( 23 Mar 2024 16:40 )   PT: 10.3 sec;   INR: 0.85 ratio      Urinalysis Basic - ( 23 Mar 2024 16:10 )    Color: Red / Appearance: Cloudy / S.019 / pH: x  Gluc: x / Ketone: Negative mg/dL  / Bili: Small / Urobili: 0.2 mg/dL   Blood: x / Protein: >=1000 mg/dL / Nitrite: Positive   Leuk Esterase: Large / RBC: Too Numerous to count /HPF / WBC 20 /HPF   Sq Epi: x / Non Sq Epi: x / Bacteria: Many /HPF    atorvastatin 80 milliGRAM(s) Oral at bedtime  cefTRIAXone   IVPB 1000 milliGRAM(s) IV Intermittent every 24 hours  clopidogrel Tablet 75 milliGRAM(s) Oral daily  dextrose 5%. 1000 milliLiter(s) IV Continuous <Continuous>  dextrose 5%. 1000 milliLiter(s) IV Continuous <Continuous>  dextrose 50% Injectable 25 Gram(s) IV Push once  dextrose 50% Injectable 12.5 Gram(s) IV Push once  dextrose 50% Injectable 25 Gram(s) IV Push once  dextrose Oral Gel 15 Gram(s) Oral once PRN  folic acid 1 milliGRAM(s) Oral daily  glucagon  Injectable 1 milliGRAM(s) IntraMuscular once  insulin glargine Injectable (LANTUS) 10 Unit(s) SubCutaneous at bedtime  insulin lispro (ADMELOG) corrective regimen sliding scale   SubCutaneous three times a day before meals  insulin lispro (ADMELOG) corrective regimen sliding scale   SubCutaneous at bedtime  metoprolol succinate ER 50 milliGRAM(s) Oral daily  nystatin Powder 1 Application(s) Topical two times a day  QUEtiapine 400 milliGRAM(s) Oral at bedtime  risperiDONE   Tablet 2 milliGRAM(s) Oral at bedtime  sodium chloride 0.9%. 1000 milliLiter(s) IV Continuous <Continuous>    Assessment/Plan:    CM, EF 40 - 45%, MR, AR, TR  Poorly controlled DM  S/p CT w/IV dye 3/20/24 (Cr 0.78 prior to CT)  Contrast MARY ALICE exacerbated by Lasix, Toradol (both d/c-d now)  Likely underlying DM renal disease given proteinuria on adm   Gentle IVF  F/u BMP, UO  Avoid nephrotoxins  Prognosis is guarded    [phone]       "medications to avoid errors   • Avoid use of over the counter sedatives and alcohol consumption  • Ensure easy access to call bell, glasses, TV control, telephone  • Ensure glasses/hearing aids are in use or close by (on top of night table)     Body mass index is 32.46 kg/m².   BMI is >= 30 and <35. (Class 1 Obesity). The following options were offered after discussion;: referral to primary care       /70 (BP Location: Right arm, Patient Position: Sitting, Cuff Size: Adult)   Temp 97.8 °F (36.6 °C) (Infrared)   Ht 152.4 cm (60\")   Wt 75.4 kg (166 lb 3.2 oz)   BMI 32.46 kg/m²    Physical Examination:  HEENT-wnl  Lungs-No wheezing or SOB      Neurologic Exam   Patient is alert and oriented.  Pupils are equal and reactive.  Visual fields are full.  EOMI without nystagmus.  Antalgic gait favoring the right leg.  Reflexes not elicited in the lower extremities.  No focal weakness in the lower extremities      Radiological Data Review:  No new studies to review today    Assessment and Plan:  Diagnoses and all orders for this visit:    1. Lumbar disc herniation with radiculopathy (Primary)  -     MRI Lumbar Spine Without Contrast; Future  -     XR Spine Lumbar AP & Lateral With Flex & Ext; Future  -     traMADol (ULTRAM) 50 MG tablet; Take 1 tablet by mouth Every 6 (Six) Hours As Needed for Moderate Pain or Severe Pain.  Dispense: 20 tablet; Refill: 0  -     Ambulatory Referral to Physical Therapy Evaluate and treat    2. DDD (degenerative disc disease), lumbar  -     MRI Lumbar Spine Without Contrast; Future  -     XR Spine Lumbar AP & Lateral With Flex & Ext; Future  -     traMADol (ULTRAM) 50 MG tablet; Take 1 tablet by mouth Every 6 (Six) Hours As Needed for Moderate Pain or Severe Pain.  Dispense: 20 tablet; Refill: 0  -     Ambulatory Referral to Physical Therapy Evaluate and treat    3. Lumbar radiculopathy    4. Chronic bilateral low back pain with right-sided sciatica    This is a 89-year-old female with " known degenerative disc disease and degenerative changes at L4-5.  She lifted some heavy groceries and is since then she has had back and ongoing right leg pain.  I am providing tramadol for pain control and proceeding with an MRI Of the lumbar spine as well as flexion-extension x-rays.  Have also made rehabilitation for her to attend physical therapy and she is in agreement.  We will see her back in the office with her new diagnostic studies.    I spent 30 minutes caring for Cliff Alfredo on 06/16/23.  This time includes activities preparing for the visit, reviewing test, reviewing history and performing an appropriate examination.  Discussing with the patient and reviewing and independently interpreting the diagnostic studies, communicating that information to the patient along with discussing care coordination and referrals if needed and documenting information in the medical records.    Any copied data from previous notes included in the (1) HPI, (2) PE, (3) MDM and/or assessment and plan has been reviewed and is accurate as of this date.  Holley Chavarria, PAC      PCP:  Bessie Torrez PA

## 2024-03-23 NOTE — PROGRESS NOTE ADULT - ASSESSMENT
63 year old female with history of HTN, HLD, IDDM2, anxiety/depression, schizophrenia, and hx of CVA (with residual dysarthria and R. hemiparesis) who presented to the ED due to change in mental state. Pt noted to have b/l foot wounds and is being admitted for further work up. Pt also being admitted for UTI and metabolic encephalopathy.    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 w/ severe PVD  - appreciate Podiatry consult - planning on local wound care vs left foot partial 2nd ray resection pending vasc recs  - ABIs couldn't be obtained and US showed extensive bilateral plaque with diffuse low velocity monophasic flow throughout both legs w/ focally elevated velocities in the right popliteal and right mid posterior tibial arteries suggestive of stenoses  - CTA aorta with b/l LE runoff as per vascular  - pain control  - stress test for pre op optimization, nonischemic, shows EF 32%, f/u further cardiac recs     #MARY ALICE  - MARY ALICE with worsening Scr now 3.96  - likely 2/2 to LOLLY, NSAID use, will dc other nephrotoxic agents ( dc lasix, vit c lovenox nsaids )  - nephrology consulted, f/u recs     UTI   - changed Rocephin to keflex   - urine culture grew E. coli  - completed course of abx     metabolic encephalopathy  - may be due to UTI  - CT head showed chronic bilateral basal ganglia infarctions and a chronic left corona radiata infarction w/ mild to moderate chronic microvascular changes without evidence of an acute transcortical infarction or hemorrhage  - as per psych, patient likely never had Schizophrenia and likely has a Mood Disorder/Anxiety spectrum symptoms with or without soft psychotic sxs w/ significant cerebral Vascular Disease, multiple CVAs and Neurocognitive deficits   - as of today, the patient does not appear to have decision making capacity in regards to surgery     Severe Truncal rash  - unclear etiology ? contact dermatitis, fungal etiology or exanthem     HTN  - c/w Toprol XL    HLD  - c/w atorvastatin    DM2  - c/w Lantus and SSI  - Hgb A1C is 8.8    Hx of Schizophrenia  - c/w Klonopin, risperidone & quetiapine  - as per psych, patient likely never had Schizophrenia and likely has a Mood Disorder/Anxiety spectrum symptoms with or without soft psychotic sxs w/ significant cerebral Vascular Disease, multiple CVAs and Neurocognitive deficits   - as of today, the patient does not appear to have decision making capacity in regards to surgery     Hx of CVA (with residual dysarthria and R. hemiparesis)  - c/w clopidogrel & atorvastatin     Prophylaxis:  DVT: HSQ TID

## 2024-03-23 NOTE — CHART NOTE - NSCHARTNOTEFT_GEN_A_CORE
Hospitalist Medicine NP Note.    EVENT: RN Called to see patient with hematuria.    SUBJECTIVE: 63 year old female with history of HTN, HLD, IDDM2, anxiety/depression, schizophrenia, and hx of CVA (with residual dysarthria and R. hemiparesis) who presented to the ED due to change in mental state. Pt noted to have b/l foot wounds and is being admitted for further work up. Pt also being admitted for UTI and metabolic encephalopathy.    OBJECTIVE:  Vital Signs Last 24 Hrs  T(C): 37 (23 Mar 2024 10:50), Max: 37 (23 Mar 2024 10:50)  T(F): 98.6 (23 Mar 2024 10:50), Max: 98.6 (23 Mar 2024 10:50)  HR: 85 (23 Mar 2024 10:50) (85 - 91)  BP: 125/53 (23 Mar 2024 10:50) (125/53 - 142/63)  BP(mean): --  RR: 18 (23 Mar 2024 10:50) (17 - 18)  SpO2: 96% (23 Mar 2024 10:50) (94% - 97%)    Parameters below as of 23 Mar 2024 10:50  Patient On (Oxygen Delivery Method): room air    LABS:                        8.6    6.97  )-----------( 203      ( 23 Mar 2024 08:05 )             27.2     03-23    136  |  103  |  39<H>  ----------------------------<  199<H>  4.2   |  24  |  3.96<H>    Ca    8.0<L>      23 Mar 2024 08:05  Phos  5.3     03-23  Mg     2.3     03-23    TPro  4.7<L>  /  Alb  x   /  TBili  x   /  DBili  x   /  AST  x   /  ALT  x   /  AlkPhos  x   03-23    ASSESSMENT: Hematuria   R/O UTI  HPI:        63 year old female with PMHx of HTN, HLD, IDDM2, anxiety/depression, schizophrenia, and hx of CVA (with residual dysarthria and R. hemiparesis) who presented to the ED due to change in mental state. Daughter called EMS as she noted  pt more slow to respond to questions today. Pt also c/o pain to L foot and R foot where she has ulcerations for the past month. She also is noted to have generalized rash to her chest, face, and abdomen which she states has been present for about 1 week. She denies fever, chills, N/V/D, chest pain, or dyspnea. (15 Mar 2024 19:58)    PLAN:   UA STAT  CBC STAT  PT/INR, PTT STAT  STOPPED HEPARIN 5000UNITS SQ Q8H.  MONITOR CLOSELY     Notified MD Lydia Armstrong NP- C

## 2024-03-23 NOTE — PROGRESS NOTE ADULT - SUBJECTIVE AND OBJECTIVE BOX
[facility] Division of Hospital Medicine  Fred Freeman MD  Available via MS Teams  Pager: [phone]    SUBJECTIVE / OVERNIGHT EVENTS:  - no events overnight, this morning patient is slow to wake up but has no nausea, vomiting, headaches, shortness of breath. does not offer any complaints.   ADDITIONAL REVIEW OF SYSTEMS:    MEDICATIONS  (STANDING):  atorvastatin 80 milliGRAM(s) Oral at bedtime  clopidogrel Tablet 75 milliGRAM(s) Oral daily  dextrose 5%. 1000 milliLiter(s) (100 mL/Hr) IV Continuous <Continuous>  dextrose 5%. 1000 milliLiter(s) (50 mL/Hr) IV Continuous <Continuous>  dextrose 50% Injectable 25 Gram(s) IV Push once  dextrose 50% Injectable 12.5 Gram(s) IV Push once  dextrose 50% Injectable 25 Gram(s) IV Push once  folic acid 1 milliGRAM(s) Oral daily  glucagon  Injectable 1 milliGRAM(s) IntraMuscular once  heparin   Injectable 5000 Unit(s) SubCutaneous every 8 hours  insulin glargine Injectable (LANTUS) 10 Unit(s) SubCutaneous at bedtime  insulin lispro (ADMELOG) corrective regimen sliding scale   SubCutaneous three times a day before meals  insulin lispro (ADMELOG) corrective regimen sliding scale   SubCutaneous at bedtime  metoprolol succinate ER 50 milliGRAM(s) Oral daily  nystatin Powder 1 Application(s) Topical two times a day  QUEtiapine 400 milliGRAM(s) Oral at bedtime  risperiDONE   Tablet 2 milliGRAM(s) Oral at bedtime    MEDICATIONS  (PRN):  dextrose Oral Gel 15 Gram(s) Oral once PRN Blood Glucose LESS THAN 70 milliGRAM(s)/deciliter      I&O's Summary    22 Mar 2024 07:01  -  23 Mar 2024 07:00  --------------------------------------------------------  IN: 1480 mL / OUT: 250 mL / NET: 1230 mL        PHYSICAL EXAM:  Vital Signs Last 24 Hrs  T(C): 36.6 (23 Mar 2024 04:38), Max: 36.7 (22 Mar 2024 23:34)  T(F): 97.8 (23 Mar 2024 04:38), Max: 98 (22 Mar 2024 23:34)  HR: 85 (23 Mar 2024 04:38) (85 - 91)  BP: 130/84 (23 Mar 2024 04:38) (130/84 - 142/63)  BP(mean): --  RR: 17 (23 Mar 2024 04:38) (17 - 18)  SpO2: 97% (23 Mar 2024 04:38) (94% - 97%)    Parameters below as of 23 Mar 2024 04:38  Patient On (Oxygen Delivery Method): room air      CONSTITUTIONAL: NAD, well-developed, well-groomed  EYES: PERRLA; conjunctiva and sclera clear  ENMT: Moist oral mucosa, no pharyngeal injection or exudates;  RESPIRATORY: Normal respiratory effort; lungs are clear to auscultation bilaterally  CARDIOVASCULAR: Regular rate and rhythm, normal S1 and S2, no murmur/rub/gallop; No lower extremity edema; Peripheral pulses are 2+ bilaterally  ABDOMEN: Nontender to palpation, normoactive bowel sounds, no rebound/guarding;  MUSCULOSKELETAL:  b/l lower extremity in bandages, wrapped   PSYCH: A+O to person, place, and time; affect calm  SKIN: No rashes; no palpable lesions      LABS:                        8.6    6.97  )-----------( 203      ( 23 Mar 2024 08:05 )             27.2     03-23    136  |  103  |  39<H>  ----------------------------<  199<H>  4.2   |  24  |  3.96<H>    Ca    8.0<L>      23 Mar 2024 08:05  Phos  5.3     03-23  Mg     2.3     03-23    TPro  5.6<L>  /  Alb  2.3<L>  /  TBili  0.4  /  DBili  x   /  AST  18  /  ALT  20  /  AlkPhos  83  03-22          Urinalysis Basic - ( 23 Mar 2024 08:05 )    Color: x / Appearance: x / SG: x / pH: x  Gluc: 199 mg/dL / Ketone: x  / Bili: x / Urobili: x   Blood: x / Protein: x / Nitrite: x   Leuk Esterase: x / RBC: x / WBC x   Sq Epi: x / Non Sq Epi: x / Bacteria: x        SARS-CoV-2: Pat (15 Mar 2024 13:09)

## 2024-03-24 LAB
APPEARANCE UR: CLEAR — SIGNIFICANT CHANGE UP
BACTERIA # UR AUTO: ABNORMAL /HPF
BILIRUB UR-MCNC: NEGATIVE — SIGNIFICANT CHANGE UP
COLOR SPEC: YELLOW — SIGNIFICANT CHANGE UP
CREAT ?TM UR-MCNC: 37 MG/DL — SIGNIFICANT CHANGE UP
DIFF PNL FLD: ABNORMAL
EPI CELLS # UR: PRESENT
GLUCOSE BLDC GLUCOMTR-MCNC: 120 MG/DL — HIGH (ref 70–99)
GLUCOSE BLDC GLUCOMTR-MCNC: 127 MG/DL — HIGH (ref 70–99)
GLUCOSE BLDC GLUCOMTR-MCNC: 131 MG/DL — HIGH (ref 70–99)
GLUCOSE BLDC GLUCOMTR-MCNC: 143 MG/DL — HIGH (ref 70–99)
GLUCOSE BLDC GLUCOMTR-MCNC: 153 MG/DL — HIGH (ref 70–99)
GLUCOSE UR QL: NEGATIVE MG/DL — SIGNIFICANT CHANGE UP
KETONES UR-MCNC: NEGATIVE MG/DL — SIGNIFICANT CHANGE UP
LEUKOCYTE ESTERASE UR-ACNC: ABNORMAL
NITRITE UR-MCNC: POSITIVE
PH UR: 6 — SIGNIFICANT CHANGE UP (ref 5–8)
PROT ?TM UR-MCNC: 75 MG/DL — HIGH (ref 0–12)
PROT UR-MCNC: 100 MG/DL
PROT/CREAT UR-RTO: 2 RATIO — HIGH (ref 0–0.2)
RBC CASTS # UR COMP ASSIST: 20 /HPF — HIGH (ref 0–4)
SP GR SPEC: 1.01 — SIGNIFICANT CHANGE UP (ref 1–1.03)
UROBILINOGEN FLD QL: 0.2 MG/DL — SIGNIFICANT CHANGE UP (ref 0.2–1)
WBC UR QL: 20 /HPF — HIGH (ref 0–5)

## 2024-03-24 PROCEDURE — 99232 SBSQ HOSP IP/OBS MODERATE 35: CPT

## 2024-03-24 PROCEDURE — 71045 X-RAY EXAM CHEST 1 VIEW: CPT | Mod: 26

## 2024-03-24 RX ORDER — SODIUM CHLORIDE 9 MG/ML
1000 INJECTION, SOLUTION INTRAVENOUS
Refills: 0 | Status: DISCONTINUED | OUTPATIENT
Start: 2024-03-24 | End: 2024-03-27

## 2024-03-24 RX ADMIN — CLOPIDOGREL BISULFATE 75 MILLIGRAM(S): 75 TABLET, FILM COATED ORAL at 12:18

## 2024-03-24 RX ADMIN — Medication 2: at 12:15

## 2024-03-24 RX ADMIN — CEFTRIAXONE 100 MILLIGRAM(S): 500 INJECTION, POWDER, FOR SOLUTION INTRAMUSCULAR; INTRAVENOUS at 17:31

## 2024-03-24 RX ADMIN — NYSTATIN CREAM 1 APPLICATION(S): 100000 CREAM TOPICAL at 05:43

## 2024-03-24 RX ADMIN — SODIUM CHLORIDE 60 MILLILITER(S): 9 INJECTION, SOLUTION INTRAVENOUS at 21:30

## 2024-03-24 RX ADMIN — NYSTATIN CREAM 1 APPLICATION(S): 100000 CREAM TOPICAL at 17:35

## 2024-03-24 RX ADMIN — INSULIN GLARGINE 10 UNIT(S): 100 INJECTION, SOLUTION SUBCUTANEOUS at 21:46

## 2024-03-24 RX ADMIN — QUETIAPINE FUMARATE 400 MILLIGRAM(S): 200 TABLET, FILM COATED ORAL at 21:29

## 2024-03-24 RX ADMIN — Medication 1 MILLIGRAM(S): at 12:18

## 2024-03-24 RX ADMIN — RISPERIDONE 2 MILLIGRAM(S): 4 TABLET ORAL at 21:29

## 2024-03-24 RX ADMIN — ATORVASTATIN CALCIUM 80 MILLIGRAM(S): 80 TABLET, FILM COATED ORAL at 21:29

## 2024-03-24 NOTE — PROGRESS NOTE ADULT - SUBJECTIVE AND OBJECTIVE BOX
Comfortable on RA    Vital Signs Last 24 Hrs  T(C): 36.9 (24 @ 11:10), Max: 36.9 (24 @ 16:52)  T(F): 98.5 (24 @ 11:10), Max: 98.5 (24 @ 16:52)  HR: 92 (24 @ 11:10) (90 - 92)  BP: 131/61 (24 @ 11:10) (113/65 - 131/61)  RR: 18 (24 @ 11:10) (18 - 19)  SpO2: 94% (24 @ 11:10) (94% - 95%)    I&O's Detail    24 Mar 2024 07:01  -  24 Mar 2024 16:08  --------------------------------------------------------  OUT:    Voided (mL): 250 mL  Total OUT: 250 mL    Lungs b/l air entry  Heart S1S2  Abd soft ND  Extremities: tr edema                                9.0    7.61  )-----------( 209      ( 23 Mar 2024 16:40 )             27.3     23 Mar 2024 08:05    136    |  103    |  39     ----------------------------<  199    4.2     |  24     |  3.96     Ca    8.0        23 Mar 2024 08:05  Phos  5.3       23 Mar 2024 08:05  Mg     2.3       23 Mar 2024 08:05    TPro  4.7    /  Alb  x      /  TBili  x      /  DBili  x      /  AST  x      /  ALT  x      /  AlkPhos  x      23 Mar 2024 08:05    LIVER FUNCTIONS - ( 23 Mar 2024 08:05 )  Alb: x     / Pro: 4.7 g/dL / ALK PHOS: x     / ALT: x     / AST: x     / GGT: x           PT/INR - ( 23 Mar 2024 16:40 )   PT: 10.3 sec;   INR: 0.85 ratio      Urinalysis Basic - ( 24 Mar 2024 12:52 )    Color: Yellow / Appearance: Clear / S.009 / pH: x  Gluc: x / Ketone: Negative mg/dL  / Bili: Negative / Urobili: 0.2 mg/dL   Blood: x / Protein: 100 mg/dL / Nitrite: Positive   Leuk Esterase: Moderate / RBC: 20 /HPF / WBC 20 /HPF   Sq Epi: x / Non Sq Epi: x / Bacteria: Moderate /HPF    atorvastatin 80 milliGRAM(s) Oral at bedtime  cefTRIAXone   IVPB 1000 milliGRAM(s) IV Intermittent every 24 hours  clopidogrel Tablet 75 milliGRAM(s) Oral daily  dextrose 5%. 1000 milliLiter(s) IV Continuous <Continuous>  dextrose 5%. 1000 milliLiter(s) IV Continuous <Continuous>  dextrose 50% Injectable 25 Gram(s) IV Push once  dextrose 50% Injectable 12.5 Gram(s) IV Push once  dextrose 50% Injectable 25 Gram(s) IV Push once  dextrose Oral Gel 15 Gram(s) Oral once PRN  folic acid 1 milliGRAM(s) Oral daily  glucagon  Injectable 1 milliGRAM(s) IntraMuscular once  insulin glargine Injectable (LANTUS) 10 Unit(s) SubCutaneous at bedtime  insulin lispro (ADMELOG) corrective regimen sliding scale   SubCutaneous three times a day before meals  insulin lispro (ADMELOG) corrective regimen sliding scale   SubCutaneous at bedtime  metoprolol succinate ER 50 milliGRAM(s) Oral daily  nystatin Powder 1 Application(s) Topical two times a day  QUEtiapine 400 milliGRAM(s) Oral at bedtime  risperiDONE   Tablet 2 milliGRAM(s) Oral at bedtime  sodium chloride 0.9%. 1000 milliLiter(s) IV Continuous <Continuous>    Assessment/Plan:    CM, EF 40 - 45%, MR, AR, TR  Poorly controlled DM  S/p CT w/IV dye 3/20/24 (Cr 0.78 prior to CT)  Contrast MARY ALICE exacerbated by Lasix, Toradol (both d/c-d now)  Likely underlying DM renal disease given hx proteinuria   Gentle IVF  F/u BMP, UO  Avoid nephrotoxins  Prognosis is guarded    [phone]

## 2024-03-24 NOTE — PROGRESS NOTE ADULT - SUBJECTIVE AND OBJECTIVE BOX
[facility] Division of Hospital Medicine  Fred Freeman MD  Available via MS Teams  Pager: [phone]    SUBJECTIVE / OVERNIGHT EVENTS:  refused bloodwork this morning. denies any n/v/abd pain, wants to go home  ADDITIONAL REVIEW OF SYSTEMS:    MEDICATIONS  (STANDING):  atorvastatin 80 milliGRAM(s) Oral at bedtime  cefTRIAXone   IVPB 1000 milliGRAM(s) IV Intermittent every 24 hours  clopidogrel Tablet 75 milliGRAM(s) Oral daily  dextrose 5%. 1000 milliLiter(s) (100 mL/Hr) IV Continuous <Continuous>  dextrose 5%. 1000 milliLiter(s) (50 mL/Hr) IV Continuous <Continuous>  dextrose 50% Injectable 25 Gram(s) IV Push once  dextrose 50% Injectable 12.5 Gram(s) IV Push once  dextrose 50% Injectable 25 Gram(s) IV Push once  folic acid 1 milliGRAM(s) Oral daily  glucagon  Injectable 1 milliGRAM(s) IntraMuscular once  insulin glargine Injectable (LANTUS) 10 Unit(s) SubCutaneous at bedtime  insulin lispro (ADMELOG) corrective regimen sliding scale   SubCutaneous three times a day before meals  insulin lispro (ADMELOG) corrective regimen sliding scale   SubCutaneous at bedtime  metoprolol succinate ER 50 milliGRAM(s) Oral daily  nystatin Powder 1 Application(s) Topical two times a day  QUEtiapine 400 milliGRAM(s) Oral at bedtime  risperiDONE   Tablet 2 milliGRAM(s) Oral at bedtime  sodium chloride 0.9%. 1000 milliLiter(s) (60 mL/Hr) IV Continuous <Continuous>    MEDICATIONS  (PRN):  dextrose Oral Gel 15 Gram(s) Oral once PRN Blood Glucose LESS THAN 70 milliGRAM(s)/deciliter      I&O's Summary    23 Mar 2024 07:  -  24 Mar 2024 07:00  --------------------------------------------------------  IN: 720 mL / OUT: 0 mL / NET: 720 mL    24 Mar 2024 07:01  -  24 Mar 2024 14:46  --------------------------------------------------------  IN: 0 mL / OUT: 250 mL / NET: -250 mL        PHYSICAL EXAM:  Vital Signs Last 24 Hrs  T(C): 36.9 (24 Mar 2024 11:10), Max: 36.9 (23 Mar 2024 16:52)  T(F): 98.5 (24 Mar 2024 11:10), Max: 98.5 (23 Mar 2024 16:52)  HR: 92 (24 Mar 2024 11:10) (90 - 92)  BP: 131/61 (24 Mar 2024 11:10) (113/65 - 131/61)  BP(mean): --  RR: 18 (24 Mar 2024 11:10) (18 - 19)  SpO2: 94% (24 Mar 2024 11:10) (94% - 95%)    Parameters below as of 24 Mar 2024 11:10  Patient On (Oxygen Delivery Method): room air      CONSTITUTIONAL: NAD, well-developed, well-groomed  EYES: PERRLA; conjunctiva and sclera clear  ENMT: Moist oral mucosa, no pharyngeal injection or exudates;  RESPIRATORY: Normal respiratory effort; lungs are clear to auscultation bilaterally  CARDIOVASCULAR: Regular rate and rhythm, normal S1 and S2, no murmur/rub/gallop; No lower extremity edema; Peripheral pulses are 2+ bilaterally  ABDOMEN: Nontender to palpation, normoactive bowel sounds, no rebound/guarding;  MUSCULOSKELETAL:  b/l lower extremity in bandages, wrapped   PSYCH: A+O 2-3; affect calm  SKIN: No rashes; no palpable lesions    LABS:                        9.0    7.61  )-----------( 209      ( 23 Mar 2024 16:40 )             27.3     03-23    136  |  103  |  39<H>  ----------------------------<  199<H>  4.2   |  24  |  3.96<H>    Ca    8.0<L>      23 Mar 2024 08:05  Phos  5.3     03-23  Mg     2.3     03-23    TPro  4.7<L>  /  Alb  x   /  TBili  x   /  DBili  x   /  AST  x   /  ALT  x   /  AlkPhos  x       PT/INR - ( 23 Mar 2024 16:40 )   PT: 10.3 sec;   INR: 0.85 ratio         PTT - ( 23 Mar 2024 16:40 )  PTT:35.6 sec      Urinalysis Basic - ( 24 Mar 2024 12:52 )    Color: Yellow / Appearance: Clear / S.009 / pH: x  Gluc: x / Ketone: Negative mg/dL  / Bili: Negative / Urobili: 0.2 mg/dL   Blood: x / Protein: 100 mg/dL / Nitrite: Positive   Leuk Esterase: Moderate / RBC: 20 /HPF / WBC 20 /HPF   Sq Epi: x / Non Sq Epi: x / Bacteria: Moderate /HPF        SARS-CoV-2: NotDetec (15 Mar 2024 13:09)      RADIOLOGY & ADDITIONAL TESTS:  New Results Reviewed Today:   New Imaging Personally Reviewed Today:  New Electrocardiogram Personally Reviewed Today:  Prior or Outpatient Records Reviewed Today:    COMMUNICATION:  Care Discussed with Consultants/Other Providers and Details of Discussion:  Discussions with Patient/Family:  PCP Communication:

## 2024-03-24 NOTE — PROGRESS NOTE ADULT - TIME BILLING
- Ordering, reviewing, and interpreting labs, testing, and imaging.  - Independently obtaining a review of systems and performing a physical exam  - Reviewing consultant documentation/recommendations in addition to discussing plan of care with consultants.  - Counselling and educating patient and family regarding interpretation of aforementioned items and plan of care.

## 2024-03-24 NOTE — PROGRESS NOTE ADULT - ASSESSMENT
63 year old female with history of HTN, HLD, IDDM2, anxiety/depression, schizophrenia, and hx of CVA (with residual dysarthria and R. hemiparesis) who presented to the ED due to change in mental state. Pt noted to have b/l foot wounds and is being admitted for further work up. Pt also being admitted for UTI and metabolic encephalopathy.    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 w/ severe PVD  - appreciate Podiatry consult - planning on local wound care vs left foot partial 2nd ray resection pending vas recs  - ABIs couldn't be obtained and US showed extensive bilateral plaque with diffuse low velocity monophasic flow throughout both legs w/ focally elevated velocities in the right popliteal and right mid posterior tibial arteries suggestive of stenoses  - CTA aorta with b/l LE runoff as per vascular  - pain control  - stress test for pre op optimization, nonischemic, shows EF 32%, f/u further cardiac recs     #MARY ALICE  - MARY ALICE with worsening Scr now 3.96  - likely 2/2 to LOLLY, NSAID use, will dc other nephrotoxic agents ( dc lasix, vit c lovenox nsaids )  - nephrology consulted, f/u recs   - refused bloodwork 3/24    UTI   - worsening mental status and thus obtained repeat UA, seems to be positive, restarted abx 3/23     metabolic encephalopathy  - may be due to UTI vs worsening structural damage   - CT head showed chronic bilateral basal ganglia infarctions and a chronic left corona radiata infarction w/ mild to moderate chronic microvascular changes without evidence of an acute transcortical infarction or hemorrhage  - as per psych, patient likely never had Schizophrenia and likely has a Mood Disorder/Anxiety spectrum symptoms with or without soft psychotic sxs w/ significant cerebral Vascular Disease, multiple CVAs and Neurocognitive deficits   - as of today, the patient does not appear to have decision making capacity in regards to surgery  - repeat CT head 3/24 for worsening mental status      Severe Truncal rash  - unclear etiology ? contact dermatitis, fungal etiology or exanthem     HTN  - c/w Toprol XL    HLD  - c/w atorvastatin    DM2  - c/w Lantus and SSI  - Hgb A1C is 8.8    Hx of Schizophrenia  - c/w Klonopin, risperidone & quetiapine  - as per psych, patient likely never had Schizophrenia and likely has a Mood Disorder/Anxiety spectrum symptoms with or without soft psychotic sxs w/ significant cerebral Vascular Disease, multiple CVAs and Neurocognitive deficits   - as of today, the patient does not appear to have decision making capacity in regards to surgery     Hx of CVA (with residual dysarthria and R. hemiparesis)  - c/w clopidogrel & atorvastatin     Prophylaxis:  DVT: HSQ TID

## 2024-03-25 LAB
ANION GAP SERPL CALC-SCNC: 14 MMOL/L — SIGNIFICANT CHANGE UP (ref 5–17)
BUN SERPL-MCNC: 39 MG/DL — HIGH (ref 7–23)
CALCIUM SERPL-MCNC: 8.6 MG/DL — SIGNIFICANT CHANGE UP (ref 8.5–10.1)
CHLORIDE SERPL-SCNC: 110 MMOL/L — HIGH (ref 96–108)
CO2 SERPL-SCNC: 23 MMOL/L — SIGNIFICANT CHANGE UP (ref 22–31)
CREAT SERPL-MCNC: 3.24 MG/DL — HIGH (ref 0.5–1.3)
EGFR: 15 ML/MIN/1.73M2 — LOW
GLUCOSE BLDC GLUCOMTR-MCNC: 106 MG/DL — HIGH (ref 70–99)
GLUCOSE BLDC GLUCOMTR-MCNC: 132 MG/DL — HIGH (ref 70–99)
GLUCOSE BLDC GLUCOMTR-MCNC: 144 MG/DL — HIGH (ref 70–99)
GLUCOSE BLDC GLUCOMTR-MCNC: 194 MG/DL — HIGH (ref 70–99)
GLUCOSE SERPL-MCNC: 109 MG/DL — HIGH (ref 70–99)
IGA FLD-MCNC: 312 MG/DL — SIGNIFICANT CHANGE UP (ref 84–499)
IGG FLD-MCNC: 456 MG/DL — LOW (ref 610–1660)
IGM SERPL-MCNC: 91 MG/DL — SIGNIFICANT CHANGE UP (ref 35–242)
KAPPA LC SER QL IFE: 11.97 MG/DL — HIGH (ref 0.33–1.94)
KAPPA/LAMBDA FREE LIGHT CHAIN RATIO, SERUM: 1.51 RATIO — SIGNIFICANT CHANGE UP (ref 0.26–1.65)
LAMBDA LC SER QL IFE: 7.95 MG/DL — HIGH (ref 0.57–2.63)
PHOSPHOLIPASE A2 RECEPTOR ELISA: <1.8 RU/ML — SIGNIFICANT CHANGE UP (ref 0–19.9)
POTASSIUM SERPL-MCNC: 3.9 MMOL/L — SIGNIFICANT CHANGE UP (ref 3.5–5.3)
POTASSIUM SERPL-SCNC: 3.9 MMOL/L — SIGNIFICANT CHANGE UP (ref 3.5–5.3)
SODIUM SERPL-SCNC: 147 MMOL/L — HIGH (ref 135–145)

## 2024-03-25 PROCEDURE — 99232 SBSQ HOSP IP/OBS MODERATE 35: CPT

## 2024-03-25 RX ADMIN — QUETIAPINE FUMARATE 400 MILLIGRAM(S): 200 TABLET, FILM COATED ORAL at 21:53

## 2024-03-25 RX ADMIN — NYSTATIN CREAM 1 APPLICATION(S): 100000 CREAM TOPICAL at 17:29

## 2024-03-25 RX ADMIN — CLOPIDOGREL BISULFATE 75 MILLIGRAM(S): 75 TABLET, FILM COATED ORAL at 11:44

## 2024-03-25 RX ADMIN — SODIUM CHLORIDE 60 MILLILITER(S): 9 INJECTION, SOLUTION INTRAVENOUS at 08:44

## 2024-03-25 RX ADMIN — CEFTRIAXONE 100 MILLIGRAM(S): 500 INJECTION, POWDER, FOR SOLUTION INTRAMUSCULAR; INTRAVENOUS at 17:37

## 2024-03-25 RX ADMIN — INSULIN GLARGINE 10 UNIT(S): 100 INJECTION, SOLUTION SUBCUTANEOUS at 21:48

## 2024-03-25 RX ADMIN — RISPERIDONE 2 MILLIGRAM(S): 4 TABLET ORAL at 21:53

## 2024-03-25 RX ADMIN — NYSTATIN CREAM 1 APPLICATION(S): 100000 CREAM TOPICAL at 05:55

## 2024-03-25 RX ADMIN — Medication 1 MILLIGRAM(S): at 11:44

## 2024-03-25 RX ADMIN — ATORVASTATIN CALCIUM 80 MILLIGRAM(S): 80 TABLET, FILM COATED ORAL at 21:53

## 2024-03-25 NOTE — PROGRESS NOTE ADULT - SUBJECTIVE AND OBJECTIVE BOX
[facility] NEPHROLOG[facility], [facility]  NEPHROLOGY AND HYPERTENSION  [address]  [address]  [place] [address]  [phone]    MD LIBERTAD AYALA MD YELENA ROSENBERG, MD BINNY KOSHY, MD CHRISTOPHER CAPUTO, MD EDWARD BOVER, MD          Patient events noted  No distress    MEDICATIONS  (STANDING):  atorvastatin 80 milliGRAM(s) Oral at bedtime  cefTRIAXone   IVPB 1000 milliGRAM(s) IV Intermittent every 24 hours  clopidogrel Tablet 75 milliGRAM(s) Oral daily  dextrose 5%. 1000 milliLiter(s) (100 mL/Hr) IV Continuous <Continuous>  dextrose 5%. 1000 milliLiter(s) (50 mL/Hr) IV Continuous <Continuous>  dextrose 50% Injectable 25 Gram(s) IV Push once  dextrose 50% Injectable 12.5 Gram(s) IV Push once  dextrose 50% Injectable 25 Gram(s) IV Push once  folic acid 1 milliGRAM(s) Oral daily  glucagon  Injectable 1 milliGRAM(s) IntraMuscular once  insulin glargine Injectable (LANTUS) 10 Unit(s) SubCutaneous at bedtime  insulin lispro (ADMELOG) corrective regimen sliding scale   SubCutaneous three times a day before meals  insulin lispro (ADMELOG) corrective regimen sliding scale   SubCutaneous at bedtime  metoprolol succinate ER 50 milliGRAM(s) Oral daily  nystatin Powder 1 Application(s) Topical two times a day  QUEtiapine 400 milliGRAM(s) Oral at bedtime  risperiDONE   Tablet 2 milliGRAM(s) Oral at bedtime  sodium chloride 0.45%. 1000 milliLiter(s) (60 mL/Hr) IV Continuous <Continuous>    MEDICATIONS  (PRN):  dextrose Oral Gel 15 Gram(s) Oral once PRN Blood Glucose LESS THAN 70 milliGRAM(s)/deciliter      03-24-24 @ 07:01  -  03-25-24 @ 07:00  --------------------------------------------------------  IN: 1500 mL / OUT: 1650 mL / NET: -150 mL      PHYSICAL EXAM:      T(C): 37.3 (03-25-24 @ 16:58), Max: 37.3 (03-25-24 @ 16:58)  HR: 93 (03-25-24 @ 16:58) (93 - 100)  BP: 110/58 (03-25-24 @ 16:58) (110/58 - 138/79)  RR: 18 (03-25-24 @ 16:58) (18 - 18)  SpO2: 94% (03-25-24 @ 16:58) (93% - 95%)  Wt(kg): --  Lungs clear  Heart S1S2  Abd soft NT ND  Extremities:   tr edema        Phospholipase A2 Receptor Antibody (03.23.24 @ 08:05)    Phospholipase A2 Receptor ALLEGRA: <1.8: Negative          <14.0                                Borderline  14.0 - 19.9                                Positive          >19.9  Performed At:  [facility] [id number]  Taco Foss MD Ph:[phone] RU/mL            03-25    147<H>  |  110<H>  |  39<H>  ----------------------------<  109<H>  3.9   |  23  |  3.24<H>    Ca    8.6      25 Mar 2024 10:50          Creatinine Trend: 3.24<--, 3.96<--, 2.69<--, 1.32<--, 0.78<--, 1.14<--    Assessment/Plan:  Contrast MARY ALICE exacerbated by Lasix, Toradol (both d/c-d now)  CM, EF 40 - 45%, MR, AR, TR  Poorly controlled DM  S/p CT w/IV dye 3/20/24 (Cr 0.78 prior to CT)  Likely underlying DM renal disease given hx proteinuria   Gentle IVF  F/u BMP, UO  Avoid nephrotoxins  Prognosis is guarded      Feliz Snatos MD

## 2024-03-25 NOTE — PROGRESS NOTE ADULT - SUBJECTIVE AND OBJECTIVE BOX
63 year old female with history of HTN, HLD, IDDM2, anxiety/depression, schizophrenia, and hx of CVA (with residual dysarthria and R. hemiparesis) who presented to the ED due to change in mental state. Pt noted to have b/l foot wounds and is being admitted for further work up. Pt also being admitted for UTI and metabolic encephalopathy.    Patient seen and examined at bedside  Patient is currently AAOx2, without acute complaints  Patient has refused labwork and procedures however was determined to not have capacity prior in this visit    Discussed with nursing  Angiogram cancelled today as patient has had significant elevation in creatinine    Renal team on board - patient receiving IVF    Case discussed at length with daughter - Susie - [phone]  Daughter prefers that patient gets all procedures as medically necessary    Will attempt to  patient to be more receptive to testing/procedures  Daughter prefers to be contacted prior to any other testing          CONSTITUTIONAL: NAD, well-developed, well-groomed  EYES: PERRLA; conjunctiva and sclera clear  ENMT: Moist oral mucosa, no pharyngeal injection or exudates;  RESPIRATORY: Normal respiratory effort; lungs are clear to auscultation bilaterally  CARDIOVASCULAR: Regular rate and rhythm, normal S1 and S2, no murmur/rub/gallop; No lower extremity edema; Peripheral pulses are 2+ bilaterally  ABDOMEN: Nontender to palpation, normoactive bowel sounds, no rebound/guarding;  MUSCULOSKELETAL:  b/l lower extremity in bandages, wrapped   PSYCH: A+O 2-3; affect calm  SKIN: No rashes; no palpable lesions    Recent Vitals  T(C): 36.7 (03-25-24 @ 11:12), Max: 37.1 (03-24-24 @ 16:59)  HR: 93 (03-25-24 @ 11:12) (86 - 100)  BP: 127/83 (03-25-24 @ 11:12) (99/60 - 138/79)  RR: 18 (03-25-24 @ 11:12) (18 - 18)  SpO2: 95% (03-25-24 @ 11:12) (93% - 95%)                        9.0    7.61  )-----------( 209      ( 23 Mar 2024 16:40 )             27.3     03-25    147<H>  |  110<H>  |  39<H>  ----------------------------<  109<H>  3.9   |  23  |  3.24<H>    Ca    8.6      25 Mar 2024 10:50      PT/INR - ( 23 Mar 2024 16:40 )   PT: 10.3 sec;   INR: 0.85 ratio         PTT - ( 23 Mar 2024 16:40 )  PTT:35.6 sec    Urinalysis Basic - ( 25 Mar 2024 10:50 )    Color: x / Appearance: x / SG: x / pH: x  Gluc: 109 mg/dL / Ketone: x  / Bili: x / Urobili: x   Blood: x / Protein: x / Nitrite: x   Leuk Esterase: x / RBC: x / WBC x   Sq Epi: x / Non Sq Epi: x / Bacteria: x          atorvastatin 80 milliGRAM(s) Oral at bedtime  cefTRIAXone   IVPB 1000 milliGRAM(s) IV Intermittent every 24 hours  clopidogrel Tablet 75 milliGRAM(s) Oral daily  dextrose 5%. 1000 milliLiter(s) IV Continuous <Continuous>  dextrose 5%. 1000 milliLiter(s) IV Continuous <Continuous>  dextrose 50% Injectable 25 Gram(s) IV Push once  dextrose 50% Injectable 12.5 Gram(s) IV Push once  dextrose 50% Injectable 25 Gram(s) IV Push once  dextrose Oral Gel 15 Gram(s) Oral once PRN  folic acid 1 milliGRAM(s) Oral daily  glucagon  Injectable 1 milliGRAM(s) IntraMuscular once  insulin glargine Injectable (LANTUS) 10 Unit(s) SubCutaneous at bedtime  insulin lispro (ADMELOG) corrective regimen sliding scale   SubCutaneous three times a day before meals  insulin lispro (ADMELOG) corrective regimen sliding scale   SubCutaneous at bedtime  metoprolol succinate ER 50 milliGRAM(s) Oral daily  nystatin Powder 1 Application(s) Topical two times a day  QUEtiapine 400 milliGRAM(s) Oral at bedtime  risperiDONE   Tablet 2 milliGRAM(s) Oral at bedtime  sodium chloride 0.45%. 1000 milliLiter(s) IV Continuous <Continuous>    Home Medications:  Bactrim  mg-160 mg oral tablet: 1 tab(s) orally 3 times a week (15 Mar 2024 22:04)  clopidogrel 75 mg oral tablet: 1 tab(s) orally once a day (15 Mar 2024 22:04)  famotidine 20 mg oral tablet: 1 tab(s) orally once a day (15 Mar 2024 22:04)  folic acid 1 mg oral tablet: 1 tab(s) orally once a day (15 Mar 2024 22:04)  furosemide 20 mg oral tablet: 1 tab(s) orally once a day (15 Mar 2024 22:04)  HumuLIN R 100 units/mL injectable solution: 6 unit(s) injectable 3 times a day WITH MEALS (15 Mar 2024 22:04)  KlonoPIN 1 mg oral tablet: 1 tab(s) orally once a day (at bedtime) (19 Nov 2023 14:07)  Lantus Solostar Pen 100 units/mL subcutaneous solution: 18 unit(s) subcutaneous once a day (at bedtime) (23 Nov 2023 13:00)  metoprolol tartrate 50 mg oral tablet: 1 tab(s) orally once a day (19 Nov 2023 14:02)  Potassium Chloride (Eqv-Klor-Con M20) 20 mEq oral tablet, extended release: 1 tab(s) orally once a day (19 Nov 2023 14:01)  predniSONE 10 mg oral tablet: 1 tab(s) orally once a day (19 Nov 2023 14:00)  QUEtiapine 400 mg oral tablet: 1 tab(s) orally once a day (at bedtime) (19 Nov 2023 13:59)  risperiDONE 2 mg oral tablet: 1 tab(s) orally once a day (at bedtime) (19 Nov 2023 13:55)  Vitamin C 500 mg oral tablet: 1 tab(s) orally once a day (19 Nov 2023 14:06)  zolpidem 10 mg oral tablet: 1 tab(s) orally once a day (at bedtime) (19 Nov 2023 14:06)

## 2024-03-25 NOTE — PROGRESS NOTE ADULT - ASSESSMENT
63F presents with BL eschars and left foot 2nd digit dry gangrene.  - Patient seen and evaluated.  - Afebrile, no leukocytosis  - Right foot posterior lateral heel well adhered eschar, no drainage, no bogginess, no fluctuance, no malodor, no tracking or tunneling, no acute signs of infection. Left foot hallux distal tuft dry eschar,  2nd digit dry gangrene, no drainage, no bogginess, no fluctuance, no malodor, no tracking or tunneling, no acute signs of infection.  - BL Foot X-Ray: No gas, no OM.  - No culture 2/2 no acute signs of infection.  - Vasc recs, appreciated.  - BLAIR/ PVR: BL BLAIR unobtainable, BL waveforms diminished below the knee.  - Recommend BL Z-flows at all times while in bed or resting in chair.  - Pod Plan: Local wound care vs left foot partial 2nd ray resection pending vasc recs.  - Please document medical clearance for possible podiatric procedure under anesthesia.  - Discussed with attending.

## 2024-03-25 NOTE — PROGRESS NOTE ADULT - SUBJECTIVE AND OBJECTIVE BOX
Patient is a 63y old  Female who presents with a chief complaint of PVD, Heel wounds, UTI (24 Mar 2024 16:08)       INTERVAL HPI/OVERNIGHT EVENTS:  Patient seen and evaluated at bedside.  Pt is resting comfortable in NAD. Denies N/V/F/C.    Allergies    erythromycin (Unknown)    Intolerances        Vital Signs Last 24 Hrs  T(C): 36.7 (25 Mar 2024 04:49), Max: 37.1 (24 Mar 2024 16:59)  T(F): 98.1 (25 Mar 2024 04:49), Max: 98.8 (24 Mar 2024 16:59)  HR: 93 (25 Mar 2024 04:49) (86 - 100)  BP: 138/79 (25 Mar 2024 04:49) (99/60 - 138/79)  BP(mean): --  RR: 18 (25 Mar 2024 04:49) (18 - 18)  SpO2: 94% (25 Mar 2024 04:49) (93% - 94%)    Parameters below as of 25 Mar 2024 04:49  Patient On (Oxygen Delivery Method): room air        LABS:                        9.0    7.61  )-----------( 209      ( 23 Mar 2024 16:40 )             27.3           PT/INR - ( 23 Mar 2024 16:40 )   PT: 10.3 sec;   INR: 0.85 ratio         PTT - ( 23 Mar 2024 16:40 )  PTT:35.6 sec  Urinalysis Basic - ( 24 Mar 2024 12:52 )    Color: Yellow / Appearance: Clear / S.009 / pH: x  Gluc: x / Ketone: Negative mg/dL  / Bili: Negative / Urobili: 0.2 mg/dL   Blood: x / Protein: 100 mg/dL / Nitrite: Positive   Leuk Esterase: Moderate / RBC: 20 /HPF / WBC 20 /HPF   Sq Epi: x / Non Sq Epi: x / Bacteria: Moderate /HPF      CAPILLARY BLOOD GLUCOSE      POCT Blood Glucose.: 106 mg/dL (25 Mar 2024 08:27)  POCT Blood Glucose.: 120 mg/dL (24 Mar 2024 21:39)  POCT Blood Glucose.: 131 mg/dL (24 Mar 2024 21:23)  POCT Blood Glucose.: 143 mg/dL (24 Mar 2024 16:04)  POCT Blood Glucose.: 153 mg/dL (24 Mar 2024 10:59)      Lower Extremity Physical Exam:  Vascular: DP/PT 0/4, B/L, CFT <3seconds B/L, Temperature gradient warm to cool, B/L.   Neuro: Unable to assess  Musculoskeletal/Ortho: unremarkable  Skin: Right foot posterior lateral heel well adhered eschar, no drainage, no bogginess, no fluctuance, no malodor, no tracking or tunneling, no acute signs of infection. Left foot hallux distal tuft dry eschar,  2nd digit dry gangrene, no drainage, no bogginess, no fluctuance, no malodor, no tracking or tunneling, no acute signs of infection.    RADIOLOGY & ADDITIONAL TESTS:

## 2024-03-25 NOTE — PROGRESS NOTE ADULT - ASSESSMENT
63 year old female with history of HTN, HLD, IDDM2, anxiety/depression, schizophrenia, and hx of CVA (with residual dysarthria and R. hemiparesis) who presented to the ED due to change in mental state. Pt noted to have b/l foot wounds and is being admitted for further work up. Pt also being admitted for UTI and metabolic encephalopathy.    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 w/ severe PVD  - appreciate Podiatry consult - planning on local wound care vs left foot partial 2nd ray resection pending [facility] recs  - ABIs couldn't be obtained and US showed extensive bilateral plaque with diffuse low velocity monophasic flow throughout both legs w/ focally elevated velocities in the right popliteal and right mid posterior tibial arteries suggestive of stenoses  - CTA aorta with b/l LE runoff performed  - Angiogram cancelled today due to MARY ALICE 3/25    *  RIGHT LEG: Diffusely calcified but patent common femoral and femoral arteries. Suggestion of a focal stenosis in the popliteal artery above the knee. Infrapopliteal runoff is heavily calcified limiting evaluation.  *  LEFT LEG: Diffusely calcified but patent common femoral and femoral arteries. Suggestion of a focal stenosis in the popliteal artery above the knee. Infrapopliteal runoff is heavily calcified limiting evaluation.  - pain control  - stress test for pre op optimization, nonischemic, shows EF 32%, f/u further cardiac recs     #MARY ALICE  - MARY ALICE with worsening Scr now 3.96 ->3.4  - likely 2/2 to LOLLY, NSAID use, will dc other nephrotoxic agents ( dc lasix, vit c lovenox nsaids )  - nephrology consulted - started IV half NS      UTI   - worsening mental status and thus obtained repeat UA, seems to be positive, restarted abx 3/23   - continue ceftriaxone     metabolic encephalopathy  - may be due to UTI vs worsening structural damage   - CT head showed chronic bilateral basal ganglia infarctions and a chronic left corona radiata infarction w/ mild to moderate chronic microvascular changes without evidence of an acute transcortical infarction or hemorrhage  - as per psych, patient likely never had Schizophrenia and likely has a Mood Disorder/Anxiety spectrum symptoms with or without soft psychotic sxs w/ significant cerebral Vascular Disease, multiple CVAs and Neurocognitive deficits   - as of today, the patient does not appear to have decision making capacity in regards to surgery  - repeat CT head 3/24 for worsening mental status      Severe Truncal rash  - unclear etiology ? contact dermatitis, fungal etiology or exanthem     HTN  - c/w Toprol XL    HLD  - c/w atorvastatin    DM2  - c/w Lantus and SSI  - Hgb A1C is 8.8    Hx of Schizophrenia  - c/w Klonopin, risperidone & quetiapine  - as per psych, patient likely never had Schizophrenia and likely has a Mood Disorder/Anxiety spectrum symptoms with or without soft psychotic sxs w/ significant cerebral Vascular Disease, multiple CVAs and Neurocognitive deficits   - as of today, the patient does not appear to have decision making capacity in regards to surgery     Hx of CVA (with residual dysarthria and R. hemiparesis)  - c/w clopidogrel & atorvastatin     Prophylaxis:  DVT: HSQ TID

## 2024-03-26 ENCOUNTER — TRANSCRIPTION ENCOUNTER (OUTPATIENT)
Age: 64
End: 2024-03-26

## 2024-03-26 LAB
ANION GAP SERPL CALC-SCNC: 10 MMOL/L — SIGNIFICANT CHANGE UP (ref 5–17)
BUN SERPL-MCNC: 23 MG/DL — SIGNIFICANT CHANGE UP (ref 7–23)
CALCIUM SERPL-MCNC: 7.8 MG/DL — LOW (ref 8.5–10.1)
CHLORIDE SERPL-SCNC: 105 MMOL/L — SIGNIFICANT CHANGE UP (ref 96–108)
CO2 SERPL-SCNC: 22 MMOL/L — SIGNIFICANT CHANGE UP (ref 22–31)
CREAT SERPL-MCNC: 1.83 MG/DL — HIGH (ref 0.5–1.3)
EGFR: 30 ML/MIN/1.73M2 — LOW
GLUCOSE BLDC GLUCOMTR-MCNC: 137 MG/DL — HIGH (ref 70–99)
GLUCOSE BLDC GLUCOMTR-MCNC: 171 MG/DL — HIGH (ref 70–99)
GLUCOSE BLDC GLUCOMTR-MCNC: 188 MG/DL — HIGH (ref 70–99)
GLUCOSE BLDC GLUCOMTR-MCNC: 216 MG/DL — HIGH (ref 70–99)
GLUCOSE BLDC GLUCOMTR-MCNC: 230 MG/DL — HIGH (ref 70–99)
GLUCOSE SERPL-MCNC: 115 MG/DL — HIGH (ref 70–99)
HCT VFR BLD CALC: 27.6 % — LOW (ref 34.5–45)
HGB BLD-MCNC: 8.6 G/DL — LOW (ref 11.5–15.5)
MCHC RBC-ENTMCNC: 29.9 PG — SIGNIFICANT CHANGE UP (ref 27–34)
MCHC RBC-ENTMCNC: 31.2 G/DL — LOW (ref 32–36)
MCV RBC AUTO: 95.8 FL — SIGNIFICANT CHANGE UP (ref 80–100)
NRBC # BLD: 0 /100 WBCS — SIGNIFICANT CHANGE UP (ref 0–0)
PLATELET # BLD AUTO: 202 K/UL — SIGNIFICANT CHANGE UP (ref 150–400)
POTASSIUM SERPL-MCNC: 3.1 MMOL/L — LOW (ref 3.5–5.3)
POTASSIUM SERPL-SCNC: 3.1 MMOL/L — LOW (ref 3.5–5.3)
RBC # BLD: 2.88 M/UL — LOW (ref 3.8–5.2)
RBC # FLD: 16.7 % — HIGH (ref 10.3–14.5)
SODIUM SERPL-SCNC: 137 MMOL/L — SIGNIFICANT CHANGE UP (ref 135–145)
WBC # BLD: 6.09 K/UL — SIGNIFICANT CHANGE UP (ref 3.8–10.5)
WBC # FLD AUTO: 6.09 K/UL — SIGNIFICANT CHANGE UP (ref 3.8–10.5)

## 2024-03-26 PROCEDURE — 99232 SBSQ HOSP IP/OBS MODERATE 35: CPT

## 2024-03-26 PROCEDURE — 70450 CT HEAD/BRAIN W/O DYE: CPT | Mod: 26

## 2024-03-26 RX ORDER — ZOLPIDEM TARTRATE 10 MG/1
1 TABLET ORAL
Refills: 0 | DISCHARGE

## 2024-03-26 RX ORDER — ASCORBIC ACID 60 MG
1 TABLET,CHEWABLE ORAL
Refills: 0 | DISCHARGE

## 2024-03-26 RX ORDER — FUROSEMIDE 40 MG
1 TABLET ORAL
Refills: 0 | DISCHARGE

## 2024-03-26 RX ORDER — LABETALOL HCL 100 MG
10 TABLET ORAL EVERY 6 HOURS
Refills: 0 | Status: DISCONTINUED | OUTPATIENT
Start: 2024-03-26 | End: 2024-03-27

## 2024-03-26 RX ORDER — FAMOTIDINE 10 MG/ML
1 INJECTION INTRAVENOUS
Refills: 0 | DISCHARGE

## 2024-03-26 RX ADMIN — NYSTATIN CREAM 1 APPLICATION(S): 100000 CREAM TOPICAL at 06:01

## 2024-03-26 RX ADMIN — Medication 50 MILLIGRAM(S): at 06:01

## 2024-03-26 RX ADMIN — CLOPIDOGREL BISULFATE 75 MILLIGRAM(S): 75 TABLET, FILM COATED ORAL at 12:47

## 2024-03-26 RX ADMIN — QUETIAPINE FUMARATE 400 MILLIGRAM(S): 200 TABLET, FILM COATED ORAL at 21:58

## 2024-03-26 RX ADMIN — CEFTRIAXONE 100 MILLIGRAM(S): 500 INJECTION, POWDER, FOR SOLUTION INTRAMUSCULAR; INTRAVENOUS at 17:31

## 2024-03-26 RX ADMIN — Medication 1 MILLIGRAM(S): at 12:46

## 2024-03-26 RX ADMIN — NYSTATIN CREAM 1 APPLICATION(S): 100000 CREAM TOPICAL at 17:15

## 2024-03-26 RX ADMIN — Medication 2: at 17:16

## 2024-03-26 RX ADMIN — Medication 4: at 12:47

## 2024-03-26 RX ADMIN — ATORVASTATIN CALCIUM 80 MILLIGRAM(S): 80 TABLET, FILM COATED ORAL at 21:58

## 2024-03-26 RX ADMIN — Medication 10 MILLIGRAM(S): at 18:43

## 2024-03-26 RX ADMIN — INSULIN GLARGINE 10 UNIT(S): 100 INJECTION, SOLUTION SUBCUTANEOUS at 21:59

## 2024-03-26 RX ADMIN — RISPERIDONE 2 MILLIGRAM(S): 4 TABLET ORAL at 21:58

## 2024-03-26 NOTE — DISCHARGE NOTE PROVIDER - NSDCMRMEDTOKEN_GEN_ALL_CORE_FT
clopidogrel 75 mg oral tablet: 1 tab(s) orally once a day  folic acid 1 mg oral tablet: 1 tab(s) orally once a day  HumuLIN R 100 units/mL injectable solution: 6 unit(s) injectable 3 times a day WITH MEALS  KlonoPIN 1 mg oral tablet: 1 tab(s) orally once a day (at bedtime)  Lantus Solostar Pen 100 units/mL subcutaneous solution: 18 unit(s) subcutaneous once a day (at bedtime)  Lipitor 80 mg oral tablet: 1 tab(s) orally once a day (at bedtime)  metoprolol tartrate 50 mg oral tablet: 1 tab(s) orally once a day  Potassium Chloride (Eqv-Klor-Con M20) 20 mEq oral tablet, extended release: 1 tab(s) orally once a day  QUEtiapine 400 mg oral tablet: 1 tab(s) orally once a day (at bedtime)  risperiDONE 2 mg oral tablet: 1 tab(s) orally once a day (at bedtime)   Bactrim  mg-160 mg oral tablet: 1 tab(s) orally 3 times a week  clopidogrel 75 mg oral tablet: 1 tab(s) orally once a day  folic acid 1 mg oral tablet: 1 tab(s) orally once a day  furosemide 20 mg oral tablet: 1 tab(s) orally once a day  HumuLIN R 100 units/mL injectable solution: 6 unit(s) injectable 3 times a day WITH MEALS  KlonoPIN 1 mg oral tablet: 1 tab(s) orally once a day (at bedtime)  Lantus Solostar Pen 100 units/mL subcutaneous solution: 18 unit(s) subcutaneous once a day (at bedtime)  Lipitor 80 mg oral tablet: 1 tab(s) orally once a day (at bedtime)  metoprolol tartrate 50 mg oral tablet: 1 tab(s) orally once a day  Potassium Chloride (Eqv-Klor-Con M20) 20 mEq oral tablet, extended release: 1 tab(s) orally once a day  predniSONE 10 mg oral tablet: 1 tab(s) orally once a day  QUEtiapine 400 mg oral tablet: 1 tab(s) orally once a day (at bedtime)  risperiDONE 2 mg oral tablet: 1 tab(s) orally once a day (at bedtime)

## 2024-03-26 NOTE — DISCHARGE NOTE PROVIDER - PROVIDER TOKENS
PROVIDER:[TOKEN:[[id number]],FOLLOWUP:[1 week]],PROVIDER:[TOKEN:[[id number]],FOLLOWUP:[1 week]],PROVIDER:[TOKEN:[[id number]],FOLLOWUP:[1 week]] PROVIDER:[TOKEN:[[id number]],FOLLOWUP:[1 week]],PROVIDER:[TOKEN:[[id number]],FOLLOWUP:[1 week]],PROVIDER:[TOKEN:[[id number]],FOLLOWUP:[1 week]],PROVIDER:[TOKEN:[[id number]]]

## 2024-03-26 NOTE — PROGRESS NOTE ADULT - SUBJECTIVE AND OBJECTIVE BOX
[facility] NEPHROLOGY SERVICES, [facility]  NEPHROLOGY AND HYPERTENSION  [address]  [address]  [place] [address]  [phone]    MD LIBERTAD AYALA MD YELENA ROSENBERG, MD BINNY KOSHY, MD CHRISTOPHER CAPUTO, MD EDWARD BOVER, MD          Patient events noted    MEDICATIONS  (STANDING):  atorvastatin 80 milliGRAM(s) Oral at bedtime  cefTRIAXone   IVPB 1000 milliGRAM(s) IV Intermittent every 24 hours  clopidogrel Tablet 75 milliGRAM(s) Oral daily  dextrose 5%. 1000 milliLiter(s) (50 mL/Hr) IV Continuous <Continuous>  dextrose 5%. 1000 milliLiter(s) (100 mL/Hr) IV Continuous <Continuous>  dextrose 50% Injectable 25 Gram(s) IV Push once  dextrose 50% Injectable 12.5 Gram(s) IV Push once  dextrose 50% Injectable 25 Gram(s) IV Push once  folic acid 1 milliGRAM(s) Oral daily  glucagon  Injectable 1 milliGRAM(s) IntraMuscular once  insulin glargine Injectable (LANTUS) 10 Unit(s) SubCutaneous at bedtime  insulin lispro (ADMELOG) corrective regimen sliding scale   SubCutaneous three times a day before meals  insulin lispro (ADMELOG) corrective regimen sliding scale   SubCutaneous at bedtime  metoprolol succinate ER 50 milliGRAM(s) Oral daily  nystatin Powder 1 Application(s) Topical two times a day  QUEtiapine 400 milliGRAM(s) Oral at bedtime  risperiDONE   Tablet 2 milliGRAM(s) Oral at bedtime  sodium chloride 0.45%. 1000 milliLiter(s) (60 mL/Hr) IV Continuous <Continuous>    MEDICATIONS  (PRN):  dextrose Oral Gel 15 Gram(s) Oral once PRN Blood Glucose LESS THAN 70 milliGRAM(s)/deciliter  labetalol Injectable 10 milliGRAM(s) IV Push every 6 hours PRN SBP >160      03-26-24 @ 07:01  -  03-26-24 @ 21:28  --------------------------------------------------------  IN: 0 mL / OUT: 1380 mL / NET: -1380 mL      PHYSICAL EXAM:      T(C): 37.8 (03-26-24 @ 17:49), Max: 37.8 (03-26-24 @ 17:49)  HR: 93 (03-26-24 @ 17:49) (93 - 99)  BP: 177/89 (03-26-24 @ 17:49) (140/75 - 182/80)  RR: 18 (03-26-24 @ 17:49) (17 - 18)  SpO2: 100% (03-26-24 @ 17:49) (94% - 100%)  Wt(kg): --  Lungs clear  Heart S1S2  Abd soft NT ND  Extremities:   tr edema                                    8.6    6.09  )-----------( 202      ( 26 Mar 2024 08:50 )             27.6     03-26    137  |  105  |  23  ----------------------------<  115<H>  3.1<L>   |  22  |  1.83<H>    Ca    7.8<L>      26 Mar 2024 08:50          Creatinine Trend: 1.83<--, 3.24<--, 3.96<--, 2.69<--, 1.32<--, 0.78<--    Assessment/Plan:  Contrast MARY ALICE exacerbated by Lasix, Toradol   Improving trend   CM, EF 40 - 45%, MR, AR, TR  Poorly controlled DM  S/p CT w/IV dye 3/20/24 (Cr 0.78 prior to CT)  Likely underlying DM renal disease given hx proteinuria   Gentle IVF  F/u BMP, UO  Will decide LE angiogram pending Cr trend  Prognosis is guarded        Feliz Santos MD

## 2024-03-26 NOTE — DISCHARGE NOTE PROVIDER - NSDCFUADDAPPT_GEN_ALL_CORE_FT
Podiatry Discharge Instructions:  Follow up: Please follow up with Dr. Sargent within 1 week of discharge from the hospital, please call [phone] for appointment and discuss that you recently were seen in the hospital.  Wound Care: Please apply betadine to bilateral foot wounds followed by 4x4 gauze, ABD pad, and kerlix daily  Weight bearing: Please weight bear as tolerated in a surgical shoe.  Antibiotics: Please continue as instructed. Podiatry Discharge Instructions:  Follow up: Please follow up with Dr. Sargent within 1 week of discharge from the hospital, please call [phone] for appointment and discuss that you recently were seen in the hospital.  Wound Care: Please apply betadine to bilateral foot wounds followed by 4x4 gauze, ABD pad, and kerlix daily  Weight bearing: Please weight bear as tolerated in a surgical shoe.  Antibiotics: Please continue as instructed.    Followup with vascular within 1-2 weeks of discharge for angiogram study

## 2024-03-26 NOTE — PROGRESS NOTE ADULT - ASSESSMENT
63 year old female with history of HTN, HLD, IDDM2, anxiety/depression, schizophrenia, and hx of CVA (with residual dysarthria and R. hemiparesis) who presented to the ED due to change in mental state. Pt noted to have b/l foot wounds and is being admitted for further work up. Pt also being admitted for UTI and metabolic encephalopathy.    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 w/ severe PVD  Podiatry and Vascular consults appreciated  ABIs couldn't be obtained and US showed extensive bilateral plaque with diffuse low velocity monophasic flow throughout both legs w/ focally elevated velocities in the right popliteal and right mid posterior tibial arteries suggestive of stenoses  CTA aorta with b/l LE runoff performed  Angiogram CANCELLED 3/25 due to MARY ALICE  stress test for pre op optimization, nonischemic, shows EF 32:: cardio cleared  plan:  at this point; will continue conservative management with asa plavix  patient will need outpatient angio    #MARY ALICE due to NSAID and LOLLY  Nephro consulted  Cr now 1.8  lasix and ACE held  may resume lasix and ACE soon    UTI   - continue ceftriaxone     metabolic encephalopathy due to Vascular dementia  - CT head showed chronic bilateral basal ganglia infarctions and a chronic left corona radiata infarction w/ mild to moderate chronic microvascular changes without evidence of an acute transcortical infarction or hemorrhage  - as per psych, patient likely never had Schizophrenia and likely has a Mood Disorder/Anxiety spectrum symptoms with or without soft psychotic sxs w/ significant cerebral Vascular Disease, multiple CVAs and Neurocognitive deficits   - as of today, the patient does not appear to have decision making capacity in regards to surgery  - repeat CT head 3/24 for worsening mental status      Severe Truncal rash  - unclear etiology ? contact dermatitis, fungal etiology or exanthem     HTN  - c/w Toprol XL    HLD  - c/w atorvastatin    DM2  - c/w Lantus and SSI  - Hgb A1C is 8.8    Hx of Schizophrenia  - c/w Klonopin, risperidone & quetiapine  - as per psych, patient likely never had Schizophrenia and likely has a Mood Disorder/Anxiety spectrum symptoms with or without soft psychotic sxs w/ significant cerebral Vascular Disease, multiple CVAs and Neurocognitive deficits      Hx of CVA (with residual dysarthria and R. hemiparesis)  - c/w clopidogrel & atorvastatin     Prophylaxis:  DVT: HSQ TID 63 year old female with history of HTN, HLD, IDDM2, anxiety/depression, schizophrenia, and hx of CVA (with residual dysarthria and R. hemiparesis) who presented to the ED due to change in mental state. Pt noted to have b/l foot wounds and is being admitted for further work up. Pt also being admitted for UTI and metabolic encephalopathy.    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 w/ severe PVD  Podiatry and Vascular consults appreciated  ABIs couldn't be obtained and US showed extensive bilateral plaque with diffuse low velocity monophasic flow throughout both legs w/ focally elevated velocities in the right popliteal and right mid posterior tibial arteries suggestive of stenoses  CTA aorta with b/l LE runoff performed  Vascular away this week : therefore requested IR intervention  d.w IR: unable to complete on 3/25 due to severe MARY ALICE  stress test for pre op optimization, nonischemic, shows EF 32:: cardio cleared  plan:  at this point; will continue conservative management with asa plavix  will discuss with nephro on clearance on angiogram. although scheduling will be difficult due to staffing this week .earliest IR can schedule a diagnostic only is probably friday    #MARY ALICE due to NSAID and LOLLY  Nephro consulted  Cr now 1.8  lasix and ACE held  may resume lasix and ACE soon    UTI   - continue ceftriaxone     metabolic encephalopathy due to Vascular dementia  - CT head showed chronic bilateral basal ganglia infarctions and a chronic left corona radiata infarction w/ mild to moderate chronic microvascular changes without evidence of an acute transcortical infarction or hemorrhage  - as per psych, patient likely never had Schizophrenia and likely has a Mood Disorder/Anxiety spectrum symptoms with or without soft psychotic sxs w/ significant cerebral Vascular Disease, multiple CVAs and Neurocognitive deficits   - as of today, the patient does not appear to have decision making capacity in regards to surgery  - repeat CT head 3/24 for worsening mental status      Severe Truncal rash  - unclear etiology ? contact dermatitis, fungal etiology or exanthem     HTN  - c/w Toprol XL    HLD  - c/w atorvastatin    DM2  - c/w Lantus and SSI  - Hgb A1C is 8.8    Hx of Schizophrenia  - c/w Klonopin, risperidone & quetiapine  - as per psych, patient likely never had Schizophrenia and likely has a Mood Disorder/Anxiety spectrum symptoms with or without soft psychotic sxs w/ significant cerebral Vascular Disease, multiple CVAs and Neurocognitive deficits      Hx of CVA (with residual dysarthria and R. hemiparesis)  - c/w clopidogrel & atorvastatin     Prophylaxis:  DVT: HSQ TID

## 2024-03-26 NOTE — DISCHARGE NOTE PROVIDER - CARE PROVIDERS DIRECT ADDRESSES
[email or web address],[email or web address]

## 2024-03-26 NOTE — DISCHARGE NOTE PROVIDER - NSDCCPCAREPLAN_GEN_ALL_CORE_FT
PRINCIPAL DISCHARGE DIAGNOSIS  Diagnosis: Toe gangrene  Assessment and Plan of Treatment: BL eschars and left foot 2nd digit dry gangrene w/ severe PVD  Vascular and Podiatry were consulted  At this time ; you will need an angiogram of your leg due to severe atherosclerosis causing poor circulation  Contrast Induced Kidney Injury  we have consulted Nephrology and held your home lasix  Please have your labs checked in 48 hours  You may resume your lasix at that time  Urinary Tract Infection:  Please complete the antibiotics as prescribed  Likely new onset of Vascular Dementia from previous strokes  Please continue the aspirin and plavix.   Please continue a statin as well  Severe Congestive Heart failure: your stress test was showing your heart is pumping at the capacity of 25 percent  Please followup with Cardiology  HTN  - c/w Toprol XL  HLD  - c/w atorvastatin  DM2  - c/w Lantus and SSI  - Hgb A1C is 8.8  Hx of Schizophrenia  - c/w Klonopin, risperidone & quetiapine  - as per psych, patient likely never had Schizophrenia and likely has a Mood Disorder/Anxiety spectrum symptoms with or without soft psychotic sxs w/ significant cerebral Vascular Disease, multiple CVAs and Neurocognitive deficits   - as of today, the patient does not appear to have decision making capacity in regards to surgery     Hx of CVA (with residual dysarthria and R. hemiparesis)  - c/w clopidogrel & atorvastatin   Prophylaxis:  DVT: HSQ TID        SECONDARY DISCHARGE DIAGNOSES  Diagnosis: UTI (urinary tract infection)  Assessment and Plan of Treatment:      PRINCIPAL DISCHARGE DIAGNOSIS  Diagnosis: Toe gangrene  Assessment and Plan of Treatment: podiatry will not surgically intervene on wounds. Wounds are stable with no wet changes. Wound care orders placed for daily application of betadine to bilateral foot wounds. No acute podiatric intervention planned at this time, please reconsult as needed. Patient is stable for discharge from podiatry standpoint. Wound care instruction and follow up information in discharge note provider. Patient will be monitored outpatient when discharged and will receive surgical intervention once revascularized.  Contrast Induced Kidney Injury  we have consulted Nephrology and held your home lasix  Please have your labs checked in 48 hours  You may resume your lasix at that time  Urinary Tract Infection:  Please complete the antibiotics as prescribed  Likely new onset of Vascular Dementia from previous strokes  Please continue the aspirin and plavix.   Please continue a statin as well  Severe Congestive Heart failure: your stress test was showing your heart is pumping at the capacity of 25 percent  Please followup with Cardiology  HTN  - c/w Toprol XL  HLD  - c/w atorvastatin  DM2  - c/w Lantus and SSI  - Hgb A1C is 8.8  Hx of Schizophrenia  - c/w Klonopin, risperidone & quetiapine  - as per psych, patient likely never had Schizophrenia and likely has a Mood Disorder/Anxiety spectrum symptoms with or without soft psychotic sxs w/ significant cerebral Vascular Disease, multiple CVAs and Neurocognitive deficits   - as of today, the patient does not appear to have decision making capacity in regards to surgery     Hx of CVA (with residual dysarthria and R. hemiparesis)  - c/w clopidogrel & atorvastatin   Prophylaxis:  DVT: HSQ TID        SECONDARY DISCHARGE DIAGNOSES  Diagnosis: UTI (urinary tract infection)  Assessment and Plan of Treatment:

## 2024-03-26 NOTE — DISCHARGE NOTE PROVIDER - HOSPITAL COURSE
63 year old female with history of HTN, HLD, IDDM2, anxiety/depression, schizophrenia, and hx of CVA (with residual dysarthria and R. hemiparesis) who presented to the ED due to change in mental state. Pt noted to have b/l foot wounds and is being admitted for further work up. Pt also being admitted for UTI and metabolic encephalopathy.    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 w/ severe PVD  - appreciate Podiatry consult - planning on local wound care vs left foot partial 2nd ray resection pending [facility] recs  - ABIs couldn't be obtained and US showed extensive bilateral plaque with diffuse low velocity monophasic flow throughout both legs w/ focally elevated velocities in the right popliteal and right mid posterior tibial arteries suggestive of stenoses  - CTA aorta with b/l LE runoff performed  - Angiogram cancelled today due to MARY ALICE 3/25    *  RIGHT LEG: Diffusely calcified but patent common femoral and femoral arteries. Suggestion of a focal stenosis in the popliteal artery above the knee. Infrapopliteal runoff is heavily calcified limiting evaluation.  *  LEFT LEG: Diffusely calcified but patent common femoral and femoral arteries. Suggestion of a focal stenosis in the popliteal artery above the knee. Infrapopliteal runoff is heavily calcified limiting evaluation.  - pain control  - stress test for pre op optimization, nonischemic, shows EF 32%, f/u further cardiac recs     #MARY ALICE  - MARY ALICE with worsening Scr now 3.96 ->3.4  - likely 2/2 to LOLLY, NSAID use, will dc other nephrotoxic agents ( dc lasix, vit c lovenox nsaids )  - nephrology consulted - started IV half NS      UTI   - worsening mental status and thus obtained repeat UA, seems to be positive, restarted abx 3/23   - continue ceftriaxone     metabolic encephalopathy  - may be due to UTI vs worsening structural damage   - CT head showed chronic bilateral basal ganglia infarctions and a chronic left corona radiata infarction w/ mild to moderate chronic microvascular changes without evidence of an acute transcortical infarction or hemorrhage  - as per psych, patient likely never had Schizophrenia and likely has a Mood Disorder/Anxiety spectrum symptoms with or without soft psychotic sxs w/ significant cerebral Vascular Disease, multiple CVAs and Neurocognitive deficits   - as of today, the patient does not appear to have decision making capacity in regards to surgery  - repeat CT head 3/24 for worsening mental status      Severe Truncal rash  - unclear etiology ? contact dermatitis, fungal etiology or exanthem     HTN  - c/w Toprol XL    HLD  - c/w atorvastatin    DM2  - c/w Lantus and SSI  - Hgb A1C is 8.8    Hx of Schizophrenia  - c/w Klonopin, risperidone & quetiapine  - as per psych, patient likely never had Schizophrenia and likely has a Mood Disorder/Anxiety spectrum symptoms with or without soft psychotic sxs w/ significant cerebral Vascular Disease, multiple CVAs and Neurocognitive deficits   - as of today, the patient does not appear to have decision making capacity in regards to surgery     Hx of CVA (with residual dysarthria and R. hemiparesis)  - c/w clopidogrel & atorvastatin     Prophylaxis:  DVT: HSQ TID     63 year old female with history of HTN, HLD, IDDM2, anxiety/depression, schizophrenia, and hx of CVA (with residual dysarthria and R. hemiparesis) who presented to the ED due to change in mental state. Pt noted to have b/l foot wounds and is being admitted for further work up. Pt also being admitted for UTI and metabolic encephalopathy.  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 podiatry consulted. vascular consulted. BLAIR's unable to obtain but US showed   extensive bilateral plaque with diffuse low velocity monophasic flow throughout both legs w/ focally elevated velocities in the right popliteal and right mid posterior tibial arteries suggestive of stenoses. CTA aorta with b/l LE runoff showed Diffusely calcified but patent common femoral and femoral arteries. Suggestion of a focal stenosis in the popliteal artery above the knee. Infrapopliteal runoff is heavily calcified limiting evaluation. Patient was schedule to have lower extremity angiogram however cancelled due to new MARY ALICE secondary to LOLLY, NSAID use, will dc other nephrotoxic agents ( dc lasix, vit c lovenox nsaids ) for which nephrology was consulted. MARY ALICE did improve however has not yet returned to baseline function. Vascular was reconsulted and deferred angiogram to IR. IR was therefor consulted however since patient is sill recovering her renal function, angiogram will be deferred to outpatient study when her renal function returns to baseline. Podiatry recommended continue wound management conservatively, no surgical intervention. Patient found to have UTI, started on rocephin. CT head performed for encephalopathy showing showed chronic bilateral basal ganglia infarctions and a chronic left corona radiata infarction w/ mild to moderate chronic microvascular changes without evidence of an acute transcortical infarction or hemorrhage. Psych was consulted and stated patient likely never had Schizophrenia and likely has a Mood Disorder/Anxiety spectrum symptoms with or without soft psychotic sxs w/ significant cerebral Vascular Disease, multiple CVAs and Neurocognitive deficits. Psych also stated patient did not have medical capacity to make decisions regarding surgery and decision are to be deferred to his children.     Patient stable for discharge today, discussed with Dr. Hernandez.    63 year old female with history of HTN, HLD, IDDM2, anxiety/depression, schizophrenia, and hx of CVA (with residual dysarthria and R. hemiparesis) who presented to the ED due to change in mental state. Pt noted to have b/l foot wounds and is being admitted for further work up. Pt also being admitted for UTI and metabolic encephalopathy.  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 podiatry consulted. vascular consulted. BLAIR's unable to obtain but US showed   extensive bilateral plaque with diffuse low velocity monophasic flow throughout both legs w/ focally elevated velocities in the right popliteal and right mid posterior tibial arteries suggestive of stenoses. CTA aorta with b/l LE runoff showed Diffusely calcified but patent common femoral and femoral arteries. Suggestion of a focal stenosis in the popliteal artery above the knee. Infrapopliteal runoff is heavily calcified limiting evaluation. Patient was schedule to have lower extremity angiogram however cancelled due to new MARY ALICE secondary to LOLLY, NSAID use, will dc other nephrotoxic agents ( dc lasix, vit c lovenox nsaids ) for which nephrology was consulted. MARY ALICE did improve however has not yet returned to baseline function. Vascular was reconsulted and deferred angiogram to IR. IR was therefor consulted however since patient is sill recovering her renal function, angiogram will be deferred to outpatient study with vascular when her renal function returns to baseline. Podiatry recommended continue wound management conservatively, no surgical intervention. Patient found to have UTI, started on rocephin. CT head performed for encephalopathy showing showed chronic bilateral basal ganglia infarctions and a chronic left corona radiata infarction w/ mild to moderate chronic microvascular changes without evidence of an acute transcortical infarction or hemorrhage. Psych was consulted and stated patient likely never had Schizophrenia and likely has a Mood Disorder/Anxiety spectrum symptoms with or without soft psychotic sxs w/ significant cerebral Vascular Disease, multiple CVAs and Neurocognitive deficits. Psych also stated patient did not have medical capacity to make decisions regarding surgery and decision are to be deferred to his children.     Patient stable for discharge today, discussed with Dr. Hernandez.

## 2024-03-26 NOTE — PROGRESS NOTE ADULT - ASSESSMENT
63F presents with BL eschars and left foot 2nd digit dry gangrene.  - Patient seen and evaluated.  - Afebrile, no leukocytosis  - Right foot posterior lateral heel well adhered eschar, no drainage, no bogginess, no fluctuance, no malodor, no tracking or tunneling, no acute signs of infection. Left foot hallux distal tuft dry eschar,  2nd digit dry gangrene, no drainage, no bogginess, no fluctuance, no malodor, no tracking or tunneling, no acute signs of infection.  - BL Foot X-Ray: No gas, no OM.  - No culture 2/2 no acute signs of infection.  - Vasc recs, appreciated.  - BLAIR/ PVR: BL BLAIR unobtainable, BL waveforms diminished below the knee.  - Recommend BL Z-flows at all times while in bed or resting in chair.  - Pod Plan: Local wound care vs left foot partial 2nd ray resection pending vasc recs.  - Please document medical clearance for possible podiatric procedure under anesthesia.  - Seen with attending

## 2024-03-26 NOTE — PROGRESS NOTE ADULT - SUBJECTIVE AND OBJECTIVE BOX
patient seen and examined  no overnight events  CT head unrevealing for new stroke  hemiparesis persists  Cr is stabilizing although (ACE and lasix on hold)    Review of Systems:  General:denies fever chills, headache, weakness  HEENT: denies blurry vision,diffculty swallowing, difficulty hearing, tinnitus  Cardiovascular: denies chest pain  ,palpitations  Pulmonary:denies shortness of breath, cough, wheezing, hemoptysis  Gastrointestinal: denies abdominal pain, constipation, diarrhea,nausea , vomiting, hematochezia  : denies hematuria, dysuria, or incontinence  Neurological: denies weakness, numbness , tingling, dizziness, tremors  MSK: denies muscle pain, difficulty ambulating, swelling, back pain  skin: denies skin rash, itching, burning, or  skin lesions  Psychiatrical: denies mood disturbances, anxierty, feeling depressed, depression , or difficulty sleeping    Objective:  Vitals  T(C): 36.7 (03-26-24 @ 11:05), Max: 37.3 (03-25-24 @ 16:58)  HR: 99 (03-26-24 @ 11:05) (93 - 99)  BP: 140/75 (03-26-24 @ 11:05) (110/58 - 173/82)  RR: 18 (03-26-24 @ 11:05) (18 - 18)  SpO2: 95% (03-26-24 @ 11:05) (94% - 97%)    Physical Exam:  General: comfortable, no acute distress  HEENT: Atraumatic, no LAD, trachea midline, PERRLA  Cardiovascular: normal s1s2, no murmurs, gallops or fricition rubs  Pulmonary: clear to ausculation Bilaterally, no wheezing , rhonchi  Gastrointestinal: soft non tender non distended, no masses felt, no organomegally  Muscloskeletal: no lower extremity edema, intact bilateral lower extremity pulses  Neurological: CN II-12 intact. No focal weakness  Psychiatrical: normal mood, cooperative  SKIN: no rash, lesions or ulcers    Labs:                          8.6    6.09  )-----------( 202      ( 26 Mar 2024 08:50 )             27.6     03-26    137  |  105  |  23  ----------------------------<  115<H>  3.1<L>   |  22  |  1.83<H>    Ca    7.8<L>      26 Mar 2024 08:50              Active Medications  MEDICATIONS  (STANDING):  atorvastatin 80 milliGRAM(s) Oral at bedtime  cefTRIAXone   IVPB 1000 milliGRAM(s) IV Intermittent every 24 hours  clopidogrel Tablet 75 milliGRAM(s) Oral daily  dextrose 5%. 1000 milliLiter(s) (100 mL/Hr) IV Continuous <Continuous>  dextrose 5%. 1000 milliLiter(s) (50 mL/Hr) IV Continuous <Continuous>  dextrose 50% Injectable 25 Gram(s) IV Push once  dextrose 50% Injectable 12.5 Gram(s) IV Push once  dextrose 50% Injectable 25 Gram(s) IV Push once  folic acid 1 milliGRAM(s) Oral daily  glucagon  Injectable 1 milliGRAM(s) IntraMuscular once  insulin glargine Injectable (LANTUS) 10 Unit(s) SubCutaneous at bedtime  insulin lispro (ADMELOG) corrective regimen sliding scale   SubCutaneous three times a day before meals  insulin lispro (ADMELOG) corrective regimen sliding scale   SubCutaneous at bedtime  metoprolol succinate ER 50 milliGRAM(s) Oral daily  nystatin Powder 1 Application(s) Topical two times a day  QUEtiapine 400 milliGRAM(s) Oral at bedtime  risperiDONE   Tablet 2 milliGRAM(s) Oral at bedtime  sodium chloride 0.45%. 1000 milliLiter(s) (60 mL/Hr) IV Continuous <Continuous>    MEDICATIONS  (PRN):  dextrose Oral Gel 15 Gram(s) Oral once PRN Blood Glucose LESS THAN 70 milliGRAM(s)/deciliter

## 2024-03-26 NOTE — PROGRESS NOTE ADULT - SUBJECTIVE AND OBJECTIVE BOX
RTW/negative/no exposure emails sent.     No longer actively monitoring.    Patient is a 63y old  Female who presents with a chief complaint of PVD, Heel wounds, UTI (25 Mar 2024 20:20)       INTERVAL HPI/OVERNIGHT EVENTS:  Patient seen and evaluated at bedside.  Pt is resting comfortable in NAD. Denies N/V/F/C.    Allergies    erythromycin (Unknown)    Intolerances        Vital Signs Last 24 Hrs  T(C): 36.9 (26 Mar 2024 05:09), Max: 37.3 (25 Mar 2024 16:58)  T(F): 98.4 (26 Mar 2024 05:09), Max: 99.2 (25 Mar 2024 16:58)  HR: 95 (26 Mar 2024 05:09) (93 - 95)  BP: 162/69 (26 Mar 2024 05:09) (110/58 - 173/82)  BP(mean): --  RR: 18 (26 Mar 2024 05:09) (18 - 18)  SpO2: 97% (26 Mar 2024 05:09) (94% - 97%)    Parameters below as of 26 Mar 2024 05:09  Patient On (Oxygen Delivery Method): room air        LABS:    03-25    147<H>  |  110<H>  |  39<H>  ----------------------------<  109<H>  3.9   |  23  |  3.24<H>    Ca    8.6      25 Mar 2024 10:50        Urinalysis Basic - ( 25 Mar 2024 10:50 )    Color: x / Appearance: x / SG: x / pH: x  Gluc: 109 mg/dL / Ketone: x  / Bili: x / Urobili: x   Blood: x / Protein: x / Nitrite: x   Leuk Esterase: x / RBC: x / WBC x   Sq Epi: x / Non Sq Epi: x / Bacteria: x      CAPILLARY BLOOD GLUCOSE      POCT Blood Glucose.: 137 mg/dL (26 Mar 2024 07:34)  POCT Blood Glucose.: 194 mg/dL (25 Mar 2024 21:16)  POCT Blood Glucose.: 144 mg/dL (25 Mar 2024 16:22)  POCT Blood Glucose.: 132 mg/dL (25 Mar 2024 11:38)  POCT Blood Glucose.: 106 mg/dL (25 Mar 2024 08:27)      Lower Extremity Physical Exam:  Vascular: DP/PT 0/4, B/L, CFT <3seconds B/L, Temperature gradient warm to cool, B/L.   Neuro: Unable to assess  Musculoskeletal/Ortho: unremarkable  Skin: Right foot posterior lateral heel well adhered eschar, no drainage, no bogginess, no fluctuance, no malodor, no tracking or tunneling, no acute signs of infection. Left foot hallux distal tuft dry eschar,  2nd digit dry gangrene, no drainage, no bogginess, no fluctuance, no malodor, no tracking or tunneling, no acute signs of infectio    RADIOLOGY & ADDITIONAL TESTS:

## 2024-03-26 NOTE — PROGRESS NOTE ADULT - PROVIDER SPECIALTY LIST ADULT
Hospitalist
Internal Medicine
Nephrology
Podiatry
Cardiology
Hospitalist
Internal Medicine
Nephrology
Podiatry
Podiatry
Nephrology
Nephrology
Vascular Surgery
Hospitalist
Podiatry
Vascular Surgery
Hospitalist

## 2024-03-26 NOTE — DISCHARGE NOTE PROVIDER - ATTENDING DISCHARGE PHYSICAL EXAMINATION:
VITALS:   T(C): 36.7 (03-27-24 @ 11:16), Max: 37.8 (03-26-24 @ 17:49)  HR: 80 (03-27-24 @ 11:16) (80 - 96)  BP: 180/70 (03-27-24 @ 11:16) (159/79 - 182/80)  RR: 18 (03-27-24 @ 11:16) (17 - 18)  SpO2: 97% (03-27-24 @ 11:16) (94% - 100%)    GENERAL: NAD, lying in bed comfortably  HEAD:  Atraumatic, Normocephalic  CHEST/LUNG: Clear to auscultation bilaterally; Unlabored respirations  HEART: Regular rate and rhythm; No murmurs, rubs, or gallops  ABDOMEN: BSx4; Soft, nontender, nondistended  NERVOUS SYSTEM:  nonverbal

## 2024-03-26 NOTE — PROGRESS NOTE ADULT - REASON FOR ADMISSION
PVD, Heel wounds, UTI

## 2024-03-26 NOTE — DISCHARGE NOTE PROVIDER - CARE PROVIDER_API CALL
Tatiana Bullock  Vascular Surgery  [address], [address]  [place] [address]  Phone: [phone]  Fax: [phone]  Follow Up Time: 1 week    Feliz Santos  Nephrology  [address], [address]  [place] [address]  Phone: [phone]  Fax: [phone]  Follow Up Time: 1 week    Martha Garcia  Neurology  [facility] [place] [address]  Phone: [phone]  Fax: [phone]  Follow Up Time: 1 week   Tatiana Bullock  Vascular Surgery  [address] [facility] [address] [facility], [address]  [place] [address]  Phone: [phone]  Fax: [phone]  Follow Up Time: 1 week    Feliz Santos  Nephrology  [address], [address]  [place] [address]  Phone: [phone]  Fax: [phone]  Follow Up Time: 1 week    Martha Garcia  Neurology  [address] [place] [address]  Phone: [phone]  Fax: [phone]  Follow Up Time: 1 week    Niru Sargent  Podiatric Medicine and Surgery  [address] [facility] [address] [facility], [address]  [place] [address]  Phone: [phone]  Fax: [phone]  Follow Up Time:

## 2024-03-27 ENCOUNTER — TRANSCRIPTION ENCOUNTER (OUTPATIENT)
Age: 64
End: 2024-03-27

## 2024-03-27 VITALS — DIASTOLIC BLOOD PRESSURE: 72 MMHG | HEART RATE: 86 BPM | SYSTOLIC BLOOD PRESSURE: 148 MMHG

## 2024-03-27 LAB
% ALBUMIN: 47.8 % — SIGNIFICANT CHANGE UP
% ALPHA 1: 7.5 % — SIGNIFICANT CHANGE UP
% ALPHA 2: 19.9 % — SIGNIFICANT CHANGE UP
% BETA: 13.7 % — SIGNIFICANT CHANGE UP
% GAMMA: 11.1 % — SIGNIFICANT CHANGE UP
ALBUMIN SERPL ELPH-MCNC: 2.2 G/DL — LOW (ref 3.6–5.5)
ALBUMIN/GLOB SERPL ELPH: 0.9 RATIO — SIGNIFICANT CHANGE UP
ALPHA1 GLOB SERPL ELPH-MCNC: 0.4 G/DL — SIGNIFICANT CHANGE UP (ref 0.1–0.4)
ALPHA2 GLOB SERPL ELPH-MCNC: 0.9 G/DL — SIGNIFICANT CHANGE UP (ref 0.5–1)
ANION GAP SERPL CALC-SCNC: 10 MMOL/L — SIGNIFICANT CHANGE UP (ref 5–17)
B-GLOBULIN SERPL ELPH-MCNC: 0.6 G/DL — SIGNIFICANT CHANGE UP (ref 0.5–1)
BUN SERPL-MCNC: 16 MG/DL — SIGNIFICANT CHANGE UP (ref 7–23)
CALCIUM SERPL-MCNC: 8.8 MG/DL — SIGNIFICANT CHANGE UP (ref 8.5–10.1)
CHLORIDE SERPL-SCNC: 108 MMOL/L — SIGNIFICANT CHANGE UP (ref 96–108)
CO2 SERPL-SCNC: 24 MMOL/L — SIGNIFICANT CHANGE UP (ref 22–31)
CREAT SERPL-MCNC: 1.4 MG/DL — HIGH (ref 0.5–1.3)
EGFR: 42 ML/MIN/1.73M2 — LOW
GAMMA GLOBULIN: 0.5 G/DL — LOW (ref 0.6–1.6)
GLUCOSE BLDC GLUCOMTR-MCNC: 140 MG/DL — HIGH (ref 70–99)
GLUCOSE BLDC GLUCOMTR-MCNC: 151 MG/DL — HIGH (ref 70–99)
GLUCOSE SERPL-MCNC: 139 MG/DL — HIGH (ref 70–99)
HCT VFR BLD CALC: 30.6 % — LOW (ref 34.5–45)
HGB BLD-MCNC: 10.1 G/DL — LOW (ref 11.5–15.5)
INTERPRETATION SERPL IFE-IMP: SIGNIFICANT CHANGE UP
MCHC RBC-ENTMCNC: 31.1 PG — SIGNIFICANT CHANGE UP (ref 27–34)
MCHC RBC-ENTMCNC: 33 G/DL — SIGNIFICANT CHANGE UP (ref 32–36)
MCV RBC AUTO: 94.2 FL — SIGNIFICANT CHANGE UP (ref 80–100)
NRBC # BLD: 0 /100 WBCS — SIGNIFICANT CHANGE UP (ref 0–0)
PLATELET # BLD AUTO: 257 K/UL — SIGNIFICANT CHANGE UP (ref 150–400)
POTASSIUM SERPL-MCNC: 3.3 MMOL/L — LOW (ref 3.5–5.3)
POTASSIUM SERPL-SCNC: 3.3 MMOL/L — LOW (ref 3.5–5.3)
PROT PATTERN SERPL ELPH-IMP: SIGNIFICANT CHANGE UP
PROT SERPL-MCNC: 4.7 G/DL — LOW (ref 6–8.3)
RBC # BLD: 3.25 M/UL — LOW (ref 3.8–5.2)
RBC # FLD: 16.8 % — HIGH (ref 10.3–14.5)
SODIUM SERPL-SCNC: 142 MMOL/L — SIGNIFICANT CHANGE UP (ref 135–145)
WBC # BLD: 6.99 K/UL — SIGNIFICANT CHANGE UP (ref 3.8–10.5)
WBC # FLD AUTO: 6.99 K/UL — SIGNIFICANT CHANGE UP (ref 3.8–10.5)

## 2024-03-27 PROCEDURE — 99239 HOSP IP/OBS DSCHRG MGMT >30: CPT

## 2024-03-27 RX ORDER — POTASSIUM CHLORIDE 20 MEQ
40 PACKET (EA) ORAL ONCE
Refills: 0 | Status: COMPLETED | OUTPATIENT
Start: 2024-03-27 | End: 2024-03-27

## 2024-03-27 RX ORDER — POTASSIUM CHLORIDE 20 MEQ
40 PACKET (EA) ORAL ONCE
Refills: 0 | Status: DISCONTINUED | OUTPATIENT
Start: 2024-03-27 | End: 2024-03-27

## 2024-03-27 RX ORDER — FUROSEMIDE 40 MG
1 TABLET ORAL
Qty: 30 | Refills: 0
Start: 2024-03-27 | End: 2024-04-25

## 2024-03-27 RX ADMIN — Medication 40 MILLIEQUIVALENT(S): at 12:04

## 2024-03-27 RX ADMIN — Medication 1 MILLIGRAM(S): at 12:04

## 2024-03-27 RX ADMIN — CLOPIDOGREL BISULFATE 75 MILLIGRAM(S): 75 TABLET, FILM COATED ORAL at 12:04

## 2024-03-27 RX ADMIN — SODIUM CHLORIDE 60 MILLILITER(S): 9 INJECTION, SOLUTION INTRAVENOUS at 05:52

## 2024-03-27 RX ADMIN — NYSTATIN CREAM 1 APPLICATION(S): 100000 CREAM TOPICAL at 05:53

## 2024-03-27 RX ADMIN — Medication 50 MILLIGRAM(S): at 05:53

## 2024-03-27 RX ADMIN — Medication 2: at 12:04

## 2024-03-27 NOTE — CHART NOTE - NSCHARTNOTEFT_GEN_A_CORE
As patient is no longer having revascularization of lower extremities with IR/Vasc, podiatry will not surgically intervene on wounds. Wounds are stable with no wet changes. Wound care orders placed for daily application of betadine to bilateral foot wounds. No acute podiatric intervention planned at this time, please reconsult as needed. Patient is stable for discharge from podiatry standpoint. Wound care instruction and follow up information in discharge note provider. Patient will be monitored outpatient when discharged and will receive surgical intervention once revascularized.

## 2024-03-27 NOTE — CHART NOTE - NSCHARTNOTEFT_GEN_A_CORE
Interventional Radiology Consult Note    63 year old female with history of HTN, HLD, IDDM2, anxiety/depression, schizophrenia, and hx of CVA (with residual dysarthria and R. hemiparesis) who presented to the ED due to change in mental state. Pt noted to have b/l foot wounds and is being admitted for further work up. Pt also being admitted for UTI and metabolic encephalopathy. 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 ABIs couldn't be obtained and US showed extensive bilateral plaque with diffuse low velocity monophasic flow throughout both legs w/ focally elevated velocities in the right popliteal and right mid posterior tibial arteries suggestive of stenoses. CTA aorta with b/l LE runoff performed. Vascular away this week, requested IR to perform lower extremity angiogram.   After CTA, patient with contrast MARY ALICE exacerbated by Lasix, Toradol. Cr peaked to 3.9. Nephrology clearance still pending.     - Case reviewed with Dr. Orozco  - Patient with chronic tissue process not requiring urgent intervention, still recovering from MARY ALICE. Per Dr. Orozco, would hold off on inpatient lower extermity angiogram to allow for full renal recovery.   - Would recommend outpatient angiogram with vascular surgery once patient has recovered from MARY ALICE.   - No IR intervention at this time.       Sanya Fox PA-C   Interventional Radiology   Available on TEAMs Interventional Radiology Consult Note    63 year old female with history of HTN, HLD, IDDM2, anxiety/depression, schizophrenia, and hx of CVA (with residual dysarthria and R. hemiparesis) who presented to the ED due to change in mental state. Pt noted to have b/l foot wounds and is being admitted for further work up. Pt also being admitted for UTI and metabolic encephalopathy. 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 ABIs couldn't be obtained and US showed extensive bilateral plaque with diffuse low velocity monophasic flow throughout both legs w/ focally elevated velocities in the right popliteal and right mid posterior tibial arteries suggestive of stenoses. CTA aorta with b/l LE runoff performed. Vascular away this week, requested IR to perform lower extremity angiogram.   After CTA, patient with contrast MARY ALICE exacerbated by Lasix, Toradol. Cr peaked to 3.9. Nephrology clearance still pending.     - Case reviewed with Dr. Orozco  - Patient with chronic tissue process not requiring urgent intervention, still recovering from MARY ALICE. Per Dr. Orozco, would hold off on inpatient lower extermity angiogram to allow for full renal recovery.   - Would recommend outpatient angiogram with vascular surgery once patient has recovered from MARY ALICE.   - No IR intervention at this time.       Sanya Fox PA-C   Interventional Radiology   Available on TEAMs    IR attending.  Agree with above assessment and plan.  ANgiogram is elective and should be done once renal function has fully resolved.

## 2024-03-27 NOTE — DISCHARGE NOTE NURSING/CASE MANAGEMENT/SOCIAL WORK - NSDCFUADDAPPT_GEN_ALL_CORE_FT
Podiatry Discharge Instructions:  Follow up: Please follow up with Dr. Sargent within 1 week of discharge from the hospital, please call [phone] for appointment and discuss that you recently were seen in the hospital.  Wound Care: Please apply betadine to bilateral foot wounds followed by 4x4 gauze, ABD pad, and kerlix daily  Weight bearing: Please weight bear as tolerated in a surgical shoe.  Antibiotics: Please continue as instructed.

## 2024-03-27 NOTE — DISCHARGE NOTE NURSING/CASE MANAGEMENT/SOCIAL WORK - NSDCPNINST_GEN_ALL_CORE
Telephone call to pts Susie howell. Understanding of discharge instructions, medications and scheduling follow up appointments verbalized

## 2024-03-27 NOTE — CHART NOTE - NSCHARTNOTEFT_GEN_A_CORE
Discussed vascular plan with Dr. Christina.  If patient remains admitted, will plan for revascularization procedure once renal function returns to baseline.   Continue care per primary team.  Wound care orders as per podiatry recs.

## 2024-03-27 NOTE — DISCHARGE NOTE NURSING/CASE MANAGEMENT/SOCIAL WORK - PATIENT PORTAL LINK FT
You can access the FollowMyHealth Patient Portal offered by [facility] by registering at the following website: [email or web address]. By joining [facility]s FollowMyHealth portal, you will also be able to view your health information using other applications (apps) compatible with our system.

## 2024-04-02 DIAGNOSIS — N17.9 ACUTE KIDNEY FAILURE, UNSPECIFIED: ICD-10-CM

## 2024-04-02 DIAGNOSIS — L97.419 NON-PRESSURE CHRONIC ULCER OF RIGHT HEEL AND MIDFOOT WITH UNSPECIFIED SEVERITY: ICD-10-CM

## 2024-04-02 DIAGNOSIS — I70.202 UNSPECIFIED ATHEROSCLEROSIS OF NATIVE ARTERIES OF EXTREMITIES, LEFT LEG: ICD-10-CM

## 2024-04-02 DIAGNOSIS — I96 GANGRENE, NOT ELSEWHERE CLASSIFIED: ICD-10-CM

## 2024-04-02 DIAGNOSIS — G93.41 METABOLIC ENCEPHALOPATHY: ICD-10-CM

## 2024-04-02 DIAGNOSIS — L97.429 NON-PRESSURE CHRONIC ULCER OF LEFT HEEL AND MIDFOOT WITH UNSPECIFIED SEVERITY: ICD-10-CM

## 2024-04-02 DIAGNOSIS — Z79.4 LONG TERM (CURRENT) USE OF INSULIN: ICD-10-CM

## 2024-04-02 DIAGNOSIS — E11.621 TYPE 2 DIABETES MELLITUS WITH FOOT ULCER: ICD-10-CM

## 2024-04-02 DIAGNOSIS — I69.354 HEMIPLEGIA AND HEMIPARESIS FOLLOWING CEREBRAL INFARCTION AFFECTING LEFT NON-DOMINANT SIDE: ICD-10-CM

## 2024-04-02 DIAGNOSIS — N39.0 URINARY TRACT INFECTION, SITE NOT SPECIFIED: ICD-10-CM

## 2024-04-02 DIAGNOSIS — I10 ESSENTIAL (PRIMARY) HYPERTENSION: ICD-10-CM

## 2024-04-02 DIAGNOSIS — E11.52 TYPE 2 DIABETES MELLITUS WITH DIABETIC PERIPHERAL ANGIOPATHY WITH GANGRENE: ICD-10-CM

## 2024-04-02 DIAGNOSIS — E78.5 HYPERLIPIDEMIA, UNSPECIFIED: ICD-10-CM

## 2024-04-02 DIAGNOSIS — F41.9 ANXIETY DISORDER, UNSPECIFIED: ICD-10-CM

## 2024-04-02 DIAGNOSIS — F32.A DEPRESSION, UNSPECIFIED: ICD-10-CM

## 2024-04-04 ENCOUNTER — APPOINTMENT (OUTPATIENT)
Dept: INTERVENTIONAL RADIOLOGY/VASCULAR | Facility: HOSPITAL | Age: 64
End: 2024-04-04

## 2024-04-11 NOTE — PATIENT PROFILE ADULT - NSPROGENSOURCEINFO_GEN_A_NUR
Health Maintenance Due   Topic Date Due    Hepatitis B Vaccine (1 of 3 - 19+ 3-dose series) Never done    COVID-19 Vaccine (3 - 2023-24 season) 09/01/2023    Depression Screening  10/14/2023    Breast Cancer Screening  12/21/2023       Patient is due for the topics as listed above and wishes to proceed with them. Orders placed for Depression Screening  and Mammogram. Education provided for Immunization(s) COVID-19 and Hep B.   patient

## 2024-06-03 NOTE — ED PROVIDER NOTE - PROGRESS NOTE DETAILS
03-Jun-2024 05:30 Attending Tho: received sign out pending imaging and vasc recs. imaging showing decreased blood flow in lower extremities. Podiatry recommended no interventions on their behalf. Seen by vascular (day ACP did not have chance to present to attending, stated overnight ACP would see pt and write note) and recommended admission and attending will evaluate tomorrow. UA also positive, abx ordered. Endorsed to Dr. Hurst. Update family member Jordan.

## 2024-12-09 NOTE — CHART NOTE - NSCHARTNOTEFT_GEN_A_CORE
EVENT: Received telephone call from RN that pt's blood sugar is 473. Has Lantus 18 Units ordered for bedtime.    HPI: Bushra Haley is a 62 year old female with PMHx of HTN, HLD, IDDM2, anxiety/depression, schizophrenia, and hx of CVA (with residual dysarthria and R. hemiparesis) who presented to the ED on 11/19/23 for complaints of back pain and admitted for sepsis secondary to UTI.    SUBJECTIVE: n/a    OBJECTIVE:  Vital Signs Last 24 Hrs  T(C): 37 (22 Nov 2023 19:16), Max: 37 (22 Nov 2023 19:16)  T(F): 98.6 (22 Nov 2023 19:16), Max: 98.6 (22 Nov 2023 19:16)  HR: 95 (22 Nov 2023 19:16) (82 - 95)  BP: 137/60 (22 Nov 2023 19:16) (132/58 - 154/64)  BP(mean): --  RR: 17 (22 Nov 2023 19:16) (17 - 18)  SpO2: 95% (22 Nov 2023 19:16) (93% - 97%)    Parameters below as of 22 Nov 2023 19:16  Patient On (Oxygen Delivery Method): room air      FOCUSED PHYSICAL EXAM:  Neuro: awake, alert, oriented x 3. No neuro deficit  Cardiovascular: Pulses +2 B/L in lower and upper extremities, HR regular, BP stable, No edema.  Respiratory: Respirations regular, unlabored, breath sounds clear B/L.   GI: Abdomen soft, non-tender, positive bowel sounds.  : no bladder distention noted. No complaints at this time.  Skin: Dry, intact, no bruising, no diaphoresis.    LABS:                        10.3   6.19  )-----------( 165      ( 22 Nov 2023 05:34 )             32.0     11-22    138  |  103  |  35<H>  ----------------------------<  355<H>  3.8   |  29  |  1.02    Ca    8.2<L>      22 Nov 2023 05:34        EKG:   IMAGING:    ASSESSMENT/PROBLEM: Hyperglycemia      PLAN:   1. Lispro 5 units, SQ x 1 dose ordered stat  2. Gave Lantus as ordered  3. Monitor response to treatment  4. Cont present care/treatment  5. Supportive care    FOLLOW UP / RESULT:
show
